# Patient Record
Sex: MALE | Employment: OTHER | URBAN - METROPOLITAN AREA
[De-identification: names, ages, dates, MRNs, and addresses within clinical notes are randomized per-mention and may not be internally consistent; named-entity substitution may affect disease eponyms.]

---

## 2024-11-18 ENCOUNTER — HOSPITAL ENCOUNTER (INPATIENT)
Facility: HOSPITAL | Age: 74
End: 2024-11-18
Attending: INTERNAL MEDICINE | Admitting: INTERNAL MEDICINE
Payer: MEDICARE

## 2024-11-18 ENCOUNTER — APPOINTMENT (OUTPATIENT)
Dept: RADIOLOGY | Facility: HOSPITAL | Age: 74
DRG: 235 | End: 2024-11-18
Payer: MEDICARE

## 2024-11-18 ENCOUNTER — APPOINTMENT (OUTPATIENT)
Dept: CARDIOLOGY | Facility: HOSPITAL | Age: 74
DRG: 235 | End: 2024-11-18
Payer: MEDICARE

## 2024-11-18 ENCOUNTER — APPOINTMENT (OUTPATIENT)
Dept: CARDIOLOGY | Facility: HOSPITAL | Age: 74
End: 2024-11-18
Payer: MEDICARE

## 2024-11-18 DIAGNOSIS — D64.9 ANEMIA, UNSPECIFIED TYPE: ICD-10-CM

## 2024-11-18 DIAGNOSIS — R07.9 CHEST PAIN, UNSPECIFIED: ICD-10-CM

## 2024-11-18 DIAGNOSIS — I21.21 ST ELEVATION MYOCARDIAL INFARCTION INVOLVING LEFT CIRCUMFLEX CORONARY ARTERY (MULTI): ICD-10-CM

## 2024-11-18 DIAGNOSIS — I21.02 ST ELEVATION MYOCARDIAL INFARCTION INVOLVING LEFT ANTERIOR DESCENDING (LAD) CORONARY ARTERY (MULTI): ICD-10-CM

## 2024-11-18 DIAGNOSIS — N17.9 ACUTE KIDNEY INJURY (CMS-HCC): ICD-10-CM

## 2024-11-18 DIAGNOSIS — R09.89 OTHER SPECIFIED SYMPTOMS AND SIGNS INVOLVING THE CIRCULATORY AND RESPIRATORY SYSTEMS: ICD-10-CM

## 2024-11-18 DIAGNOSIS — I73.9 PERIPHERAL VASCULAR DISEASE, UNSPECIFIED (CMS-HCC): ICD-10-CM

## 2024-11-18 DIAGNOSIS — I21.3 STEMI (ST ELEVATION MYOCARDIAL INFARCTION) (MULTI): Primary | ICD-10-CM

## 2024-11-18 DIAGNOSIS — I25.701: ICD-10-CM

## 2024-11-18 DIAGNOSIS — I31.4 CARDIAC TAMPONADE: ICD-10-CM

## 2024-11-18 DIAGNOSIS — D50.9 IRON DEFICIENCY ANEMIA, UNSPECIFIED IRON DEFICIENCY ANEMIA TYPE: ICD-10-CM

## 2024-11-18 DIAGNOSIS — R57.9 SHOCK (MULTI): ICD-10-CM

## 2024-11-18 DIAGNOSIS — R57.0 CARDIOGENIC SHOCK (MULTI): ICD-10-CM

## 2024-11-18 DIAGNOSIS — N17.0 ACUTE KIDNEY INJURY (AKI) WITH ACUTE TUBULAR NECROSIS (ATN) (CMS-HCC): ICD-10-CM

## 2024-11-18 DIAGNOSIS — Z95.1 S/P CABG X 4: ICD-10-CM

## 2024-11-18 DIAGNOSIS — I79.8 OTHER DISORDERS OF ARTERIES, ARTERIOLES AND CAPILLARIES IN DISEASES CLASSIFIED ELSEWHERE: ICD-10-CM

## 2024-11-18 DIAGNOSIS — Z01.818 ENCOUNTER FOR OTHER PREPROCEDURAL EXAMINATION: ICD-10-CM

## 2024-11-18 LAB
ABO GROUP (TYPE) IN BLOOD: NORMAL
ALBUMIN SERPL BCP-MCNC: 3.9 G/DL (ref 3.4–5)
AMPHETAMINES UR QL SCN: ABNORMAL
ANION GAP BLDA CALCULATED.4IONS-SCNC: 12 MMO/L (ref 10–25)
ANION GAP SERPL CALC-SCNC: 16 MMOL/L (ref 10–20)
ANTIBODY SCREEN: NORMAL
AORTIC VALVE PEAK VELOCITY: 1.64 M/S
APTT PPP: 43 SECONDS (ref 27–38)
ATRIAL RATE: 75 BPM
AV PEAK GRADIENT: 11 MMHG
AVA (PEAK VEL): 2.06 CM2
BARBITURATES UR QL SCN: ABNORMAL
BASE EXCESS BLDA CALC-SCNC: 3.1 MMOL/L (ref -2–3)
BASOPHILS # BLD AUTO: 0.04 X10*3/UL (ref 0–0.1)
BASOPHILS NFR BLD AUTO: 0.3 %
BENZODIAZ UR QL SCN: ABNORMAL
BNP SERPL-MCNC: 1470 PG/ML (ref 0–99)
BODY TEMPERATURE: 37 DEGREES CELSIUS
BUN SERPL-MCNC: 21 MG/DL (ref 6–23)
BZE UR QL SCN: ABNORMAL
CA-I BLDA-SCNC: 1.13 MMOL/L (ref 1.1–1.33)
CALCIUM SERPL-MCNC: 9 MG/DL (ref 8.6–10.6)
CANNABINOIDS UR QL SCN: ABNORMAL
CARDIAC TROPONIN I PNL SERPL HS: ABNORMAL NG/L (ref 0–53)
CARDIAC TROPONIN I PNL SERPL HS: ABNORMAL NG/L (ref 0–53)
CHLORIDE BLDA-SCNC: 93 MMOL/L (ref 98–107)
CHLORIDE SERPL-SCNC: 94 MMOL/L (ref 98–107)
CHOLEST SERPL-MCNC: 143 MG/DL (ref 0–199)
CHOLESTEROL/HDL RATIO: 4.6
CO2 SERPL-SCNC: 26 MMOL/L (ref 21–32)
CREAT SERPL-MCNC: 0.9 MG/DL (ref 0.5–1.3)
EGFRCR SERPLBLD CKD-EPI 2021: 90 ML/MIN/1.73M*2
EJECTION FRACTION APICAL 4 CHAMBER: 33.1
EJECTION FRACTION: 23 %
EOSINOPHIL # BLD AUTO: 0 X10*3/UL (ref 0–0.4)
EOSINOPHIL NFR BLD AUTO: 0 %
ERYTHROCYTE [DISTWIDTH] IN BLOOD BY AUTOMATED COUNT: 13.2 % (ref 11.5–14.5)
EST. AVERAGE GLUCOSE BLD GHB EST-MCNC: 134 MG/DL
FENTANYL+NORFENTANYL UR QL SCN: ABNORMAL
GLUCOSE BLD MANUAL STRIP-MCNC: 177 MG/DL (ref 74–99)
GLUCOSE BLD MANUAL STRIP-MCNC: 180 MG/DL (ref 74–99)
GLUCOSE BLD MANUAL STRIP-MCNC: 202 MG/DL (ref 74–99)
GLUCOSE BLDA-MCNC: 187 MG/DL (ref 74–99)
GLUCOSE SERPL-MCNC: 168 MG/DL (ref 74–99)
HBA1C MFR BLD: 6.3 %
HCO3 BLDA-SCNC: 25.9 MMOL/L (ref 22–26)
HCT VFR BLD AUTO: 51.3 % (ref 41–52)
HCT VFR BLD EST: 53 % (ref 41–52)
HDLC SERPL-MCNC: 31.4 MG/DL
HGB BLD-MCNC: 17.3 G/DL (ref 13.5–17.5)
HGB BLDA-MCNC: 17.6 G/DL (ref 13.5–17.5)
IMM GRANULOCYTES # BLD AUTO: 0.08 X10*3/UL (ref 0–0.5)
IMM GRANULOCYTES NFR BLD AUTO: 0.5 % (ref 0–0.9)
INHALED O2 CONCENTRATION: 21 %
INR PPP: 1.3 (ref 0.9–1.1)
LACTATE BLDA-SCNC: 1.3 MMOL/L (ref 0.4–2)
LACTATE SERPL-SCNC: 1.1 MMOL/L (ref 0.4–2)
LDLC SERPL CALC-MCNC: 85 MG/DL
LEFT ATRIUM VOLUME AREA LENGTH INDEX BSA: 39.4 ML/M2
LEFT VENTRICULAR OUTFLOW TRACT DIAMETER: 2 CM
LYMPHOCYTES # BLD AUTO: 1.13 X10*3/UL (ref 0.8–3)
LYMPHOCYTES NFR BLD AUTO: 7.6 %
MAGNESIUM SERPL-MCNC: 2.29 MG/DL (ref 1.6–2.4)
MCH RBC QN AUTO: 27.5 PG (ref 26–34)
MCHC RBC AUTO-ENTMCNC: 33.7 G/DL (ref 32–36)
MCV RBC AUTO: 81 FL (ref 80–100)
METHADONE UR QL SCN: ABNORMAL
MITRAL VALVE E/A RATIO: 4.75
MONOCYTES # BLD AUTO: 1.58 X10*3/UL (ref 0.05–0.8)
MONOCYTES NFR BLD AUTO: 10.6 %
NEUTROPHILS # BLD AUTO: 12.04 X10*3/UL (ref 1.6–5.5)
NEUTROPHILS NFR BLD AUTO: 81 %
NON HDL CHOLESTEROL: 112 MG/DL (ref 0–149)
NRBC BLD-RTO: 0 /100 WBCS (ref 0–0)
OPIATES UR QL SCN: ABNORMAL
OXYCODONE+OXYMORPHONE UR QL SCN: ABNORMAL
OXYHGB MFR BLDA: 92.8 % (ref 94–98)
P AXIS: 68 DEGREES
P OFFSET: 211 MS
P ONSET: 145 MS
PCO2 BLDA: 34 MM HG (ref 38–42)
PCP UR QL SCN: ABNORMAL
PH BLDA: 7.49 PH (ref 7.38–7.42)
PHOSPHATE SERPL-MCNC: 2.9 MG/DL (ref 2.5–4.9)
PLATELET # BLD AUTO: 208 X10*3/UL (ref 150–450)
PO2 BLDA: 72 MM HG (ref 85–95)
POTASSIUM BLDA-SCNC: 3.7 MMOL/L (ref 3.5–5.3)
POTASSIUM SERPL-SCNC: 3.8 MMOL/L (ref 3.5–5.3)
PR INTERVAL: 146 MS
PROCALCITONIN SERPL-MCNC: 0.1 NG/ML
PROTHROMBIN TIME: 15.2 SECONDS (ref 9.8–12.8)
Q ONSET: 218 MS
QRS COUNT: 13 BEATS
QRS DURATION: 88 MS
QT INTERVAL: 442 MS
QTC CALCULATION(BAZETT): 493 MS
QTC FREDERICIA: 475 MS
R AXIS: 57 DEGREES
RBC # BLD AUTO: 6.3 X10*6/UL (ref 4.5–5.9)
RH FACTOR (ANTIGEN D): NORMAL
SAO2 % BLDA: 97 % (ref 94–100)
SODIUM BLDA-SCNC: 127 MMOL/L (ref 136–145)
SODIUM SERPL-SCNC: 132 MMOL/L (ref 136–145)
T AXIS: 101 DEGREES
T OFFSET: 439 MS
TRIGL SERPL-MCNC: 135 MG/DL (ref 0–149)
TSH SERPL-ACNC: 2.05 MIU/L (ref 0.44–3.98)
UFH PPP CHRO-ACNC: 0.1 IU/ML
UFH PPP CHRO-ACNC: 0.2 IU/ML
VENTRICULAR RATE: 75 BPM
VLDL: 27 MG/DL (ref 0–40)
WBC # BLD AUTO: 14.9 X10*3/UL (ref 4.4–11.3)

## 2024-11-18 PROCEDURE — 82810 BLOOD GASES O2 SAT ONLY: CPT

## 2024-11-18 PROCEDURE — 2500000001 HC RX 250 WO HCPCS SELF ADMINISTERED DRUGS (ALT 637 FOR MEDICARE OP)

## 2024-11-18 PROCEDURE — 80307 DRUG TEST PRSMV CHEM ANLYZR: CPT

## 2024-11-18 PROCEDURE — 36415 COLL VENOUS BLD VENIPUNCTURE: CPT

## 2024-11-18 PROCEDURE — 93005 ELECTROCARDIOGRAM TRACING: CPT

## 2024-11-18 PROCEDURE — 99291 CRITICAL CARE FIRST HOUR: CPT

## 2024-11-18 PROCEDURE — 84132 ASSAY OF SERUM POTASSIUM: CPT

## 2024-11-18 PROCEDURE — 37799 UNLISTED PX VASCULAR SURGERY: CPT

## 2024-11-18 PROCEDURE — 84145 PROCALCITONIN (PCT): CPT

## 2024-11-18 PROCEDURE — 82947 ASSAY GLUCOSE BLOOD QUANT: CPT

## 2024-11-18 PROCEDURE — 84443 ASSAY THYROID STIM HORMONE: CPT

## 2024-11-18 PROCEDURE — 93306 TTE W/DOPPLER COMPLETE: CPT | Performed by: INTERNAL MEDICINE

## 2024-11-18 PROCEDURE — 83605 ASSAY OF LACTIC ACID: CPT

## 2024-11-18 PROCEDURE — 71045 X-RAY EXAM CHEST 1 VIEW: CPT | Performed by: RADIOLOGY

## 2024-11-18 PROCEDURE — 84443 ASSAY THYROID STIM HORMONE: CPT | Performed by: PHYSICIAN ASSISTANT

## 2024-11-18 PROCEDURE — 85730 THROMBOPLASTIN TIME PARTIAL: CPT

## 2024-11-18 PROCEDURE — 84484 ASSAY OF TROPONIN QUANT: CPT

## 2024-11-18 PROCEDURE — 83036 HEMOGLOBIN GLYCOSYLATED A1C: CPT

## 2024-11-18 PROCEDURE — 86850 RBC ANTIBODY SCREEN: CPT

## 2024-11-18 PROCEDURE — 80069 RENAL FUNCTION PANEL: CPT

## 2024-11-18 PROCEDURE — 2500000002 HC RX 250 W HCPCS SELF ADMINISTERED DRUGS (ALT 637 FOR MEDICARE OP, ALT 636 FOR OP/ED)

## 2024-11-18 PROCEDURE — C8929 TTE W OR WO FOL WCON,DOPPLER: HCPCS

## 2024-11-18 PROCEDURE — 83735 ASSAY OF MAGNESIUM: CPT

## 2024-11-18 PROCEDURE — 2020000001 HC ICU ROOM DAILY

## 2024-11-18 PROCEDURE — 93010 ELECTROCARDIOGRAM REPORT: CPT | Performed by: INTERNAL MEDICINE

## 2024-11-18 PROCEDURE — 85520 HEPARIN ASSAY: CPT

## 2024-11-18 PROCEDURE — 2500000004 HC RX 250 GENERAL PHARMACY W/ HCPCS (ALT 636 FOR OP/ED)

## 2024-11-18 PROCEDURE — 80320 DRUG SCREEN QUANTALCOHOLS: CPT

## 2024-11-18 PROCEDURE — 71045 X-RAY EXAM CHEST 1 VIEW: CPT

## 2024-11-18 PROCEDURE — 83880 ASSAY OF NATRIURETIC PEPTIDE: CPT

## 2024-11-18 PROCEDURE — 80061 LIPID PANEL: CPT

## 2024-11-18 PROCEDURE — 85025 COMPLETE CBC W/AUTO DIFF WBC: CPT

## 2024-11-18 RX ORDER — HYDRALAZINE HYDROCHLORIDE 10 MG/1
10 TABLET, FILM COATED ORAL 3 TIMES DAILY
Status: DISCONTINUED | OUTPATIENT
Start: 2024-11-18 | End: 2024-11-19

## 2024-11-18 RX ORDER — ACETAMINOPHEN 325 MG/1
975 TABLET ORAL EVERY 6 HOURS PRN
Status: DISCONTINUED | OUTPATIENT
Start: 2024-11-18 | End: 2024-12-04

## 2024-11-18 RX ORDER — ALLOPURINOL 100 MG/1
100 TABLET ORAL DAILY
COMMUNITY

## 2024-11-18 RX ORDER — ASPIRIN 81 MG/1
81 TABLET ORAL DAILY
Status: DISCONTINUED | OUTPATIENT
Start: 2024-11-18 | End: 2024-12-04

## 2024-11-18 RX ORDER — ATORVASTATIN CALCIUM 40 MG/1
40 TABLET, FILM COATED ORAL NIGHTLY
Status: DISCONTINUED | OUTPATIENT
Start: 2024-11-18 | End: 2024-11-19

## 2024-11-18 RX ORDER — DEXTROSE 50 % IN WATER (D50W) INTRAVENOUS SYRINGE
25
Status: DISCONTINUED | OUTPATIENT
Start: 2024-11-18 | End: 2024-12-04

## 2024-11-18 RX ORDER — INSULIN LISPRO 100 [IU]/ML
0-10 INJECTION, SOLUTION INTRAVENOUS; SUBCUTANEOUS EVERY 4 HOURS
Status: DISCONTINUED | OUTPATIENT
Start: 2024-11-18 | End: 2024-11-19

## 2024-11-18 RX ORDER — PANTOPRAZOLE SODIUM 40 MG/10ML
40 INJECTION, POWDER, LYOPHILIZED, FOR SOLUTION INTRAVENOUS DAILY
Status: DISCONTINUED | OUTPATIENT
Start: 2024-11-18 | End: 2024-11-19

## 2024-11-18 RX ORDER — EPTIFIBATIDE 0.75 MG/ML
2 INJECTION, SOLUTION INTRAVENOUS CONTINUOUS
Status: DISCONTINUED | OUTPATIENT
Start: 2024-11-18 | End: 2024-11-18

## 2024-11-18 RX ORDER — HEPARIN SODIUM 10000 [USP'U]/100ML
0-4000 INJECTION, SOLUTION INTRAVENOUS CONTINUOUS
Status: DISCONTINUED | OUTPATIENT
Start: 2024-11-18 | End: 2024-11-20

## 2024-11-18 RX ORDER — DEXTROSE 50 % IN WATER (D50W) INTRAVENOUS SYRINGE
12.5
Status: DISCONTINUED | OUTPATIENT
Start: 2024-11-18 | End: 2024-12-04

## 2024-11-18 SDOH — SOCIAL STABILITY: SOCIAL INSECURITY: WITHIN THE LAST YEAR, HAVE YOU BEEN HUMILIATED OR EMOTIONALLY ABUSED IN OTHER WAYS BY YOUR PARTNER OR EX-PARTNER?: NO

## 2024-11-18 SDOH — SOCIAL STABILITY: SOCIAL INSECURITY
WITHIN THE LAST YEAR, HAVE YOU BEEN RAPED OR FORCED TO HAVE ANY KIND OF SEXUAL ACTIVITY BY YOUR PARTNER OR EX-PARTNER?: NO

## 2024-11-18 SDOH — HEALTH STABILITY: MENTAL HEALTH: HOW MANY DRINKS CONTAINING ALCOHOL DO YOU HAVE ON A TYPICAL DAY WHEN YOU ARE DRINKING?: 1 OR 2

## 2024-11-18 SDOH — SOCIAL STABILITY: SOCIAL INSECURITY: WITHIN THE LAST YEAR, HAVE YOU BEEN AFRAID OF YOUR PARTNER OR EX-PARTNER?: NO

## 2024-11-18 SDOH — ECONOMIC STABILITY: INCOME INSECURITY: IN THE PAST 12 MONTHS HAS THE ELECTRIC, GAS, OIL, OR WATER COMPANY THREATENED TO SHUT OFF SERVICES IN YOUR HOME?: NO

## 2024-11-18 SDOH — SOCIAL STABILITY: SOCIAL INSECURITY: DO YOU FEEL ANYONE HAS EXPLOITED OR TAKEN ADVANTAGE OF YOU FINANCIALLY OR OF YOUR PERSONAL PROPERTY?: NO

## 2024-11-18 SDOH — SOCIAL STABILITY: SOCIAL INSECURITY: ABUSE: ADULT

## 2024-11-18 SDOH — SOCIAL STABILITY: SOCIAL INSECURITY: DOES ANYONE TRY TO KEEP YOU FROM HAVING/CONTACTING OTHER FRIENDS OR DOING THINGS OUTSIDE YOUR HOME?: NO

## 2024-11-18 SDOH — SOCIAL STABILITY: SOCIAL INSECURITY
WITHIN THE LAST YEAR, HAVE YOU BEEN KICKED, HIT, SLAPPED, OR OTHERWISE PHYSICALLY HURT BY YOUR PARTNER OR EX-PARTNER?: NO

## 2024-11-18 SDOH — HEALTH STABILITY: MENTAL HEALTH: HOW OFTEN DO YOU HAVE SIX OR MORE DRINKS ON ONE OCCASION?: NEVER

## 2024-11-18 SDOH — HEALTH STABILITY: MENTAL HEALTH: HOW OFTEN DO YOU HAVE A DRINK CONTAINING ALCOHOL?: NEVER

## 2024-11-18 SDOH — ECONOMIC STABILITY: FOOD INSECURITY
WITHIN THE PAST 12 MONTHS, YOU WORRIED THAT YOUR FOOD WOULD RUN OUT BEFORE YOU GOT THE MONEY TO BUY MORE.: PATIENT DECLINED

## 2024-11-18 SDOH — SOCIAL STABILITY: SOCIAL INSECURITY: HAVE YOU HAD THOUGHTS OF HARMING ANYONE ELSE?: NO

## 2024-11-18 SDOH — ECONOMIC STABILITY: FOOD INSECURITY: WITHIN THE PAST 12 MONTHS, THE FOOD YOU BOUGHT JUST DIDN'T LAST AND YOU DIDN'T HAVE MONEY TO GET MORE.: PATIENT DECLINED

## 2024-11-18 SDOH — HEALTH STABILITY: MENTAL HEALTH: HOW OFTEN DO YOU HAVE A DRINK CONTAINING ALCOHOL?: MONTHLY OR LESS

## 2024-11-18 SDOH — SOCIAL STABILITY: SOCIAL INSECURITY: ARE THERE ANY APPARENT SIGNS OF INJURIES/BEHAVIORS THAT COULD BE RELATED TO ABUSE/NEGLECT?: NO

## 2024-11-18 SDOH — SOCIAL STABILITY: SOCIAL INSECURITY: ARE YOU OR HAVE YOU BEEN THREATENED OR ABUSED PHYSICALLY, EMOTIONALLY, OR SEXUALLY BY ANYONE?: NO

## 2024-11-18 SDOH — SOCIAL STABILITY: SOCIAL INSECURITY: WERE YOU ABLE TO COMPLETE ALL THE BEHAVIORAL HEALTH SCREENINGS?: YES

## 2024-11-18 SDOH — SOCIAL STABILITY: SOCIAL INSECURITY: DO YOU FEEL UNSAFE GOING BACK TO THE PLACE WHERE YOU ARE LIVING?: NO

## 2024-11-18 SDOH — SOCIAL STABILITY: SOCIAL INSECURITY: HAS ANYONE EVER THREATENED TO HURT YOUR FAMILY OR YOUR PETS?: NO

## 2024-11-18 ASSESSMENT — ENCOUNTER SYMPTOMS
NAUSEA: 0
CONSTIPATION: 0
FATIGUE: 0
NERVOUS/ANXIOUS: 0
DIZZINESS: 0
MYALGIAS: 0
CHILLS: 0
BLOOD IN STOOL: 0
NUMBNESS: 0
SORE THROAT: 0
ADENOPATHY: 0
LIGHT-HEADEDNESS: 0
UNEXPECTED WEIGHT CHANGE: 0
FREQUENCY: 0
FEVER: 0
WOUND: 0
COUGH: 0
BRUISES/BLEEDS EASILY: 0
BACK PAIN: 0
JOINT SWELLING: 0
DIFFICULTY URINATING: 0
EYE REDNESS: 0
PALPITATIONS: 0
DIARRHEA: 0
POLYDIPSIA: 0
RHINORRHEA: 0
WEAKNESS: 0
VOMITING: 0
HEADACHES: 0
WHEEZING: 0
APPETITE CHANGE: 0
DYSURIA: 0
EYE DISCHARGE: 0
HEMATURIA: 0
SHORTNESS OF BREATH: 0
ABDOMINAL PAIN: 0

## 2024-11-18 ASSESSMENT — LIFESTYLE VARIABLES
SKIP TO QUESTIONS 9-10: 1
HOW MANY STANDARD DRINKS CONTAINING ALCOHOL DO YOU HAVE ON A TYPICAL DAY: 1 OR 2
AUDIT-C TOTAL SCORE: 1
SKIP TO QUESTIONS 9-10: 1
SKIP TO QUESTIONS 9-10: 1
AUDIT-C TOTAL SCORE: 1
HOW OFTEN DO YOU HAVE 6 OR MORE DRINKS ON ONE OCCASION: NEVER
AUDIT-C TOTAL SCORE: 1
HOW OFTEN DO YOU HAVE A DRINK CONTAINING ALCOHOL: MONTHLY OR LESS
AUDIT-C TOTAL SCORE: 0

## 2024-11-18 ASSESSMENT — PATIENT HEALTH QUESTIONNAIRE - PHQ9
2. FEELING DOWN, DEPRESSED OR HOPELESS: NOT AT ALL
SUM OF ALL RESPONSES TO PHQ9 QUESTIONS 1 & 2: 0
1. LITTLE INTEREST OR PLEASURE IN DOING THINGS: NOT AT ALL

## 2024-11-18 ASSESSMENT — COLUMBIA-SUICIDE SEVERITY RATING SCALE - C-SSRS
6. HAVE YOU EVER DONE ANYTHING, STARTED TO DO ANYTHING, OR PREPARED TO DO ANYTHING TO END YOUR LIFE?: NO
1. IN THE PAST MONTH, HAVE YOU WISHED YOU WERE DEAD OR WISHED YOU COULD GO TO SLEEP AND NOT WAKE UP?: NO
2. HAVE YOU ACTUALLY HAD ANY THOUGHTS OF KILLING YOURSELF?: NO

## 2024-11-18 ASSESSMENT — PAIN SCALES - GENERAL
PAINLEVEL_OUTOF10: 0 - NO PAIN
PAINLEVEL_OUTOF10: 8
PAINLEVEL_OUTOF10: 3
PAINLEVEL_OUTOF10: 0 - NO PAIN
PAINLEVEL_OUTOF10: 9

## 2024-11-18 ASSESSMENT — PAIN - FUNCTIONAL ASSESSMENT
PAIN_FUNCTIONAL_ASSESSMENT: 0-10

## 2024-11-18 ASSESSMENT — COGNITIVE AND FUNCTIONAL STATUS - GENERAL
MOBILITY SCORE: 24
DAILY ACTIVITIY SCORE: 24
PATIENT BASELINE BEDBOUND: NO

## 2024-11-18 ASSESSMENT — ACTIVITIES OF DAILY LIVING (ADL)
DRESSING YOURSELF: INDEPENDENT
JUDGMENT_ADEQUATE_SAFELY_COMPLETE_DAILY_ACTIVITIES: YES
PATIENT'S MEMORY ADEQUATE TO SAFELY COMPLETE DAILY ACTIVITIES?: YES
TOILETING: INDEPENDENT
LACK_OF_TRANSPORTATION: NO
BATHING: INDEPENDENT
WALKS IN HOME: INDEPENDENT
GROOMING: INDEPENDENT
LACK_OF_TRANSPORTATION: NO
ASSISTIVE_DEVICE: EYEGLASSES
HEARING - RIGHT EAR: FUNCTIONAL
ADEQUATE_TO_COMPLETE_ADL: YES
FEEDING YOURSELF: INDEPENDENT
HEARING - LEFT EAR: FUNCTIONAL

## 2024-11-18 ASSESSMENT — PAIN DESCRIPTION - DESCRIPTORS: DESCRIPTORS: ACHING

## 2024-11-18 NOTE — CONSULTS
"CARDIAC SURGERY CONSULT NOTE    Reason For Consult  CABG  evaluation    History Of Present Illness  Edu Echavarria is a 74 y.o. male with a PMH HTN, GERD, and gout who is presenting with chest pain approximately 24 hrs ago. He went to an OSH where he was diagnosed with suspicion for inferior wall MI. A LHC was performed demonstrated mvCAD. An IABP was placed and the patient was transferred to [facility] for escalation of care.     At the time of my evaluation, he states that this was his first episode of pain. He denied having current pain or SOB. He used to smoke but quit in 1984. He is retired but takes care of his ADL's. He states that he would have no difficulty walking a block and can climb a flight of stairs (perhaps 2).      Past Medical History  HTN  GERD  Gout    Surgical History  He has no past surgical history on file.     Social History  He reports that he quit smoking about 52 years ago. His smoking use included cigarettes. He started smoking about 39 years ago. He has never used smokeless tobacco. He reports current drug use. Drug: Marijuana. No history on file for alcohol use.    Family History  No family history on file.     Allergies  Patient has no known allergies.    Review of Systems  Negative except for the aforementioned     Physical Exam  General: NAD   Neuro: No focal deficits. Aox4  HENT: Atraumatic / normocephalic; supple neck; no carotid bruit; Trachea midline  Chest: Symmetric chest rise. No crepitus. No obvious deformity  Lungs: CTAB  CV: S1 and S2 are audible. No MRG. Palpable peripheral pulses  Abdomen: Soft, NT / ND. No masses.   Inguinal: Palpable femoral pulses  Extremities: WWP  Skin: Moist. No rash     Last Recorded Vitals  Blood pressure (!) 160/99, pulse 79, temperature 36.5 °C (97.7 °F), temperature source Temporal, resp. rate 21, height 1.702 m (5' 7\"), weight 82.6 kg (182 lb), SpO2 95%.    Relevant Results  LHC performed on 11/17  Per report:  It showed diffusely diseae LAD (prox 70%, " mid 90%, distal 90%), Lcx mod to severe diffusely disease, OM1 80%, OM3 distally occluded with intra systemic collaterals, ramus absent, RCA mod disease in mid segment non dom. Given multivessel disease and collaterals to distally occluded OM3      Assessment/Plan   Patient is a 74-year old male without major comorbidities who presents with inferior wall STEMI. He is now in the ICU with an IABP. He is stable on Hep and Eptifibatide gtt's. His estimated EF on the [facility] was 25%. Certainly, he will need revascularization and seems to be an appropriate surgical candidate. I do have concerns about the nature of his LAD disease which is diffused and the benefit that a LIMA would provide. Will discuss this with Dr. Villa (Attending on call). In the mean time, we will follow up with the echo for a repeat assessment of his cardiac function. Further recs to follow.    Glen Foreman MD  Cardiac Surgery Fellow  PGY-7  Pager: [phone]

## 2024-11-18 NOTE — CARE PLAN
Problem: Pain - Adult  Goal: Verbalizes/displays adequate comfort level or baseline comfort level  Outcome: Progressing     Problem: Safety - Adult  Goal: Free from fall injury  Outcome: Progressing     Problem: Discharge Planning  Goal: Discharge to home or other facility with appropriate resources  Outcome: Progressing     Problem: Chronic Conditions and Co-morbidities  Goal: Patient's chronic conditions and co-morbidity symptoms are monitored and maintained or improved  Outcome: Progressing     Problem: Skin  Goal: Participates in plan/prevention/treatment measures  Outcome: Progressing  Goal: Prevent/manage excess moisture  Outcome: Progressing  Goal: Prevent/minimize sheer/friction injuries  Outcome: Progressing

## 2024-11-18 NOTE — NURSING NOTE
Multidisciplinary Device Rounds Note    Attendance:    CICU Attending: Yesenia  CICU Fellow: Dr. Lai  HFICU Attending: Unavailable      Device in Place: IABP    Device Concerns: None at this time    Indications for Upgrade: None-patient progressing appropriately, not currently on any vasopressors or inotropes    Hemodynamic/Vent/Device Management: Patient is on RA and hemodynamically stable at this time. IABP is 1:1 with max augmentation    Bleeding/Limb Concerns: Positive pedal pulses in RLE-no concerns at this time    Nursing Concerns: None        eptifibatide, 2 mcg/kg/min, Last Rate: 2 mcg/kg/min (11/18/24 1145)  heparin, 0-4,000 Units/hr, Last Rate: 1,000 Units/hr (11/18/24 1117)        Invasive Hemodynamics:    Most Recent Range Past 24hrs   BP (Art) 168/64 Arterial Line BP 1  Min: 154/59  Max: 186/51   MAP(Art) 114 mmHg Arterial Line MAP 1 (mmHg)   Min: 102 mmHg  Max: 114 mmHg

## 2024-11-18 NOTE — PROGRESS NOTES
Pharmacy Medication History Review    Edu Echavarria is a 74 y.o. male admitted for STEMI (ST elevation myocardial infarction) (Multi). Pharmacy reviewed the patient's qbkhm-yr-bykunikdx medications and allergies for accuracy.    The list below reflects the updated PTA list.   Prior to Admission Medications   Prescriptions Last Dose Informant   allopurinol (Zyloprim) 100 mg tablet  Self   Sig: Take 1 tablet (100 mg) by mouth once daily.    Patient says he takes 1/2 tablet daily , no fill at any local pharmacy . Called to verify at [facility] and said no fill history.    metoprolol succinate XL (Kapspargo Sprinkle) 100 mg 24 hr capsule  Self   Sig: Take 1 capsule (100 mg) by mouth once daily. Capsules must be opened and contents sprinkled on a small amount of soft food or liquid (non-warmed) and administered within 15 minutes of preparation. Do not crush or chew granules.      Patient says he gets from [facility] could not find fill history       Facility-Administered Medications: None        The list below reflects the updated allergy list. Please review each documented allergy for additional clarification and justification.  Allergies  Reviewed by Anna Escobar MD on 11/18/2024   No Known Allergies         Patient accepts M2B at discharge. Pharmacy has been updated to [facility].    Sources used to complete the med history include:    [facility]  Pharmacy dispense history  Patient interview Moderate historian  Chart Review  Care Everywhere     Below are additional concerns with the patient's PTA list.  Patient says he currently only takes 2 medications although could not find any fill history . Called his local pharmacy he said he goes to and there was no fill history at all [facility] [phone]    Medications ADDED:  Allopurinol  Metoprolol succinate    Medications CHANGED:  none  Medications REMOVED:   none    [facility].   Transitions of Care Pharmacist  [facility]  Please  reach out via Secure Chat for questions, or if no response call [employer] or [facility]

## 2024-11-18 NOTE — H&P
History of present illness:  Edu Echavarria is a 74 y.o. male with a PMHx of HTN, GERD, and gout who presented to [facility] ED on 11/17pm with 2d hx of intermittent palpitations and chest pain. These symptoms started on 11/16, self-resolved, and then recurred on 11/17 which is why he presented to the ED.     Initial workup remarkable for Hstrop >27,027, BNP 1100. Initial EKG reportedly Aflutter w/ RVR w/o ST elevations. Pt started on dilt gtt. Repeat EKG showed ST elevations in II/III/avF/V1-V3 (difficult to interpret on photocopied EKG). At that time chest pain had improved 10 > 2. Trops continued to increase and pt felt more chest pressure overnight with repet EKG reportedly showing more pronounced ST elevations in anterior and inferior leads and therefore code STEMI called. Pt was then loaded with aspirin 324mg, plavix 300mg, and heparin gtt. Initially pt declined LHC due to his father previously having a complication with HC, however ultimately agreed to radial access LHC. LHC performed with showed diffusely diseae LAD (prox 70%, mid 90%, distal 90%), Lcx mod to severe diffusely disease, OM1 80%, OM3 distally occluded with intra systemic collaterals, ramus absent, RCA mod disease in mid segment non dom. Given multivessel disease and collaterals to distally occluded OM3, IABP was placed without stenting. Started on integrillin and continued on heparin gtt. Transferred to [facility] for CABG evaluation.     Pt arrived to CICU on room air, heparin and integrillin, and on IABP. Pt reports he hasn't had chest pain since admission 11/17. Pt denies dizziness, headache, chest pain, SOB, nausea, vomiting, abdominal pain, hematochezia, melena, dysuria, hematuria.     While in the ED:  - Vital Signs: °C 36.8/ /90/ HR 95/ RR 18/ 95% on RA     Labs:  - Labs 11/17:   CBC: WBC 15, Hgb 18.7, plt 213   BMP: Na 131, K3.8 , Cl 93, HCO3 28, BUN 18, Cr 1.0, glu 162   LFT: Ca 9.5, tprot -, alb -, alkphos -, AST -, ALT -, tbili -,  dbili -   Mg -, phos -   Heme: PT 15, INR 1.34, aPTT 44.1   hs-TnI >52252, BNP 1100, lactate -   BCx x2 drawn    Imaging:  [facility] 11/18?:  LAD: large vessel which is extensively and diffusely diseased. Prox/ostial LAD evidence of a hazy eccentric stensois ~70%. LAD w/ evidence of extensive disease w/ stenosis in its proximal part of approx 65-70% followed by aneursymatic dilatation jutp roximal to first septal , which si folllowed by his stenosis in range of 70%. Mid LAD has evidence of a stenosis in range of 90%. Distal LAD is severely diseased with evidence of a long lesion in the range of 90%.     D1: small  D2: mod sized, mildly diffusely disease  Lcx: large anatomically dominant vessel which gives off 2 obtuse marginals, mod to severe diffuse disease. Setnosis medicine 1.1.1.1 at the bifucation/takeoff of 3rd OM.   Om1: mod sized vessel with evidence of severe stnesosis in range of 80%  OM2: small  OM3: large vessel with evidence of severe ostial stenosis/bifrucation medicine 1.1.1.1. more distally the vessel is totally occluded. Distal Oms are visualized thought he itnra systemic collaterals. Ramyus intermedius absent.   RCA: non dominant with evcieence of moderte disease in its mid segment.     LVEDP: 46 mmHg  Estimated LVEF 20-25% with evidence of extensive global hypokinesis.     Past Medical History:  See above    Past Surgical History:  Tonsillectomy at 5yo     Social history:  Home: lives alone  Smoking: Smoked for 15yrs, stopped 1985   Alcohol: last drink about 8yrs ago per pt  Drugs: intermittent marijuana, cocaine, and xanax use. Last used cocaine 2-3 wks ago    Allergies:  Patient has no known allergies.    Home medications:  Metop succ 100mg daily  Allopurinol 100mg daily    Meds from OSH:  Dilt gtt (stopped)  Hep gtt  Tylenol  Aspirin 324mg + 81mg daily  Plavix loaded 11/17 2100  Fent 50mcg  Atorva 80mg qhs  Midazolam 1mg  Nicardipine gtt  NG gtt  Pantoprazole 40  Plavix 75  NS 2L  "  Verapamil 2.5 IV PRN  Zofran PRN     Review of systems  Review of Systems   Constitutional:  Negative for appetite change, chills, fatigue, fever and unexpected weight change.   HENT:  Negative for rhinorrhea and sore throat.    Eyes:  Negative for discharge and redness.   Respiratory:  Negative for cough, shortness of breath and wheezing.    Cardiovascular:  Negative for chest pain, palpitations and leg swelling.   Gastrointestinal:  Negative for abdominal pain, blood in stool, constipation, diarrhea, nausea and vomiting.   Endocrine: Negative for polydipsia and polyuria.   Genitourinary:  Negative for difficulty urinating, dysuria, frequency, hematuria and urgency.   Musculoskeletal:  Negative for back pain, joint swelling and myalgias.   Skin:  Negative for rash and wound.   Neurological:  Negative for dizziness, syncope, weakness, light-headedness, numbness and headaches.   Hematological:  Negative for adenopathy. Does not bruise/bleed easily.   Psychiatric/Behavioral:  Negative for behavioral problems. The patient is not nervous/anxious.         Objective     Visit Vitals  BP (!) 160/99   Pulse 79   Temp 36.2 °C (97.2 °F) (Temporal)   Resp 21   Ht 1.702 m (5' 7\")   Wt 82.6 kg (182 lb)   SpO2 95%   BMI 28.51 kg/m²   Smoking Status Former   BSA 1.98 m²      Physical exam:  Constitutional: No acute distress, resting comfortably in bed, cooperative  HEENT: Normocephalic, atraumatic, PERRLA, EOMI, moist mucous membranes  Cardiovascular: RRR, normal S1/S2, no murmurs noted. R art line via sheath. IABP R fem  Pulmonary: Clear to auscultation b/l, no wheezes/crackles/rhonchi, no increased work of breathing, no supplemental oxygen  GI: Soft, non-tender, non-distended, normoactive bowel sounds  : External hawthorne  Lower extremities: Warm and well perfused, no lower extremity edema  Neuro: A&O x3, able to move all 4 extremities spontaneously  Skin: No rashes or lesions noted  Psych: Appropriate mood and affect     "   Assessment/Plan   Edu Echavarria is a 74 y.o. male with a PMHx of HTN, GERD, and gout who presented to [facility] ED on 11/17pm with 2d hx of intermittent palpitations and chest pain. These symptoms started on 11/16, self-resolved, and then recurred on 11/17 which is why he presented to the ED. Initial workup remarkable for Hstrop >27,027, BNP 1100. Initial EKG reportedly Aflutter w/ RVR w/o ST elevations. Pt started on dilt gtt. Repeat EKG showed ST elevations in II/III/avF/V1-V3 (difficult to interpret on photocopied EKG). At that time chest pain had improved 10 > 2. Trops continued to increase and pt felt more chest pressure overnight with repet EKG reportedly showing more pronounced ST elevations in anterior and inferior leads and therefore code STEMI called. Pt was then loaded with aspirin 324mg, plavix 300mg, and heparin gtt. Initially pt declined LHC due to his father previously having a complication with HC, however ultimately agreed to radial access LHC. LHC performed with showed diffusely diseae LAD (prox 70%, mid 90%, distal 90%), Lcx mod to severe diffusely disease, OM1 80%, OM3 distally occluded with intra systemic collaterals, ramus absent, RCA mod disease in mid segment non dom. Given multivessel disease and collaterals to distally occluded OM3, IABP was placed without stenting. Started on integrillin and continued on heparin gtt. Transferred to [facility] for CABG evaluation.     Neuro:  - No active issues    Cardiovascular:  # STEMI  # Triple vessel disease  :: HS trop >67147 at OSH  :: HS trop at : 32877 > 51661  :: LHC 11/18: Triple vessel disease (LAD prox 70%, mid 65-70%, dist 90%), Lcx OM1 80%, OM2 small, OM3 occluded with collaterals, RCA mid moderate disease. LVEDP 46mmHg, LVEF 20-25%, evidence of extensive global hypokinesis  :: Arrived integrillin and heparin gtt  :: Aspirin and plavix loaded 11/17 2100    Plan:  [] ASA 81mg daily  [] Heparin gtt  [] Atorva 40mg at bedtime  [] CT surgery  consult    Respiratory:  - No active issues    GI:  # GERD  [] Pantoprazole 40mg daily IV    Renal/:  - No active issues    Heme/Onc:  - No active issues    Endo:  - No active issues    ID:  - No active issues    MSK/Rheum:  # Gout  [] hold allopurinol    Derm:  - No active issues    Psych:  - No active issues    Access: R radial art line  Tubes/drains: External hawthorne  O2: RA  Diet: Regular diet  GI ppx: PPI  DVT ppx: Heparin gtt  Code status: Full code (confirmed on admission)  Surrogate medical decision maker:   Guillermo Barrios Jr (cousin)   [phone]  Wife's #: [phone]    Anna Escobar MD  Internal Medicine PGY-3

## 2024-11-19 ENCOUNTER — APPOINTMENT (OUTPATIENT)
Dept: RADIOLOGY | Facility: HOSPITAL | Age: 74
End: 2024-11-19
Payer: MEDICARE

## 2024-11-19 LAB
ALBUMIN SERPL BCP-MCNC: 3.8 G/DL (ref 3.4–5)
ALP SERPL-CCNC: 53 U/L (ref 33–136)
ALT SERPL W P-5'-P-CCNC: 30 U/L (ref 10–52)
ANION GAP SERPL CALC-SCNC: 12 MMOL/L (ref 10–20)
APAP SERPL-MCNC: <10 UG/ML
APPEARANCE UR: CLEAR
APTT PPP: 64 SECONDS (ref 27–38)
AST SERPL W P-5'-P-CCNC: 100 U/L (ref 9–39)
BASOPHILS # BLD AUTO: 0.05 X10*3/UL (ref 0–0.1)
BASOPHILS NFR BLD AUTO: 0.4 %
BILIRUB DIRECT SERPL-MCNC: 0.7 MG/DL (ref 0–0.3)
BILIRUB SERPL-MCNC: 2 MG/DL (ref 0–1.2)
BILIRUB UR STRIP.AUTO-MCNC: NEGATIVE MG/DL
BUN SERPL-MCNC: 21 MG/DL (ref 6–23)
CALCIUM SERPL-MCNC: 8.8 MG/DL (ref 8.6–10.6)
CHLORIDE SERPL-SCNC: 94 MMOL/L (ref 98–107)
CO2 SERPL-SCNC: 28 MMOL/L (ref 21–32)
COLOR UR: YELLOW
CREAT SERPL-MCNC: 0.92 MG/DL (ref 0.5–1.3)
EGFRCR SERPLBLD CKD-EPI 2021: 88 ML/MIN/1.73M*2
EOSINOPHIL # BLD AUTO: 0.08 X10*3/UL (ref 0–0.4)
EOSINOPHIL NFR BLD AUTO: 0.6 %
ERYTHROCYTE [DISTWIDTH] IN BLOOD BY AUTOMATED COUNT: 13.1 % (ref 11.5–14.5)
ETHANOL SERPL-MCNC: <10 MG/DL
GLUCOSE BLD MANUAL STRIP-MCNC: 138 MG/DL (ref 74–99)
GLUCOSE BLD MANUAL STRIP-MCNC: 144 MG/DL (ref 74–99)
GLUCOSE BLD MANUAL STRIP-MCNC: 157 MG/DL (ref 74–99)
GLUCOSE BLD MANUAL STRIP-MCNC: 185 MG/DL (ref 74–99)
GLUCOSE BLD MANUAL STRIP-MCNC: 189 MG/DL (ref 74–99)
GLUCOSE BLD MANUAL STRIP-MCNC: 241 MG/DL (ref 74–99)
GLUCOSE SERPL-MCNC: 146 MG/DL (ref 74–99)
GLUCOSE UR STRIP.AUTO-MCNC: ABNORMAL MG/DL
HCT VFR BLD AUTO: 46.4 % (ref 41–52)
HGB BLD-MCNC: 16.4 G/DL (ref 13.5–17.5)
IMM GRANULOCYTES # BLD AUTO: 0.04 X10*3/UL (ref 0–0.5)
IMM GRANULOCYTES NFR BLD AUTO: 0.3 % (ref 0–0.9)
INR PPP: 1.4 (ref 0.9–1.1)
KETONES UR STRIP.AUTO-MCNC: ABNORMAL MG/DL
LEUKOCYTE ESTERASE UR QL STRIP.AUTO: NEGATIVE
LYMPHOCYTES # BLD AUTO: 1.81 X10*3/UL (ref 0.8–3)
LYMPHOCYTES NFR BLD AUTO: 13 %
MAGNESIUM SERPL-MCNC: 2.16 MG/DL (ref 1.6–2.4)
MCH RBC QN AUTO: 28.8 PG (ref 26–34)
MCHC RBC AUTO-ENTMCNC: 35.3 G/DL (ref 32–36)
MCV RBC AUTO: 82 FL (ref 80–100)
MONOCYTES # BLD AUTO: 1.6 X10*3/UL (ref 0.05–0.8)
MONOCYTES NFR BLD AUTO: 11.5 %
MUCOUS THREADS #/AREA URNS AUTO: NORMAL /LPF
NEUTROPHILS # BLD AUTO: 10.36 X10*3/UL (ref 1.6–5.5)
NEUTROPHILS NFR BLD AUTO: 74.2 %
NITRITE UR QL STRIP.AUTO: NEGATIVE
NRBC BLD-RTO: 0 /100 WBCS (ref 0–0)
PH UR STRIP.AUTO: 6.5 [PH]
PHOSPHATE SERPL-MCNC: 3 MG/DL (ref 2.5–4.9)
PLATELET # BLD AUTO: 187 X10*3/UL (ref 150–450)
POTASSIUM SERPL-SCNC: 3.2 MMOL/L (ref 3.5–5.3)
PROT SERPL-MCNC: 6.7 G/DL (ref 6.4–8.2)
PROT UR STRIP.AUTO-MCNC: ABNORMAL MG/DL
PROTHROMBIN TIME: 15.9 SECONDS (ref 9.8–12.8)
RBC # BLD AUTO: 5.69 X10*6/UL (ref 4.5–5.9)
RBC # UR STRIP.AUTO: NEGATIVE /UL
RBC #/AREA URNS AUTO: NORMAL /HPF
SALICYLATES SERPL-MCNC: <3 MG/DL
SODIUM SERPL-SCNC: 131 MMOL/L (ref 136–145)
SP GR UR STRIP.AUTO: 1.03
TSH SERPL-ACNC: 1.9 MIU/L (ref 0.44–3.98)
UFH PPP CHRO-ACNC: 0.2 IU/ML
UFH PPP CHRO-ACNC: 1 IU/ML
UROBILINOGEN UR STRIP.AUTO-MCNC: NORMAL MG/DL
WBC # BLD AUTO: 13.9 X10*3/UL (ref 4.4–11.3)
WBC #/AREA URNS AUTO: NORMAL /HPF

## 2024-11-19 PROCEDURE — 83735 ASSAY OF MAGNESIUM: CPT

## 2024-11-19 PROCEDURE — 2500000004 HC RX 250 GENERAL PHARMACY W/ HCPCS (ALT 636 FOR OP/ED)

## 2024-11-19 PROCEDURE — 36415 COLL VENOUS BLD VENIPUNCTURE: CPT

## 2024-11-19 PROCEDURE — 71045 X-RAY EXAM CHEST 1 VIEW: CPT

## 2024-11-19 PROCEDURE — 82947 ASSAY GLUCOSE BLOOD QUANT: CPT

## 2024-11-19 PROCEDURE — 99232 SBSQ HOSP IP/OBS MODERATE 35: CPT | Performed by: PHYSICIAN ASSISTANT

## 2024-11-19 PROCEDURE — 85730 THROMBOPLASTIN TIME PARTIAL: CPT

## 2024-11-19 PROCEDURE — 84100 ASSAY OF PHOSPHORUS: CPT

## 2024-11-19 PROCEDURE — 85610 PROTHROMBIN TIME: CPT

## 2024-11-19 PROCEDURE — 87081 CULTURE SCREEN ONLY: CPT | Performed by: PHYSICIAN ASSISTANT

## 2024-11-19 PROCEDURE — 80053 COMPREHEN METABOLIC PANEL: CPT

## 2024-11-19 PROCEDURE — 2500000005 HC RX 250 GENERAL PHARMACY W/O HCPCS: Performed by: PHYSICIAN ASSISTANT

## 2024-11-19 PROCEDURE — 2500000001 HC RX 250 WO HCPCS SELF ADMINISTERED DRUGS (ALT 637 FOR MEDICARE OP): Performed by: STUDENT IN AN ORGANIZED HEALTH CARE EDUCATION/TRAINING PROGRAM

## 2024-11-19 PROCEDURE — 71045 X-RAY EXAM CHEST 1 VIEW: CPT | Performed by: STUDENT IN AN ORGANIZED HEALTH CARE EDUCATION/TRAINING PROGRAM

## 2024-11-19 PROCEDURE — 2500000002 HC RX 250 W HCPCS SELF ADMINISTERED DRUGS (ALT 637 FOR MEDICARE OP, ALT 636 FOR OP/ED)

## 2024-11-19 PROCEDURE — 85025 COMPLETE CBC W/AUTO DIFF WBC: CPT

## 2024-11-19 PROCEDURE — 2020000001 HC ICU ROOM DAILY

## 2024-11-19 PROCEDURE — 81001 URINALYSIS AUTO W/SCOPE: CPT | Performed by: PHYSICIAN ASSISTANT

## 2024-11-19 PROCEDURE — 2500000001 HC RX 250 WO HCPCS SELF ADMINISTERED DRUGS (ALT 637 FOR MEDICARE OP)

## 2024-11-19 PROCEDURE — 82248 BILIRUBIN DIRECT: CPT

## 2024-11-19 PROCEDURE — 85520 HEPARIN ASSAY: CPT

## 2024-11-19 PROCEDURE — 99291 CRITICAL CARE FIRST HOUR: CPT

## 2024-11-19 RX ORDER — POTASSIUM CHLORIDE 20 MEQ/1
40 TABLET, EXTENDED RELEASE ORAL ONCE
Status: DISCONTINUED | OUTPATIENT
Start: 2024-11-19 | End: 2024-11-19

## 2024-11-19 RX ORDER — ALUMINUM HYDROXIDE, MAGNESIUM HYDROXIDE, AND SIMETHICONE 1200; 120; 1200 MG/30ML; MG/30ML; MG/30ML
10 SUSPENSION ORAL ONCE
Status: DISCONTINUED | OUTPATIENT
Start: 2024-11-19 | End: 2024-11-19

## 2024-11-19 RX ORDER — MUPIROCIN 20 MG/G
OINTMENT TOPICAL 2 TIMES DAILY
Status: DISPENSED | OUTPATIENT
Start: 2024-11-19 | End: 2024-11-24

## 2024-11-19 RX ORDER — ISOSORBIDE DINITRATE 10 MG/1
10 TABLET ORAL
Status: DISCONTINUED | OUTPATIENT
Start: 2024-11-19 | End: 2024-11-19

## 2024-11-19 RX ORDER — CARVEDILOL 6.25 MG/1
6.25 TABLET ORAL 2 TIMES DAILY
Status: DISCONTINUED | OUTPATIENT
Start: 2024-11-19 | End: 2024-11-19

## 2024-11-19 RX ORDER — CARVEDILOL 12.5 MG/1
12.5 TABLET ORAL ONCE
Status: DISCONTINUED | OUTPATIENT
Start: 2024-11-19 | End: 2024-11-20

## 2024-11-19 RX ORDER — HYDRALAZINE HYDROCHLORIDE 100 MG/1
100 TABLET, FILM COATED ORAL 3 TIMES DAILY
Status: DISCONTINUED | OUTPATIENT
Start: 2024-11-19 | End: 2024-11-21

## 2024-11-19 RX ORDER — PANTOPRAZOLE SODIUM 40 MG/10ML
40 INJECTION, POWDER, LYOPHILIZED, FOR SOLUTION INTRAVENOUS
Status: DISCONTINUED | OUTPATIENT
Start: 2024-11-20 | End: 2024-12-02

## 2024-11-19 RX ORDER — POTASSIUM CHLORIDE 1.5 G/1.58G
40 POWDER, FOR SOLUTION ORAL ONCE
Status: COMPLETED | OUTPATIENT
Start: 2024-11-19 | End: 2024-11-19

## 2024-11-19 RX ORDER — ESOMEPRAZOLE MAGNESIUM 40 MG/1
40 GRANULE, DELAYED RELEASE ORAL
Status: DISCONTINUED | OUTPATIENT
Start: 2024-11-20 | End: 2024-12-02

## 2024-11-19 RX ORDER — ISOSORBIDE DINITRATE 40 MG/1
40 TABLET ORAL
Status: DISCONTINUED | OUTPATIENT
Start: 2024-11-19 | End: 2024-11-25

## 2024-11-19 RX ORDER — POLYETHYLENE GLYCOL 3350 17 G/17G
17 POWDER, FOR SOLUTION ORAL DAILY
Status: DISCONTINUED | OUTPATIENT
Start: 2024-11-19 | End: 2024-11-26

## 2024-11-19 RX ORDER — ISOSORBIDE DINITRATE 20 MG/1
20 TABLET ORAL ONCE
Status: COMPLETED | OUTPATIENT
Start: 2024-11-19 | End: 2024-11-19

## 2024-11-19 RX ORDER — CYCLOBENZAPRINE HCL 10 MG
5 TABLET ORAL ONCE AS NEEDED
Status: COMPLETED | OUTPATIENT
Start: 2024-11-19 | End: 2024-11-19

## 2024-11-19 RX ORDER — ISOSORBIDE DINITRATE 10 MG/1
10 TABLET ORAL ONCE
Status: COMPLETED | OUTPATIENT
Start: 2024-11-19 | End: 2024-11-19

## 2024-11-19 RX ORDER — INSULIN LISPRO 100 [IU]/ML
0-10 INJECTION, SOLUTION INTRAVENOUS; SUBCUTANEOUS
Status: DISCONTINUED | OUTPATIENT
Start: 2024-11-19 | End: 2024-12-04

## 2024-11-19 RX ORDER — CARVEDILOL 25 MG/1
25 TABLET ORAL 2 TIMES DAILY
Status: DISCONTINUED | OUTPATIENT
Start: 2024-11-20 | End: 2024-11-21

## 2024-11-19 RX ORDER — ALUMINUM HYDROXIDE, MAGNESIUM HYDROXIDE, AND SIMETHICONE 1200; 120; 1200 MG/30ML; MG/30ML; MG/30ML
10 SUSPENSION ORAL 4 TIMES DAILY PRN
Status: DISCONTINUED | OUTPATIENT
Start: 2024-11-19 | End: 2024-11-20

## 2024-11-19 RX ORDER — CARVEDILOL 12.5 MG/1
12.5 TABLET ORAL 2 TIMES DAILY
Status: DISCONTINUED | OUTPATIENT
Start: 2024-11-19 | End: 2024-11-19

## 2024-11-19 RX ORDER — ATORVASTATIN CALCIUM 80 MG/1
80 TABLET, FILM COATED ORAL NIGHTLY
Status: DISCONTINUED | OUTPATIENT
Start: 2024-11-19 | End: 2024-12-04

## 2024-11-19 RX ORDER — HYDRALAZINE HYDROCHLORIDE 25 MG/1
25 TABLET, FILM COATED ORAL 3 TIMES DAILY
Status: DISCONTINUED | OUTPATIENT
Start: 2024-11-19 | End: 2024-11-19

## 2024-11-19 RX ORDER — HYDRALAZINE HYDROCHLORIDE 50 MG/1
50 TABLET, FILM COATED ORAL ONCE
Status: COMPLETED | OUTPATIENT
Start: 2024-11-19 | End: 2024-11-19

## 2024-11-19 RX ORDER — ISOSORBIDE DINITRATE 20 MG/1
20 TABLET ORAL
Status: DISCONTINUED | OUTPATIENT
Start: 2024-11-19 | End: 2024-11-19

## 2024-11-19 RX ORDER — HYDRALAZINE HYDROCHLORIDE 10 MG/1
10 TABLET, FILM COATED ORAL ONCE
Status: COMPLETED | OUTPATIENT
Start: 2024-11-19 | End: 2024-11-19

## 2024-11-19 RX ORDER — PANTOPRAZOLE SODIUM 40 MG/1
40 TABLET, DELAYED RELEASE ORAL
Status: DISCONTINUED | OUTPATIENT
Start: 2024-11-20 | End: 2024-12-03

## 2024-11-19 RX ORDER — HYDRALAZINE HYDROCHLORIDE 50 MG/1
50 TABLET, FILM COATED ORAL 3 TIMES DAILY
Status: DISCONTINUED | OUTPATIENT
Start: 2024-11-19 | End: 2024-11-19

## 2024-11-19 RX ORDER — POTASSIUM CHLORIDE 20 MEQ/1
40 TABLET, EXTENDED RELEASE ORAL ONCE
Status: COMPLETED | OUTPATIENT
Start: 2024-11-19 | End: 2024-11-19

## 2024-11-19 RX ORDER — ONDANSETRON HYDROCHLORIDE 2 MG/ML
4 INJECTION, SOLUTION INTRAVENOUS ONCE
Status: COMPLETED | OUTPATIENT
Start: 2024-11-19 | End: 2024-11-19

## 2024-11-19 SDOH — SOCIAL STABILITY: SOCIAL INSECURITY: WITHIN THE LAST YEAR, HAVE YOU BEEN AFRAID OF YOUR PARTNER OR EX-PARTNER?: NO

## 2024-11-19 SDOH — HEALTH STABILITY: PHYSICAL HEALTH
HOW OFTEN DO YOU NEED TO HAVE SOMEONE HELP YOU WHEN YOU READ INSTRUCTIONS, PAMPHLETS, OR OTHER WRITTEN MATERIAL FROM YOUR DOCTOR OR PHARMACY?: NEVER

## 2024-11-19 SDOH — ECONOMIC STABILITY: FOOD INSECURITY: WITHIN THE PAST 12 MONTHS, THE FOOD YOU BOUGHT JUST DIDN'T LAST AND YOU DIDN'T HAVE MONEY TO GET MORE.: NEVER TRUE

## 2024-11-19 SDOH — ECONOMIC STABILITY: HOUSING INSECURITY: IN THE LAST 12 MONTHS, WAS THERE A TIME WHEN YOU WERE NOT ABLE TO PAY THE MORTGAGE OR RENT ON TIME?: NO

## 2024-11-19 SDOH — ECONOMIC STABILITY: FOOD INSECURITY: HOW HARD IS IT FOR YOU TO PAY FOR THE VERY BASICS LIKE FOOD, HOUSING, MEDICAL CARE, AND HEATING?: NOT VERY HARD

## 2024-11-19 SDOH — SOCIAL STABILITY: SOCIAL INSECURITY: WITHIN THE LAST YEAR, HAVE YOU BEEN HUMILIATED OR EMOTIONALLY ABUSED IN OTHER WAYS BY YOUR PARTNER OR EX-PARTNER?: NO

## 2024-11-19 SDOH — ECONOMIC STABILITY: HOUSING INSECURITY: AT ANY TIME IN THE PAST 12 MONTHS, WERE YOU HOMELESS OR LIVING IN A SHELTER (INCLUDING NOW)?: NO

## 2024-11-19 SDOH — ECONOMIC STABILITY: FOOD INSECURITY: WITHIN THE PAST 12 MONTHS, YOU WORRIED THAT YOUR FOOD WOULD RUN OUT BEFORE YOU GOT THE MONEY TO BUY MORE.: NEVER TRUE

## 2024-11-19 SDOH — ECONOMIC STABILITY: INCOME INSECURITY: IN THE PAST 12 MONTHS HAS THE ELECTRIC, GAS, OIL, OR WATER COMPANY THREATENED TO SHUT OFF SERVICES IN YOUR HOME?: NO

## 2024-11-19 SDOH — ECONOMIC STABILITY: HOUSING INSECURITY: IN THE PAST 12 MONTHS, HOW MANY TIMES HAVE YOU MOVED WHERE YOU WERE LIVING?: 0

## 2024-11-19 SDOH — ECONOMIC STABILITY: TRANSPORTATION INSECURITY: IN THE PAST 12 MONTHS, HAS LACK OF TRANSPORTATION KEPT YOU FROM MEDICAL APPOINTMENTS OR FROM GETTING MEDICATIONS?: NO

## 2024-11-19 ASSESSMENT — PAIN SCALES - GENERAL
PAINLEVEL_OUTOF10: 0 - NO PAIN
PAINLEVEL_OUTOF10: 6
PAINLEVEL_OUTOF10: 5 - MODERATE PAIN
PAINLEVEL_OUTOF10: 0 - NO PAIN
PAINLEVEL_OUTOF10: 0 - NO PAIN

## 2024-11-19 ASSESSMENT — ACTIVITIES OF DAILY LIVING (ADL)
LACK_OF_TRANSPORTATION: NO
LACK_OF_TRANSPORTATION: NO

## 2024-11-19 ASSESSMENT — PAIN - FUNCTIONAL ASSESSMENT
PAIN_FUNCTIONAL_ASSESSMENT: 0-10

## 2024-11-19 ASSESSMENT — PAIN DESCRIPTION - DESCRIPTORS: DESCRIPTORS: ACHING

## 2024-11-19 NOTE — PROGRESS NOTES
CARDIAC SURGERY CONSULT PROGRESS NOTE    SUBJECTIVE  Edu Echavarria is a 74 y.o. male with a PMHx of HTN, GERD, gout, former smoker, and current cocaine and marijuana use who presented to [facility] ED on 11/17 with ~ 2 days history of palpitations and chest pain. At [facility] his initial EKG aflutter w/ RVR and ST elevtions in II/III/avF & questionable in V1-V3 w/o reciprocal changes, and labs remarkable for HS trop >20157, BNP 1100, WBC 5, CBC/RFP otherwise unremarkable. Repeat EKG aflutter w/ similar ST elevations as prior. STEMI call activated, loaded with aspirin 324mg, plavix 300mg, and started on heparin gtt 11/17. LHC showed triple vessel disease w/ no stenting and IABP was placed for coronary perfusion. Started on integrillin (which has now been stopped) and continued on heparin gtt. He was then transferred to [facility] for CABG evaluation.    On exam, the patient denies chest pain and otherwise feels well.   Of note, U tox was positive for cannabinoid and cocaine on admission 11/18. The patient states he last used cocaine on Saturday 11/16 and marijuana 11/17.     Cardiac surgery is consulted for surgical evaluation of his CAD.     [facility] 11/18:  LAD: large vessel which is extensively and diffusely diseased. Prox/ostial LAD evidence of a hazy eccentric stensois ~70%. LAD w/ evidence of extensive disease w/ stenosis in its proximal part of approx 65-70% followed by aneursymatic dilatation jutp roximal to first septal , which si folllowed by his stenosis in range of 70%. Mid LAD has evidence of a stenosis in range of 90%. Distal LAD is severely diseased with evidence of a long lesion in the range of 90%.      D1: small  D2: mod sized, mildly diffusely disease  Lcx: large anatomically dominant vessel which gives off 2 obtuse marginals, mod to severe diffuse disease. Setnosis medicine 1.1.1.1 at the bifucation/takeoff of 3rd OM.   Om1: mod sized vessel with evidence of severe stnesosis in range of  "80%  OM2: small  OM3: large vessel with evidence of severe ostial stenosis/bifrucation medicine 1.1.1.1. more distally the vessel is totally occluded. Distal Oms are visualized thought he itnra systemic collaterals. Ramyus intermedius absent.   RCA: non dominant with evcieence of moderte disease in its mid segment.      LVEDP: 46 mmHg  Estimated LVEF 20-25% with evidence of extensive global hypokinesis.     TTE 11/18: CONCLUSIONS:   1. Poorly visualized anatomical structures due to suboptimal image quality.   2. Left ventricular ejection fraction is severely decreased, calculated by Regalado's biplane at 23%.   3. There is global hypokinesis of the left ventricle with minor regional variations.   4. Spectral Doppler shows a Grade III (restrictive pattern) of left ventricular diastolic filling with an elevated left atrial pressure.   5. There is normal right ventricular global systolic function.   6. Mildly enlarged right ventricle.   7. The left atrium is mildly dilated.      Objective   /86   Pulse 93   Temp 36.1 °C (97 °F)   Resp 21   Ht 1.702 m (5' 7\")   Wt 84.4 kg (186 lb)   SpO2 96%   BMI 29.13 kg/m²   0-10 (Numeric) Pain Score: 5 - Moderate pain   Vitals:    11/19/24 0000   Weight: 84.4 kg (186 lb)          Intake/Output Summary (Last 24 hours) at 11/19/2024 1401  Last data filed at 11/19/2024 0800  Gross per 24 hour   Intake 818.26 ml   Output 850 ml   Net -31.74 ml       Physical Exam  Physical Exam  Constitutional:       General: He is not in acute distress.     Appearance: He is obese. He is ill-appearing. He is not toxic-appearing.   HENT:      Head: Normocephalic.      Mouth/Throat:      Mouth: Mucous membranes are moist.   Cardiovascular:      Rate and Rhythm: Normal rate and regular rhythm.      Heart sounds: No murmur heard.     Comments: IABP in place  Pulmonary:      Effort: Pulmonary effort is normal.      Breath sounds: Normal breath sounds.      Comments: On room air  Musculoskeletal: "         General: Normal range of motion.      Cervical back: Neck supple.      Right lower leg: No edema.      Left lower leg: No edema.   Skin:     General: Skin is warm and dry.   Neurological:      General: No focal deficit present.      Mental Status: He is alert and oriented to person, place, and time.   Psychiatric:         Mood and Affect: Mood normal.         Behavior: Behavior normal.         Medications  Scheduled medications  aspirin, 81 mg, oral, Daily  atorvastatin, 80 mg, oral, Nightly  carvedilol, 6.25 mg, oral, BID  [START ON 11/20/2024] pantoprazole, 40 mg, oral, Daily before breakfast   Or  [START ON 11/20/2024] esomeprazole, 40 mg, nasoduodenal tube, Daily before breakfast   Or  [START ON 11/20/2024] pantoprazole, 40 mg, intravenous, Daily before breakfast  hydrALAZINE, 50 mg, oral, TID  insulin lispro, 0-10 Units, subcutaneous, TID AC  isosorbide dinitrate, 20 mg, oral, TID  perflutren protein A microsphere, 0.5 mL, intravenous, Once in imaging  polyethylene glycol, 17 g, oral, Daily  potassium chloride CR, 40 mEq, oral, Once  sulfur hexafluoride microsphr, 2 mL, intravenous, Once in imaging    Continuous medications  heparin, 0-4,000 Units/hr, Last Rate: Stopped (11/19/24 1006)    PRN medications  PRN medications: acetaminophen, dextrose, dextrose, glucagon, glucagon, heparin    Labs  Results for orders placed or performed during the hospital encounter of 11/18/24 (from the past 24 hours)   Troponin I, High Sensitivity   Result Value Ref Range    Troponin I, High Sensitivity (CMC) 69,947 (HH) 0 - 53 ng/L   Heparin Assay, UFH   Result Value Ref Range    Heparin Unfractionated 0.1 See Comment Below for Therapeutic Ranges IU/mL   POCT GLUCOSE   Result Value Ref Range    POCT Glucose 202 (H) 74 - 99 mg/dL   Transthoracic Echo (TTE) Complete   Result Value Ref Range    AV pk carlyn 1.64 m/s    LVOT diam 2.00 cm    MV E/A ratio 4.75     LA vol index A/L 39.4 ml/m2    LV EF 23 %    Aortic Valve Area by  Continuity of Peak Velocity 2.06 cm2    AV pk grad 11 mmHg    LV A4C EF 33.1    POCT GLUCOSE   Result Value Ref Range    POCT Glucose 177 (H) 74 - 99 mg/dL   Drug Screen, Urine   Result Value Ref Range    Amphetamine Screen, Urine Presumptive Negative Presumptive Negative    Barbiturate Screen, Urine Presumptive Negative Presumptive Negative    Benzodiazepines Screen, Urine Presumptive Positive (A) Presumptive Negative    Cannabinoid Screen, Urine Presumptive Positive (A) Presumptive Negative    Cocaine Metabolite Screen, Urine Presumptive Positive (A) Presumptive Negative    Fentanyl Screen, Urine Presumptive Positive (A) Presumptive Negative    Opiate Screen, Urine Presumptive Negative Presumptive Negative    Oxycodone Screen, Urine Presumptive Negative Presumptive Negative    PCP Screen, Urine Presumptive Negative Presumptive Negative    Methadone Screen, Urine Presumptive Negative Presumptive Negative   Heparin Assay, UFH   Result Value Ref Range    Heparin Unfractionated 0.2 See Comment Below for Therapeutic Ranges IU/mL   POCT GLUCOSE   Result Value Ref Range    POCT Glucose 138 (H) 74 - 99 mg/dL   Hepatic Function Panel   Result Value Ref Range    Albumin 3.8 3.4 - 5.0 g/dL    Bilirubin, Total 2.0 (H) 0.0 - 1.2 mg/dL    Bilirubin, Direct 0.7 (H) 0.0 - 0.3 mg/dL    Alkaline Phosphatase 53 33 - 136 U/L    ALT 30 10 - 52 U/L     (H) 9 - 39 U/L    Total Protein 6.7 6.4 - 8.2 g/dL   CBC and Auto Differential   Result Value Ref Range    WBC 13.9 (H) 4.4 - 11.3 x10*3/uL    nRBC 0.0 0.0 - 0.0 /100 WBCs    RBC 5.69 4.50 - 5.90 x10*6/uL    Hemoglobin 16.4 13.5 - 17.5 g/dL    Hematocrit 46.4 41.0 - 52.0 %    MCV 82 80 - 100 fL    MCH 28.8 26.0 - 34.0 pg    MCHC 35.3 32.0 - 36.0 g/dL    RDW 13.1 11.5 - 14.5 %    Platelets 187 150 - 450 x10*3/uL    Neutrophils % 74.2 40.0 - 80.0 %    Immature Granulocytes %, Automated 0.3 0.0 - 0.9 %    Lymphocytes % 13.0 13.0 - 44.0 %    Monocytes % 11.5 2.0 - 10.0 %     Eosinophils % 0.6 0.0 - 6.0 %    Basophils % 0.4 0.0 - 2.0 %    Neutrophils Absolute 10.36 (H) 1.60 - 5.50 x10*3/uL    Immature Granulocytes Absolute, Automated 0.04 0.00 - 0.50 x10*3/uL    Lymphocytes Absolute 1.81 0.80 - 3.00 x10*3/uL    Monocytes Absolute 1.60 (H) 0.05 - 0.80 x10*3/uL    Eosinophils Absolute 0.08 0.00 - 0.40 x10*3/uL    Basophils Absolute 0.05 0.00 - 0.10 x10*3/uL   Coagulation Screen   Result Value Ref Range    Protime 15.9 (H) 9.8 - 12.8 seconds    INR 1.4 (H) 0.9 - 1.1    aPTT 64 (H) 27 - 38 seconds   Magnesium   Result Value Ref Range    Magnesium 2.16 1.60 - 2.40 mg/dL   Phosphorus   Result Value Ref Range    Phosphorus 3.0 2.5 - 4.9 mg/dL   Basic Metabolic Panel   Result Value Ref Range    Glucose 146 (H) 74 - 99 mg/dL    Sodium 131 (L) 136 - 145 mmol/L    Potassium 3.2 (L) 3.5 - 5.3 mmol/L    Chloride 94 (L) 98 - 107 mmol/L    Bicarbonate 28 21 - 32 mmol/L    Anion Gap 12 10 - 20 mmol/L    Urea Nitrogen 21 6 - 23 mg/dL    Creatinine 0.92 0.50 - 1.30 mg/dL    eGFR 88 >60 mL/min/1.73m*2    Calcium 8.8 8.6 - 10.6 mg/dL   Heparin Assay, UFH   Result Value Ref Range    Heparin Unfractionated 0.2 See Comment Below for Therapeutic Ranges IU/mL   POCT GLUCOSE   Result Value Ref Range    POCT Glucose 157 (H) 74 - 99 mg/dL   POCT GLUCOSE   Result Value Ref Range    POCT Glucose 144 (H) 74 - 99 mg/dL   POCT GLUCOSE   Result Value Ref Range    POCT Glucose 241 (H) 74 - 99 mg/dL               ASSESSMENT & PLAN  Edu Echavarria is a 74 y.o. male with a PMHx of HTN, GERD, gout, former smoker, and current cocaine and marijuana use who presented to [facility] ED on 11/17 with ~ 2 days history of palpitations and chest pain. At [facility] his initial EKG aflutter w/ RVR and ST elevtions in II/III/avF & questionable in V1-V3 w/o reciprocal changes, and labs remarkable for HS trop >45810, BNP 1100, WBC 5, CBC/RFP otherwise unremarkable. Repeat EKG aflutter w/ similar ST elevations as prior. STEMI call  activated, loaded with aspirin 324mg, plavix 300mg, and started on heparin gtt 11/17. LHC showed triple vessel disease w/ no stenting and IABP was placed for coronary perfusion. Started on integrillin (which has now been stopped) and continued on heparin gtt. He was then transferred to [facility] for CABG evaluation.    On exam, the patient denies chest pain and otherwise feels well.   Of note, U tox was positive for cannabinoid and cocaine on admission 11/18. The patient states he last used cocaine on Saturday 11/16 and marijuana 11/17.     Cardiac surgery is consulted for surgical evaluation of his CAD.     RECOMMENDATIONS/PLAN  Dr. Villa is aware of patient, currently reviewing case and available imaging.   - Further recs pending surgeon review and the below workup obtained.   - Emergent cardiac surgery not indicated at this time.   - No OR date established, will need further preoperative testing.   - Ideally, will wait at least 5 days s/p last dose of Plavix (11/17 PM) to take patient to OR.  - [facility] images in PACS to review.   - TTE 11/18 EF 23%  - Medical optimization per primary team    Preop risk stratification studies/labs:  --- US Carotids/Vein Mapping/ LE US ELODIA - ordered and pending   --- PFTs (spirometry and room air ABG) - ordered and pending   --- pCXR completed 11/19  --- MRSA, UA/Culture, LFTs, HgbA1c, TSH/T4, Lipid panel  --- CT chest without contrast   --- Dental evaluation not indicated for isolated CABG surgeries     When/if goes to surgery:  - Continue ASA, high-intensity statin, and BB (or document contraindications for use) for preop CABG patients   - No ACEi/ARBs in the pre-op period (at least 48 hours)  - Hold any SGLT2 inhibitors (Farxiga, Jardiance, etc.) for at least 3 days prior to cardiac surgery to prevent euglycemic DKA  - Hold any daily GLP-1 agonist drugs on the day of surgery. Hold any weekly GLP-1 drugs for 7 days prior to surgery. (Dulaglutide, Exenatide, Liraglutide, Lixisenatide,  & Semaglutide)  - No antiplatelets other than ASA, no anticoagulants other than Heparin  - NPO after midnight, blood on hold/T&S, and preop scrubs only to be ordered once OR date is established      Will continue to follow along.  Thank you for the consultation.   Patient educated and all questions answered.  Please page the cardiac surgery consult pager [phone] with any questions or changes in patient condition.    Felecia Ritchie PA-C  Cardiac Surgery Consult [facility]  [facility]  Cardiac Surgery Consult Pager [phone]     11/19/2024  2:01 PM

## 2024-11-19 NOTE — PROGRESS NOTES
Edu Echavarria is a 74 y.o. male on day 1 of admission presenting with STEMI (ST elevation myocardial infarction) (Multi).      Subjective   Overnight pt hypertensive -190s and started on hydral 50mg TID and isordil 10mg TID. This am pt is doing okay. Denies any chest pain since 11/17 evening at OSH. Denies SOB, nausea, vomiting, abdominal pain, difficulty urinating.     Objective     Last Recorded Vitals  /53   Pulse 89   Temp 36.1 °C (97 °F)   Resp 21   Wt 84.4 kg (186 lb)   SpO2 99%   Intake/Output last 3 Shifts:    Intake/Output Summary (Last 24 hours) at 11/19/2024 1052  Last data filed at 11/19/2024 0608  Gross per 24 hour   Intake 786.26 ml   Output 850 ml   Net -63.74 ml       Admission Weight  Weight: 82.6 kg (182 lb) (11/18/24 1124)    Daily Weight  11/19/24 : 84.4 kg (186 lb)    Image Results  XR chest 1 view  Narrative: Interpreted By:  Jovi Alonso,  and Adria Moore   STUDY:  XR CHEST 1 VIEW;  11/19/2024 7:06 am      INDICATION:  Signs/Symptoms:IABP.      COMPARISON:  Chest radiograph 11/18/2024      ACCESSION NUMBER(S):  [id number]      ORDERING CLINICIAN:  MEGHAN ABARCA      FINDINGS:  AP radiograph of the chest was provided. There is better patient  centering on present study. Radiopaque marker of IABP again overlies  upper descending thoracic aorta, just cranial to the level of left  principal bronchus, although slightly more inferior in position as  compared to prior radiograph from yesterday.      CARDIOMEDIASTINAL SILHOUETTE:  Cardiomediastinal silhouette is normal in size and configuration.      LUNGS:  Low lung volumes with bronchovascular crowding and no jamil pulmonary  edema. There is no focal consolidation, pleural effusion, or  pneumothorax.      ABDOMEN:  No remarkable upper abdominal findings.      BONES:  No acute osseous changes.      Impression: 1.  Interval adjustment of the intra-aortic balloon pump catheter,  with the radiopaque marker projecting over  upper descending thoracic  aorta, just cranial to the level of left principal bronchus.  2. No evidence of acute cardiopulmonary process.      I personally reviewed the image(s)/study and resident interpretation.  I agree with the findings as stated by resident Oscar Covington.  Data analyzed and images interpreted at [facility], [place], [place].      MACRO:  None      Signed by: Jovi Alonso 11/19/2024 10:42 AM  Dictation workstation:   [id number]      Physical Exam  Constitutional: No acute distress, resting comfortably in bed, cooperative  HEENT: Normocephalic, atraumatic, PERRLA, EOMI, moist mucous membranes  Cardiovascular: RRR, normal S1/S2, no murmurs noted. R art line via sheath. IABP R fem  Pulmonary: Clear to auscultation b/l, no wheezes/crackles/rhonchi, no increased work of breathing, no supplemental oxygen  GI: Soft, non-tender, non-distended, normoactive bowel sounds  : External hawthorne  Lower extremities: Warm and well perfused, no lower extremity edema  Neuro: A&O x3, able to move all 4 extremities spontaneously  Skin: No rashes or lesions noted  Psych: Appropriate mood and affect     Assessment/Plan   Edu Echavarria is a 74 y.o. male with a PMHx of HTN, GERD, and gout who presented to [facility] ED on 11/17pm with 2d hx of intermittent palpitations and chest pain. At OSH initial EKG flutter w/ RVR and ST elevtions in II/III/avF & questionable in V1-V3 w/o reciprocal changes, and labs remarkable for HS trop >93438, BNP 1100, WBC 5, CBC/RFP otherwise unremarkable. Repeat EKG aflutter w/ similar ST elevations as prior. STEMI call activated, loaded with aspirin 324mg, plavix 300mg, and started on heparin gtt 11/17pm. LHC showed triple vessel disease w/ no stenting and IABP was placed for coronary perf. Started on integrillin (which has now been stopped) and continued on heparin gtt. Transferred to [facility] for CABG evaluation. CT surgery consult, pending recs (note that  plavix loaded at OSH 11/17).     Updates 11/19/24:  [] Coreg 6.25mg BID  [] Increase isordil 10>20 TID  [] Titrate isordil & hydral q4h based on BP trend  [] Remove R radial art line  [] Wean balloon pump today  [] Follow up CT surg recs     Neuro:  - No active issues     Cardiovascular:  # STEMI  # Triple vessel disease  :: HS trop >39044 at OSH  :: HS trop at : 97518 > 25086  :: LHC 11/18: Triple vessel disease (LAD prox 70%, mid 65-70%, dist 90%), Lcx OM1 80%, OM2 small, OM3 occluded with collaterals, RCA mid moderate disease. LVEDP 46mmHg, LVEF 20-25%, evidence of extensive global hypokinesis  :: Arrived integrillin and heparin gtt  :: Aspirin and plavix loaded 11/17 ~2100  :: Utox + cannavinoid, fent (received w/ EMS), benzos, cocaine     Plan:  [] ASA 81mg daily  [] Heparin gtt  [] Atorva 80mg qhs  [] Coreg 6.25 BID  [] Isordil 20 TID  [] Hydral 50 TID  [] CT surgery consult  [] HOLD home metop (hx cocaine use)     Respiratory:  - No active issues, on RA     GI:  # GERD  [] Pantoprazole 40mg daily     Renal/:  - No active issues     Heme/Onc:  - No active issues     Endo:  - No active issues     ID:  - No active issues     MSK/Rheum:  # Gout  [] hold allopurinol     Derm:  - No active issues     Psych:  - No active issues     Access: R radial art line, PIVs  Tubes/drains: External hawthorne  O2: RA  Diet: Regular diet  GI ppx: PPI  DVT ppx: Heparin gtt  Code status: Full code (confirmed on admission)  Surrogate medical decision maker:   Guillermo Barrios Jr (cousin)   [phone]  Guillermo's wife's #: [phone]     Anna Escobar MD  Internal Medicine PGY-3

## 2024-11-19 NOTE — PROGRESS NOTES
"   11/19/24 1040   Discharge Planning   Living Arrangements Alone   Support Systems Children;Family members   Assistance Needed n/a   Type of Residence Private residence   Do you have animals or pets at home? No   Who is requesting discharge planning? Provider   Home or Post Acute Services   (TBD)   Does the patient need discharge transport arranged? No     - ICU TREATMENT PLAN: Patient presented to [facility] ED on 11/17pm with 2d hx of intermittent palpitations and chest pain. Transferred to [facility] for CABG evaluation 11/18.  - Payer: Medicare  -Support System: Cousin Guillermo Barrios. Also son Brandon is listed in the chart.  - Planned Disposition: Pending medical outcome and rehab recommendations.  - Additional Information: SDOH and Social Work Discharge Planning assessments were completed with the patient. There were no SDOH issues identified.  - Barriers to discharge: None at this time. SW will continue to follow.  Update: Patient clarified that Brandon Lehman listed in the chart is his friend, not a son. His NOK is cousin Guillermo Barrios.    SW discussed that patient's labs were positive for Cocaine and Cannabis. Patient says that he used Cocaine recently to \"taste\" it, the use before that was 4 years ago. He is going to stay away from Cocaine. He uses \"one puff\" of Cannabis a day. He is not sure if he wants to stop using it. Patient declined substance abuse treatment resources.   "

## 2024-11-20 ENCOUNTER — APPOINTMENT (OUTPATIENT)
Dept: RADIOLOGY | Facility: HOSPITAL | Age: 74
DRG: 235 | End: 2024-11-20
Payer: MEDICARE

## 2024-11-20 ENCOUNTER — APPOINTMENT (OUTPATIENT)
Dept: VASCULAR MEDICINE | Facility: HOSPITAL | Age: 74
DRG: 235 | End: 2024-11-20
Payer: MEDICARE

## 2024-11-20 ENCOUNTER — APPOINTMENT (OUTPATIENT)
Dept: VASCULAR MEDICINE | Facility: HOSPITAL | Age: 74
End: 2024-11-20
Payer: MEDICARE

## 2024-11-20 ENCOUNTER — APPOINTMENT (OUTPATIENT)
Dept: RESPIRATORY THERAPY | Facility: HOSPITAL | Age: 74
DRG: 235 | End: 2024-11-20
Payer: MEDICARE

## 2024-11-20 LAB
ACT BLD: 131 SEC (ref 83–199)
ALBUMIN SERPL BCP-MCNC: 3.6 G/DL (ref 3.4–5)
ALP SERPL-CCNC: 56 U/L (ref 33–136)
ALT SERPL W P-5'-P-CCNC: 22 U/L (ref 10–52)
ANION GAP SERPL CALC-SCNC: 14 MMOL/L (ref 10–20)
APTT PPP: 91 SECONDS (ref 27–38)
AST SERPL W P-5'-P-CCNC: 36 U/L (ref 9–39)
BASE EXCESS BLDA CALC-SCNC: 0.5 MMOL/L (ref -2–3)
BASOPHILS # BLD AUTO: 0.04 X10*3/UL (ref 0–0.1)
BASOPHILS NFR BLD AUTO: 0.2 %
BILIRUB DIRECT SERPL-MCNC: 0.5 MG/DL (ref 0–0.3)
BILIRUB SERPL-MCNC: 1.5 MG/DL (ref 0–1.2)
BODY TEMPERATURE: 37 DEGREES CELSIUS
BUN SERPL-MCNC: 25 MG/DL (ref 6–23)
CALCIUM SERPL-MCNC: 8.6 MG/DL (ref 8.6–10.6)
CHLORIDE SERPL-SCNC: 95 MMOL/L (ref 98–107)
CO2 SERPL-SCNC: 24 MMOL/L (ref 21–32)
COHGB MFR BLDA: 2.7 %
CREAT SERPL-MCNC: 1.28 MG/DL (ref 0.5–1.3)
CREAT UR-MCNC: 184.6 MG/DL (ref 20–370)
DO-HGB MFR BLDA: 2.9 % (ref 0–5)
EGFRCR SERPLBLD CKD-EPI 2021: 59 ML/MIN/1.73M*2
EOSINOPHIL # BLD AUTO: 0.02 X10*3/UL (ref 0–0.4)
EOSINOPHIL NFR BLD AUTO: 0.1 %
ERYTHROCYTE [DISTWIDTH] IN BLOOD BY AUTOMATED COUNT: 13.2 % (ref 11.5–14.5)
GLUCOSE BLD MANUAL STRIP-MCNC: 152 MG/DL (ref 74–99)
GLUCOSE BLD MANUAL STRIP-MCNC: 162 MG/DL (ref 74–99)
GLUCOSE BLD MANUAL STRIP-MCNC: 172 MG/DL (ref 74–99)
GLUCOSE BLD MANUAL STRIP-MCNC: 187 MG/DL (ref 74–99)
GLUCOSE BLD MANUAL STRIP-MCNC: 190 MG/DL (ref 74–99)
GLUCOSE BLD MANUAL STRIP-MCNC: 212 MG/DL (ref 74–99)
GLUCOSE SERPL-MCNC: 175 MG/DL (ref 74–99)
HCO3 BLDA-SCNC: 24.6 MMOL/L (ref 22–26)
HCT VFR BLD AUTO: 43 % (ref 41–52)
HGB BLD-MCNC: 15.3 G/DL (ref 13.5–17.5)
HGB BLDA-MCNC: 14.9 G/DL (ref 13.5–17.5)
HOLD SPECIMEN: NORMAL
IMM GRANULOCYTES # BLD AUTO: 0.08 X10*3/UL (ref 0–0.5)
IMM GRANULOCYTES NFR BLD AUTO: 0.5 % (ref 0–0.9)
INR PPP: 1.3 (ref 0.9–1.1)
LYMPHOCYTES # BLD AUTO: 1.09 X10*3/UL (ref 0.8–3)
LYMPHOCYTES NFR BLD AUTO: 6.1 %
MAGNESIUM SERPL-MCNC: 2.1 MG/DL (ref 1.6–2.4)
MCH RBC QN AUTO: 28 PG (ref 26–34)
MCHC RBC AUTO-ENTMCNC: 35.6 G/DL (ref 32–36)
MCV RBC AUTO: 79 FL (ref 80–100)
METHGB MFR BLDA: 0.4 % (ref 0–1.5)
MGC ASCENT PFT - FEV1 - PRE: 1.58
MGC ASCENT PFT - FEV1 - PREDICTED: 2.66
MGC ASCENT PFT - FVC - PRE: 2.9
MGC ASCENT PFT - FVC - PREDICTED: 3.52
MONOCYTES # BLD AUTO: 1.36 X10*3/UL (ref 0.05–0.8)
MONOCYTES NFR BLD AUTO: 7.7 %
NEUTROPHILS # BLD AUTO: 15.17 X10*3/UL (ref 1.6–5.5)
NEUTROPHILS NFR BLD AUTO: 85.4 %
NRBC BLD-RTO: 0 /100 WBCS (ref 0–0)
OXYHGB MFR BLDA: 94 % (ref 94–98)
PCO2 BLDA: 37 MM HG (ref 38–42)
PH BLDA: 7.43 PH (ref 7.38–7.42)
PHOSPHATE SERPL-MCNC: 3.4 MG/DL (ref 2.5–4.9)
PLATELET # BLD AUTO: 171 X10*3/UL (ref 150–450)
PO2 BLDA: 78 MM HG (ref 85–95)
POTASSIUM SERPL-SCNC: 4.2 MMOL/L (ref 3.5–5.3)
PROT SERPL-MCNC: 5.6 G/DL (ref 6.4–8.2)
PROTHROMBIN TIME: 14.8 SECONDS (ref 9.8–12.8)
RBC # BLD AUTO: 5.46 X10*6/UL (ref 4.5–5.9)
SAO2 % BLDA: 97 % (ref 94–100)
SODIUM SERPL-SCNC: 129 MMOL/L (ref 136–145)
SODIUM UR-SCNC: <10 MMOL/L
SODIUM/CREAT UR-RTO: NORMAL
UFH PPP CHRO-ACNC: 0.3 IU/ML
UFH PPP CHRO-ACNC: 0.3 IU/ML
UFH PPP CHRO-ACNC: 1.3 IU/ML
WBC # BLD AUTO: 17.8 X10*3/UL (ref 4.4–11.3)

## 2024-11-20 PROCEDURE — 2500000001 HC RX 250 WO HCPCS SELF ADMINISTERED DRUGS (ALT 637 FOR MEDICARE OP)

## 2024-11-20 PROCEDURE — 36600 WITHDRAWAL OF ARTERIAL BLOOD: CPT

## 2024-11-20 PROCEDURE — 93922 UPR/L XTREMITY ART 2 LEVELS: CPT | Performed by: INTERNAL MEDICINE

## 2024-11-20 PROCEDURE — 94010 BREATHING CAPACITY TEST: CPT | Performed by: INTERNAL MEDICINE

## 2024-11-20 PROCEDURE — 84100 ASSAY OF PHOSPHORUS: CPT

## 2024-11-20 PROCEDURE — 94010 BREATHING CAPACITY TEST: CPT

## 2024-11-20 PROCEDURE — 85610 PROTHROMBIN TIME: CPT

## 2024-11-20 PROCEDURE — 37799 UNLISTED PX VASCULAR SURGERY: CPT

## 2024-11-20 PROCEDURE — 71045 X-RAY EXAM CHEST 1 VIEW: CPT

## 2024-11-20 PROCEDURE — 83050 HGB METHEMOGLOBIN QUAN: CPT

## 2024-11-20 PROCEDURE — 93922 UPR/L XTREMITY ART 2 LEVELS: CPT

## 2024-11-20 PROCEDURE — 80048 BASIC METABOLIC PNL TOTAL CA: CPT

## 2024-11-20 PROCEDURE — 85025 COMPLETE CBC W/AUTO DIFF WBC: CPT

## 2024-11-20 PROCEDURE — 2500000004 HC RX 250 GENERAL PHARMACY W/ HCPCS (ALT 636 FOR OP/ED)

## 2024-11-20 PROCEDURE — 83735 ASSAY OF MAGNESIUM: CPT

## 2024-11-20 PROCEDURE — 2020000001 HC ICU ROOM DAILY

## 2024-11-20 PROCEDURE — 82947 ASSAY GLUCOSE BLOOD QUANT: CPT

## 2024-11-20 PROCEDURE — 82248 BILIRUBIN DIRECT: CPT

## 2024-11-20 PROCEDURE — 85347 COAGULATION TIME ACTIVATED: CPT

## 2024-11-20 PROCEDURE — 85520 HEPARIN ASSAY: CPT

## 2024-11-20 PROCEDURE — 99291 CRITICAL CARE FIRST HOUR: CPT

## 2024-11-20 PROCEDURE — 83935 ASSAY OF URINE OSMOLALITY: CPT

## 2024-11-20 PROCEDURE — 2500000001 HC RX 250 WO HCPCS SELF ADMINISTERED DRUGS (ALT 637 FOR MEDICARE OP): Performed by: STUDENT IN AN ORGANIZED HEALTH CARE EDUCATION/TRAINING PROGRAM

## 2024-11-20 PROCEDURE — 99232 SBSQ HOSP IP/OBS MODERATE 35: CPT | Performed by: PHYSICIAN ASSISTANT

## 2024-11-20 PROCEDURE — 36415 COLL VENOUS BLD VENIPUNCTURE: CPT

## 2024-11-20 PROCEDURE — 84300 ASSAY OF URINE SODIUM: CPT

## 2024-11-20 RX ORDER — FENTANYL CITRATE 50 UG/ML
25 INJECTION, SOLUTION INTRAMUSCULAR; INTRAVENOUS ONCE
Status: COMPLETED | OUTPATIENT
Start: 2024-11-20 | End: 2024-11-20

## 2024-11-20 RX ORDER — ENOXAPARIN SODIUM 100 MG/ML
40 INJECTION SUBCUTANEOUS DAILY
Status: DISCONTINUED | OUTPATIENT
Start: 2024-11-20 | End: 2024-11-24

## 2024-11-20 RX ORDER — FENTANYL CITRATE 50 UG/ML
INJECTION, SOLUTION INTRAMUSCULAR; INTRAVENOUS
Status: COMPLETED
Start: 2024-11-20 | End: 2024-11-20

## 2024-11-20 RX ORDER — CALCIUM CARBONATE 200(500)MG
500 TABLET,CHEWABLE ORAL DAILY
Status: DISCONTINUED | OUTPATIENT
Start: 2024-11-20 | End: 2024-11-20

## 2024-11-20 RX ORDER — AMLODIPINE BESYLATE 5 MG/1
5 TABLET ORAL DAILY
Status: DISCONTINUED | OUTPATIENT
Start: 2024-11-20 | End: 2024-11-20

## 2024-11-20 RX ORDER — ATROPINE SULFATE 0.1 MG/ML
INJECTION INTRAVENOUS
Status: DISPENSED
Start: 2024-11-20 | End: 2024-11-21

## 2024-11-20 RX ORDER — ALUMINUM HYDROXIDE, MAGNESIUM HYDROXIDE, AND SIMETHICONE 1200; 120; 1200 MG/30ML; MG/30ML; MG/30ML
20 SUSPENSION ORAL ONCE
Status: COMPLETED | OUTPATIENT
Start: 2024-11-20 | End: 2024-11-20

## 2024-11-20 RX ORDER — CALCIUM CARBONATE 200(500)MG
500 TABLET,CHEWABLE ORAL 4 TIMES DAILY PRN
Status: DISCONTINUED | OUTPATIENT
Start: 2024-11-20 | End: 2024-11-23

## 2024-11-20 ASSESSMENT — PAIN SCALES - GENERAL
PAINLEVEL_OUTOF10: 0 - NO PAIN
PAINLEVEL_OUTOF10: 0 - NO PAIN
PAINLEVEL_OUTOF10: 5 - MODERATE PAIN
PAINLEVEL_OUTOF10: 0 - NO PAIN
PAINLEVEL_OUTOF10: 7

## 2024-11-20 ASSESSMENT — PAIN - FUNCTIONAL ASSESSMENT
PAIN_FUNCTIONAL_ASSESSMENT: 0-10

## 2024-11-20 NOTE — PROGRESS NOTES
Social Work Progress Note  SW met with the patient at bedside. Patient reports that he is not . He does not have children nor siblings. His closest relative is cousin Guillermo Barrios. Patient wants Guillermo to be his medical decision maker in the situations when patient is not able to make decisions for himself. Patient did not want to complete [facility] paperwork.  JIHAN GARZA

## 2024-11-20 NOTE — PROGRESS NOTES
"Edu Echavarria is a 74 y.o. male on day 2 of admission presenting with STEMI (ST elevation myocardial infarction) (Multi).    Subjective   Mr. Echavarria was seen and examined.  He is mainly bothered by gastric pain, limiting his PO intake.  He states being dehydrated.  His Cr increased to 1.28 today with dec UO over 24hr yesterday.  Still has IABP, will wean off hopefully today.  Coreg was increased to 20mg BID yesterday night, amlodipine added this morning but patient's BP dropped afterwards so it was discontinued.        Objective     Physical Exam  Constitutional: No acute distress, resting comfortably in bed, cooperative  HEENT: Normocephalic, atraumatic, PERRLA, EOMI, moist mucous membranes  Cardiovascular: RRR, normal S1/S2, no murmurs noted. R art line via sheath. IABP R fem  Pulmonary: Clear to auscultation b/l, no wheezes/crackles/rhonchi, no increased work of breathing, no supplemental oxygen  GI: Soft, non-tender, non-distended, normoactive bowel sounds  : External hawthorne  Lower extremities: Warm and well perfused, no lower extremity edema  Neuro: A&O x3, able to move all 4 extremities spontaneously  Skin: No rashes or lesions noted  Psych: Appropriate mood and affect    Last Recorded Vitals  Blood pressure 155/58, pulse 82, temperature 35.4 °C (95.7 °F), temperature source Temporal, resp. rate 21, height 1.702 m (5' 7\"), weight 84.4 kg (186 lb), SpO2 94%.  Intake/Output last 3 Shifts:  I/O last 3 completed shifts:  In: 894.5 (10.6 mL/kg) [P.O.:240; I.V.:654.5 (7.8 mL/kg)]  Out: 1125 (13.3 mL/kg) [Urine:1125 (0.4 mL/kg/hr)]  Weight: 84.4 kg     Relevant Results    Results for orders placed or performed during the hospital encounter of 11/18/24 (from the past 24 hours)   POCT GLUCOSE   Result Value Ref Range    POCT Glucose 189 (H) 74 - 99 mg/dL   Urinalysis with Reflex Culture and Microscopic   Result Value Ref Range    Color, Urine Yellow Light-Yellow, Yellow, Dark-Yellow    Appearance, Urine Clear Clear    " Specific Gravity, Urine 1.030 1.005 - 1.035    pH, Urine 6.5 5.0, 5.5, 6.0, 6.5, 7.0, 7.5, 8.0    Protein, Urine 70 (1+) (A) NEGATIVE, 10 (TRACE), 20 (TRACE) mg/dL    Glucose, Urine 30 (TRACE) (A) Normal mg/dL    Blood, Urine NEGATIVE NEGATIVE    Ketones, Urine 20 (1+) (A) NEGATIVE mg/dL    Bilirubin, Urine NEGATIVE NEGATIVE    Urobilinogen, Urine Normal Normal mg/dL    Nitrite, Urine NEGATIVE NEGATIVE    Leukocyte Esterase, Urine NEGATIVE NEGATIVE   Extra Urine Gray Tube   Result Value Ref Range    Extra Tube Hold for add-ons.    Urinalysis Microscopic   Result Value Ref Range    WBC, Urine 1-5 1-5, NONE /HPF    RBC, Urine 3-5 NONE, 1-2, 3-5 /HPF    Mucus, Urine FEW Reference range not established. /LPF   Heparin Assay, UFH   Result Value Ref Range    Heparin Unfractionated 1.0 See Comment Below for Therapeutic Ranges IU/mL   POCT GLUCOSE   Result Value Ref Range    POCT Glucose 185 (H) 74 - 99 mg/dL   Hepatic Function Panel   Result Value Ref Range    Albumin 3.6 3.4 - 5.0 g/dL    Bilirubin, Total 1.5 (H) 0.0 - 1.2 mg/dL    Bilirubin, Direct 0.5 (H) 0.0 - 0.3 mg/dL    Alkaline Phosphatase 56 33 - 136 U/L    ALT 22 10 - 52 U/L    AST 36 9 - 39 U/L    Total Protein 5.6 (L) 6.4 - 8.2 g/dL   CBC and Auto Differential   Result Value Ref Range    WBC 17.8 (H) 4.4 - 11.3 x10*3/uL    nRBC 0.0 0.0 - 0.0 /100 WBCs    RBC 5.46 4.50 - 5.90 x10*6/uL    Hemoglobin 15.3 13.5 - 17.5 g/dL    Hematocrit 43.0 41.0 - 52.0 %    MCV 79 (L) 80 - 100 fL    MCH 28.0 26.0 - 34.0 pg    MCHC 35.6 32.0 - 36.0 g/dL    RDW 13.2 11.5 - 14.5 %    Platelets 171 150 - 450 x10*3/uL    Neutrophils % 85.4 40.0 - 80.0 %    Immature Granulocytes %, Automated 0.5 0.0 - 0.9 %    Lymphocytes % 6.1 13.0 - 44.0 %    Monocytes % 7.7 2.0 - 10.0 %    Eosinophils % 0.1 0.0 - 6.0 %    Basophils % 0.2 0.0 - 2.0 %    Neutrophils Absolute 15.17 (H) 1.60 - 5.50 x10*3/uL    Immature Granulocytes Absolute, Automated 0.08 0.00 - 0.50 x10*3/uL    Lymphocytes Absolute  1.09 0.80 - 3.00 x10*3/uL    Monocytes Absolute 1.36 (H) 0.05 - 0.80 x10*3/uL    Eosinophils Absolute 0.02 0.00 - 0.40 x10*3/uL    Basophils Absolute 0.04 0.00 - 0.10 x10*3/uL   Coagulation Screen   Result Value Ref Range    Protime 14.8 (H) 9.8 - 12.8 seconds    INR 1.3 (H) 0.9 - 1.1    aPTT 91 (H) 27 - 38 seconds   Magnesium   Result Value Ref Range    Magnesium 2.10 1.60 - 2.40 mg/dL   Phosphorus   Result Value Ref Range    Phosphorus 3.4 2.5 - 4.9 mg/dL   Basic Metabolic Panel   Result Value Ref Range    Glucose 175 (H) 74 - 99 mg/dL    Sodium 129 (L) 136 - 145 mmol/L    Potassium 4.2 3.5 - 5.3 mmol/L    Chloride 95 (L) 98 - 107 mmol/L    Bicarbonate 24 21 - 32 mmol/L    Anion Gap 14 10 - 20 mmol/L    Urea Nitrogen 25 (H) 6 - 23 mg/dL    Creatinine 1.28 0.50 - 1.30 mg/dL    eGFR 59 (L) >60 mL/min/1.73m*2    Calcium 8.6 8.6 - 10.6 mg/dL   Heparin Assay, UFH   Result Value Ref Range    Heparin Unfractionated 0.3 See Comment Below for Therapeutic Ranges IU/mL   POCT GLUCOSE   Result Value Ref Range    POCT Glucose 187 (H) 74 - 99 mg/dL   Heparin Assay, UFH   Result Value Ref Range    Heparin Unfractionated 1.3 (HH) See Comment Below for Therapeutic Ranges IU/mL   Pulmonary function testing   Result Value Ref Range    FVC - Predicted 3.52     FEV1 - Predicted 2.66     FVC - PRE 2.90     FEV1 - Pre 1.58    Heparin Assay, UFH   Result Value Ref Range    Heparin Unfractionated 0.3 See Comment Below for Therapeutic Ranges IU/mL   POCT GLUCOSE   Result Value Ref Range    POCT Glucose 152 (H) 74 - 99 mg/dL   Blood Gas Arterial, COOX Unsolicited   Result Value Ref Range    POCT pH, Arterial 7.43 (H) 7.38 - 7.42 pH    POCT pCO2, Arterial 37 (L) 38 - 42 mm Hg    POCT pO2, Arterial 78 (L) 85 - 95 mm Hg    POCT SO2, Arterial 97 94 - 100 %    POCT Oxy Hemoglobin, Arterial 94.0 94.0 - 98.0 %    POCT Base Excess, Arterial 0.5 -2.0 - 3.0 mmol/L    POCT HCO3 Calculated, Arterial 24.6 22.0 - 26.0 mmol/L    POCT Hemoglobin, Arterial  14.9 13.5 - 17.5 g/dL    POCT Carboxyhemoglobin, Arterial 2.7 %    POCT Methemoglobin, Arterial 0.4 0.0 - 1.5 %    POCT Deoxy Hemoglobin, Arterial 2.9 0.0 - 5.0 %    Patient Temperature 37.0 degrees Celsius   Sodium, Urine Random   Result Value Ref Range    Sodium, Urine Random <10 mmol/L    Creatinine, Urine Random 184.6 20.0 - 370.0 mg/dL    Sodium/Creatinine Ratio     POCT GLUCOSE   Result Value Ref Range    POCT Glucose 190 (H) 74 - 99 mg/dL        Assessment/Plan   Assessment & Plan  STEMI (ST elevation myocardial infarction) (Multi)      Edu Echavarria is a 74 y.o. male with a PMHx of HTN, GERD, and gout who presented to [facility] ED on 11/17pm with 2d hx of intermittent palpitations and chest pain. At OSH initial EKG flutter w/ RVR and ST elevtions in II/III/avF & questionable in V1-V3 w/o reciprocal changes, and labs remarkable for HS trop >14259, BNP 1100, WBC 5, CBC/RFP otherwise unremarkable. Repeat EKG aflutter w/ similar ST elevations as prior. STEMI call activated, loaded with aspirin 324mg, plavix 300mg, and started on heparin gtt 11/17pm. LHC showed triple vessel disease w/ no stenting and IABP was placed for coronary perf. Started on integrillin (which has now been stopped) and continued on heparin gtt. Transferred to [facility] for CABG evaluation. CT surgery on board for CABG evaluation.    Updates 11/20/2024:  - Emergent cardiac surgery not indicated at this time.   - No OR date established, will need further preoperative testing.   - Ideally, will wait at least 5 days s/p last dose of Plavix (11/17 PM) to take patient to OR.  -Coreg increased to 25mg BID overnight  -Amlodipine added then discontinued as patient's blood pressure dropped  -LR 500ml given as MARYBETH sounds pre-renal     Preop risk stratification studies/labs:  --- US Carotids/Vein Mapping/ LE US ELODIA - ordered and pending   --- PFTs (spirometry and room air ABG) - done today, pending result  --- pCXR completed 11/19  --- CT chest without  contrast  after the balloon pump is out   --- Dental evaluation not indicated for isolated CABG surgeries     Neuro:  - No active issues     Cardiovascular:  # STEMI  # Triple vessel disease  :: HS trop >94023 at OSH  :: HS trop at [facility]: 54693 > 74301  :: LHC 11/18: Triple vessel disease (LAD prox 70%, mid 65-70%, dist 90%), Lcx OM1 80%, OM2 small, OM3 occluded with collaterals, RCA mid moderate disease. LVEDP 46mmHg, LVEF 20-25%, evidence of extensive global hypokinesis  :: Arrived integrillin and heparin gtt  :: Aspirin and plavix loaded 11/17 ~2100  :: Utox + cannavinoid, fent (received w/ EMS), benzos, cocaine     Plan:  [] ASA 81mg daily  [] Heparin gtt  [] Atorva 80mg qhs  [] Coreg 25 BID  [] Isordil 40 TID  [] Hydral 100 TID  [] HOLD home metop (hx cocaine use)     Respiratory:  - No active issues, on RA     GI:  # GERD  [] Pantoprazole 40mg daily     Renal/:  - Cr elevated today (1.3 today) along with low UO over the past 24hrs (275ml)     Heme/Onc:  -Leukocytosis but no infectious sxs  -Will monitor     Endo:  - No active issues     ID:  - No active issues     MSK/Rheum:  # Gout  [] hold allopurinol     Derm:  - No active issues     Psych:  - No active issues     Access: R radial art line, PIVs  Tubes/drains: External hawthorne  O2: RA  Diet: Regular diet  GI ppx: PPI  DVT ppx: Heparin gtt  Code status: Full code (confirmed on admission)  Surrogate medical decision maker:   Guillermo Barrios Jr (cousin)   [phone]  Guillermo's wife's #: [phone]    Discussed with Dr. Britt Scott MD  PGY-II  Department of Internal Medicine

## 2024-11-20 NOTE — HOSPITAL COURSE
Edu Echavarria is a 74 y.o. male with PMH of HTN, HFrEF, CAD, GERD, and gout, initially presented to OSH with STEMI. Transferred to [facility] for CABG evaluation, hospital course c/b MARYBETH, worsening hyponatremia causing delay of surgery case, then further complicated by respiratory failure following fluid resuscitation for correction of hyponatremia, and cardiogenic shock necessitating IABP. Now presents to CTICU s/p CABGx4, LAAL with Dr. Villa    12/4/2024 OPERATION: by Homer Villa  1. Coronary artery bypass grafting x 4:               In situ left internal mammary artery to left anterior descending coronary artery bypass.               Aorto to right posterior descending saphenous vein coronary artery bypass.                  Aorto to first marginal circumflex saphenous vein coronary artery bypass.               Abarca of vein graft to first diagonal saphenous vein coronary artery bypass.  2. Left atrial appendage ligation with AtriCure ACHV 35 mm clip ([id number]).   3. Ligation, Ligament of Mau.  4. Left posterior pericardial window creation.  5. Cannulation and initiation of cardiopulmonary oxygenator.   6. Medistim flow probe analysis of bypass grafts.   7. Endoscopic harvest of right saphenous vein.    Echo Pre/Post: Pre EF 30%, Post EF 30%,   Chest Tubes/Drains: L pleural, R pleural, mediastinal   Temporary wires location/setting: A/V wires set DDD@ 80 on arrival    CTICU course:    Transfer to T3 on 12/13    ========================================    Floor Course:  - Patient was diuresed for fluid volume overload post cardiac surgery; Preop weight: 89kg, discharge wt: 85kg  - On ASA, statin, BB by discharge  - Epicardial wires CUT on 12/18  - Telemetry at discharge SR  - 2v CXR done 12/15  - Postop echo done 12/9  - Cardiac rehab referral was placed  - PT recs SNF  - Anticipate discharge to skilled nursing facility    Discharged on 12/18    On day of discharge, vital signs were stable and no acute distress  was noted. All questions were answered. After VS and labs were reviewed it was determined the patient was stable for discharge.   Hospital day of discharge management- spent >30 minutes coordinating the discharge and counseling/educating patient and family regarding discharge instructions.     ========================================    History:  Past Surgical History:  Procedure Laterality Date  · CARDIAC CATHETERIZATION N/A 2024    Procedure: IABP Insertion;  Surgeon: Mari Baneulos MD;  Location: [place] [address] Cardiac Cath Lab;  Service: Cardiovascular;  Laterality: N/A;     Prescriptions Prior to Admission  Medications Prior to Admission  Medication Sig Dispense Refill Last Dose/Taking  · allopurinol (Zyloprim) 100 mg tablet Take 1 tablet (100 mg) by mouth once daily.        · metoprolol succinate XL (Kapspargo Sprinkle) 100 mg 24 hr capsule Take 1 capsule (100 mg) by mouth once daily. Capsules must be opened and contents sprinkled on a small amount of soft food or liquid (non-warmed) and administered within 15 minutes of preparation. Do not crush or chew granules.           Allergies: Patient has no known allergies.     Social History  Tobacco Use  · Smoking status: Former      Types: Cigarettes      Start date:       Quit date: 1972      Years since quittin.9  · Smokeless tobacco: Never  Vaping Use  · Vaping status: Never Used  Substance Use Topics  · Drug use: Yes      Types: Marijuana      Comment: occasional  ======================================

## 2024-11-20 NOTE — PROGRESS NOTES
CARDIAC SURGERY CONSULT PROGRESS NOTE    SUBJECTIVE  Edu Echavarria is a 74 y.o. male with a PMHx of HTN, GERD, gout, former smoker, and current cocaine and marijuana use who presented to [facility] ED on 11/17 with ~ 2 days history of palpitations and chest pain. At [facility] his initial EKG aflutter w/ RVR and ST elevtions in II/III/avF & questionable in V1-V3 w/o reciprocal changes, and labs remarkable for HS trop >84273, BNP 1100, WBC 5, CBC/RFP otherwise unremarkable. Repeat EKG aflutter w/ similar ST elevations as prior. STEMI call activated, loaded with aspirin 324mg, plavix 300mg, and started on heparin gtt 11/17. LHC showed triple vessel disease w/ no stenting and IABP was placed for coronary perfusion. Started on integrillin (which has now been stopped) and continued on heparin gtt. He was then transferred to [facility] for CABG evaluation.    On exam, the patient denies chest pain and otherwise feels well.   Of note, U tox was positive for cannabinoid and cocaine on admission 11/18. The patient states he last used cocaine on Saturday 11/16 and marijuana 11/17.     Cardiac surgery is consulted for surgical evaluation of his CAD.     [facility] 11/18:  LAD: large vessel which is extensively and diffusely diseased. Prox/ostial LAD evidence of a hazy eccentric stensois ~70%. LAD w/ evidence of extensive disease w/ stenosis in its proximal part of approx 65-70% followed by aneursymatic dilatation jutp roximal to first septal , which si folllowed by his stenosis in range of 70%. Mid LAD has evidence of a stenosis in range of 90%. Distal LAD is severely diseased with evidence of a long lesion in the range of 90%.      D1: small  D2: mod sized, mildly diffusely disease  Lcx: large anatomically dominant vessel which gives off 2 obtuse marginals, mod to severe diffuse disease. Setnosis medicine 1.1.1.1 at the bifucation/takeoff of 3rd OM.   Om1: mod sized vessel with evidence of severe stnesosis in range of  "80%  OM2: small  OM3: large vessel with evidence of severe ostial stenosis/bifrucation medicine 1.1.1.1. more distally the vessel is totally occluded. Distal Oms are visualized thought he itnra systemic collaterals. Ramyus intermedius absent.   RCA: non dominant with evcieence of moderte disease in its mid segment.      LVEDP: 46 mmHg  Estimated LVEF 20-25% with evidence of extensive global hypokinesis.     TTE 11/18: CONCLUSIONS:   1. Poorly visualized anatomical structures due to suboptimal image quality.   2. Left ventricular ejection fraction is severely decreased, calculated by Regalado's biplane at 23%.   3. There is global hypokinesis of the left ventricle with minor regional variations.   4. Spectral Doppler shows a Grade III (restrictive pattern) of left ventricular diastolic filling with an elevated left atrial pressure.   5. There is normal right ventricular global systolic function.   6. Mildly enlarged right ventricle.   7. The left atrium is mildly dilated.      Objective   BP (!) 99/37   Pulse 84   Temp 36.3 °C (97.3 °F) (Temporal)   Resp 22   Ht 1.702 m (5' 7\")   Wt 84.4 kg (186 lb 1.1 oz)   SpO2 95%   BMI 29.14 kg/m²   0-10 (Numeric) Pain Score: 0 - No pain   Vitals:    11/20/24 0811   Weight: 84.4 kg (186 lb 1.1 oz)          Intake/Output Summary (Last 24 hours) at 11/20/2024 1417  Last data filed at 11/20/2024 1200  Gross per 24 hour   Intake 818.61 ml   Output 575 ml   Net 243.61 ml       Physical Exam  Physical Exam  Constitutional:       General: He is not in acute distress.     Appearance: He is obese. He is not toxic-appearing.   HENT:      Head: Normocephalic.      Mouth/Throat:      Mouth: Mucous membranes are moist.   Cardiovascular:      Rate and Rhythm: Normal rate and regular rhythm.      Heart sounds: No murmur heard.     Comments: IABP in place  Pulmonary:      Effort: Pulmonary effort is normal.      Breath sounds: Normal breath sounds.      Comments: On room " air  Musculoskeletal:         General: Normal range of motion.      Cervical back: Neck supple.      Right lower leg: No edema.      Left lower leg: No edema.   Skin:     General: Skin is warm and dry.   Neurological:      General: No focal deficit present.      Mental Status: He is alert and oriented to person, place, and time.   Psychiatric:         Mood and Affect: Mood normal.         Behavior: Behavior normal.         Medications  Scheduled medications  aspirin, 81 mg, oral, Daily  atorvastatin, 80 mg, oral, Nightly  carvedilol, 25 mg, oral, BID  pantoprazole, 40 mg, oral, Daily before breakfast   Or  esomeprazole, 40 mg, nasoduodenal tube, Daily before breakfast   Or  pantoprazole, 40 mg, intravenous, Daily before breakfast  hydrALAZINE, 100 mg, oral, TID  insulin lispro, 0-10 Units, subcutaneous, TID AC  isosorbide dinitrate, 40 mg, oral, TID  mupirocin, , Topical, BID  perflutren protein A microsphere, 0.5 mL, intravenous, Once in imaging  polyethylene glycol, 17 g, oral, Daily  sulfur hexafluoride microsphr, 2 mL, intravenous, Once in imaging    Continuous medications  heparin, 0-4,000 Units/hr, Last Rate: Stopped (11/20/24 1130)    PRN medications  PRN medications: acetaminophen, calcium carbonate, dextrose, dextrose, glucagon, glucagon, heparin    Labs  Results for orders placed or performed during the hospital encounter of 11/18/24 (from the past 24 hours)   POCT GLUCOSE   Result Value Ref Range    POCT Glucose 189 (H) 74 - 99 mg/dL   Urinalysis with Reflex Culture and Microscopic   Result Value Ref Range    Color, Urine Yellow Light-Yellow, Yellow, Dark-Yellow    Appearance, Urine Clear Clear    Specific Gravity, Urine 1.030 1.005 - 1.035    pH, Urine 6.5 5.0, 5.5, 6.0, 6.5, 7.0, 7.5, 8.0    Protein, Urine 70 (1+) (A) NEGATIVE, 10 (TRACE), 20 (TRACE) mg/dL    Glucose, Urine 30 (TRACE) (A) Normal mg/dL    Blood, Urine NEGATIVE NEGATIVE    Ketones, Urine 20 (1+) (A) NEGATIVE mg/dL    Bilirubin, Urine  NEGATIVE NEGATIVE    Urobilinogen, Urine Normal Normal mg/dL    Nitrite, Urine NEGATIVE NEGATIVE    Leukocyte Esterase, Urine NEGATIVE NEGATIVE   Extra Urine Gray Tube   Result Value Ref Range    Extra Tube Hold for add-ons.    Urinalysis Microscopic   Result Value Ref Range    WBC, Urine 1-5 1-5, NONE /HPF    RBC, Urine 3-5 NONE, 1-2, 3-5 /HPF    Mucus, Urine FEW Reference range not established. /LPF   Heparin Assay, UFH   Result Value Ref Range    Heparin Unfractionated 1.0 See Comment Below for Therapeutic Ranges IU/mL   POCT GLUCOSE   Result Value Ref Range    POCT Glucose 185 (H) 74 - 99 mg/dL   Hepatic Function Panel   Result Value Ref Range    Albumin 3.6 3.4 - 5.0 g/dL    Bilirubin, Total 1.5 (H) 0.0 - 1.2 mg/dL    Bilirubin, Direct 0.5 (H) 0.0 - 0.3 mg/dL    Alkaline Phosphatase 56 33 - 136 U/L    ALT 22 10 - 52 U/L    AST 36 9 - 39 U/L    Total Protein 5.6 (L) 6.4 - 8.2 g/dL   CBC and Auto Differential   Result Value Ref Range    WBC 17.8 (H) 4.4 - 11.3 x10*3/uL    nRBC 0.0 0.0 - 0.0 /100 WBCs    RBC 5.46 4.50 - 5.90 x10*6/uL    Hemoglobin 15.3 13.5 - 17.5 g/dL    Hematocrit 43.0 41.0 - 52.0 %    MCV 79 (L) 80 - 100 fL    MCH 28.0 26.0 - 34.0 pg    MCHC 35.6 32.0 - 36.0 g/dL    RDW 13.2 11.5 - 14.5 %    Platelets 171 150 - 450 x10*3/uL    Neutrophils % 85.4 40.0 - 80.0 %    Immature Granulocytes %, Automated 0.5 0.0 - 0.9 %    Lymphocytes % 6.1 13.0 - 44.0 %    Monocytes % 7.7 2.0 - 10.0 %    Eosinophils % 0.1 0.0 - 6.0 %    Basophils % 0.2 0.0 - 2.0 %    Neutrophils Absolute 15.17 (H) 1.60 - 5.50 x10*3/uL    Immature Granulocytes Absolute, Automated 0.08 0.00 - 0.50 x10*3/uL    Lymphocytes Absolute 1.09 0.80 - 3.00 x10*3/uL    Monocytes Absolute 1.36 (H) 0.05 - 0.80 x10*3/uL    Eosinophils Absolute 0.02 0.00 - 0.40 x10*3/uL    Basophils Absolute 0.04 0.00 - 0.10 x10*3/uL   Coagulation Screen   Result Value Ref Range    Protime 14.8 (H) 9.8 - 12.8 seconds    INR 1.3 (H) 0.9 - 1.1    aPTT 91 (H) 27 - 38  seconds   Magnesium   Result Value Ref Range    Magnesium 2.10 1.60 - 2.40 mg/dL   Phosphorus   Result Value Ref Range    Phosphorus 3.4 2.5 - 4.9 mg/dL   Basic Metabolic Panel   Result Value Ref Range    Glucose 175 (H) 74 - 99 mg/dL    Sodium 129 (L) 136 - 145 mmol/L    Potassium 4.2 3.5 - 5.3 mmol/L    Chloride 95 (L) 98 - 107 mmol/L    Bicarbonate 24 21 - 32 mmol/L    Anion Gap 14 10 - 20 mmol/L    Urea Nitrogen 25 (H) 6 - 23 mg/dL    Creatinine 1.28 0.50 - 1.30 mg/dL    eGFR 59 (L) >60 mL/min/1.73m*2    Calcium 8.6 8.6 - 10.6 mg/dL   Heparin Assay, UFH   Result Value Ref Range    Heparin Unfractionated 0.3 See Comment Below for Therapeutic Ranges IU/mL   POCT GLUCOSE   Result Value Ref Range    POCT Glucose 187 (H) 74 - 99 mg/dL   Heparin Assay, UFH   Result Value Ref Range    Heparin Unfractionated 1.3 (HH) See Comment Below for Therapeutic Ranges IU/mL   Pulmonary function testing   Result Value Ref Range    FVC - Predicted 3.52     FEV1 - Predicted 2.66     FVC - PRE 2.90     FEV1 - Pre 1.58    Heparin Assay, UFH   Result Value Ref Range    Heparin Unfractionated 0.3 See Comment Below for Therapeutic Ranges IU/mL   POCT GLUCOSE   Result Value Ref Range    POCT Glucose 152 (H) 74 - 99 mg/dL   Blood Gas Arterial, COOX Unsolicited   Result Value Ref Range    POCT pH, Arterial 7.43 (H) 7.38 - 7.42 pH    POCT pCO2, Arterial 37 (L) 38 - 42 mm Hg    POCT pO2, Arterial 78 (L) 85 - 95 mm Hg    POCT SO2, Arterial 97 94 - 100 %    POCT Oxy Hemoglobin, Arterial 94.0 94.0 - 98.0 %    POCT Base Excess, Arterial 0.5 -2.0 - 3.0 mmol/L    POCT HCO3 Calculated, Arterial 24.6 22.0 - 26.0 mmol/L    POCT Hemoglobin, Arterial 14.9 13.5 - 17.5 g/dL    POCT Carboxyhemoglobin, Arterial 2.7 %    POCT Methemoglobin, Arterial 0.4 0.0 - 1.5 %    POCT Deoxy Hemoglobin, Arterial 2.9 0.0 - 5.0 %    Patient Temperature 37.0 degrees Celsius   Sodium, Urine Random   Result Value Ref Range    Sodium, Urine Random <10 mmol/L    Creatinine,  Urine Random 184.6 20.0 - 370.0 mg/dL    Sodium/Creatinine Ratio     POCT GLUCOSE   Result Value Ref Range    POCT Glucose 190 (H) 74 - 99 mg/dL   ACTIVATED CLOTTING TIME LOW   Result Value Ref Range    POCT Activated Clotting Time Low Range 131 83 - 199 sec               ASSESSMENT & PLAN  Edu Echavarria is a 74 y.o. male with a PMHx of HTN, GERD, gout, former smoker, and current cocaine and marijuana use who presented to [facility] ED on 11/17 with ~ 2 days history of palpitations and chest pain. At [facility] his initial EKG aflutter w/ RVR and ST elevtions in II/III/avF & questionable in V1-V3 w/o reciprocal changes, and labs remarkable for HS trop >06938, BNP 1100, WBC 5, CBC/RFP otherwise unremarkable. Repeat EKG aflutter w/ similar ST elevations as prior. STEMI call activated, loaded with aspirin 324mg, plavix 300mg, and started on heparin gtt 11/17. LHC showed triple vessel disease w/ no stenting and IABP was placed for coronary perfusion. Started on integrillin (which has now been stopped) and continued on heparin gtt. [facility]    On exam, the patient denies chest pain and otherwise feels well.   Of note, U tox was positive for cannabinoid and cocaine on admission 11/18. The patient states he last used cocaine on Saturday 11/16 and marijuana 11/17.     Cardiac surgery is consulted for surgical evaluation of his CAD.     RECOMMENDATIONS/PLAN  Dr. Villa met with patient reviewed case and available imaging.   - Surgical date TBD  - Ideally, will wait at least 5 days s/p last dose of Plavix (11/17 PM) to take patient to OR.  - [facility] images in PACS to review.   - TTE 11/18 EF 23%  - Medical optimization per primary team    Preop risk stratification studies/labs:  --- US Carotids/Vein Mapping/ LE US ELODIA - ordered and pending   --- PFTs (spirometry and room air ABG) - ordered and pending   --- pCXR completed 11/19  --- MRSA, UA/Culture, LFTs, HgbA1c, TSH/T4, Lipid  panel  --- CT chest without contrast - ordered and pending   --- Dental evaluation not indicated for isolated CABG surgeries     When/if goes to surgery:  - Continue ASA, high-intensity statin, and BB (or document contraindications for use) for preop CABG patients   - No ACEi/ARBs in the pre-op period (at least 48 hours)  - Hold any SGLT2 inhibitors (Farxiga, Jardiance, etc.) for at least 3 days prior to cardiac surgery to prevent euglycemic DKA  - Hold any daily GLP-1 agonist drugs on the day of surgery. Hold any weekly GLP-1 drugs for 7 days prior to surgery. (Dulaglutide, Exenatide, Liraglutide, Lixisenatide, & Semaglutide)  - No antiplatelets other than ASA, no anticoagulants other than Heparin  - NPO after midnight, blood on hold/T&S, and preop scrubs only to be ordered once OR date is established      Will continue to follow along.  Thank you for the consultation.   Patient educated and all questions answered.  Please page the cardiac surgery consult pager [phone] with any questions or changes in patient condition.    Felecia Ritchie PA-C  Cardiac Surgery Consult [facility]  [facility]  Cardiac Surgery Consult Pager [phone]     11/20/2024  2:17 PM

## 2024-11-20 NOTE — CARE PLAN
Problem: Pain - Adult  Goal: Verbalizes/displays adequate comfort level or baseline comfort level  Outcome: Progressing     Problem: Safety - Adult  Goal: Free from fall injury  Outcome: Progressing     Problem: Discharge Planning  Goal: Discharge to home or other facility with appropriate resources  Outcome: Progressing     Problem: Chronic Conditions and Co-morbidities  Goal: Patient's chronic conditions and co-morbidity symptoms are monitored and maintained or improved  Outcome: Progressing     Problem: Skin  Goal: Participates in plan/prevention/treatment measures  Outcome: Progressing  Flowsheets (Taken 11/20/2024 0810)  Participates in plan/prevention/treatment measures: Elevate heels  Goal: Prevent/manage excess moisture  Outcome: Progressing  Flowsheets (Taken 11/20/2024 0810)  Prevent/manage excess moisture: Cleanse incontinence/protect with barrier cream  Goal: Prevent/minimize sheer/friction injuries  Outcome: Progressing  Flowsheets (Taken 11/20/2024 0810)  Prevent/minimize sheer/friction injuries: Use pull sheet

## 2024-11-21 ENCOUNTER — APPOINTMENT (OUTPATIENT)
Dept: CARDIOLOGY | Facility: HOSPITAL | Age: 74
DRG: 235 | End: 2024-11-21
Payer: MEDICARE

## 2024-11-21 ENCOUNTER — APPOINTMENT (OUTPATIENT)
Dept: VASCULAR MEDICINE | Facility: HOSPITAL | Age: 74
DRG: 235 | End: 2024-11-21
Payer: MEDICARE

## 2024-11-21 ENCOUNTER — APPOINTMENT (OUTPATIENT)
Dept: VASCULAR MEDICINE | Facility: HOSPITAL | Age: 74
End: 2024-11-21
Payer: MEDICARE

## 2024-11-21 ENCOUNTER — APPOINTMENT (OUTPATIENT)
Dept: RADIOLOGY | Facility: HOSPITAL | Age: 74
DRG: 235 | End: 2024-11-21
Payer: MEDICARE

## 2024-11-21 LAB
ALBUMIN SERPL BCP-MCNC: 3.4 G/DL (ref 3.4–5)
ALBUMIN SERPL BCP-MCNC: 3.7 G/DL (ref 3.4–5)
ALP SERPL-CCNC: 61 U/L (ref 33–136)
ALT SERPL W P-5'-P-CCNC: 18 U/L (ref 10–52)
ANION GAP SERPL CALC-SCNC: 13 MMOL/L (ref 10–20)
ANION GAP SERPL CALC-SCNC: 15 MMOL/L (ref 10–20)
APTT PPP: 34 SECONDS (ref 27–38)
AST SERPL W P-5'-P-CCNC: 27 U/L (ref 9–39)
BASOPHILS # BLD AUTO: 0.05 X10*3/UL (ref 0–0.1)
BASOPHILS NFR BLD AUTO: 0.4 %
BILIRUB DIRECT SERPL-MCNC: 0.2 MG/DL (ref 0–0.3)
BILIRUB SERPL-MCNC: 1.1 MG/DL (ref 0–1.2)
BILIRUB SERPL-MCNC: 1.1 MG/DL (ref 0–1.2)
BUN SERPL-MCNC: 38 MG/DL (ref 6–23)
BUN SERPL-MCNC: 40 MG/DL (ref 6–23)
CALCIUM SERPL-MCNC: 8.4 MG/DL (ref 8.6–10.6)
CALCIUM SERPL-MCNC: 8.5 MG/DL (ref 8.6–10.6)
CHLORIDE SERPL-SCNC: 95 MMOL/L (ref 98–107)
CHLORIDE SERPL-SCNC: 95 MMOL/L (ref 98–107)
CO2 SERPL-SCNC: 24 MMOL/L (ref 21–32)
CO2 SERPL-SCNC: 25 MMOL/L (ref 21–32)
CREAT SERPL-MCNC: 1.41 MG/DL (ref 0.5–1.3)
CREAT SERPL-MCNC: 1.65 MG/DL (ref 0.5–1.3)
EGFRCR SERPLBLD CKD-EPI 2021: 44 ML/MIN/1.73M*2
EGFRCR SERPLBLD CKD-EPI 2021: 53 ML/MIN/1.73M*2
EOSINOPHIL # BLD AUTO: 0.21 X10*3/UL (ref 0–0.4)
EOSINOPHIL NFR BLD AUTO: 1.7 %
ERYTHROCYTE [DISTWIDTH] IN BLOOD BY AUTOMATED COUNT: 13.5 % (ref 11.5–14.5)
GLUCOSE BLD MANUAL STRIP-MCNC: 140 MG/DL (ref 74–99)
GLUCOSE BLD MANUAL STRIP-MCNC: 149 MG/DL (ref 74–99)
GLUCOSE BLD MANUAL STRIP-MCNC: 163 MG/DL (ref 74–99)
GLUCOSE BLD MANUAL STRIP-MCNC: 181 MG/DL (ref 74–99)
GLUCOSE BLD MANUAL STRIP-MCNC: 192 MG/DL (ref 74–99)
GLUCOSE SERPL-MCNC: 145 MG/DL (ref 74–99)
GLUCOSE SERPL-MCNC: 171 MG/DL (ref 74–99)
HCT VFR BLD AUTO: 42.8 % (ref 41–52)
HGB BLD-MCNC: 14.6 G/DL (ref 13.5–17.5)
HGB RETIC QN: 32 PG (ref 28–38)
IMM GRANULOCYTES # BLD AUTO: 0.07 X10*3/UL (ref 0–0.5)
IMM GRANULOCYTES NFR BLD AUTO: 0.6 % (ref 0–0.9)
IMMATURE RETIC FRACTION: 9.9 %
INR PPP: 1.1 (ref 0.9–1.1)
LDH SERPL L TO P-CCNC: 390 U/L (ref 84–246)
LYMPHOCYTES # BLD AUTO: 1.86 X10*3/UL (ref 0.8–3)
LYMPHOCYTES NFR BLD AUTO: 15.4 %
MAGNESIUM SERPL-MCNC: 2.23 MG/DL (ref 1.6–2.4)
MCH RBC QN AUTO: 28.3 PG (ref 26–34)
MCHC RBC AUTO-ENTMCNC: 34.1 G/DL (ref 32–36)
MCV RBC AUTO: 83 FL (ref 80–100)
MONOCYTES # BLD AUTO: 1.25 X10*3/UL (ref 0.05–0.8)
MONOCYTES NFR BLD AUTO: 10.3 %
NEUTROPHILS # BLD AUTO: 8.66 X10*3/UL (ref 1.6–5.5)
NEUTROPHILS NFR BLD AUTO: 71.6 %
NRBC BLD-RTO: 0 /100 WBCS (ref 0–0)
OSMOLALITY SERPL: 281 MOSM/KG (ref 280–300)
PHOSPHATE SERPL-MCNC: 3.1 MG/DL (ref 2.5–4.9)
PHOSPHATE SERPL-MCNC: 4.2 MG/DL (ref 2.5–4.9)
PLATELET # BLD AUTO: 144 X10*3/UL (ref 150–450)
POTASSIUM SERPL-SCNC: 4 MMOL/L (ref 3.5–5.3)
POTASSIUM SERPL-SCNC: 4.1 MMOL/L (ref 3.5–5.3)
PROT SERPL-MCNC: 5.8 G/DL (ref 6.4–8.2)
PROTHROMBIN TIME: 12.7 SECONDS (ref 9.8–12.8)
RBC # BLD AUTO: 5.15 X10*6/UL (ref 4.5–5.9)
RETICS #: 0.13 X10*6/UL (ref 0.02–0.11)
RETICS/RBC NFR AUTO: 2.5 % (ref 0.5–2)
SODIUM SERPL-SCNC: 128 MMOL/L (ref 136–145)
SODIUM SERPL-SCNC: 131 MMOL/L (ref 136–145)
STAPHYLOCOCCUS SPEC CULT: NORMAL
WBC # BLD AUTO: 12.1 X10*3/UL (ref 4.4–11.3)

## 2024-11-21 PROCEDURE — 2500000001 HC RX 250 WO HCPCS SELF ADMINISTERED DRUGS (ALT 637 FOR MEDICARE OP)

## 2024-11-21 PROCEDURE — 82248 BILIRUBIN DIRECT: CPT

## 2024-11-21 PROCEDURE — 80053 COMPREHEN METABOLIC PANEL: CPT

## 2024-11-21 PROCEDURE — 85025 COMPLETE CBC W/AUTO DIFF WBC: CPT

## 2024-11-21 PROCEDURE — 83615 LACTATE (LD) (LDH) ENZYME: CPT

## 2024-11-21 PROCEDURE — 71250 CT THORAX DX C-: CPT

## 2024-11-21 PROCEDURE — 83735 ASSAY OF MAGNESIUM: CPT

## 2024-11-21 PROCEDURE — 2500000004 HC RX 250 GENERAL PHARMACY W/ HCPCS (ALT 636 FOR OP/ED)

## 2024-11-21 PROCEDURE — 84100 ASSAY OF PHOSPHORUS: CPT

## 2024-11-21 PROCEDURE — 36415 COLL VENOUS BLD VENIPUNCTURE: CPT

## 2024-11-21 PROCEDURE — 1200000002 HC GENERAL ROOM WITH TELEMETRY DAILY

## 2024-11-21 PROCEDURE — 85730 THROMBOPLASTIN TIME PARTIAL: CPT

## 2024-11-21 PROCEDURE — 80069 RENAL FUNCTION PANEL: CPT | Mod: CCI

## 2024-11-21 PROCEDURE — 82947 ASSAY GLUCOSE BLOOD QUANT: CPT

## 2024-11-21 PROCEDURE — 71250 CT THORAX DX C-: CPT | Performed by: RADIOLOGY

## 2024-11-21 PROCEDURE — 93005 ELECTROCARDIOGRAM TRACING: CPT

## 2024-11-21 PROCEDURE — 93880 EXTRACRANIAL BILAT STUDY: CPT | Performed by: SURGERY

## 2024-11-21 PROCEDURE — 2550000001 HC RX 255 CONTRASTS: Performed by: INTERNAL MEDICINE

## 2024-11-21 PROCEDURE — 85045 AUTOMATED RETICULOCYTE COUNT: CPT

## 2024-11-21 PROCEDURE — 82247 BILIRUBIN TOTAL: CPT

## 2024-11-21 PROCEDURE — 99291 CRITICAL CARE FIRST HOUR: CPT

## 2024-11-21 PROCEDURE — 2500000001 HC RX 250 WO HCPCS SELF ADMINISTERED DRUGS (ALT 637 FOR MEDICARE OP): Performed by: STUDENT IN AN ORGANIZED HEALTH CARE EDUCATION/TRAINING PROGRAM

## 2024-11-21 PROCEDURE — 83930 ASSAY OF BLOOD OSMOLALITY: CPT

## 2024-11-21 PROCEDURE — 99232 SBSQ HOSP IP/OBS MODERATE 35: CPT | Performed by: PHYSICIAN ASSISTANT

## 2024-11-21 PROCEDURE — 93970 EXTREMITY STUDY: CPT

## 2024-11-21 PROCEDURE — 97165 OT EVAL LOW COMPLEX 30 MIN: CPT | Mod: GO

## 2024-11-21 PROCEDURE — 93970 EXTREMITY STUDY: CPT | Performed by: SURGERY

## 2024-11-21 PROCEDURE — 97162 PT EVAL MOD COMPLEX 30 MIN: CPT | Mod: GP | Performed by: STUDENT IN AN ORGANIZED HEALTH CARE EDUCATION/TRAINING PROGRAM

## 2024-11-21 PROCEDURE — 93880 EXTRACRANIAL BILAT STUDY: CPT

## 2024-11-21 PROCEDURE — 85610 PROTHROMBIN TIME: CPT

## 2024-11-21 RX ORDER — HYDRALAZINE HYDROCHLORIDE 50 MG/1
50 TABLET, FILM COATED ORAL 3 TIMES DAILY
Status: DISCONTINUED | OUTPATIENT
Start: 2024-11-21 | End: 2024-11-25

## 2024-11-21 RX ORDER — CHLORHEXIDINE GLUCONATE ORAL RINSE 1.2 MG/ML
15 SOLUTION DENTAL 2 TIMES DAILY
Status: COMPLETED | OUTPATIENT
Start: 2024-11-24 | End: 2024-11-24

## 2024-11-21 RX ORDER — DOCUSATE SODIUM 100 MG/1
100 CAPSULE, LIQUID FILLED ORAL 2 TIMES DAILY
Status: DISCONTINUED | OUTPATIENT
Start: 2024-11-21 | End: 2024-11-24

## 2024-11-21 RX ORDER — CARVEDILOL 12.5 MG/1
12.5 TABLET ORAL 2 TIMES DAILY
Status: DISCONTINUED | OUTPATIENT
Start: 2024-11-21 | End: 2024-11-24

## 2024-11-21 ASSESSMENT — COGNITIVE AND FUNCTIONAL STATUS - GENERAL
HELP NEEDED FOR BATHING: A LITTLE
STANDING UP FROM CHAIR USING ARMS: A LITTLE
MOVING FROM LYING ON BACK TO SITTING ON SIDE OF FLAT BED WITH BEDRAILS: A LITTLE
CLIMB 3 TO 5 STEPS WITH RAILING: TOTAL
DAILY ACTIVITIY SCORE: 21
TURNING FROM BACK TO SIDE WHILE IN FLAT BAD: A LITTLE
MOVING TO AND FROM BED TO CHAIR: A LITTLE
TOILETING: A LITTLE
DRESSING REGULAR LOWER BODY CLOTHING: A LITTLE
MOBILITY SCORE: 16
WALKING IN HOSPITAL ROOM: A LITTLE
DAILY ACTIVITIY SCORE: 24
MOBILITY SCORE: 24

## 2024-11-21 ASSESSMENT — PAIN - FUNCTIONAL ASSESSMENT
PAIN_FUNCTIONAL_ASSESSMENT: 0-10

## 2024-11-21 ASSESSMENT — PAIN SCALES - GENERAL
PAINLEVEL_OUTOF10: 0 - NO PAIN

## 2024-11-21 ASSESSMENT — ACTIVITIES OF DAILY LIVING (ADL): BATHING_ASSISTANCE: MINIMAL

## 2024-11-21 NOTE — PROGRESS NOTES
CARDIAC SURGERY CONSULT PROGRESS NOTE    SUBJECTIVE  Edu Echavarria is a 74 y.o. male with a PMHx of HTN, GERD, gout, former smoker, and current cocaine and marijuana use who presented to [facility] ED on 11/17 with ~ 2 days history of palpitations and chest pain. At [facility] his initial EKG aflutter w/ RVR and ST elevtions in II/III/avF & questionable in V1-V3 w/o reciprocal changes, and labs remarkable for HS trop >79519, BNP 1100, WBC 5, CBC/RFP otherwise unremarkable. Repeat EKG aflutter w/ similar ST elevations as prior. STEMI call activated, loaded with aspirin 324mg, plavix 300mg, and started on heparin gtt 11/17. LHC showed triple vessel disease w/ no stenting and IABP was placed for coronary perfusion. Started on integrillin (which has now been stopped) and continued on heparin gtt. He was then transferred to [facility] for CABG evaluation.    On exam, the patient denies chest pain and otherwise feels well.   Of note, U tox was positive for cannabinoid and cocaine on admission 11/18. The patient states he last used cocaine on Saturday 11/16 and marijuana 11/17.     Cardiac surgery is consulted for surgical evaluation of his CAD.     [facility] 11/18:  LAD: large vessel which is extensively and diffusely diseased. Prox/ostial LAD evidence of a hazy eccentric stensois ~70%. LAD w/ evidence of extensive disease w/ stenosis in its proximal part of approx 65-70% followed by aneursymatic dilatation jutp roximal to first septal , which si folllowed by his stenosis in range of 70%. Mid LAD has evidence of a stenosis in range of 90%. Distal LAD is severely diseased with evidence of a long lesion in the range of 90%.      D1: small  D2: mod sized, mildly diffusely disease  Lcx: large anatomically dominant vessel which gives off 2 obtuse marginals, mod to severe diffuse disease. Setnosis medicine 1.1.1.1 at the bifucation/takeoff of 3rd OM.   Om1: mod sized vessel with evidence of severe stnesosis in range of  "80%  OM2: small  OM3: large vessel with evidence of severe ostial stenosis/bifrucation medicine 1.1.1.1. more distally the vessel is totally occluded. Distal Oms are visualized thought he itnra systemic collaterals. Ramyus intermedius absent.   RCA: non dominant with evcieence of moderte disease in its mid segment.      LVEDP: 46 mmHg  Estimated LVEF 20-25% with evidence of extensive global hypokinesis.     TTE 11/18: CONCLUSIONS:   1. Poorly visualized anatomical structures due to suboptimal image quality.   2. Left ventricular ejection fraction is severely decreased, calculated by Regalado's biplane at 23%.   3. There is global hypokinesis of the left ventricle with minor regional variations.   4. Spectral Doppler shows a Grade III (restrictive pattern) of left ventricular diastolic filling with an elevated left atrial pressure.   5. There is normal right ventricular global systolic function.   6. Mildly enlarged right ventricle.   7. The left atrium is mildly dilated.      Objective   /57   Pulse 83   Temp 36.8 °C (98.2 °F)   Resp 21   Ht 1.702 m (5' 7\")   Wt 85.4 kg (188 lb 4.4 oz)   SpO2 95%   BMI 29.49 kg/m²   0-10 (Numeric) Pain Score: 0 - No pain   Vitals:    11/21/24 0600   Weight: 85.4 kg (188 lb 4.4 oz)          Intake/Output Summary (Last 24 hours) at 11/21/2024 1523  Last data filed at 11/21/2024 1122  Gross per 24 hour   Intake 563.3 ml   Output 250 ml   Net 313.3 ml       Physical Exam  Physical Exam  Constitutional:       General: He is not in acute distress.     Appearance: He is obese. He is not toxic-appearing.   HENT:      Head: Normocephalic.      Mouth/Throat:      Mouth: Mucous membranes are moist.   Cardiovascular:      Rate and Rhythm: Normal rate and regular rhythm.      Heart sounds: No murmur heard.  Pulmonary:      Effort: Pulmonary effort is normal.      Breath sounds: Normal breath sounds.      Comments: On room air  Musculoskeletal:         General: Normal range of motion. "      Cervical back: Neck supple.      Right lower leg: No edema.      Left lower leg: No edema.   Skin:     General: Skin is warm and dry.   Neurological:      General: No focal deficit present.      Mental Status: He is alert and oriented to person, place, and time.   Psychiatric:         Mood and Affect: Mood normal.         Behavior: Behavior normal.         Medications  Scheduled medications  aspirin, 81 mg, oral, Daily  atorvastatin, 80 mg, oral, Nightly  carvedilol, 12.5 mg, oral, BID  [START ON 11/24/2024] chlorhexidine, 15 mL, Mouth/Throat, BID  enoxaparin, 40 mg, subcutaneous, Daily  pantoprazole, 40 mg, oral, Daily before breakfast   Or  esomeprazole, 40 mg, nasoduodenal tube, Daily before breakfast   Or  pantoprazole, 40 mg, intravenous, Daily before breakfast  hydrALAZINE, 50 mg, oral, TID  insulin lispro, 0-10 Units, subcutaneous, TID AC  iohexol, 90 mL, intravenous, Once in imaging  isosorbide dinitrate, 40 mg, oral, TID  mupirocin, , Topical, BID  perflutren protein A microsphere, 0.5 mL, intravenous, Once in imaging  polyethylene glycol, 17 g, oral, Daily  sulfur hexafluoride microsphr, 2 mL, intravenous, Once in imaging    Continuous medications     PRN medications  PRN medications: acetaminophen, calcium carbonate, dextrose, dextrose, glucagon, glucagon    Labs  Results for orders placed or performed during the hospital encounter of 11/18/24 (from the past 24 hours)   POCT GLUCOSE   Result Value Ref Range    POCT Glucose 172 (H) 74 - 99 mg/dL   POCT GLUCOSE   Result Value Ref Range    POCT Glucose 212 (H) 74 - 99 mg/dL   POCT GLUCOSE   Result Value Ref Range    POCT Glucose 162 (H) 74 - 99 mg/dL   Hepatic Function Panel   Result Value Ref Range    Albumin 3.7 3.4 - 5.0 g/dL    Bilirubin, Total 1.1 0.0 - 1.2 mg/dL    Bilirubin, Direct 0.2 0.0 - 0.3 mg/dL    Alkaline Phosphatase 61 33 - 136 U/L    ALT 18 10 - 52 U/L    AST 27 9 - 39 U/L    Total Protein 5.8 (L) 6.4 - 8.2 g/dL   CBC and Auto  Differential   Result Value Ref Range    WBC 12.1 (H) 4.4 - 11.3 x10*3/uL    nRBC 0.0 0.0 - 0.0 /100 WBCs    RBC 5.15 4.50 - 5.90 x10*6/uL    Hemoglobin 14.6 13.5 - 17.5 g/dL    Hematocrit 42.8 41.0 - 52.0 %    MCV 83 80 - 100 fL    MCH 28.3 26.0 - 34.0 pg    MCHC 34.1 32.0 - 36.0 g/dL    RDW 13.5 11.5 - 14.5 %    Platelets 144 (L) 150 - 450 x10*3/uL    Neutrophils % 71.6 40.0 - 80.0 %    Immature Granulocytes %, Automated 0.6 0.0 - 0.9 %    Lymphocytes % 15.4 13.0 - 44.0 %    Monocytes % 10.3 2.0 - 10.0 %    Eosinophils % 1.7 0.0 - 6.0 %    Basophils % 0.4 0.0 - 2.0 %    Neutrophils Absolute 8.66 (H) 1.60 - 5.50 x10*3/uL    Immature Granulocytes Absolute, Automated 0.07 0.00 - 0.50 x10*3/uL    Lymphocytes Absolute 1.86 0.80 - 3.00 x10*3/uL    Monocytes Absolute 1.25 (H) 0.05 - 0.80 x10*3/uL    Eosinophils Absolute 0.21 0.00 - 0.40 x10*3/uL    Basophils Absolute 0.05 0.00 - 0.10 x10*3/uL   Coagulation Screen   Result Value Ref Range    Protime 12.7 9.8 - 12.8 seconds    INR 1.1 0.9 - 1.1    aPTT 34 27 - 38 seconds   Magnesium   Result Value Ref Range    Magnesium 2.23 1.60 - 2.40 mg/dL   Phosphorus   Result Value Ref Range    Phosphorus 4.2 2.5 - 4.9 mg/dL   Basic Metabolic Panel   Result Value Ref Range    Glucose 145 (H) 74 - 99 mg/dL    Sodium 131 (L) 136 - 145 mmol/L    Potassium 4.0 3.5 - 5.3 mmol/L    Chloride 95 (L) 98 - 107 mmol/L    Bicarbonate 25 21 - 32 mmol/L    Anion Gap 15 10 - 20 mmol/L    Urea Nitrogen 38 (H) 6 - 23 mg/dL    Creatinine 1.65 (H) 0.50 - 1.30 mg/dL    eGFR 44 (L) >60 mL/min/1.73m*2    Calcium 8.5 (L) 8.6 - 10.6 mg/dL   POCT GLUCOSE   Result Value Ref Range    POCT Glucose 140 (H) 74 - 99 mg/dL   Bilirubin, total   Result Value Ref Range    Bilirubin, Total 1.1 0.0 - 1.2 mg/dL   Lactate dehydrogenase   Result Value Ref Range     (H) 84 - 246 U/L   Reticulocytes   Result Value Ref Range    Retic % 2.5 (H) 0.5 - 2.0 %    Retic Absolute 0.127 (H) 0.017 - 0.110 x10*6/uL     Reticulocyte Hemoglobin 32 28 - 38 pg    Immature Retic fraction 9.9 <=16.0 %   POCT GLUCOSE   Result Value Ref Range    POCT Glucose 149 (H) 74 - 99 mg/dL   POCT GLUCOSE   Result Value Ref Range    POCT Glucose 192 (H) 74 - 99 mg/dL   Renal function panel   Result Value Ref Range    Glucose 171 (H) 74 - 99 mg/dL    Sodium 128 (L) 136 - 145 mmol/L    Potassium 4.1 3.5 - 5.3 mmol/L    Chloride 95 (L) 98 - 107 mmol/L    Bicarbonate 24 21 - 32 mmol/L    Anion Gap 13 10 - 20 mmol/L    Urea Nitrogen 40 (H) 6 - 23 mg/dL    Creatinine 1.41 (H) 0.50 - 1.30 mg/dL    eGFR 53 (L) >60 mL/min/1.73m*2    Calcium 8.4 (L) 8.6 - 10.6 mg/dL    Phosphorus 3.1 2.5 - 4.9 mg/dL    Albumin 3.4 3.4 - 5.0 g/dL               ASSESSMENT & PLAN  Edu Echavarria is a 74 y.o. male with a PMHx of HTN, GERD, gout, former smoker, and current cocaine and marijuana use who presented to [facility] ED on 11/17 with ~ 2 days history of palpitations and chest pain. At [facility] his initial EKG aflutter w/ RVR and ST elevtions in II/III/avF & questionable in V1-V3 w/o reciprocal changes, and labs remarkable for HS trop >48889, BNP 1100, WBC 5, CBC/RFP otherwise unremarkable. Repeat EKG aflutter w/ similar ST elevations as prior. STEMI call activated, loaded with aspirin 324mg, plavix 300mg, and started on heparin gtt 11/17. LHC showed triple vessel disease w/ no stenting and IABP was placed for coronary perfusion. Started on integrillin (which has now been stopped) and continued on heparin gtt. He was then transferred to [facility] for CABG evaluation.    On exam, the patient denies chest pain and otherwise feels well.   Of note, U tox was positive for cannabinoid and cocaine on admission 11/18. The patient states he last used cocaine on Saturday 11/16 and marijuana 11/17.     Cardiac surgery is consulted for surgical evaluation of his CAD.     RECOMMENDATIONS/PLAN  Dr. Villa met with patient reviewed case and available imaging.   - Plan for OR Monday  11/25   - Ideally, will wait at least 5 days s/p last dose of Plavix (11/17 PM) to take patient to OR.  - [facility] images in PACS to review.   - TTE 11/18 EF 23%  - Medical optimization per primary team    Preop risk stratification studies/labs:  --- US Carotids/Vein Mapping/ LE US ELODIA - completed 11/21  --- PFTs (spirometry and room air ABG) -  completed 11/21  --- pCXR completed 11/19  --- MRSA, UA/Culture, LFTs, HgbA1c, TSH/T4, Lipid panel  --- CT chest without contrast - completed 11/21  --- Dental evaluation not indicated for isolated CABG surgeries     When/if goes to surgery:  - Continue ASA, high-intensity statin, and BB (or document contraindications for use) for preop CABG patients   - No ACEi/ARBs in the pre-op period (at least 48 hours)  - Hold any SGLT2 inhibitors (Farxiga, Jardiance, etc.) for at least 3 days prior to cardiac surgery to prevent euglycemic DKA  - Hold any daily GLP-1 agonist drugs on the day of surgery. Hold any weekly GLP-1 drugs for 7 days prior to surgery. (Dulaglutide, Exenatide, Liraglutide, Lixisenatide, & Semaglutide)  - No antiplatelets other than ASA, no anticoagulants other than Heparin  - NPO after midnight, blood on hold/T&S, and preop scrubs only to be ordered for OR 11/25       Will continue to follow along.  Thank you for the consultation.   Patient educated and all questions answered.  Please page the cardiac surgery consult pager [phone] with any questions or changes in patient condition.    Felecia Ritchie PA-C  Cardiac Surgery Consult [facility]  [facility]  Cardiac Surgery Consult Pager [phone]     11/21/2024  3:23 PM

## 2024-11-21 NOTE — PROGRESS NOTES
Physical Therapy    Physical Therapy Evaluation    Patient Name: Edu Echavarria  MRN: [id number]  Room: 03/03-A  Today's Date: 11/21/2024   Time Calculation  Start Time: 0957  Stop Time: 1015  Time Calculation (min): 18 min    Assessment/Plan   PT Assessment  PT Assessment Results: Decreased endurance, Impaired balance  Rehab Prognosis: Good  Barriers to Discharge: medical acuity/CABG workup  End of Session Communication: Bedside nurse  End of Session Patient Position: Up in chair, Alarm off, not on at start of session  IP OR SWING BED PT PLAN  Inpatient or Swing Bed: Inpatient  PT Plan  Treatment/Interventions: Bed mobility, Gait training, Transfer training, Balance training, Strengthening, Endurance training, Therapeutic exercise, Range of motion, Therapeutic activity  PT Plan: Ongoing PT  PT Frequency: 3 times per week  PT Discharge Recommendations: Low intensity level of continued care (may require moderate intensity post acute discharge pending possible surgical intervention)  PT Recommended Transfer Status: Assist x1  PT - OK to Discharge: Yes    Subjective      General Visit Information:  Subjective: Patient is alert, agreeable to PT.  In good spirits and happy to be OOB mobilizing, watching resident evil on computer up in chair.  Mentioning how if he were to die, it would be fine as he would be reincarnated up the chain.  Reason for Referral: CAD CABG evaluation c triple vessle disease s/p IABP  Past Medical History Relevant to Rehab: PMHx of HTN, GERD, gout, former smoker, and current cocaine and marijuana  Prior to Session Communication: Bedside nurse  Patient Position Received: Up in chair  Family/Caregiver Present: No   Home Living:  Home Living  Type of Home: House  Lives With: Alone  Home Adaptive Equipment: Cane  Home Layout: One level  Home Access: No concerns  Prior Level of Function:  Prior Function Per Pt/Caregiver Report  Level of [place]: Independent with ADLs and functional  transfers  Precautions:  Precautions  Medical Precautions: Fall precautions, Cardiac precautions  Vital Signs:  Pre Vitals  Vital Signs  Heart Rate: 80  SpO2: 97 %  BP: 105/60  MAP (mmHg): 63   Post Vitals  Vital Signs  Heart Rate: 80  SpO2: 97 %  BP: 90/56  MAP (mmHg): 65   Lines/Tubes/Drains:  External Urinary Catheter Male (Active)   Number of days: 1     Medical Gas Therapy: None (Room air)  Continuous Medications/Drips:       Objective   Pain:  Pain Assessment  0-10 (Numeric) Pain Score: 0 - No pain (post 0)    Cognition:  Cognition  Overall Cognitive Status: Within Functional Limits  Orientation Level: Oriented X4    General Assessments:  Extremity/Trunk Assessments:  Tone: Mild tremor/cervical tic noted  Sensation  Light Touch: No apparent deficits  Coordination  Coordination Comment: intact to opposition, noted mild tremor, cervial/facial tic  Upper Extremity  ROM: WFL  Strength:BUE 4+/5  Lower Extremity  ROM: WFL  Strength: BUE 4+/5    Sitting Static Balance Normal  Sitting Dynamic Balance Not NormalUE Support No UE support and Assist Level Supervision  Standing Static Balance Not NormalUE Support No UE support and Assist Level Supervision  Standing Dynamic Balance Not NormalUE Support No UE support and Assist Level Contact Guard    Functional Assessments:       Transfers  Transfer: Yes  Transfer 1  Transfer From 1: Sit to  Transfer to 1: Stand  Transfer Device 1:  (no de)  Transfer Level of Assistance 1: Contact guard  Transfers 2  Transfer From 2: Stand to  Transfer to 2: Sit  Transfer Device 2:  (no device)  Transfer Level of Assistance 2: Contact guard  Transfers 3  Transfer From 3: Chair with arms to  Transfer to 3: Chair with arms  Transfer Device 3:  (no device)  Transfer Level of Assistance 3: Contact guard    Ambulation/Gait Training  Ambulation/Gait Training Performed: Yes  Ambulation/Gait Training 1  Surface 1: Level tile  Device 1: No device  Assistance 1:  (cont)  Quality of Gait 1:  (wide JOSÉ MIGUEL,  decreased foot clearance, dereased step length)  Comments/Distance (ft) 1: 15'              Outcome Measures:  [facility] Basic Mobility  Turning from your back to your side while in a flat bed without using bedrails: A little  Moving from lying on your back to sitting on the side of a flat bed without using bedrails: A little  Moving to and from bed to chair (including a wheelchair): A little  Standing up from a chair using your arms (e.g. wheelchair or bedside chair): A little  To walk in hospital room: A little  Climbing 3-5 steps with railing: Total  Basic Mobility - Total Score: 16           FSS-ICU  Ambulation: Walks <50 feet with any assistance x1 or walks any distance with assistance x2 people  Rolling: Supervision or set-up only  Sitting: Supervision or set-up only  Transfer Sit-to-Stand: Supervision or set-up only  Transfer Supine-to-Sit: Supervision or set-up only  Total Score: 21  ICU Mobility Screen  ICU Mobility Scale: Walking with assistance of 1 person  [place]  Sitting Balance: Steady, safe  Arises: Able, uses arms to help  Attempts to Arise: Able to arise, one attempt  Immediate Standing Balance (First 5 Seconds): Unsteady (Swaggers, moves feet, trunk sway)  Standing Balance: Steady but wide stance, uses cane or other support  Nudged: Staggers, grabs, catches self  Eyes Closed: Steady  Turned 360 Degrees: Steadiness: Steady  Turned 360 Degrees: Continuity of Steps: Discontinuous steps  Sitting Down: Uses arms or not a smooth motion  Balance Score: 9  Initiation of Gait: No hesitancy  Step Height: R Swing Foot: Right foot complete clears floor  Step Length: R Swing Foot: Passes left stance foot  Step Height: L Swing Foot: Left foot complete clears floor  Step Length: L Swing Foot: Passes right stance foot  Step Symmetry: Right and left step length not equal (Estimate)  Step Continuity: Steps appear continuous  Path: Mild/moderate deviation or uses walking aid  Trunk: No sway but flexion of knees or back  or spreads arms out while walking  Walking Time: Heels apart  Gait Score: 8  Total Score: 17          Encounter Problems       Encounter Problems (Active)       PT Problem       Patient will complete supine to sit and sit to supine Independent  (Progressing)       Start:  11/21/24    Expected End:  12/05/24            Patient will perform sit<>stand transfer with LRAD, and Modified Independent  (Progressing)       Start:  11/21/24    Expected End:  12/05/24            Patient will ambulate >150' with LRAD and Modified Independent  (Progressing)       Start:  11/21/24    Expected End:  12/05/24            Patient will complete Tinetti Balance Assesment with a score equal to or better than 24 to reduce falls risk.    (Progressing)       Start:  11/21/24    Expected End:  12/05/24               Pain - Adult            Assessment: Patient presents for CABG evaluation.  Currently CGA for mobility with endurance and orthostatic hypotension main limiting factors.  Patient would benefit from further therapy to increase functional independence and safety.  Will continue to follow during acute stay.      Education Documentation  Precautions, taught by Akil Lara PT at 11/21/2024 11:09 AM.  Learner: Patient  Readiness: Acceptance  Method: Explanation  Response: Needs Reinforcement  Comment: falls risk, BP concerns    Body Mechanics, taught by Akil Lara PT at 11/21/2024 11:09 AM.  Learner: Patient  Readiness: Acceptance  Method: Explanation  Response: Needs Reinforcement  Comment: falls risk, BP concerns    Mobility Training, taught by Akil Lara PT at 11/21/2024 11:09 AM.  Learner: Patient  Readiness: Acceptance  Method: Explanation  Response: Needs Reinforcement  Comment: falls risk, BP concerns    Education Comments  No comments found.          11/21/24 at 11:09 AM   Akil Lara PT   Rehab Office: [phone]          '

## 2024-11-21 NOTE — PROGRESS NOTES
Occupational Therapy    Evaluation    Patient Name: Eud Echavarria  MRN: [id number]  Today's Date: 11/21/2024  Room: 03/03  Time Calculation  Start Time: 1443  Stop Time: 1509  Time Calculation (min): 26 min    Assessment  IP OT Assessment  OT Assessment: Patient demo hair care with set up assist. Patient reporting some fatigue with OOB activity after bedrest with IABP. Motivated to engage in therapy and to progress to PLOF. Recommend LOW intensity OT services when medically appropriate.  Prognosis: Good  Barriers to Discharge: None  Evaluation/Treatment Tolerance: Patient tolerated treatment well  End of Session Communication: Bedside nurse  End of Session Patient Position: Bed, 3 rail up  Plan:  Inpatient Plan  Treatment Interventions: ADL retraining, Visual perceptual retraining, Functional transfer training, UE strengthening/ROM, Endurance training, Cognitive reorientation, Patient/family training, Equipment evaluation/education, Neuromuscular reeducation, Fine motor coordination activities, Compensatory technique education, UE splinting  OT Frequency: 2 times per week  OT Discharge Recommendations: Low intensity level of continued care  OT Recommended Transfer Status: Assist of 1  OT - OK to Discharge: Yes (when medically appropriate)  OT Assessment  OT Assessment Results: Decreased ADL status, Decreased endurance, Decreased functional mobility  Prognosis: Good  Barriers to Discharge: None  Evaluation/Treatment Tolerance: Patient tolerated treatment well  Strengths: Ability to acquire knowledge, Attitude of self, Premorbid level of function  Barriers to Participation: Comorbidities    Subjective   Current Problem:  1. STEMI (ST elevation myocardial infarction) (Multi)  Transthoracic Echo (TTE) Complete    Transthoracic Echo (TTE) Complete    Vascular US Lower Extremity Vein Mapping Bilateral    Vascular US Ankle Brachial Index (ELODIA) Without Exercise    Vascular US Carotid Artery Duplex Bilateral    Vascular US  Lower Extremity Vein Mapping Bilateral    Vascular US Ankle Brachial Index (ELODIA) Without Exercise    Vascular US Carotid Artery Duplex Bilateral    Case Request Operating Room: CABG x 3 LIMA and veins    Case Request Operating Room: CABG x 3 LIMA and veins      2. Chest pain, unspecified  Vascular US Lower Extremity Vein Mapping Bilateral    Vascular US Lower Extremity Vein Mapping Bilateral      3. Other disorders of arteries, arterioles and capillaries in diseases classified elsewhere  Vascular US Ankle Brachial Index (ELODIA) Without Exercise    Vascular US Carotid Artery Duplex Bilateral    Vascular US Ankle Brachial Index (ELODIA) Without Exercise    Vascular US Carotid Artery Duplex Bilateral      4. Encounter for other preprocedural examination  Vascular US Lower Extremity Vein Mapping Bilateral    Vascular US Ankle Brachial Index (ELODIA) Without Exercise      5. Peripheral vascular disease, unspecified (CMS-HCC)  Vascular US Ankle Brachial Index (ELODIA) Without Exercise      6. Other specified symptoms and signs involving the circulatory and respiratory systems  Vascular US Carotid Artery Duplex Bilateral        General:  Reason for Referral: 73 y/o M presenting to OSH with 2 days chest pain and found to have STEMI; IABP placed for coronar perfusion and transfer to [facility] for CABG eval work up.  Past Medical History Relevant to Rehab: HTN, GERD, gout, former smoker, and current cocaine and marijuana  Prior to Session Communication: Bedside nurse  Patient Position Received: Bed, 3 rail up  Family/Caregiver Present: No  General Comment: Agreeable to OT eval. Patient reporting he wishes he could shower and wash hair; patient agreeable to shampoo cap.   Precautions:  Hearing/Visual Limitations: appears WFL with glasses on.  Medical Precautions: Fall precautions, Cardiac precautions  Vital Signs: Patient denied lightheadedness/dizziness with in bed activity.     Pre Vitals  Vital Signs  Vitals Session: Pre OT  Heart Rate:  81  SpO2: 96 %  BP: 106/57  MAP (mmHg): 68       Pain:  Pain Assessment  Pain Assessment: 0-10  0-10 (Numeric) Pain Score: 0 - No pain  Lines/Tubes/Drains:  External Urinary Catheter Male (Active)   Number of days: 1         Objective   Cognition:  Overall Cognitive Status: Within Functional Limits  Orientation Level: Oriented X4  Insight: Within function limits  Impulsive: Within functional limits        Reyes Agitation Sedation Scale  Reyes Agitation Sedation Scale (RASS): Alert and calm  Home Living:  Type of Home: House  Lives With: Alone  Home Adaptive Equipment: None  Home Layout: One level  Home Access: No concerns   Prior Function:  Level of [place]: Independent with ADLs and functional transfers  Vocational: Retired (automechanic)  Leisure: 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  Hand Dominance: Right  Prior Function Comments: Patient reporting IND with ADLs, IADLs and driving. Patient reporting his friendManny usually stops by daily to his house or his autoshop (nearby not at his house).    ADL:  Eating Assistance: Independent  Grooming Assistance: Independent  Grooming Deficit: Setup  Bathing Assistance: Minimal  UE Dressing Assistance: Modified independent (Device)  UE Dressing Deficit: Setup  LE Dressing Assistance: Minimal  Toileting Assistance with Device: Minimal    Bed Mobility/Transfers: Bed Mobility  Bed Mobility: No (Patient declined OOB at this time. Patient was up earlier this date with nursing and PT.)  Functional Mobility  Functional Mobility Performed: No   and Transfers  Transfer: No    Vision: Vision - Basic Assessment  Current Vision: Wears glasses all the time    Strength:  Strength Comments: BUEs WFL    Coordination:  Movements are Fluid and Coordinated: Yes   Hand Function:  Hand Function  Gross Grasp: Functional  Coordination: Functional  Extremities: RUE   RUE : Within Functional Limits, LUE   LUE: Within Functional Limits,     Outcome Measures: [facility]  Daily Activity  Putting on and taking off regular lower body clothing: A little  Bathing (including washing, rinsing, drying): A little  Putting on and taking off regular upper body clothing: None  Toileting, which includes using toilet, bedpan or urinal: A little  Taking care of personal grooming such as brushing teeth: None  Eating Meals: None  Daily Activity - Total Score: 21    Confusion Assessment Method-ICU (CAM-ICU)  Feature 3: Altered Level of Consciousness: Negative    ,   Reyes Agitation Sedation Scale  Reyes Agitation Sedation Scale (RASS): Alert and calm      Education Documentation  Body Mechanics, taught by Colleen Salazar OT at 11/21/2024  4:10 PM.  Learner: Patient  Readiness: Acceptance  Method: Explanation  Response: Verbalizes Understanding  Comment: activity pacing; self advocacy for needs while in hospital    Precautions, taught by Colleen Salazar OT at 11/21/2024  4:10 PM.  Learner: Patient  Readiness: Acceptance  Method: Explanation  Response: Verbalizes Understanding  Comment: activity pacing; self advocacy for needs while in hospital    ADL Training, taught by Colleen Salazar OT at 11/21/2024  4:10 PM.  Learner: Patient  Readiness: Acceptance  Method: Explanation  Response: Verbalizes Understanding  Comment: activity pacing; self advocacy for needs while in hospital    Education Comments  No comments found.        Goals:     Encounter Problems       Encounter Problems (Active)       ADLs       Patient will complete LB dressing with MOD I.   (Progressing)       Start:  11/21/24    Expected End:  12/05/24            Patient will complete toileting with MOD I.   (Progressing)       Start:  11/21/24    Expected End:  12/05/24               BALANCE       Patient will demo standing balance with MOD I for at least 5 min in prep for standing ADLs.  (Progressing)       Start:  11/21/24    Expected End:  12/05/24               MOBILITY       Patient will complete bed mobility with MOD  I.    (Progressing)       Start:  11/21/24    Expected End:  12/05/24            Pt. Will demo household distance functional mobility with MOD I using LRAD.   (Progressing)       Start:  11/21/24    Expected End:  12/05/24               TRANSFERS       Pt. Will complete stand pivot transfer with MOD I using LRAD.   (Progressing)       Start:  11/21/24    Expected End:  12/05/24 11/21/24 at 4:11 PM   Colleen Salazar, OT   Rehab Office: [phone]

## 2024-11-21 NOTE — CARE PLAN
The patient's goals for the shift include  to get some rest    The clinical goals for the shift include pt. will remain HDS overnight and begin to sit up around 2200    Over the shift, the patient did not make progress toward the following goals. Barriers to progression include frequent checks. Recommendations to address these barriers include grouping nursing care.

## 2024-11-22 ENCOUNTER — APPOINTMENT (OUTPATIENT)
Dept: CARDIOLOGY | Facility: HOSPITAL | Age: 74
End: 2024-11-22
Payer: MEDICARE

## 2024-11-22 ENCOUNTER — ANESTHESIA EVENT (OUTPATIENT)
Dept: OPERATING ROOM | Facility: HOSPITAL | Age: 74
End: 2024-11-22
Payer: MEDICARE

## 2024-11-22 PROBLEM — I10 PRIMARY HYPERTENSION: Status: ACTIVE | Noted: 2024-11-22

## 2024-11-22 PROBLEM — E78.5 HYPERLIPIDEMIA: Status: ACTIVE | Noted: 2024-11-22

## 2024-11-22 PROBLEM — J45.909 MILD ASTHMA (HHS-HCC): Status: ACTIVE | Noted: 2024-11-22

## 2024-11-22 PROBLEM — K21.9 GASTROESOPHAGEAL REFLUX DISEASE: Status: ACTIVE | Noted: 2024-11-22

## 2024-11-22 PROBLEM — I25.10 CAD (CORONARY ARTERY DISEASE): Status: ACTIVE | Noted: 2024-11-22

## 2024-11-22 PROBLEM — R94.31 ABNORMAL EKG: Status: ACTIVE | Noted: 2024-11-22

## 2024-11-22 PROBLEM — R07.9 CHEST PAIN: Status: ACTIVE | Noted: 2024-11-22

## 2024-11-22 LAB
ALBUMIN SERPL BCP-MCNC: 3.7 G/DL (ref 3.4–5)
ALP SERPL-CCNC: 60 U/L (ref 33–136)
ALT SERPL W P-5'-P-CCNC: 16 U/L (ref 10–52)
ANION GAP SERPL CALC-SCNC: 13 MMOL/L (ref 10–20)
APTT PPP: 32 SECONDS (ref 27–38)
AST SERPL W P-5'-P-CCNC: 27 U/L (ref 9–39)
BASOPHILS # BLD AUTO: 0.04 X10*3/UL (ref 0–0.1)
BASOPHILS NFR BLD AUTO: 0.4 %
BILIRUB DIRECT SERPL-MCNC: 0.3 MG/DL (ref 0–0.3)
BILIRUB SERPL-MCNC: 1 MG/DL (ref 0–1.2)
BUN SERPL-MCNC: 31 MG/DL (ref 6–23)
CALCIUM SERPL-MCNC: 8.5 MG/DL (ref 8.6–10.6)
CHLORIDE SERPL-SCNC: 94 MMOL/L (ref 98–107)
CO2 SERPL-SCNC: 24 MMOL/L (ref 21–32)
CREAT SERPL-MCNC: 1.2 MG/DL (ref 0.5–1.3)
EGFRCR SERPLBLD CKD-EPI 2021: 64 ML/MIN/1.73M*2
EOSINOPHIL # BLD AUTO: 0.26 X10*3/UL (ref 0–0.4)
EOSINOPHIL NFR BLD AUTO: 2.5 %
ERYTHROCYTE [DISTWIDTH] IN BLOOD BY AUTOMATED COUNT: 13.2 % (ref 11.5–14.5)
GLUCOSE BLD MANUAL STRIP-MCNC: 116 MG/DL (ref 74–99)
GLUCOSE BLD MANUAL STRIP-MCNC: 141 MG/DL (ref 74–99)
GLUCOSE BLD MANUAL STRIP-MCNC: 155 MG/DL (ref 74–99)
GLUCOSE BLD MANUAL STRIP-MCNC: 157 MG/DL (ref 74–99)
GLUCOSE BLD MANUAL STRIP-MCNC: 179 MG/DL (ref 74–99)
GLUCOSE SERPL-MCNC: 140 MG/DL (ref 74–99)
HCT VFR BLD AUTO: 39 % (ref 41–52)
HGB BLD-MCNC: 13.4 G/DL (ref 13.5–17.5)
IMM GRANULOCYTES # BLD AUTO: 0.05 X10*3/UL (ref 0–0.5)
IMM GRANULOCYTES NFR BLD AUTO: 0.5 % (ref 0–0.9)
INR PPP: 1.1 (ref 0.9–1.1)
LDH SERPL L TO P-CCNC: 249 U/L (ref 84–246)
LYMPHOCYTES # BLD AUTO: 1.35 X10*3/UL (ref 0.8–3)
LYMPHOCYTES NFR BLD AUTO: 12.7 %
MAGNESIUM SERPL-MCNC: 2.12 MG/DL (ref 1.6–2.4)
MCH RBC QN AUTO: 28.6 PG (ref 26–34)
MCHC RBC AUTO-ENTMCNC: 34.4 G/DL (ref 32–36)
MCV RBC AUTO: 83 FL (ref 80–100)
MONOCYTES # BLD AUTO: 1.39 X10*3/UL (ref 0.05–0.8)
MONOCYTES NFR BLD AUTO: 13.1 %
NEUTROPHILS # BLD AUTO: 7.52 X10*3/UL (ref 1.6–5.5)
NEUTROPHILS NFR BLD AUTO: 70.8 %
NRBC BLD-RTO: 0 /100 WBCS (ref 0–0)
OSMOLALITY UR: 784 MOSM/KG (ref 200–1200)
PHOSPHATE SERPL-MCNC: 3.1 MG/DL (ref 2.5–4.9)
PLATELET # BLD AUTO: 144 X10*3/UL (ref 150–450)
POTASSIUM SERPL-SCNC: 3.8 MMOL/L (ref 3.5–5.3)
PROT SERPL-MCNC: 5.9 G/DL (ref 6.4–8.2)
PROTHROMBIN TIME: 12.8 SECONDS (ref 9.8–12.8)
RBC # BLD AUTO: 4.68 X10*6/UL (ref 4.5–5.9)
SODIUM SERPL-SCNC: 127 MMOL/L (ref 136–145)
WBC # BLD AUTO: 10.6 X10*3/UL (ref 4.4–11.3)

## 2024-11-22 PROCEDURE — 2500000001 HC RX 250 WO HCPCS SELF ADMINISTERED DRUGS (ALT 637 FOR MEDICARE OP)

## 2024-11-22 PROCEDURE — 36415 COLL VENOUS BLD VENIPUNCTURE: CPT

## 2024-11-22 PROCEDURE — 84075 ASSAY ALKALINE PHOSPHATASE: CPT

## 2024-11-22 PROCEDURE — 93010 ELECTROCARDIOGRAM REPORT: CPT | Performed by: INTERNAL MEDICINE

## 2024-11-22 PROCEDURE — 99233 SBSQ HOSP IP/OBS HIGH 50: CPT | Performed by: INTERNAL MEDICINE

## 2024-11-22 PROCEDURE — 2500000002 HC RX 250 W HCPCS SELF ADMINISTERED DRUGS (ALT 637 FOR MEDICARE OP, ALT 636 FOR OP/ED)

## 2024-11-22 PROCEDURE — 82947 ASSAY GLUCOSE BLOOD QUANT: CPT

## 2024-11-22 PROCEDURE — 85025 COMPLETE CBC W/AUTO DIFF WBC: CPT

## 2024-11-22 PROCEDURE — 83735 ASSAY OF MAGNESIUM: CPT

## 2024-11-22 PROCEDURE — 93005 ELECTROCARDIOGRAM TRACING: CPT

## 2024-11-22 PROCEDURE — 83615 LACTATE (LD) (LDH) ENZYME: CPT

## 2024-11-22 PROCEDURE — 84100 ASSAY OF PHOSPHORUS: CPT

## 2024-11-22 PROCEDURE — 85610 PROTHROMBIN TIME: CPT

## 2024-11-22 PROCEDURE — 2500000004 HC RX 250 GENERAL PHARMACY W/ HCPCS (ALT 636 FOR OP/ED)

## 2024-11-22 PROCEDURE — 99232 SBSQ HOSP IP/OBS MODERATE 35: CPT | Performed by: PHYSICIAN ASSISTANT

## 2024-11-22 PROCEDURE — 1200000002 HC GENERAL ROOM WITH TELEMETRY DAILY

## 2024-11-22 RX ORDER — SIMETHICONE 80 MG
40 TABLET,CHEWABLE ORAL 4 TIMES DAILY PRN
Status: DISCONTINUED | OUTPATIENT
Start: 2024-11-22 | End: 2024-12-04

## 2024-11-22 RX ORDER — POTASSIUM CHLORIDE 750 MG/1
20 TABLET, FILM COATED, EXTENDED RELEASE ORAL ONCE
Status: COMPLETED | OUTPATIENT
Start: 2024-11-22 | End: 2024-11-22

## 2024-11-22 SDOH — HEALTH STABILITY: MENTAL HEALTH: CURRENT SMOKER: 0

## 2024-11-22 ASSESSMENT — COGNITIVE AND FUNCTIONAL STATUS - GENERAL
DRESSING REGULAR LOWER BODY CLOTHING: A LITTLE
MOBILITY SCORE: 24
MOBILITY SCORE: 24
DAILY ACTIVITIY SCORE: 24
DAILY ACTIVITIY SCORE: 23

## 2024-11-22 ASSESSMENT — PAIN SCALES - GENERAL
PAINLEVEL_OUTOF10: 8
PAINLEVEL_OUTOF10: 0 - NO PAIN

## 2024-11-22 ASSESSMENT — PAIN - FUNCTIONAL ASSESSMENT: PAIN_FUNCTIONAL_ASSESSMENT: 0-10

## 2024-11-22 ASSESSMENT — ACTIVITIES OF DAILY LIVING (ADL): LACK_OF_TRANSPORTATION: NO

## 2024-11-22 NOTE — PROGRESS NOTES
Date of Admission: 2024  Hospital Day: 5    Subjective   NAEON. No CP, SOB, resting comfortably on RA.         Objective     Vitals:    24 Hour Vitals  Temp:  [36.2 °C (97.2 °F)-36.7 °C (98.1 °F)] 36.6 °C (97.9 °F)  Heart Rate:  [74-87] 80  Resp:  [13-26] 18  BP: ()/(57-82) 118/64    Temp (24hrs), Av.4 °C (97.6 °F), Min:36.2 °C (97.2 °F), Max:36.7 °C (98.1 °F)     24 hour Intake/Output    Intake/Output Summary (Last 24 hours) at 2024 1526  Last data filed at 2024 1151  Gross per 24 hour   Intake 240 ml   Output 975 ml   Net -735 ml        Physical exam:  Constitutional: Well-developed male in no acute distress.  HEENT: NCAT. EOMI. No JVD  Respiratory: CTAB. No wheezes, crackles, or rhonchi. Normal respiratory effort on RA.  Cardiovascular: RRR, normal S1/S2, No murmurs, rubs, or gallops.   Abdominal: Soft, nondistended, nontender to palpation. No rebound or guarding  Neuro: CN II-XII grossly intact. Moving all extremities with no focal deficits  MSK: WWP, no peripheral edema  Skin: No lesions, wounds, or bruising  Psych: Appropriate mood and affect.      Medications   Scheduled Medications  aspirin, 81 mg, oral, Daily  atorvastatin, 80 mg, oral, Nightly  carvedilol, 12.5 mg, oral, BID  [START ON 2024] chlorhexidine, 15 mL, Mouth/Throat, BID  docusate sodium, 100 mg, oral, BID  enoxaparin, 40 mg, subcutaneous, Daily  pantoprazole, 40 mg, oral, Daily before breakfast   Or  esomeprazole, 40 mg, nasoduodenal tube, Daily before breakfast   Or  pantoprazole, 40 mg, intravenous, Daily before breakfast  hydrALAZINE, 50 mg, oral, TID  insulin lispro, 0-10 Units, subcutaneous, TID AC  isosorbide dinitrate, 40 mg, oral, TID  mupirocin, , Topical, BID  polyethylene glycol, 17 g, oral, Daily     Continuous Medications    PRN Medications  PRN medications: acetaminophen, calcium carbonate, dextrose, dextrose, glucagon, glucagon     Labs  CBC  Results from last 72 hours   Lab Units 24  8872  11/21/24  0337 11/20/24  0302   WBC AUTO x10*3/uL 10.6 12.1* 17.8*   HEMOGLOBIN g/dL 13.4* 14.6 15.3   HEMATOCRIT % 39.0* 42.8 43.0   PLATELETS AUTO x10*3/uL 144* 144* 171       BMP  Results from last 72 hours   Lab Units 11/22/24  0636 11/21/24  1405 11/21/24  0337 11/20/24  0302   SODIUM mmol/L 127* 128* 131* 129*   POTASSIUM mmol/L 3.8 4.1 4.0 4.2   CHLORIDE mmol/L 94* 95* 95* 95*   BUN mg/dL 31* 40* 38* 25*   CREATININE mg/dL 1.20 1.41* 1.65* 1.28   MAGNESIUM mg/dL 2.12  --  2.23 2.10   PHOSPHORUS mg/dL 3.1 3.1 4.2 3.4       Imaging:     === 11/18/24 ===    CT CHEST WO IV CONTRAST    - Impression -  1.  Thoracic aorta is normal in course and caliber. There is mild  calcified plaque involving the arch and branch vessels. There is  bovine arch configuration with common origin of the innominate and  left common carotid artery.  2. Severe coronary artery calcifications.  3. Bibasilar atelectasis left-greater-than-right.  4. Several small, pulmonary nodules within both lungs, measuring less  than 6 mm, which are likely benign. Instructions:  Consider follow-up  noncontrast CT scan chest in 12 months.  5. Multiple enlarged retrocrural lymph nodes measuring up to 1.3 cm  which are nonspecific and may be reactive. Recommend follow-up CT of  the abdomen in 3 months to evaluate for interval change.    TTE (11/18)  CONCLUSIONS:   1. Poorly visualized anatomical structures due to suboptimal image quality.   2. Left ventricular ejection fraction is severely decreased, calculated by Regalado's biplane at 23%.   3. There is global hypokinesis of the left ventricle with minor regional variations.   4. Spectral Doppler shows a Grade III (restrictive pattern) of left ventricular diastolic filling with an elevated left atrial pressure.   5. There is normal right ventricular global systolic function.   6. Mildly enlarged right ventricle.   7. The left atrium is mildly dilated.           Assessment and plan:  Edu Echavarria is a 74  y.o. male with a PMHx of HTN, GERD, and gout who presented to [facility] ED on 11/17pm with 2d hx of intermittent palpitations and chest pain. At OSH initial EKG flutter w/ RVR and ST elevtions in II/III/avF & questionable in V1-V3 w/o reciprocal changes, and labs remarkable for HS trop >92395, BNP 1100, WBC 5, CBC/RFP otherwise unremarkable. Repeat EKG aflutter w/ similar ST elevations as prior. STEMI call activated, loaded with aspirin 324mg, plavix 300mg, and started on heparin gtt 11/17pm. LHC showed triple vessel disease w/ no stenting and IABP was placed for coronary perf. Started on integrillin (which has now been stopped) and continued on heparin gtt. Transferred to [facility] for CABG evaluation. CT surgery on board for CABG evaluation, tentative plan for OR 11/25. Hospital course c/b MARYBETH and slowly dropping plts/Hb, continuing further workup. Pt transferred to floor 11/21 for further care pre-op.      Updates 11/22  -transfer to floor   -tentative plan for OR 11/25 with CT surgery  -abdominal pain improved after having BM  -colace added BID per pt request  -retic index borderline at 2.6   -LDH mildly elevated at 390, likely related to IABP; repeat for AM ordered  -4Ts: 3pts (low prob of HIT)   -c/w lovenox ppx for now, considering holding if plts continue to drop   -repeat T&S ordered with AM labs  -s/p 500cc fluids given MARYBETH with improvement in Cr     # STEMI  # Triple vessel disease  :: HS trop >39780 at OSH  :: HS trop at : 99278 > 24804  :: LHC 11/18: Triple vessel disease (LAD prox 70%, mid 65-70%, dist 90%), Lcx OM1 80%, OM2 small, OM3 occluded with collaterals, RCA mid moderate disease. LVEDP 46mmHg, LVEF 20-25%, evidence of extensive global hypokinesis  :: Arrived integrillin and heparin gtt  :: Aspirin and plavix loaded 11/17 ~2100  :: Utox + cannavinoid, fent (received w/ EMS), benzos, cocaine  -ASA 81mg daily  -Atorva 80mg qhs  -Coreg 12.5mg BID  -Isordil 40 TID  -Hydral 50 TID  -HOLD home metop  (hx cocaine use), on coreg as above  -CT surgery following, planning for tentative OR date 11/25 (waiting at least 5 days since plavix load 11/17)  -holding hep gtt     #MARYBETH, resolving  -baseline cr ~0.9  -MARYBETH worsening 11/21 in AM to 1.65>1.2  -FENa 0.1%, pre-renal  -likely multifactorial related to contrast admin, hemodynamic changes, and decreased PO intake  -low urine sodium 11/20  -given 500cc fluid with improvement of Cr to 1.41  -encourage PO intake  -monitor urine output, renally dose medications to GFR     #hyponatremia, chronic  -Na on admit 132, now 128- likely some chronic component of hyponatremia  -serum and urine osm added onto labwork  -will continue to monitor for time being     #mild thrombocytopenia   -plts and Hb slowly downtrending since admission, appears possible dilutional as all cell lines have decreased (initial labs likely with element of hemoconcentration)  -4Ts: 3pts (low prob of HIT)   -coags, bili wnl  -retic index borderline at 2.6   -LDH mildly elevated at 390 - may be related to IABP (removed 11/20)  -repeat LDH with AM labs  -repeat T&S ordered with AM labs  -c/w lovenox ppx for now, considering holding if plts continue to drop   -no signs of active bleeding     #leukocytosis - improving  -downtrending with fluids, no current signs/sx of infection   -continue to monitor off abx     #heartburn   #abdominal pain  -not on medications at home  -c/w Pantoprazole 40mg daily- continue discontinue if symptoms improve prior to dc  -tums prn  -c/w miralax; adding colace per patient request     #Gout  -hold allopurinol     Disposition  -PT/OT rec home care currently, pending post-operation eval      Diet: low sodium  Electrolytes: K >4; Mg >2  DVT ppx: lovenox  GI ppx: ppi  Lines/tubes: PIV, external cath   O2: RA  Baseline Cr: 0.9  T+S: 11/18, repeat   Code status: full code - reconfirmed on transfer  Surrogate decision maker: Guillermo Barrios Jr (cousin) [phone] - reconfirmed on  transfer  Guillermo's wife's #: [phone]    Patient seen and discussed with attending physician.      Red Loredo MD  PGY-1 Internal Medicine  [facility]

## 2024-11-22 NOTE — PROGRESS NOTES
CARDIAC SURGERY CONSULT PROGRESS NOTE    SUBJECTIVE  Edu Echavarria is a 74 y.o. male with a PMHx of HTN, GERD, gout, former smoker, and current cocaine and marijuana use who presented to [facility] ED on 11/17 with ~ 2 days history of palpitations and chest pain. At [facility] his initial EKG aflutter w/ RVR and ST elevtions in II/III/avF & questionable in V1-V3 w/o reciprocal changes, and labs remarkable for HS trop >17900, BNP 1100, WBC 5, CBC/RFP otherwise unremarkable. Repeat EKG aflutter w/ similar ST elevations as prior. STEMI call activated, loaded with aspirin 324mg, plavix 300mg, and started on heparin gtt 11/17. LHC showed triple vessel disease w/ no stenting and IABP was placed for coronary perfusion. Started on integrillin (which has now been stopped) and continued on heparin gtt. He was then transferred to [facility] for CABG evaluation.    On exam, the patient denies chest pain and otherwise feels well.   Of note, U tox was positive for cannabinoid and cocaine on admission 11/18. The patient states he last used cocaine on Saturday 11/16 and marijuana 11/17.     Cardiac surgery is consulted for surgical evaluation of his CAD.     [facility] 11/18:  LAD: large vessel which is extensively and diffusely diseased. Prox/ostial LAD evidence of a hazy eccentric stensois ~70%. LAD w/ evidence of extensive disease w/ stenosis in its proximal part of approx 65-70% followed by aneursymatic dilatation jutp roximal to first septal , which si folllowed by his stenosis in range of 70%. Mid LAD has evidence of a stenosis in range of 90%. Distal LAD is severely diseased with evidence of a long lesion in the range of 90%.      D1: small  D2: mod sized, mildly diffusely disease  Lcx: large anatomically dominant vessel which gives off 2 obtuse marginals, mod to severe diffuse disease. Setnosis medicine 1.1.1.1 at the bifucation/takeoff of 3rd OM.   Om1: mod sized vessel with evidence of severe stnesosis in range of  "80%  OM2: small  OM3: large vessel with evidence of severe ostial stenosis/bifrucation medicine 1.1.1.1. more distally the vessel is totally occluded. Distal Oms are visualized thought he itnra systemic collaterals. Ramyus intermedius absent.   RCA: non dominant with evcieence of moderte disease in its mid segment.      LVEDP: 46 mmHg  Estimated LVEF 20-25% with evidence of extensive global hypokinesis.     TTE 11/18: CONCLUSIONS:   1. Poorly visualized anatomical structures due to suboptimal image quality.   2. Left ventricular ejection fraction is severely decreased, calculated by Regalado's biplane at 23%.   3. There is global hypokinesis of the left ventricle with minor regional variations.   4. Spectral Doppler shows a Grade III (restrictive pattern) of left ventricular diastolic filling with an elevated left atrial pressure.   5. There is normal right ventricular global systolic function.   6. Mildly enlarged right ventricle.   7. The left atrium is mildly dilated.      Objective   /64   Pulse 80   Temp 36.6 °C (97.9 °F)   Resp 18   Ht 1.702 m (5' 7\")   Wt 85.4 kg (188 lb 4.4 oz)   SpO2 95%   BMI 29.49 kg/m²   0-10 (Numeric) Pain Score: 0 - No pain   Vitals:    11/21/24 0600   Weight: 85.4 kg (188 lb 4.4 oz)          Intake/Output Summary (Last 24 hours) at 11/22/2024 1305  Last data filed at 11/22/2024 1151  Gross per 24 hour   Intake 240 ml   Output 975 ml   Net -735 ml       Physical Exam  Physical Exam  Constitutional:       General: He is not in acute distress.     Appearance: He is obese. He is not toxic-appearing.   HENT:      Head: Normocephalic.      Mouth/Throat:      Mouth: Mucous membranes are moist.   Cardiovascular:      Rate and Rhythm: Normal rate and regular rhythm.      Heart sounds: No murmur heard.  Pulmonary:      Effort: Pulmonary effort is normal.      Breath sounds: Normal breath sounds.      Comments: On room air  Musculoskeletal:         General: Normal range of motion.    "   Cervical back: Neck supple.      Right lower leg: No edema.      Left lower leg: No edema.   Skin:     General: Skin is warm and dry.   Neurological:      General: No focal deficit present.      Mental Status: He is alert and oriented to person, place, and time.   Psychiatric:         Mood and Affect: Mood normal.         Behavior: Behavior normal.         Medications  Scheduled medications  aspirin, 81 mg, oral, Daily  atorvastatin, 80 mg, oral, Nightly  carvedilol, 12.5 mg, oral, BID  [START ON 11/24/2024] chlorhexidine, 15 mL, Mouth/Throat, BID  docusate sodium, 100 mg, oral, BID  enoxaparin, 40 mg, subcutaneous, Daily  pantoprazole, 40 mg, oral, Daily before breakfast   Or  esomeprazole, 40 mg, nasoduodenal tube, Daily before breakfast   Or  pantoprazole, 40 mg, intravenous, Daily before breakfast  hydrALAZINE, 50 mg, oral, TID  insulin lispro, 0-10 Units, subcutaneous, TID AC  isosorbide dinitrate, 40 mg, oral, TID  mupirocin, , Topical, BID  polyethylene glycol, 17 g, oral, Daily    Continuous medications     PRN medications  PRN medications: acetaminophen, calcium carbonate, dextrose, dextrose, glucagon, glucagon    Labs  Results for orders placed or performed during the hospital encounter of 11/18/24 (from the past 24 hours)   Renal function panel   Result Value Ref Range    Glucose 171 (H) 74 - 99 mg/dL    Sodium 128 (L) 136 - 145 mmol/L    Potassium 4.1 3.5 - 5.3 mmol/L    Chloride 95 (L) 98 - 107 mmol/L    Bicarbonate 24 21 - 32 mmol/L    Anion Gap 13 10 - 20 mmol/L    Urea Nitrogen 40 (H) 6 - 23 mg/dL    Creatinine 1.41 (H) 0.50 - 1.30 mg/dL    eGFR 53 (L) >60 mL/min/1.73m*2    Calcium 8.4 (L) 8.6 - 10.6 mg/dL    Phosphorus 3.1 2.5 - 4.9 mg/dL    Albumin 3.4 3.4 - 5.0 g/dL   Osmolality   Result Value Ref Range    Osmolality, Serum 281 280 - 300 mOsm/kg   POCT GLUCOSE   Result Value Ref Range    POCT Glucose 181 (H) 74 - 99 mg/dL   POCT GLUCOSE   Result Value Ref Range    POCT Glucose 163 (H) 74 - 99  mg/dL   Hepatic Function Panel   Result Value Ref Range    Albumin 3.7 3.4 - 5.0 g/dL    Bilirubin, Total 1.0 0.0 - 1.2 mg/dL    Bilirubin, Direct 0.3 0.0 - 0.3 mg/dL    Alkaline Phosphatase 60 33 - 136 U/L    ALT 16 10 - 52 U/L    AST 27 9 - 39 U/L    Total Protein 5.9 (L) 6.4 - 8.2 g/dL   CBC and Auto Differential   Result Value Ref Range    WBC 10.6 4.4 - 11.3 x10*3/uL    nRBC 0.0 0.0 - 0.0 /100 WBCs    RBC 4.68 4.50 - 5.90 x10*6/uL    Hemoglobin 13.4 (L) 13.5 - 17.5 g/dL    Hematocrit 39.0 (L) 41.0 - 52.0 %    MCV 83 80 - 100 fL    MCH 28.6 26.0 - 34.0 pg    MCHC 34.4 32.0 - 36.0 g/dL    RDW 13.2 11.5 - 14.5 %    Platelets 144 (L) 150 - 450 x10*3/uL    Neutrophils % 70.8 40.0 - 80.0 %    Immature Granulocytes %, Automated 0.5 0.0 - 0.9 %    Lymphocytes % 12.7 13.0 - 44.0 %    Monocytes % 13.1 2.0 - 10.0 %    Eosinophils % 2.5 0.0 - 6.0 %    Basophils % 0.4 0.0 - 2.0 %    Neutrophils Absolute 7.52 (H) 1.60 - 5.50 x10*3/uL    Immature Granulocytes Absolute, Automated 0.05 0.00 - 0.50 x10*3/uL    Lymphocytes Absolute 1.35 0.80 - 3.00 x10*3/uL    Monocytes Absolute 1.39 (H) 0.05 - 0.80 x10*3/uL    Eosinophils Absolute 0.26 0.00 - 0.40 x10*3/uL    Basophils Absolute 0.04 0.00 - 0.10 x10*3/uL   Magnesium   Result Value Ref Range    Magnesium 2.12 1.60 - 2.40 mg/dL   Phosphorus   Result Value Ref Range    Phosphorus 3.1 2.5 - 4.9 mg/dL   Basic Metabolic Panel   Result Value Ref Range    Glucose 140 (H) 74 - 99 mg/dL    Sodium 127 (L) 136 - 145 mmol/L    Potassium 3.8 3.5 - 5.3 mmol/L    Chloride 94 (L) 98 - 107 mmol/L    Bicarbonate 24 21 - 32 mmol/L    Anion Gap 13 10 - 20 mmol/L    Urea Nitrogen 31 (H) 6 - 23 mg/dL    Creatinine 1.20 0.50 - 1.30 mg/dL    eGFR 64 >60 mL/min/1.73m*2    Calcium 8.5 (L) 8.6 - 10.6 mg/dL   Lactate dehydrogenase   Result Value Ref Range     (H) 84 - 246 U/L   Coagulation Screen   Result Value Ref Range    Protime 12.8 9.8 - 12.8 seconds    INR 1.1 0.9 - 1.1    aPTT 32 27 - 38  seconds   POCT GLUCOSE   Result Value Ref Range    POCT Glucose 179 (H) 74 - 99 mg/dL   POCT GLUCOSE   Result Value Ref Range    POCT Glucose 116 (H) 74 - 99 mg/dL   POCT GLUCOSE   Result Value Ref Range    POCT Glucose 155 (H) 74 - 99 mg/dL               ASSESSMENT & PLAN  Edu Echavarria is a 74 y.o. male with a PMHx of HTN, GERD, gout, former smoker, and current cocaine and marijuana use who presented to [facility] ED on 11/17 with ~ 2 days history of palpitations and chest pain. At [facility] his initial EKG aflutter w/ RVR and ST elevtions in II/III/avF & questionable in V1-V3 w/o reciprocal changes, and labs remarkable for HS trop >41241, BNP 1100, WBC 5, CBC/RFP otherwise unremarkable. Repeat EKG aflutter w/ similar ST elevations as prior. STEMI call activated, loaded with aspirin 324mg, plavix 300mg, and started on heparin gtt 11/17. LHC showed triple vessel disease w/ no stenting and IABP was placed for coronary perfusion. Started on integrillin (which has now been stopped) and continued on heparin gtt. He was then transferred to [facility] for CABG evaluation.    On exam, the patient denies chest pain and otherwise feels well.   Of note, U tox was positive for cannabinoid and cocaine on admission 11/18. The patient states he last used cocaine on Saturday 11/16 and marijuana 11/17.     Cardiac surgery is consulted for surgical evaluation of his CAD.     RECOMMENDATIONS/PLAN  Dr. Villa met with patient reviewed case and available imaging. Ideally, will wait at least 5 days s/p last dose of Plavix (11/17 PM) to take patient to OR.  - Plan for OR Monday 11/25   - [facility] images in PACS to review.   - TTE 11/18 EF 23%  - Medical optimization per primary team    Preop risk stratification studies/labs:  --- US Carotids/Vein Mapping/ LE US ELODIA - completed 11/21  --- PFTs (spirometry and room air ABG) -  completed 11/21  --- pCXR completed 11/19  --- MRSA, UA/Culture, LFTs, HgbA1c, TSH/T4, Lipid panel  --- CT chest  without contrast - completed 11/21  --- Dental evaluation not indicated for isolated CABG surgeries     Preop orders:  - Continue ASA, high-intensity statin, and BB (or document contraindications for use) for preop CABG patients   - No ACEi/ARBs in the pre-op period (at least 48 hours)  - Hold any SGLT2 inhibitors (Farxiga, Jardiance, etc.) for at least 3 days prior to cardiac surgery to prevent euglycemic DKA  - Hold any daily GLP-1 agonist drugs on the day of surgery. Hold any weekly GLP-1 drugs for 7 days prior to surgery. (Dulaglutide, Exenatide, Liraglutide, Lixisenatide, & Semaglutide)  - No antiplatelets other than ASA, no anticoagulants other than Heparin  - NPO after midnight, blood on hold/T&S, and preop scrubs ordered for OR 11/25       Will continue to follow along.  Thank you for the consultation.   Patient educated and all questions answered.  Please page the cardiac surgery consult pager [phone] with any questions or changes in patient condition.    Felecia Ritchie PA-C  Cardiac Surgery Consult [facility]  [facility]  Cardiac Surgery Consult Pager [phone]     11/22/2024  1:05 PM

## 2024-11-22 NOTE — ANESTHESIA PREPROCEDURE EVALUATION
74 y.o. male with a PMHx of HTN, GERD, gout, former smoker, and current cocaine and marijuana use who presented to [facility] ED on 11/17 with ~ 2 days history of palpitations and chest pain. EKG showed aflutter w/ ST elevations. STEMI call activated, loaded with aspirin 324mg, plavix 300mg, and started on heparin gtt. LHC showed triple vessel disease w/ no stenting and IABP was placed for coronary perfusion, removed since.He was then transferred to [facility] for CABG evaluation.       ALLERGIES:  No Known Allergies     MEDICAL HISTORY:  No past medical history on file.     Relevant Problems   Anesthesia  No history of Anesthesia. Denies family history of MH.       Cardiac   (+) Abnormal EKG   (+) CAD (coronary artery disease)   (+) Chest pain   (+) Hyperlipidemia   (+) Primary hypertension   (+) STEMI (ST elevation myocardial infarction) (Multi)      Pulmonary   (+) Mild asthma (HHS-HCC)      Neuro  Cocaine use       GI   (+) Gastroesophageal reflux disease      /Renal (within normal limits)      Liver (within normal limits)      Endocrine  Denies T2DM, has been hyperglycemic since admission and required insulin.       Hematology (within normal limits)      Musculoskeletal (within normal limits)      HEENT (within normal limits)      ID (within normal limits)      Skin (within normal limits)        SURGICAL HISTORY:  No past surgical history on file.     MEDS:  Current Outpatient Medications   Medication Instructions    allopurinol (ZYLOPRIM) 100 mg, oral, Daily    metoprolol succinate XL (KAPSPARGO SPRINKLE) 100 mg, oral, Daily, Capsules must be opened and contents sprinkled on a small amount of soft food or liquid (non-warmed) and administered within 15 minutes of preparation. Do not crush or chew granules.        VITALS:      11/22/2024     3:45 PM 11/22/2024    11:51 AM 11/22/2024     8:16 AM   Vitals   Systolic 120 118 129   Diastolic 72 64 82   Heart Rate 81 80 87   Temp 36.6 °C (97.9 °F) 36.6 °C (97.9 °F)  36.2 °C (97.2 °F)   Resp  18 16       LABS:   BMP Extended[unfilled], LFT   Lab Results   Component Value Date    ALT 16 11/22/2024    AST 27 11/22/2024    ALKPHOS 60 11/22/2024    BILITOT 1.0 11/22/2024   , CBC Extended [unfilled], Coags Extendied[unfilled], A1C   Lab Results   Component Value Date    HGBA1C 6.3 (H) 11/18/2024   , ABG Extended [unfilled]    IMAGING:  [facility] 11/18:  LAD: large vessel which is extensively and diffusely diseased. Prox/ostial LAD evidence of a hazy eccentric stensois ~70%. LAD w/ evidence of extensive disease w/ stenosis in its proximal part of approx 65-70% followed by aneursymatic dilatation jutp roximal to first septal , which si folllowed by his stenosis in range of 70%. Mid LAD has evidence of a stenosis in range of 90%. Distal LAD is severely diseased with evidence of a long lesion in the range of 90%.      D1: small  D2: mod sized, mildly diffusely disease  Lcx: large anatomically dominant vessel which gives off 2 obtuse marginals, mod to severe diffuse disease. Setnosis medicine 1.1.1.1 at the bifucation/takeoff of 3rd OM.   Om1: mod sized vessel with evidence of severe stnesosis in range of 80%  OM2: small  OM3: large vessel with evidence of severe ostial stenosis/bifrucation medicine 1.1.1.1. more distally the vessel is totally occluded. Distal Oms are visualized thought he itnra systemic collaterals. Ramyus intermedius absent.   RCA: non dominant with evcieence of moderte disease in its mid segment.      LVEDP: 46 mmHg  Estimated LVEF 20-25% with evidence of extensive global hypokinesis.      TTE 11/18: CONCLUSIONS:   1. Poorly visualized anatomical structures due to suboptimal image quality.   2. Left ventricular ejection fraction is severely decreased, calculated by Regalado's biplane at 23%.   3. There is global hypokinesis of the left ventricle with minor regional variations.   4. Spectral Doppler shows a Grade III (restrictive pattern) of left  ventricular diastolic filling with an elevated left atrial pressure.   5. There is normal right ventricular global systolic function.   6. Mildly enlarged right ventricle.   7. The left atrium is mildly dilated.       SOCIAL:  Social History     Tobacco Use   Smoking Status Former    Types: Cigarettes    Start date:     Quit date:     Years since quittin.9   Smokeless Tobacco Never      Social History     Substance and Sexual Activity   Alcohol Use Not on file      Social History     Substance and Sexual Activity   Drug Use Yes    Types: Marijuana    Comment: occasional        NPO STATUS:  No data recorded    Clinical Areas Reviewed:    Allergies  Meds             Physical Exam    Airway  Mallampati: II  TM distance: >3 FB  Neck ROM: full     Cardiovascular - normal exam     Dental - normal exam     Pulmonary - normal exam     Abdominal            Anesthesia Plan    History of general anesthesia?: no  History of complications of general anesthesia?: unknown/emergency    ASA 4     The patient is not a current smoker.    Use of blood products discussed with patient who consented to blood products.  Blood Products Consent Comment: Patient is hesitant to receive blood products due to COVID vaccine. Explained to patient complications with CPB and possibility of needing to use blood products. Patient agrees to receive blood only for emergency.   Plan discussed with resident.

## 2024-11-22 NOTE — CARE PLAN
The patient's goals for the shift include      The clinical goals for the shift include Pt will sleep a minimum of 4 hours by the end of this shift      Problem: Pain - Adult  Goal: Verbalizes/displays adequate comfort level or baseline comfort level  Outcome: Progressing     Problem: Safety - Adult  Goal: Free from fall injury  Outcome: Progressing

## 2024-11-22 NOTE — PROGRESS NOTES
SW met with pt to offer support, pt is alert and fully oriented. SW offered information of Medicare Part D and how to apply per pt's request. Pt has questions for financial POA and SW informed pt financial POA is not to be assisted in the hospital. Pt understands and denied any further issues or questions on social work at the moment. SW will continue to follow and assist.     REGINA MillsA, LSW

## 2024-11-22 NOTE — PROGRESS NOTES
"S  Hospital course by ICU team   \"Edu Echavarria is a 74 y.o. male with a PMHx of HTN, GERD, and gout who presented to [facility] ED on 11/17pm with 2d hx of intermittent palpitations and chest pain. At OSH initial EKG flutter w/ RVR and ST elevtions in II/III/avF & questionable in V1-V3 w/o reciprocal changes, and labs remarkable for HS trop >89151, BNP 1100, WBC 5, CBC/RFP otherwise unremarkable. Repeat EKG aflutter w/ similar ST elevations as prior. STEMI call activated, loaded with aspirin 324mg, plavix 300mg, and started on heparin gtt 11/17pm.   LHC showed triple vessel disease w/ no stenting and IABP was placed for coronary perf. Started on integrillin (which has now been stopped) and continued on heparin gtt.      Transferred to [facility] for CABG evaluation.   CT surgery consulted, tentative plan for OR on Monday (note that plavix loaded at OSH 11/17). Preop eval was completed pending CT chest result.     IABP was pulled out on 11/20.  BP has been managed with:  -Coreg 25 BID  -Isordil 40 TID  -Hydral 100 TID  Tried to add amlodipine yesterday in am but patient's BP dropped so it was discontinued.   Coreg was also reduced to 12.5BID this morning.     Hospital course was complicated with worsening MARYBETH, most likely in the setting of dec PO intake, recent contrast administration during cath, and BP drop.  Will give 500ml fluids and repeat RFPi in pm.     Hb and plt slowly dropping, will check blood film, LDH, bili and retic. No blood loss reported.\"    When seen, pt states he is overall feeling well. He feels quite run down given everything that has happened. States he just walked around his room and felt that really tired him out. Denies any SOB or chest pain. He does report mild abdominal pain worst in LUQ which is improved after having a BM. Pt is requesting stool softener in addition to miralax as the miralax causes mild cramping for him. Denies any other complaints.     MEDs  Scheduled medications  aspirin, " 81 mg, oral, Daily  atorvastatin, 80 mg, oral, Nightly  carvedilol, 12.5 mg, oral, BID  [START ON 11/24/2024] chlorhexidine, 15 mL, Mouth/Throat, BID  enoxaparin, 40 mg, subcutaneous, Daily  pantoprazole, 40 mg, oral, Daily before breakfast   Or  esomeprazole, 40 mg, nasoduodenal tube, Daily before breakfast   Or  pantoprazole, 40 mg, intravenous, Daily before breakfast  hydrALAZINE, 50 mg, oral, TID  insulin lispro, 0-10 Units, subcutaneous, TID AC  iohexol, 90 mL, intravenous, Once in imaging  isosorbide dinitrate, 40 mg, oral, TID  mupirocin, , Topical, BID  perflutren protein A microsphere, 0.5 mL, intravenous, Once in imaging  polyethylene glycol, 17 g, oral, Daily  sulfur hexafluoride microsphr, 2 mL, intravenous, Once in imaging      Continuous medications     PRN medications  PRN medications: acetaminophen, calcium carbonate, dextrose, dextrose, glucagon, glucagon     O  VT  Temp:  [36.1 °C (97 °F)-36.8 °C (98.2 °F)] 36.4 °C (97.5 °F)  Heart Rate:  [] 80  Resp:  [15-27] 25  BP: ()/(47-81) 107/65     I/O    Intake/Output Summary (Last 24 hours) at 11/21/2024 1952  Last data filed at 11/21/2024 1700  Gross per 24 hour   Intake 527.88 ml   Output 650 ml   Net -122.12 ml        PE  General: no acute distress  Cardio: normal rate, regular rhythm, no rubs/murmurs, no S3/S4  Pulm: non-labored, no accessory muscle usage, BS symmetric, no crackles/wheezing/stridor, on RA  GI: non-distended, appropriately soft, mildly tender in LUQ otherwise nontender to palpation  MSK: no joint swelling, grossly full active ROM, bruising over R radial access site- no swelling noted; full ROM in right hand  HEENT: grossly normocephalic  Neuro: grossly oriented, CN grossly intact, extremities moving symmetrically  Psych: appropriate affect  Volume: mucous membranes moist, no LE pitting edema  Perfusion: no cyanosis, distal extremities warm    LAB  -see EMR    IMG  -see EMR      A/P  Edu Echavarria is a 74 y.o. male with a  PMHx of HTN, GERD, and gout who presented to [facility] ED on 11/17pm with 2d hx of intermittent palpitations and chest pain. At OSH initial EKG flutter w/ RVR and ST elevtions in II/III/avF & questionable in V1-V3 w/o reciprocal changes, and labs remarkable for HS trop >82170, BNP 1100, WBC 5, CBC/RFP otherwise unremarkable. Repeat EKG aflutter w/ similar ST elevations as prior. STEMI call activated, loaded with aspirin 324mg, plavix 300mg, and started on heparin gtt 11/17pm. LHC showed triple vessel disease w/ no stenting and IABP was placed for coronary perf. Started on integrillin (which has now been stopped) and continued on heparin gtt. Transferred to [facility] for CABG evaluation. CT surgery on board for CABG evaluation, tentative plan for OR 11/25. Hospital course c/b MARYBETH and slowly dropping plts/Hb, continuing further workup. Pt transferred to floor 11/21 for further care pre-op.      Updates 11/21  -transfer to floor   -tentative plan for OR 11/25 with CT surgery  -abdominal pain improved after having BM  -colace added BID per pt request  -retic index borderline at 2.6   -LDH mildly elevated at 390, likely related to IABP; repeat for AM ordered  -4Ts: 3pts (low prob of HIT)   -c/w lovenox ppx for now, considering holding if plts continue to drop   -repeat T&S ordered with AM labs  -s/p 500cc fluids today given MARYBETH with improvement in Cr  -serum osm, urine osm added on to eval hyponatremia (came in at 132 so may be some chronic element)     # STEMI  # Triple vessel disease  :: HS trop >73177 at OSH  :: HS trop at : 04045 > 63304  :: LHC 11/18: Triple vessel disease (LAD prox 70%, mid 65-70%, dist 90%), Lcx OM1 80%, OM2 small, OM3 occluded with collaterals, RCA mid moderate disease. LVEDP 46mmHg, LVEF 20-25%, evidence of extensive global hypokinesis  :: Arrived integrillin and heparin gtt  :: Aspirin and plavix loaded 11/17 ~2100  :: Utox + cannavinoid, fent (received w/ EMS), benzos, cocaine  -ASA 81mg  daily  -Atorva 80mg qhs  -Coreg 12.5mg BID  -Isordil 40 TID  -Hydral 50 TID  -HOLD home metop (hx cocaine use), on coreg as above  -CT surgery following, planning for tentative OR date 11/25 (waiting at least 5 days since plavix load 11/17)    #MARYBETH  -baseline cr ~0.9  -MARYBETH worsening 11/21 in AM to 1.65  -likely multifactorial related to contrast admin, hemodynamic changes, and decreased PO intake  -low urine sodium 11/20  -given 500cc fluid with improvement of Cr to 1.41  -encourage PO intake  -monitor urine output, renally dose medications to GFR    #hyponatremia  -Na on admit 132, now 128- likely some chronic component of hyponatremia  -serum and urine osm added onto labwork  -will continue to monitor for time being    #mild thrombocytopenia   -plts and Hb slowly downtrending since admission, appears possible dilutional as all cell lines have decreased (initial labs likely with element of hemoconcentration)  -4Ts: 3pts (low prob of HIT)   -coags, bili wnl  -retic index borderline at 2.6   -LDH mildly elevated at 390 - may be related to IABP (removed 11/20)  -repeat LDH with AM labs  -repeat T&S ordered with AM labs  -c/w lovenox ppx for now, considering holding if plts continue to drop   -no signs of active bleeding     #leukocytosis - improving  -downtrending with fluids, no current signs/sx of infection   -continue to monitor off abx     #heartburn   #abdominal pain  -not on medications at home  -c/w Pantoprazole 40mg daily- continue discontinue if symptoms improve prior to dc  -tums prn  -c/w miralax; adding colace per patient request     #Gout  -hold allopurinol    Disposition  -PT/OT rec home care currently, pending post-operation eval     Diet: low sodium  Electrolytes: K >4; Mg >2  DVT ppx: lovenox  GI ppx: ppi  Lines/tubes: PIV, external cath   O2: RA  Baseline Cr: 0.9  T+S: 11/18, repeat   Code status: full code - reconfirmed on transfer  Surrogate decision maker: Guillermo Barrios Jr (cousin) [phone] -  reconfirmed on transfer  Guillermo's wife's #: [phone]    Vanessa Castillo  Internal Medicine, PGY3    Pt to be formally staffed in AM with attending.

## 2024-11-22 NOTE — PROGRESS NOTES
"Physical Therapy                 Therapy Communication Note    Patient Name: Edu Echavarria  MRN: [id number]  Department: [place] [address]  Room: [facility]  Today's Date: 11/22/2024     Discipline: Physical Therapy    Missed Visit Reason: Missed Visit Reason:  (pt declined stating, \"I just was up to the bathroom. I dont want to walk out there until after surgery.\" provided encouragement however pt continued to declined)    Missed Time: Attempt    Comment:  "

## 2024-11-22 NOTE — PROGRESS NOTES
11/22/24 0900   Discharge Planning   Living Arrangements Alone   Support Systems Children;Family members   Assistance Needed no   Type of Residence Private residence   Number of Stairs to Enter Residence 2   Number of Stairs Within Residence 0   Do you have animals or pets at home? No   Who is requesting discharge planning? Provider   Home or Post Acute Services None   Expected Discharge Disposition Home   Does the patient need discharge transport arranged? No   Financial Resource Strain   How hard is it for you to pay for the very basics like food, housing, medical care, and heating? Not very   Housing Stability   In the last 12 months, was there a time when you were not able to pay the mortgage or rent on time? N   In the past 12 months, how many times have you moved where you were living? 0   At any time in the past 12 months, were you homeless or living in a shelter (including now)? N   Transportation Needs   In the past 12 months, has lack of transportation kept you from medical appointments or from getting medications? no   In the past 12 months, has lack of transportation kept you from meetings, work, or from getting things needed for daily living? No   Patient Choice   Patient / Family choosing to utilize agency / facility established prior to hospitalization No   Stroke Family Assessment   Stroke Family Assessment Needed No   Intensity of Service   Intensity of Service 0-30 min     Assessment Note:  Met with patient and Introduced myself as care coordinator and member of the Care Transitions team for discharge planning. Patient lives at home alone.  Transportation: Patient said his cousin or friend will pick him up at time of discharge.  Pharmacy: [facility] on [address] in [place].  DME: No  Previous home care: No  Falls: No  PCP: Has PCP in [place], but does not remember the doctors name.  Dialysis: No  Oxygen: No  Confirmed patient address, phone number and emergency contact. Answered all questions and  concerns. Will continue to follow for discharge needs.    Transitional Care Coordination Progress Note:  Patient discussed during interdisciplinary rounds.   Team members present: MD and TCC  Plan per Medical/Surgical team: Patient getting surgery on Monday 11/25, then home with home care for PT/OT.  Payor: Medicare  Discharge disposition: Patient lives outside [facility] area will reach out to home cares in patients area.  Potential Barriers: None  ADOD: 11/26/24

## 2024-11-23 LAB
ABO GROUP (TYPE) IN BLOOD: NORMAL
ALBUMIN SERPL BCP-MCNC: 3.8 G/DL (ref 3.4–5)
ALP SERPL-CCNC: 72 U/L (ref 33–136)
ALT SERPL W P-5'-P-CCNC: 20 U/L (ref 10–52)
ANION GAP SERPL CALC-SCNC: 17 MMOL/L (ref 10–20)
ANTIBODY SCREEN: NORMAL
APTT PPP: 33 SECONDS (ref 27–38)
AST SERPL W P-5'-P-CCNC: 24 U/L (ref 9–39)
BASOPHILS # BLD AUTO: 0.04 X10*3/UL (ref 0–0.1)
BASOPHILS NFR BLD AUTO: 0.4 %
BILIRUB DIRECT SERPL-MCNC: 0.5 MG/DL (ref 0–0.3)
BILIRUB SERPL-MCNC: 1.4 MG/DL (ref 0–1.2)
BLOOD EXPIRATION DATE: NORMAL
BUN SERPL-MCNC: 25 MG/DL (ref 6–23)
CALCIUM SERPL-MCNC: 9.1 MG/DL (ref 8.6–10.6)
CHLORIDE SERPL-SCNC: 92 MMOL/L (ref 98–107)
CO2 SERPL-SCNC: 21 MMOL/L (ref 21–32)
CREAT SERPL-MCNC: 1.02 MG/DL (ref 0.5–1.3)
DISPENSE STATUS: NORMAL
EGFRCR SERPLBLD CKD-EPI 2021: 78 ML/MIN/1.73M*2
EOSINOPHIL # BLD AUTO: 0.22 X10*3/UL (ref 0–0.4)
EOSINOPHIL NFR BLD AUTO: 2.5 %
ERYTHROCYTE [DISTWIDTH] IN BLOOD BY AUTOMATED COUNT: 13.1 % (ref 11.5–14.5)
GLUCOSE BLD MANUAL STRIP-MCNC: 126 MG/DL (ref 74–99)
GLUCOSE BLD MANUAL STRIP-MCNC: 131 MG/DL (ref 74–99)
GLUCOSE BLD MANUAL STRIP-MCNC: 134 MG/DL (ref 74–99)
GLUCOSE BLD MANUAL STRIP-MCNC: 190 MG/DL (ref 74–99)
GLUCOSE SERPL-MCNC: 129 MG/DL (ref 74–99)
HCT VFR BLD AUTO: 40.2 % (ref 41–52)
HGB BLD-MCNC: 13.8 G/DL (ref 13.5–17.5)
IMM GRANULOCYTES # BLD AUTO: 0.03 X10*3/UL (ref 0–0.5)
IMM GRANULOCYTES NFR BLD AUTO: 0.3 % (ref 0–0.9)
INR PPP: 1.2 (ref 0.9–1.1)
LYMPHOCYTES # BLD AUTO: 1.31 X10*3/UL (ref 0.8–3)
LYMPHOCYTES NFR BLD AUTO: 14.7 %
MAGNESIUM SERPL-MCNC: 2.07 MG/DL (ref 1.6–2.4)
MCH RBC QN AUTO: 28.3 PG (ref 26–34)
MCHC RBC AUTO-ENTMCNC: 34.3 G/DL (ref 32–36)
MCV RBC AUTO: 82 FL (ref 80–100)
MONOCYTES # BLD AUTO: 1.06 X10*3/UL (ref 0.05–0.8)
MONOCYTES NFR BLD AUTO: 11.9 %
NEUTROPHILS # BLD AUTO: 6.24 X10*3/UL (ref 1.6–5.5)
NEUTROPHILS NFR BLD AUTO: 70.2 %
NRBC BLD-RTO: 0 /100 WBCS (ref 0–0)
PHOSPHATE SERPL-MCNC: 3.4 MG/DL (ref 2.5–4.9)
PLATELET # BLD AUTO: 174 X10*3/UL (ref 150–450)
POTASSIUM SERPL-SCNC: 4 MMOL/L (ref 3.5–5.3)
PRODUCT BLOOD TYPE: 6200
PRODUCT CODE: NORMAL
PROT SERPL-MCNC: 6.1 G/DL (ref 6.4–8.2)
PROTHROMBIN TIME: 13 SECONDS (ref 9.8–12.8)
RBC # BLD AUTO: 4.88 X10*6/UL (ref 4.5–5.9)
RH FACTOR (ANTIGEN D): NORMAL
SODIUM SERPL-SCNC: 126 MMOL/L (ref 136–145)
UNIT ABO: NORMAL
UNIT NUMBER: NORMAL
UNIT RH: NORMAL
UNIT VOLUME: 350
WBC # BLD AUTO: 8.9 X10*3/UL (ref 4.4–11.3)
XM INTEP: NORMAL

## 2024-11-23 PROCEDURE — 85610 PROTHROMBIN TIME: CPT

## 2024-11-23 PROCEDURE — 80053 COMPREHEN METABOLIC PANEL: CPT

## 2024-11-23 PROCEDURE — 2500000001 HC RX 250 WO HCPCS SELF ADMINISTERED DRUGS (ALT 637 FOR MEDICARE OP)

## 2024-11-23 PROCEDURE — 84075 ASSAY ALKALINE PHOSPHATASE: CPT

## 2024-11-23 PROCEDURE — 84100 ASSAY OF PHOSPHORUS: CPT

## 2024-11-23 PROCEDURE — 2500000002 HC RX 250 W HCPCS SELF ADMINISTERED DRUGS (ALT 637 FOR MEDICARE OP, ALT 636 FOR OP/ED)

## 2024-11-23 PROCEDURE — 86850 RBC ANTIBODY SCREEN: CPT

## 2024-11-23 PROCEDURE — 36415 COLL VENOUS BLD VENIPUNCTURE: CPT

## 2024-11-23 PROCEDURE — 85025 COMPLETE CBC W/AUTO DIFF WBC: CPT

## 2024-11-23 PROCEDURE — 85730 THROMBOPLASTIN TIME PARTIAL: CPT

## 2024-11-23 PROCEDURE — 82947 ASSAY GLUCOSE BLOOD QUANT: CPT

## 2024-11-23 PROCEDURE — 83735 ASSAY OF MAGNESIUM: CPT

## 2024-11-23 PROCEDURE — 86923 COMPATIBILITY TEST ELECTRIC: CPT

## 2024-11-23 PROCEDURE — 2500000004 HC RX 250 GENERAL PHARMACY W/ HCPCS (ALT 636 FOR OP/ED)

## 2024-11-23 PROCEDURE — 99232 SBSQ HOSP IP/OBS MODERATE 35: CPT | Performed by: INTERNAL MEDICINE

## 2024-11-23 PROCEDURE — 1200000002 HC GENERAL ROOM WITH TELEMETRY DAILY

## 2024-11-23 RX ORDER — ALUMINUM HYDROXIDE, MAGNESIUM HYDROXIDE, AND SIMETHICONE 1200; 120; 1200 MG/30ML; MG/30ML; MG/30ML
20 SUSPENSION ORAL 4 TIMES DAILY PRN
Status: DISCONTINUED | OUTPATIENT
Start: 2024-11-23 | End: 2024-12-01

## 2024-11-23 ASSESSMENT — COGNITIVE AND FUNCTIONAL STATUS - GENERAL
MOBILITY SCORE: 24
DAILY ACTIVITIY SCORE: 23
DRESSING REGULAR LOWER BODY CLOTHING: A LITTLE

## 2024-11-23 ASSESSMENT — PAIN SCALES - GENERAL: PAINLEVEL_OUTOF10: 0 - NO PAIN

## 2024-11-23 NOTE — PROGRESS NOTES
Date of Admission: 2024  Hospital Day: 6    Subjective   NAEON. No CP, SOB, resting comfortably on RA.  Pain has been complaining of abdominal pain with taking his pills.  He states that he has had; since he was 17 years old.  Taking the amount of pills that he is at the hospital is making some medicine.  Denies symptoms of reflux.  Endorses upset stomach, indigestion, bloating.  Denies nausea, vomiting, constipation, diarrhea.      Discussed with patient the plan for patient CABG on Monday.  Cardiac      Objective     Vitals:    24 Hour Vitals  Temp:  [36.4 °C (97.5 °F)-36.7 °C (98.1 °F)] 36.7 °C (98.1 °F)  Heart Rate:  [74-84] 81  Resp:  [17-19] 18  BP: (118-132)/(64-78) 132/78    Temp (24hrs), Av.6 °C (97.8 °F), Min:36.4 °C (97.5 °F), Max:36.7 °C (98.1 °F)     24 hour Intake/Output    Intake/Output Summary (Last 24 hours) at 2024 0858  Last data filed at 2024 0430  Gross per 24 hour   Intake 240 ml   Output 1650 ml   Net -1410 ml        Physical exam:  Constitutional: Well-developed male in no acute distress.  HEENT: NCAT. EOMI. No JVD  Respiratory: CTAB. No wheezes, crackles, or rhonchi. Normal respiratory effort on RA.  Cardiovascular: RRR, normal S1/S2, No murmurs, rubs, or gallops.   Abdominal: Soft, nondistended, nontender to palpation. No rebound or guarding  Neuro: CN II-XII grossly intact. Moving all extremities with no focal deficits  MSK: WWP, no peripheral edema  Skin: No lesions, wounds, or bruising  Psych: Appropriate mood and affect.      Medications   Scheduled Medications  aspirin, 81 mg, oral, Daily  atorvastatin, 80 mg, oral, Nightly  carvedilol, 12.5 mg, oral, BID  [START ON 2024] chlorhexidine, 15 mL, Mouth/Throat, BID  docusate sodium, 100 mg, oral, BID  enoxaparin, 40 mg, subcutaneous, Daily  pantoprazole, 40 mg, oral, Daily before breakfast   Or  esomeprazole, 40 mg, nasoduodenal tube, Daily before breakfast   Or  pantoprazole, 40 mg, intravenous, Daily before  breakfast  hydrALAZINE, 50 mg, oral, TID  insulin lispro, 0-10 Units, subcutaneous, TID AC  isosorbide dinitrate, 40 mg, oral, TID  mupirocin, , Topical, BID  polyethylene glycol, 17 g, oral, Daily     Continuous Medications    PRN Medications  PRN medications: acetaminophen, calcium carbonate, dextrose, dextrose, glucagon, glucagon, simethicone     Labs  CBC  Results from last 72 hours   Lab Units 11/23/24  0722 11/22/24  0636 11/21/24  0337   WBC AUTO x10*3/uL 8.9 10.6 12.1*   HEMOGLOBIN g/dL 13.8 13.4* 14.6   HEMATOCRIT % 40.2* 39.0* 42.8   PLATELETS AUTO x10*3/uL 174 144* 144*       BMP  Results from last 72 hours   Lab Units 11/23/24  0722 11/22/24  0636 11/21/24  1405 11/21/24  0337   SODIUM mmol/L 126* 127* 128* 131*   POTASSIUM mmol/L 4.0 3.8 4.1 4.0   CHLORIDE mmol/L 92* 94* 95* 95*   BUN mg/dL 25* 31* 40* 38*   CREATININE mg/dL 1.02 1.20 1.41* 1.65*   MAGNESIUM mg/dL 2.07 2.12  --  2.23   PHOSPHORUS mg/dL 3.4 3.1 3.1 4.2       Imaging:     === 11/18/24 ===    CT CHEST WO IV CONTRAST    - Impression -  1.  Thoracic aorta is normal in course and caliber. There is mild  calcified plaque involving the arch and branch vessels. There is  bovine arch configuration with common origin of the innominate and  left common carotid artery.  2. Severe coronary artery calcifications.  3. Bibasilar atelectasis left-greater-than-right.  4. Several small, pulmonary nodules within both lungs, measuring less  than 6 mm, which are likely benign. Instructions:  Consider follow-up  noncontrast CT scan chest in 12 months.  5. Multiple enlarged retrocrural lymph nodes measuring up to 1.3 cm  which are nonspecific and may be reactive. Recommend follow-up CT of  the abdomen in 3 months to evaluate for interval change.    TTE (11/18)  CONCLUSIONS:   1. Poorly visualized anatomical structures due to suboptimal image quality.   2. Left ventricular ejection fraction is severely decreased, calculated by Regalado's biplane at 23%.   3.  There is global hypokinesis of the left ventricle with minor regional variations.   4. Spectral Doppler shows a Grade III (restrictive pattern) of left ventricular diastolic filling with an elevated left atrial pressure.   5. There is normal right ventricular global systolic function.   6. Mildly enlarged right ventricle.   7. The left atrium is mildly dilated.           Assessment and plan:  Edu Echavarria is a 74 y.o. male with a PMHx of HTN, GERD, and gout who presented to [facility] ED on 11/17pm with 2d hx of intermittent palpitations and chest pain. At OSH initial EKG flutter w/ RVR and ST elevtions in II/III/avF & questionable in V1-V3 w/o reciprocal changes, and labs remarkable for HS trop >78541, BNP 1100, WBC 5, CBC/RFP otherwise unremarkable. Repeat EKG aflutter w/ similar ST elevations as prior. STEMI call activated, loaded with aspirin 324mg, plavix 300mg, and started on heparin gtt 11/17pm. LHC showed triple vessel disease w/ no stenting and IABP was placed for coronary perf. Started on integrillin (which has now been stopped) and continued on heparin gtt. Transferred to [facility] for CABG evaluation. CT surgery on board for CABG evaluation, tentative plan for OR 11/25. Hospital course c/b MARYBETH and slowly dropping plts/Hb, continuing further workup. Pt transferred to floor 11/21 for further care pre-op.      Updates 11/23  -tentative plan for OR 11/25 with CT surgery  -LDH mildly elevated at 390, likely related to IABP; repeat for AM ordered.-->249   -c/w lovenox ppx for now, considering holding if plts drop again  -added Maalox for stomach upset      # STEMI  # Triple vessel disease  :: HS trop >23497 at OSH  :: HS trop at : 77676 > 33943  :: LHC 11/18: Triple vessel disease (LAD prox 70%, mid 65-70%, dist 90%), Lcx OM1 80%, OM2 small, OM3 occluded with collaterals, RCA mid moderate disease. LVEDP 46mmHg, LVEF 20-25%, evidence of extensive global hypokinesis  :: Arrived integrillin and heparin gtt  ::  Aspirin and plavix loaded 11/17 ~2100  :: Utox + cannavinoid, fent (received w/ EMS), benzos, cocaine  -ASA 81mg daily  -Atorva 80mg qhs  -Coreg 12.5mg BID  -Isordil 40 TID  -Hydral 50 TID  -HOLD home metop (hx cocaine use), on coreg as above  -CT surgery following, planning for tentative OR date 11/25 (waiting at least 5 days since plavix load 11/17)  -holding hep gtt     #MARYBETH, resolved  -baseline cr ~0.9  -MARYBETH worsening 11/21 in AM to 1.65>1.2-->1  -FENa 0.1%, pre-renal, likely multifactorial related to contrast admin, hemodynamic changes, and decreased PO intake  -low urine sodium 11/20  Plan:  -encourage PO intake  -monitor urine output, renally dose medications to GFR  -gentle fluid bolus prn     #hyponatremia, chronic  -Na on admit 132, now 128- likely some chronic component of hyponatremia  -serum and urine osm added onto labwork  -will continue to monitor for time being     #mild thrombocytopenia, resolved  -plts and Hb slowly downtrending since admission, appears possible dilutional as all cell lines have decreased (initial labs likely with element of hemoconcentration)  -4Ts: 3pts (low prob of HIT)   -coags, bili wnl  -retic index borderline at 2.6   -LDH mildly elevated at 390 - may be related to IABP (removed 11/20)  -repeat LDH with AM labs-->249  -repeat T&S ordered with AM labs  -c/w lovenox ppx for now, considering holding if plts continue to drop   -no signs of active bleeding   -Plt 174     #leukocytosis - improving  -downtrending with fluids, no current signs/sx of infection   -continue to monitor off abx     #heartburn   #abdominal pain  -not on medications at home  -c/w Pantoprazole 40mg daily- continue discontinue if symptoms improve prior to dc  -tums prn  -c/w miralax; adding colace per patient request     #Gout  -hold allopurinol     Disposition  -PT/OT rec home care currently, pending post-operation eval      Diet: low sodium  Electrolytes: K >4; Mg >2  DVT ppx: lovenox  GI ppx:  ppi  Lines/tubes: PIV, external cath   O2: RA  Baseline Cr: 0.9  T+S: 11/18, repeat   Code status: full code - reconfirmed on transfer  Surrogate decision maker: Guillermo Karlosbrant Cloud (cousin) [phone] - reconfirmed on transfer  Guillermo's wife's #: [phone]    Patient seen and discussed with attending physician.      Aspen Carnes,   PGY-1 Anesthesiology  [facility]

## 2024-11-24 VITALS
WEIGHT: 188.27 LBS | BODY MASS INDEX: 29.55 KG/M2 | SYSTOLIC BLOOD PRESSURE: 130 MMHG | HEART RATE: 75 BPM | TEMPERATURE: 97.5 F | HEIGHT: 67 IN | DIASTOLIC BLOOD PRESSURE: 83 MMHG | OXYGEN SATURATION: 95 % | RESPIRATION RATE: 16 BRPM

## 2024-11-24 LAB
ALBUMIN SERPL BCP-MCNC: 3.6 G/DL (ref 3.4–5)
ALBUMIN SERPL BCP-MCNC: 4.2 G/DL (ref 3.4–5)
ALP SERPL-CCNC: 70 U/L (ref 33–136)
ALT SERPL W P-5'-P-CCNC: 22 U/L (ref 10–52)
ANION GAP SERPL CALC-SCNC: 14 MMOL/L (ref 10–20)
ANION GAP SERPL CALC-SCNC: 15 MMOL/L (ref 10–20)
APTT PPP: 32 SECONDS (ref 27–38)
AST SERPL W P-5'-P-CCNC: 21 U/L (ref 9–39)
BASOPHILS # BLD AUTO: 0.04 X10*3/UL (ref 0–0.1)
BASOPHILS NFR BLD AUTO: 0.4 %
BILIRUB DIRECT SERPL-MCNC: 0.3 MG/DL (ref 0–0.3)
BILIRUB SERPL-MCNC: 1.2 MG/DL (ref 0–1.2)
BUN SERPL-MCNC: 19 MG/DL (ref 6–23)
BUN SERPL-MCNC: 20 MG/DL (ref 6–23)
CALCIUM SERPL-MCNC: 8.6 MG/DL (ref 8.6–10.6)
CALCIUM SERPL-MCNC: 8.8 MG/DL (ref 8.6–10.6)
CHLORIDE SERPL-SCNC: 89 MMOL/L (ref 98–107)
CHLORIDE SERPL-SCNC: 90 MMOL/L (ref 98–107)
CO2 SERPL-SCNC: 21 MMOL/L (ref 21–32)
CO2 SERPL-SCNC: 24 MMOL/L (ref 21–32)
CREAT SERPL-MCNC: 0.99 MG/DL (ref 0.5–1.3)
CREAT SERPL-MCNC: 0.99 MG/DL (ref 0.5–1.3)
EGFRCR SERPLBLD CKD-EPI 2021: 80 ML/MIN/1.73M*2
EGFRCR SERPLBLD CKD-EPI 2021: 80 ML/MIN/1.73M*2
EOSINOPHIL # BLD AUTO: 0.22 X10*3/UL (ref 0–0.4)
EOSINOPHIL NFR BLD AUTO: 2.1 %
ERYTHROCYTE [DISTWIDTH] IN BLOOD BY AUTOMATED COUNT: 12.9 % (ref 11.5–14.5)
GLUCOSE BLD MANUAL STRIP-MCNC: 140 MG/DL (ref 74–99)
GLUCOSE BLD MANUAL STRIP-MCNC: 152 MG/DL (ref 74–99)
GLUCOSE BLD MANUAL STRIP-MCNC: 216 MG/DL (ref 74–99)
GLUCOSE BLD MANUAL STRIP-MCNC: 224 MG/DL (ref 74–99)
GLUCOSE SERPL-MCNC: 157 MG/DL (ref 74–99)
GLUCOSE SERPL-MCNC: 213 MG/DL (ref 74–99)
HCT VFR BLD AUTO: 38.8 % (ref 41–52)
HGB BLD-MCNC: 13.7 G/DL (ref 13.5–17.5)
IMM GRANULOCYTES # BLD AUTO: 0.1 X10*3/UL (ref 0–0.5)
IMM GRANULOCYTES NFR BLD AUTO: 1 % (ref 0–0.9)
INR PPP: 1.2 (ref 0.9–1.1)
LYMPHOCYTES # BLD AUTO: 1.36 X10*3/UL (ref 0.8–3)
LYMPHOCYTES NFR BLD AUTO: 13 %
MAGNESIUM SERPL-MCNC: 2.09 MG/DL (ref 1.6–2.4)
MCH RBC QN AUTO: 28.9 PG (ref 26–34)
MCHC RBC AUTO-ENTMCNC: 35.3 G/DL (ref 32–36)
MCV RBC AUTO: 82 FL (ref 80–100)
MONOCYTES # BLD AUTO: 1.26 X10*3/UL (ref 0.05–0.8)
MONOCYTES NFR BLD AUTO: 12 %
NEUTROPHILS # BLD AUTO: 7.5 X10*3/UL (ref 1.6–5.5)
NEUTROPHILS NFR BLD AUTO: 71.5 %
NRBC BLD-RTO: 0 /100 WBCS (ref 0–0)
OSMOLALITY SERPL: 262 MOSM/KG (ref 280–300)
PHOSPHATE SERPL-MCNC: 2.6 MG/DL (ref 2.5–4.9)
PHOSPHATE SERPL-MCNC: 2.9 MG/DL (ref 2.5–4.9)
PLATELET # BLD AUTO: 182 X10*3/UL (ref 150–450)
POTASSIUM SERPL-SCNC: 3.8 MMOL/L (ref 3.5–5.3)
POTASSIUM SERPL-SCNC: 4.3 MMOL/L (ref 3.5–5.3)
PROT SERPL-MCNC: 6.1 G/DL (ref 6.4–8.2)
PROTHROMBIN TIME: 14 SECONDS (ref 9.8–12.8)
RBC # BLD AUTO: 4.74 X10*6/UL (ref 4.5–5.9)
SODIUM SERPL-SCNC: 121 MMOL/L (ref 136–145)
SODIUM SERPL-SCNC: 124 MMOL/L (ref 136–145)
WBC # BLD AUTO: 10.5 X10*3/UL (ref 4.4–11.3)

## 2024-11-24 PROCEDURE — 84133 ASSAY OF URINE POTASSIUM: CPT

## 2024-11-24 PROCEDURE — 2500000001 HC RX 250 WO HCPCS SELF ADMINISTERED DRUGS (ALT 637 FOR MEDICARE OP)

## 2024-11-24 PROCEDURE — 36415 COLL VENOUS BLD VENIPUNCTURE: CPT

## 2024-11-24 PROCEDURE — 85025 COMPLETE CBC W/AUTO DIFF WBC: CPT

## 2024-11-24 PROCEDURE — 99232 SBSQ HOSP IP/OBS MODERATE 35: CPT | Performed by: INTERNAL MEDICINE

## 2024-11-24 PROCEDURE — 80069 RENAL FUNCTION PANEL: CPT | Mod: CCI

## 2024-11-24 PROCEDURE — 82947 ASSAY GLUCOSE BLOOD QUANT: CPT

## 2024-11-24 PROCEDURE — 85610 PROTHROMBIN TIME: CPT

## 2024-11-24 PROCEDURE — 84100 ASSAY OF PHOSPHORUS: CPT

## 2024-11-24 PROCEDURE — 84540 ASSAY OF URINE/UREA-N: CPT

## 2024-11-24 PROCEDURE — 83735 ASSAY OF MAGNESIUM: CPT

## 2024-11-24 PROCEDURE — 1200000002 HC GENERAL ROOM WITH TELEMETRY DAILY

## 2024-11-24 PROCEDURE — 81001 URINALYSIS AUTO W/SCOPE: CPT

## 2024-11-24 PROCEDURE — 82248 BILIRUBIN DIRECT: CPT

## 2024-11-24 PROCEDURE — 83935 ASSAY OF URINE OSMOLALITY: CPT

## 2024-11-24 PROCEDURE — 83930 ASSAY OF BLOOD OSMOLALITY: CPT

## 2024-11-24 PROCEDURE — 84300 ASSAY OF URINE SODIUM: CPT

## 2024-11-24 PROCEDURE — 80053 COMPREHEN METABOLIC PANEL: CPT

## 2024-11-24 PROCEDURE — 2500000002 HC RX 250 W HCPCS SELF ADMINISTERED DRUGS (ALT 637 FOR MEDICARE OP, ALT 636 FOR OP/ED)

## 2024-11-24 RX ORDER — CARVEDILOL 25 MG/1
25 TABLET ORAL 2 TIMES DAILY
Status: DISCONTINUED | OUTPATIENT
Start: 2024-11-24 | End: 2024-11-25

## 2024-11-24 RX ORDER — DOCUSATE SODIUM 50 MG/5ML
100 LIQUID ORAL 2 TIMES DAILY PRN
Status: DISCONTINUED | OUTPATIENT
Start: 2024-11-24 | End: 2024-12-04

## 2024-11-24 RX ORDER — ENOXAPARIN SODIUM 100 MG/ML
40 INJECTION SUBCUTANEOUS DAILY
Status: DISCONTINUED | OUTPATIENT
Start: 2024-11-25 | End: 2024-11-29

## 2024-11-24 ASSESSMENT — COGNITIVE AND FUNCTIONAL STATUS - GENERAL
WALKING IN HOSPITAL ROOM: A LITTLE
CLIMB 3 TO 5 STEPS WITH RAILING: A LITTLE
TOILETING: A LITTLE
TURNING FROM BACK TO SIDE WHILE IN FLAT BAD: A LITTLE
DRESSING REGULAR LOWER BODY CLOTHING: A LITTLE
MOBILITY SCORE: 18
MOVING FROM LYING ON BACK TO SITTING ON SIDE OF FLAT BED WITH BEDRAILS: A LITTLE
MOVING TO AND FROM BED TO CHAIR: A LITTLE
HELP NEEDED FOR BATHING: A LITTLE
PERSONAL GROOMING: A LITTLE
MOVING TO AND FROM BED TO CHAIR: A LITTLE
MOBILITY SCORE: 18
MOVING FROM LYING ON BACK TO SITTING ON SIDE OF FLAT BED WITH BEDRAILS: A LITTLE
PERSONAL GROOMING: A LITTLE
DRESSING REGULAR UPPER BODY CLOTHING: A LITTLE
TURNING FROM BACK TO SIDE WHILE IN FLAT BAD: A LITTLE
WALKING IN HOSPITAL ROOM: A LITTLE
STANDING UP FROM CHAIR USING ARMS: A LITTLE
DRESSING REGULAR UPPER BODY CLOTHING: A LITTLE
CLIMB 3 TO 5 STEPS WITH RAILING: A LITTLE
DRESSING REGULAR LOWER BODY CLOTHING: A LITTLE
STANDING UP FROM CHAIR USING ARMS: A LITTLE
DAILY ACTIVITIY SCORE: 19
DAILY ACTIVITIY SCORE: 19
TOILETING: A LITTLE
HELP NEEDED FOR BATHING: A LITTLE

## 2024-11-24 ASSESSMENT — PAIN SCALES - GENERAL
PAINLEVEL_OUTOF10: 0 - NO PAIN
PAINLEVEL_OUTOF10: 0 - NO PAIN

## 2024-11-24 ASSESSMENT — PAIN - FUNCTIONAL ASSESSMENT: PAIN_FUNCTIONAL_ASSESSMENT: 0-10

## 2024-11-24 NOTE — PROGRESS NOTES
Date of Admission: 2024  Hospital Day: 7    Subjective   NAEON. No CP, SOB, resting comfortably on RA.      From previous:  Pain has been complaining of abdominal pain with taking his pills.  He states that he has had; since he was 17 years old.  Taking the amount of pills that he is at the hospital is making some medicine.  Denies symptoms of reflux.  Endorses upset stomach, indigestion, bloating.  Denies nausea, vomiting, constipation, diarrhea.      Discussed with patient the plan for patient CABG on Monday.  Cardiac surgery following.      Objective     Vitals:    24 Hour Vitals  Temp:  [36.4 °C (97.5 °F)-36.7 °C (98.1 °F)] 36.4 °C (97.5 °F)  Heart Rate:  [77-84] 78  Resp:  [16-18] 17  BP: (120-160)/() 120/73    Temp (24hrs), Av.6 °C (97.8 °F), Min:36.4 °C (97.5 °F), Max:36.7 °C (98.1 °F)     24 hour Intake/Output    Intake/Output Summary (Last 24 hours) at 2024 0817  Last data filed at 2024 0431  Gross per 24 hour   Intake --   Output 700 ml   Net -700 ml        Physical exam:  Constitutional: Well-developed male in no acute distress.  HEENT: NCAT. EOMI. No JVD  Respiratory: CTAB. No wheezes, crackles, or rhonchi. Normal respiratory effort on RA.  Cardiovascular: RRR, normal S1/S2, No murmurs, rubs, or gallops.   Abdominal: Soft, nondistended, nontender to palpation. No rebound or guarding  Neuro: CN II-XII grossly intact. Moving all extremities with no focal deficits  MSK: WWP, no peripheral edema  Skin: No lesions, wounds, or bruising  Psych: Appropriate mood and affect.      Medications   Scheduled Medications  aspirin, 81 mg, oral, Daily  atorvastatin, 80 mg, oral, Nightly  carvedilol, 25 mg, oral, BID  chlorhexidine, 15 mL, Mouth/Throat, BID  docusate sodium, 100 mg, oral, BID  enoxaparin, 40 mg, subcutaneous, Daily  pantoprazole, 40 mg, oral, Daily before breakfast   Or  esomeprazole, 40 mg, nasoduodenal tube, Daily before breakfast   Or  pantoprazole, 40 mg, intravenous, Daily  before breakfast  hydrALAZINE, 50 mg, oral, TID  insulin lispro, 0-10 Units, subcutaneous, TID AC  isosorbide dinitrate, 40 mg, oral, TID  mupirocin, , Topical, BID  polyethylene glycol, 17 g, oral, Daily     Continuous Medications    PRN Medications  PRN medications: acetaminophen, alum-mag hydroxide-simeth, dextrose, dextrose, glucagon, glucagon, simethicone     Labs  CBC  Results from last 72 hours   Lab Units 11/23/24  0722 11/22/24  0636   WBC AUTO x10*3/uL 8.9 10.6   HEMOGLOBIN g/dL 13.8 13.4*   HEMATOCRIT % 40.2* 39.0*   PLATELETS AUTO x10*3/uL 174 144*       BMP  Results from last 72 hours   Lab Units 11/23/24  0722 11/22/24  0636 11/21/24  1405   SODIUM mmol/L 126* 127* 128*   POTASSIUM mmol/L 4.0 3.8 4.1   CHLORIDE mmol/L 92* 94* 95*   BUN mg/dL 25* 31* 40*   CREATININE mg/dL 1.02 1.20 1.41*   MAGNESIUM mg/dL 2.07 2.12  --    PHOSPHORUS mg/dL 3.4 3.1 3.1       Imaging:     === 11/18/24 ===    CT CHEST WO IV CONTRAST    - Impression -  1.  Thoracic aorta is normal in course and caliber. There is mild  calcified plaque involving the arch and branch vessels. There is  bovine arch configuration with common origin of the innominate and  left common carotid artery.  2. Severe coronary artery calcifications.  3. Bibasilar atelectasis left-greater-than-right.  4. Several small, pulmonary nodules within both lungs, measuring less  than 6 mm, which are likely benign. Instructions:  Consider follow-up  noncontrast CT scan chest in 12 months.  5. Multiple enlarged retrocrural lymph nodes measuring up to 1.3 cm  which are nonspecific and may be reactive. Recommend follow-up CT of  the abdomen in 3 months to evaluate for interval change.    TTE (11/18)  CONCLUSIONS:   1. Poorly visualized anatomical structures due to suboptimal image quality.   2. Left ventricular ejection fraction is severely decreased, calculated by Regalado's biplane at 23%.   3. There is global hypokinesis of the left ventricle with minor regional  variations.   4. Spectral Doppler shows a Grade III (restrictive pattern) of left ventricular diastolic filling with an elevated left atrial pressure.   5. There is normal right ventricular global systolic function.   6. Mildly enlarged right ventricle.   7. The left atrium is mildly dilated.           Assessment and plan:  Edu Echavarria is a 74 y.o. male with a PMHx of HTN, GERD, and gout who presented to [facility] ED on 11/17pm with 2d hx of intermittent palpitations and chest pain. At OSH initial EKG flutter w/ RVR and ST elevtions in II/III/avF & questionable in V1-V3 w/o reciprocal changes, and labs remarkable for HS trop >67806, BNP 1100, WBC 5, CBC/RFP otherwise unremarkable. Repeat EKG aflutter w/ similar ST elevations as prior. STEMI call activated, loaded with aspirin 324mg, plavix 300mg, and started on heparin gtt 11/17pm. LHC showed triple vessel disease w/ no stenting and IABP was placed for coronary perf. Started on integrillin (which has now been stopped) and continued on heparin gtt. Transferred to [facility] for CABG evaluation. CT surgery on board for CABG evaluation, tentative plan for OR 11/25. Hospital course c/b MARYBETH and slowly dropping plts/Hb, continuing further workup. Pt transferred to floor 11/21 for further care pre-op.      Updates 11/24  -tentative plan for OR 11/25 with CT surgery  -c/w lovenox ppx for now, considering holding if plts drop again     # STEMI  # Triple vessel disease  :: HS trop >79674 at OSH  :: HS trop at : 85039 > 19155  :: LHC 11/18: Triple vessel disease (LAD prox 70%, mid 65-70%, dist 90%), Lcx OM1 80%, OM2 small, OM3 occluded with collaterals, RCA mid moderate disease. LVEDP 46mmHg, LVEF 20-25%, evidence of extensive global hypokinesis  :: Arrived integrillin and heparin gtt  :: Aspirin and plavix loaded 11/17 ~2100  :: Utox + cannavinoid, fent (received w/ EMS), benzos, cocaine  -ASA 81mg daily  -Atorva 80mg qhs  -Coreg 12.5mg BID  -Isordil 40 TID  -Hydral 50  TID  -HOLD home metop (hx cocaine use), on coreg as above  -CT surgery following, planning for tentative OR date 11/25 (waiting at least 5 days since plavix load 11/17)  -NPO MN  -holding hep, stop dvt ppx this AM     #MARYBETH, resolved  -baseline cr ~0.9  -MARYBETH worsening 11/21 in AM to 1.65>1.2-->1  -FENa 0.1%, pre-renal, likely multifactorial related to contrast admin, hemodynamic changes, and decreased PO intake  -low urine sodium 11/20  Plan:  -encourage PO intake  -monitor urine output, renally dose medications to GFR  -gentle fluid bolus prn     #hyponatremia, chronic  -Na on admit 132, now 124- likely some chronic component of hyponatremia  -serum and urine osm added onto labwork  -will continue to monitor for time being     #mild thrombocytopenia, resolved  -plts and Hb slowly downtrending since admission, appears possible dilutional as all cell lines have decreased (initial labs likely with element of hemoconcentration)  -4Ts: 3pts (low prob of HIT)   -coags, bili wnl  -retic index borderline at 2.6   -LDH mildly elevated at 390 - may be related to IABP (removed 11/20)  -repeat LDH with AM labs-->249  -repeat T&S ordered with AM labs  -c/w lovenox ppx for now, considering holding if plts continue to drop   -no signs of active bleeding     #leukocytosis - improving  -downtrending with fluids, no current signs/sx of infection   -continue to monitor off abx     #heartburn   #abdominal pain  -not on medications at home  -c/w Pantoprazole 40mg daily- continue discontinue if symptoms improve prior to dc  -tums prn  -c/w miralax; adding colace per patient request     #Gout  -hold allopurinol     Disposition  -PT/OT rec home care currently, pending post-operation eval      Diet: low sodium  Electrolytes: K >4; Mg >2  DVT ppx: lovenox therapeutic  GI ppx: ppi  Lines/tubes: PIV, external cath   O2: RA  Baseline Cr: 0.9  T+S: 11/18, repeat   Code status: full code - reconfirmed on transfer  Surrogate decision maker: Guillermo  Denis Cloud (cousin) [phone] - reconfirmed on transfer  Guillermo's wife's #: [phone]    Patient seen and discussed with attending physician.      Aspen Carnes DO  PGY-1 Anesthesiology  [facility]

## 2024-11-24 NOTE — CARE PLAN
The patient's goals for the shift include      The clinical goals for the shift include safety      Problem: Pain - Adult  Goal: Verbalizes/displays adequate comfort level or baseline comfort level  Outcome: Progressing     Problem: Safety - Adult  Goal: Free from fall injury  Outcome: Progressing     Problem: Discharge Planning  Goal: Discharge to home or other facility with appropriate resources  Outcome: Progressing     Problem: Chronic Conditions and Co-morbidities  Goal: Patient's chronic conditions and co-morbidity symptoms are monitored and maintained or improved  Outcome: Progressing     Problem: Skin  Goal: Participates in plan/prevention/treatment measures  Outcome: Progressing  Goal: Prevent/manage excess moisture  Outcome: Progressing  Goal: Prevent/minimize sheer/friction injuries  Outcome: Progressing

## 2024-11-24 NOTE — CARE PLAN
The patient's goals for the shift include      The clinical goals for the shift include safety    Over the shift, the patient had no c/o sob or cp.  Tolerating diet without issues. Pt NPO after MN for surgery. Family at bedside

## 2024-11-25 ENCOUNTER — ANESTHESIA (OUTPATIENT)
Dept: OPERATING ROOM | Facility: HOSPITAL | Age: 74
End: 2024-11-25
Payer: MEDICARE

## 2024-11-25 ENCOUNTER — APPOINTMENT (OUTPATIENT)
Dept: RADIOLOGY | Facility: HOSPITAL | Age: 74
DRG: 235 | End: 2024-11-25
Payer: MEDICARE

## 2024-11-25 LAB
ALBUMIN SERPL BCP-MCNC: 3.7 G/DL (ref 3.4–5)
ALBUMIN SERPL BCP-MCNC: 3.8 G/DL (ref 3.4–5)
ALP SERPL-CCNC: 100 U/L (ref 33–136)
ALT SERPL W P-5'-P-CCNC: 22 U/L (ref 10–52)
ANION GAP SERPL CALC-SCNC: 13 MMOL/L (ref 10–20)
ANION GAP SERPL CALC-SCNC: 15 MMOL/L (ref 10–20)
APPEARANCE UR: CLEAR
APTT PPP: 34 SECONDS (ref 27–38)
AST SERPL W P-5'-P-CCNC: 18 U/L (ref 9–39)
ATRIAL RATE: 75 BPM
ATRIAL RATE: 87 BPM
BASOPHILS # BLD AUTO: 0.04 X10*3/UL (ref 0–0.1)
BASOPHILS NFR BLD AUTO: 0.4 %
BILIRUB DIRECT SERPL-MCNC: 0.4 MG/DL (ref 0–0.3)
BILIRUB SERPL-MCNC: 1.2 MG/DL (ref 0–1.2)
BILIRUB UR STRIP.AUTO-MCNC: NEGATIVE MG/DL
BUN SERPL-MCNC: 17 MG/DL (ref 6–23)
BUN SERPL-MCNC: 18 MG/DL (ref 6–23)
CALCIUM SERPL-MCNC: 8.7 MG/DL (ref 8.6–10.6)
CALCIUM SERPL-MCNC: 9.1 MG/DL (ref 8.6–10.6)
CHLORIDE SERPL-SCNC: 87 MMOL/L (ref 98–107)
CHLORIDE SERPL-SCNC: 88 MMOL/L (ref 98–107)
CHLORIDE UR-SCNC: <15 MMOL/L
CHLORIDE/CREATININE (MMOL/G) IN URINE: NORMAL
CO2 SERPL-SCNC: 23 MMOL/L (ref 21–32)
CO2 SERPL-SCNC: 24 MMOL/L (ref 21–32)
COLOR UR: YELLOW
CREAT SERPL-MCNC: 0.91 MG/DL (ref 0.5–1.3)
CREAT SERPL-MCNC: 0.97 MG/DL (ref 0.5–1.3)
CREAT UR-MCNC: 144.4 MG/DL (ref 20–370)
EGFRCR SERPLBLD CKD-EPI 2021: 82 ML/MIN/1.73M*2
EGFRCR SERPLBLD CKD-EPI 2021: 89 ML/MIN/1.73M*2
EOSINOPHIL # BLD AUTO: 0.25 X10*3/UL (ref 0–0.4)
EOSINOPHIL NFR BLD AUTO: 2.3 %
ERYTHROCYTE [DISTWIDTH] IN BLOOD BY AUTOMATED COUNT: 13 % (ref 11.5–14.5)
GLUCOSE BLD MANUAL STRIP-MCNC: 124 MG/DL (ref 74–99)
GLUCOSE BLD MANUAL STRIP-MCNC: 152 MG/DL (ref 74–99)
GLUCOSE BLD MANUAL STRIP-MCNC: 174 MG/DL (ref 74–99)
GLUCOSE BLD MANUAL STRIP-MCNC: 189 MG/DL (ref 74–99)
GLUCOSE SERPL-MCNC: 122 MG/DL (ref 74–99)
GLUCOSE SERPL-MCNC: 137 MG/DL (ref 74–99)
GLUCOSE UR STRIP.AUTO-MCNC: NORMAL MG/DL
HCT VFR BLD AUTO: 40 % (ref 41–52)
HGB BLD-MCNC: 14.2 G/DL (ref 13.5–17.5)
HOLD SPECIMEN: NORMAL
IMM GRANULOCYTES # BLD AUTO: 0.11 X10*3/UL (ref 0–0.5)
IMM GRANULOCYTES NFR BLD AUTO: 1 % (ref 0–0.9)
INR PPP: 1.3 (ref 0.9–1.1)
KETONES UR STRIP.AUTO-MCNC: NEGATIVE MG/DL
LEUKOCYTE ESTERASE UR QL STRIP.AUTO: NEGATIVE
LYMPHOCYTES # BLD AUTO: 1.39 X10*3/UL (ref 0.8–3)
LYMPHOCYTES NFR BLD AUTO: 13 %
MAGNESIUM SERPL-MCNC: 2.03 MG/DL (ref 1.6–2.4)
MAGNESIUM SERPL-MCNC: 2.06 MG/DL (ref 1.6–2.4)
MCH RBC QN AUTO: 28.3 PG (ref 26–34)
MCHC RBC AUTO-ENTMCNC: 35.5 G/DL (ref 32–36)
MCV RBC AUTO: 80 FL (ref 80–100)
MONOCYTES # BLD AUTO: 1.38 X10*3/UL (ref 0.05–0.8)
MONOCYTES NFR BLD AUTO: 12.9 %
MUCOUS THREADS #/AREA URNS AUTO: NORMAL /LPF
NEUTROPHILS # BLD AUTO: 7.52 X10*3/UL (ref 1.6–5.5)
NEUTROPHILS NFR BLD AUTO: 70.4 %
NITRITE UR QL STRIP.AUTO: NEGATIVE
NRBC BLD-RTO: 0 /100 WBCS (ref 0–0)
OSMOLALITY UR: 581 MOSM/KG (ref 200–1200)
P AXIS: 68 DEGREES
P AXIS: 69 DEGREES
P OFFSET: 207 MS
P OFFSET: 211 MS
P ONSET: 142 MS
P ONSET: 145 MS
PH UR STRIP.AUTO: 6.5 [PH]
PHOSPHATE SERPL-MCNC: 3.2 MG/DL (ref 2.5–4.9)
PHOSPHATE SERPL-MCNC: 3.4 MG/DL (ref 2.5–4.9)
PLATELET # BLD AUTO: 217 X10*3/UL (ref 150–450)
POTASSIUM SERPL-SCNC: 3.9 MMOL/L (ref 3.5–5.3)
POTASSIUM SERPL-SCNC: 4 MMOL/L (ref 3.5–5.3)
POTASSIUM UR-SCNC: 22 MMOL/L
POTASSIUM/CREAT UR-RTO: 15 MMOL/G CREAT
PR INTERVAL: 146 MS
PR INTERVAL: 146 MS
PROT SERPL-MCNC: 6.3 G/DL (ref 6.4–8.2)
PROT UR STRIP.AUTO-MCNC: ABNORMAL MG/DL
PROTHROMBIN TIME: 14.3 SECONDS (ref 9.8–12.8)
Q ONSET: 215 MS
Q ONSET: 218 MS
QRS COUNT: 13 BEATS
QRS COUNT: 14 BEATS
QRS DURATION: 106 MS
QRS DURATION: 88 MS
QT INTERVAL: 396 MS
QT INTERVAL: 442 MS
QTC CALCULATION(BAZETT): 476 MS
QTC CALCULATION(BAZETT): 493 MS
QTC FREDERICIA: 448 MS
QTC FREDERICIA: 475 MS
R AXIS: 57 DEGREES
R AXIS: 89 DEGREES
RBC # BLD AUTO: 5.01 X10*6/UL (ref 4.5–5.9)
RBC # UR STRIP.AUTO: NEGATIVE /UL
RBC #/AREA URNS AUTO: NORMAL /HPF
SODIUM SERPL-SCNC: 120 MMOL/L (ref 136–145)
SODIUM SERPL-SCNC: 122 MMOL/L (ref 136–145)
SODIUM UR-SCNC: <10 MMOL/L
SODIUM UR-SCNC: <10 MMOL/L
SODIUM/CREAT UR-RTO: NORMAL
SODIUM/CREAT UR-RTO: NORMAL
SP GR UR STRIP.AUTO: 1.02
T AXIS: 101 DEGREES
T AXIS: 111 DEGREES
T OFFSET: 413 MS
T OFFSET: 439 MS
UREA/CREAT UR-SRTO: 8.2 G/G CREAT
UROBILINOGEN UR STRIP.AUTO-MCNC: NORMAL MG/DL
UUN UR-MCNC: 1187 MG/DL
VENTRICULAR RATE: 75 BPM
VENTRICULAR RATE: 87 BPM
WBC # BLD AUTO: 10.7 X10*3/UL (ref 4.4–11.3)
WBC #/AREA URNS AUTO: NORMAL /HPF

## 2024-11-25 PROCEDURE — 80053 COMPREHEN METABOLIC PANEL: CPT

## 2024-11-25 PROCEDURE — 2500000004 HC RX 250 GENERAL PHARMACY W/ HCPCS (ALT 636 FOR OP/ED)

## 2024-11-25 PROCEDURE — 36415 COLL VENOUS BLD VENIPUNCTURE: CPT

## 2024-11-25 PROCEDURE — 2500000001 HC RX 250 WO HCPCS SELF ADMINISTERED DRUGS (ALT 637 FOR MEDICARE OP)

## 2024-11-25 PROCEDURE — 82248 BILIRUBIN DIRECT: CPT

## 2024-11-25 PROCEDURE — 99232 SBSQ HOSP IP/OBS MODERATE 35: CPT | Performed by: PHYSICIAN ASSISTANT

## 2024-11-25 PROCEDURE — 80069 RENAL FUNCTION PANEL: CPT | Mod: CCI

## 2024-11-25 PROCEDURE — 99232 SBSQ HOSP IP/OBS MODERATE 35: CPT | Performed by: STUDENT IN AN ORGANIZED HEALTH CARE EDUCATION/TRAINING PROGRAM

## 2024-11-25 PROCEDURE — 84100 ASSAY OF PHOSPHORUS: CPT

## 2024-11-25 PROCEDURE — 1200000002 HC GENERAL ROOM WITH TELEMETRY DAILY

## 2024-11-25 PROCEDURE — 74018 RADEX ABDOMEN 1 VIEW: CPT | Performed by: RADIOLOGY

## 2024-11-25 PROCEDURE — 85610 PROTHROMBIN TIME: CPT

## 2024-11-25 PROCEDURE — 2500000002 HC RX 250 W HCPCS SELF ADMINISTERED DRUGS (ALT 637 FOR MEDICARE OP, ALT 636 FOR OP/ED)

## 2024-11-25 PROCEDURE — 85025 COMPLETE CBC W/AUTO DIFF WBC: CPT

## 2024-11-25 PROCEDURE — 99222 1ST HOSP IP/OBS MODERATE 55: CPT

## 2024-11-25 PROCEDURE — 82947 ASSAY GLUCOSE BLOOD QUANT: CPT

## 2024-11-25 PROCEDURE — 74018 RADEX ABDOMEN 1 VIEW: CPT

## 2024-11-25 PROCEDURE — 83735 ASSAY OF MAGNESIUM: CPT

## 2024-11-25 RX ORDER — ISOSORBIDE MONONITRATE 30 MG/1
30 TABLET, EXTENDED RELEASE ORAL DAILY
Status: DISCONTINUED | OUTPATIENT
Start: 2024-11-25 | End: 2024-11-30

## 2024-11-25 RX ORDER — METOPROLOL SUCCINATE 50 MG/1
50 TABLET, EXTENDED RELEASE ORAL DAILY
Status: DISCONTINUED | OUTPATIENT
Start: 2024-11-25 | End: 2024-11-30

## 2024-11-25 ASSESSMENT — COGNITIVE AND FUNCTIONAL STATUS - GENERAL
TURNING FROM BACK TO SIDE WHILE IN FLAT BAD: A LITTLE
DAILY ACTIVITIY SCORE: 19
MOVING TO AND FROM BED TO CHAIR: A LITTLE
WALKING IN HOSPITAL ROOM: A LITTLE
WALKING IN HOSPITAL ROOM: A LITTLE
CLIMB 3 TO 5 STEPS WITH RAILING: A LITTLE
MOVING TO AND FROM BED TO CHAIR: A LITTLE
HELP NEEDED FOR BATHING: A LITTLE
CLIMB 3 TO 5 STEPS WITH RAILING: A LITTLE
DRESSING REGULAR UPPER BODY CLOTHING: A LITTLE
DRESSING REGULAR LOWER BODY CLOTHING: A LITTLE
MOBILITY SCORE: 18
TOILETING: A LITTLE
PERSONAL GROOMING: A LITTLE
STANDING UP FROM CHAIR USING ARMS: A LITTLE
PERSONAL GROOMING: A LITTLE
DRESSING REGULAR LOWER BODY CLOTHING: A LITTLE
MOBILITY SCORE: 18
MOVING TO AND FROM BED TO CHAIR: A LITTLE
CLIMB 3 TO 5 STEPS WITH RAILING: A LITTLE
DRESSING REGULAR UPPER BODY CLOTHING: A LITTLE
TURNING FROM BACK TO SIDE WHILE IN FLAT BAD: A LITTLE
STANDING UP FROM CHAIR USING ARMS: A LITTLE
DAILY ACTIVITIY SCORE: 19
DRESSING REGULAR LOWER BODY CLOTHING: A LITTLE
HELP NEEDED FOR BATHING: A LITTLE
PERSONAL GROOMING: A LITTLE
TOILETING: A LITTLE
TOILETING: A LITTLE
STANDING UP FROM CHAIR USING ARMS: A LITTLE
MOVING FROM LYING ON BACK TO SITTING ON SIDE OF FLAT BED WITH BEDRAILS: A LITTLE
MOBILITY SCORE: 18
HELP NEEDED FOR BATHING: A LITTLE
MOVING FROM LYING ON BACK TO SITTING ON SIDE OF FLAT BED WITH BEDRAILS: A LITTLE
MOVING FROM LYING ON BACK TO SITTING ON SIDE OF FLAT BED WITH BEDRAILS: A LITTLE
TURNING FROM BACK TO SIDE WHILE IN FLAT BAD: A LITTLE
WALKING IN HOSPITAL ROOM: A LITTLE
DRESSING REGULAR UPPER BODY CLOTHING: A LITTLE
DAILY ACTIVITIY SCORE: 19

## 2024-11-25 ASSESSMENT — PAIN SCALES - GENERAL
PAINLEVEL_OUTOF10: 0 - NO PAIN
PAINLEVEL_OUTOF10: 0 - NO PAIN

## 2024-11-25 NOTE — CARE PLAN
Problem: Pain - Adult  Goal: Verbalizes/displays adequate comfort level or baseline comfort level  Outcome: Progressing     Problem: Safety - Adult  Goal: Free from fall injury  Outcome: Progressing     Problem: Discharge Planning  Goal: Discharge to home or other facility with appropriate resources  Outcome: Progressing     Problem: Chronic Conditions and Co-morbidities  Goal: Patient's chronic conditions and co-morbidity symptoms are monitored and maintained or improved  Outcome: Progressing     Problem: Skin  Goal: Participates in plan/prevention/treatment measures  Outcome: Progressing  Goal: Prevent/manage excess moisture  Outcome: Progressing  Goal: Prevent/minimize sheer/friction injuries  Outcome: Progressing   The patient's goals for the shift include      The clinical goals for the shift include patient will remain HMDS throughout shift

## 2024-11-25 NOTE — CONSULTS
Reason For Consult  Hyponatremia    History Of Present Illness  Edu Echavarria is a 74 y.o. male with a PMHx of HTN, GERD, and gout who presented to OSH on 11/17 with chest pain, found to be in Aflutter with RVR and STEMI. LHC showed triple vessel disease and IABP was placed, transferred to [facility] for CABG. Course c/b MARYBETH, likely 2/2 dehydration and contrast, which resolved. TTE on 11/18 with EF of 23%. Nephrology consulted for acute on chronic hyponatremia.     CABG delayed d/t hyponatremia on 11/25. Na on admit 132, now 121.  likely some chronic component of hyponatremia. UA unremarkable.  Urine sodium <10, Urine osmolality 581, serum osmolality 262 (low). Very low po intake (<400mL last 2 days) with good UOP (1.2L yesterday). Also having continued diarrhea. States he is feeling well this morning and frustrated his surgery was postponed. Has been getting low sodium diet. No other acute complaints.      Past Medical History  He has no past medical history on file.    Surgical History  He has no past surgical history on file.     Social History  He reports that he quit smoking about 52 years ago. His smoking use included cigarettes. He started smoking about 39 years ago. He has never used smokeless tobacco. He reports current drug use. Drug: Marijuana. No history on file for alcohol use.    Family History  No family history on file.     Allergies  Patient has no known allergies.    Review of Systems  As stated in HPI     Objective Data:  Vitals: I/O:   Vitals:    11/25/24 0816   BP: 140/88   Pulse: 75   Resp: 17   Temp: 36.4 °C (97.5 °F)   SpO2: 97%        24hr Min/Max:  Temp  Min: 36.3 °C (97.3 °F)  Max: 36.9 °C (98.4 °F)  Pulse  Min: 69  Max: 79  BP  Min: 110/66  Max: 144/92  Resp  Min: 16  Max: 20  SpO2  Min: 95 %  Max: 97 %   Intake/Output Summary (Last 24 hours) at 11/25/2024 1029  Last data filed at 11/25/2024 0658  Gross per 24 hour   Intake 120 ml   Output 650 ml   Net -530 ml        Net IO Since Admission:  -3,199.83 mL [11/25/24 1029]      Physical Exam:  General: Awake, alert, conversant  HEENT: Pupils equal and round, no scleral icterus or conjunctivitis  Skin: No suspect lesions or rashes noted on visible skin  Chest: Ctab, normal respiratory effort, not on supplemental oxygen  Cardiac: Regular rate and rhythm, no murmurs appreciated  Abdomen: moderately distended, non tender to palpation, no guarding  : No flank pain or indwelling urinary catheter  EXT: No peripheral edema, no asymmetry noted  Neuro: AOx4, moving all limbs spontaneously, follows commands    Lab:  CBC RFP   Lab Results   Component Value Date    WBC 10.7 11/25/2024    HGB 14.2 11/25/2024    HCT 40.0 (L) 11/25/2024    MCV 80 11/25/2024     11/25/2024    NEUTROABS 7.52 (H) 11/25/2024    Lab Results   Component Value Date     (L) 11/25/2024    K 4.0 11/25/2024    CL 88 (L) 11/25/2024    CO2 23 11/25/2024    BUN 17 11/25/2024    CREATININE 0.91 11/25/2024    CREATININE 0.99 11/24/2024     Lab Results   Component Value Date    MG 2.06 11/25/2024    PHOS 3.4 11/25/2024    CALCIUM 9.1 11/25/2024         Hepatic Function ABG/VBG   Lab Results   Component Value Date    ALT 22 11/25/2024    AST 18 11/25/2024    ALKPHOS 100 11/25/2024     Lab Results   Component Value Date    BILIDIR 0.4 (H) 11/25/2024      Lab Results   Component Value Date    PROTIME 14.3 (H) 11/25/2024    APTT 34 11/25/2024    INR 1.3 (H) 11/25/2024    Lab Results   Component Value Date    LACTATE 1.1 11/18/2024        Results from last 7 days   Lab Units 11/25/24  0832 11/24/24  0805 11/23/24  0722   WBC AUTO x10*3/uL 10.7 10.5 8.9   HEMOGLOBIN g/dL 14.2 13.7 13.8   HEMATOCRIT % 40.0* 38.8* 40.2*   PLATELETS AUTO x10*3/uL 217 182 174   NEUTROS PCT AUTO % 70.4 71.5 70.2   LYMPHS PCT AUTO % 13.0 13.0 14.7   MONOS PCT AUTO % 12.9 12.0 11.9   EOS PCT AUTO % 2.3 2.1 2.5     Results from last 7 days   Lab Units 11/25/24  0832 11/24/24  1842 11/24/24  0805 11/23/24  0722   SODIUM  mmol/L 122* 121* 124* 126*   POTASSIUM mmol/L 4.0 4.3 3.8 4.0   CHLORIDE mmol/L 88* 89* 90* 92*   CO2 mmol/L 23 21 24 21   BUN mg/dL 17 19 20 25*   CREATININE mg/dL 0.91 0.99 0.99 1.02   CALCIUM mg/dL 9.1 8.6 8.8 9.1   PROTEIN TOTAL g/dL 6.3*  --  6.1* 6.1*   BILIRUBIN TOTAL mg/dL 1.2  --  1.2 1.4*   ALK PHOS U/L 100  --  70 72   ALT U/L 22  --  22 20   AST U/L 18  --  21 24   GLUCOSE mg/dL 137* 157* 213* 129*     Results from last 7 days   Lab Units 11/25/24  0832 11/24/24  0805 11/23/24  0722   MAGNESIUM mg/dL 2.06 2.09 2.07       Results from last 7 days   Lab Units 11/25/24  0832 11/25/24  0813 11/24/24  2054 11/24/24  1842 11/24/24  1645 11/24/24  1133 11/24/24  0805 11/24/24  0732 11/23/24  1150 11/23/24  0722 11/22/24  0927 11/22/24  0636   POCT GLUCOSE mg/dL  --  152* 140*  --  216* 152*  --  224*   < >  --    < >  --    GLUCOSE mg/dL 137*  --   --  157*  --   --  213*  --   --  129*  --  140*    < > = values in this interval not displayed.       Imaging:  XR abdomen 1 view    Result Date: 11/25/2024  Interpreted By:  Familia Segura, STUDY: XR ABDOMEN 1 VIEW;  11/25/2024 9:07 am   INDICATION: Signs/Symptoms:Bloating and diarrhea, hx: STEMI pending CABG.     COMPARISON: None.   ACCESSION NUMBER(S): [id number]   ORDERING CLINICIAN: MEGHAN ABARCA   FINDINGS: Nonobstructive bowel gas pattern. Limited evaluation of pneumoperitoneum on supine imaging, however no gross evidence of free air is noted.   Visualized lungs are clear.   Osseous structures demonstrate no acute bony changes.       1.  No evidence of bowel obstruction or perforation.   MACRO: None   Signed by: Familia Segura 11/25/2024 10:09 AM Dictation workstation:   [id number]      Medications:  Scheduled Medications:  PRN Medications:    aspirin, 81 mg, oral, Daily  atorvastatin, 80 mg, oral, Nightly  carvedilol, 25 mg, oral, BID  enoxaparin, 40 mg, subcutaneous, Daily  pantoprazole, 40 mg, oral, Daily before breakfast   Or  esomeprazole, 40  mg, nasoduodenal tube, Daily before breakfast   Or  pantoprazole, 40 mg, intravenous, Daily before breakfast  hydrALAZINE, 50 mg, oral, TID  insulin lispro, 0-10 Units, subcutaneous, TID AC  isosorbide dinitrate, 40 mg, oral, TID  polyethylene glycol, 17 g, oral, Daily  sodium chloride, 500 mL, intravenous, Once     PRN medications: acetaminophen, alum-mag hydroxide-simeth, dextrose, dextrose, docusate sodium, glucagon, glucagon, simethicone     Assessment:  Edu Echavarria is a 74 y.o. male w/ a PMH of HTN, GERD, and gout who presented to OSH on 11/17 with chest pain, found to be in Aflutter with RVR and STEMI, LHC showed triple vessel disease, now awaiting CABG. TTE 11/18 with EF of 23%. Nephrology consulted for acute on chronic hyponatremia in the setting of hypovolemia.    #Hyponatremia  - Likely In setting of dehydration and diarrhea, low po intake  - Clinical volume status: Hypovolemic  - No likely causative meds  - UA unremarkable.  Urine sodium <10, Urine osmolality 581, serum osmolality 262 (low).    Recommendations:  - IV fluid resuscitation with isotonic fluids (NS 0.9%) as tolerated given HFrEF. Would recommend at least 1L.  - Would encourage PO intake    Discussed with attending nephrologist, Dr. Singh who agrees with the plan. Please page with any questions.    Andres Dominguez MD  PGY-1, Internal Medicine  Nephrology Pager # [phone]

## 2024-11-25 NOTE — PROGRESS NOTES
"Date of Admission: 2024  Hospital Day: 8    Subjective   No CP, SOB, on RA.  Denotes having a distended abdomen and bloating. Has had increased watery stools, 2-3x per day over the last few days (normal 1 well-formed BM per day). Also endorses decreased appetite and PO intake 2/2 bloating and diarrhea.    His CABG surgery today was canceled due to worsening acute on chronic hypoNa, down to 121. Pt expressed frustration and discontent at this news and is critical of the healthcare system, often having thoughts of going home AMA and expressing he would pursue \"medically-assisted suicide\" if it was an option. He believes his prognosis is very poor and he has little hope of making meaningful recovery. Expressed to patient regarding surgery and improved functionality/QoL after surgery, but pt in a low mood and repeats wishes of not being alive, although no active suicidal intent.      Objective     Vitals:    24 Hour Vitals  Temp:  [36.3 °C (97.3 °F)-36.9 °C (98.4 °F)] 36.4 °C (97.5 °F)  Heart Rate:  [69-79] 75  Resp:  [16-20] 17  BP: (110-144)/(66-92) 140/88    Temp (24hrs), Av.6 °C (97.8 °F), Min:36.3 °C (97.3 °F), Max:36.9 °C (98.4 °F)     24 hour Intake/Output    Intake/Output Summary (Last 24 hours) at 2024 0918  Last data filed at 2024 0658  Gross per 24 hour   Intake 240 ml   Output 950 ml   Net -710 ml        Physical exam:  Constitutional: Well-developed male in no acute distress.  HEENT: NCAT. EOMI. No JVD  Respiratory: CTAB. No wheezes, crackles, or rhonchi. Normal respiratory effort on RA.  Cardiovascular: RRR, normal S1/S2, No murmurs, rubs, or gallops.   Abdominal: Distended, soft, tympanitic to percussion, mild tenderness in RUQ, no rebound or guarding  Neuro: CN II-XII grossly intact. Moving all extremities with no focal deficits  MSK: WWP, no peripheral edema  Skin: No lesions, wounds, or bruising  Psych: Appropriate mood and affect.      Medications   Scheduled " Medications  aspirin, 81 mg, oral, Daily  atorvastatin, 80 mg, oral, Nightly  carvedilol, 25 mg, oral, BID  enoxaparin, 40 mg, subcutaneous, Daily  pantoprazole, 40 mg, oral, Daily before breakfast   Or  esomeprazole, 40 mg, nasoduodenal tube, Daily before breakfast   Or  pantoprazole, 40 mg, intravenous, Daily before breakfast  hydrALAZINE, 50 mg, oral, TID  insulin lispro, 0-10 Units, subcutaneous, TID AC  isosorbide dinitrate, 40 mg, oral, TID  polyethylene glycol, 17 g, oral, Daily  sodium chloride, 500 mL, intravenous, Once     Continuous Medications    PRN Medications  PRN medications: acetaminophen, alum-mag hydroxide-simeth, dextrose, dextrose, docusate sodium, glucagon, glucagon, simethicone     Labs  CBC  Results from last 72 hours   Lab Units 11/24/24  0805 11/23/24  0722   WBC AUTO x10*3/uL 10.5 8.9   HEMOGLOBIN g/dL 13.7 13.8   HEMATOCRIT % 38.8* 40.2*   PLATELETS AUTO x10*3/uL 182 174       BMP  Results from last 72 hours   Lab Units 11/24/24  1842 11/24/24  0805 11/23/24  0722   SODIUM mmol/L 121* 124* 126*   POTASSIUM mmol/L 4.3 3.8 4.0   CHLORIDE mmol/L 89* 90* 92*   BUN mg/dL 19 20 25*   CREATININE mg/dL 0.99 0.99 1.02   MAGNESIUM mg/dL  --  2.09 2.07   PHOSPHORUS mg/dL 2.9 2.6 3.4     Serum osm 262, Uosm 581, Vanita <10    Imaging:     === 11/18/24 ===    CT CHEST WO IV CONTRAST    - Impression -  1.  Thoracic aorta is normal in course and caliber. There is mild  calcified plaque involving the arch and branch vessels. There is  bovine arch configuration with common origin of the innominate and  left common carotid artery.  2. Severe coronary artery calcifications.  3. Bibasilar atelectasis left-greater-than-right.  4. Several small, pulmonary nodules within both lungs, measuring less  than 6 mm, which are likely benign. Instructions:  Consider follow-up  noncontrast CT scan chest in 12 months.  5. Multiple enlarged retrocrural lymph nodes measuring up to 1.3 cm  which are nonspecific and may be  reactive. Recommend follow-up CT of  the abdomen in 3 months to evaluate for interval change.    TTE (11/18)  CONCLUSIONS:   1. Poorly visualized anatomical structures due to suboptimal image quality.   2. Left ventricular ejection fraction is severely decreased, calculated by Regalado's biplane at 23%.   3. There is global hypokinesis of the left ventricle with minor regional variations.   4. Spectral Doppler shows a Grade III (restrictive pattern) of left ventricular diastolic filling with an elevated left atrial pressure.   5. There is normal right ventricular global systolic function.   6. Mildly enlarged right ventricle.   7. The left atrium is mildly dilated.    KUB (11/25):  IMPRESSION:  1.  No evidence of bowel obstruction or perforation.       Assessment and plan:  Edu Echavarria is a 74 y.o. male with a PMHx of HTN, GERD, and gout who presented to [facility] ED on 11/17pm with 2d hx of intermittent palpitations and chest pain. At OSH initial EKG flutter w/ RVR and ST elevtions in II/III/avF & questionable in V1-V3 w/o reciprocal changes, and labs remarkable for HS trop >96748, BNP 1100, WBC 5, CBC/RFP otherwise unremarkable. Repeat EKG aflutter w/ similar ST elevations as prior. STEMI call activated, loaded with aspirin 324mg, plavix 300mg, and started on heparin gtt 11/17pm. LHC showed triple vessel disease w/ no stenting and IABP was placed for coronary perf. Started on integrillin (which has now been stopped) and continued on heparin gtt. Transferred to [facility] for CABG evaluation. CT surgery on board for CABG evaluation, tentative plan for OR 11/25. Hospital course c/b MARYBETH and slowly dropping plts/Hb, continuing further workup. Pt transferred to floor 11/21 for further care pre-op.      Updates 11/25  -OR cancelled today 2/2 hyponatremia  -Acute on chronic hyponatremia, down to 121 (baseline ~130)  -Consulted nephrology, appreciate recs  -Fluid resuscitation w/ 1L NS  -Denotes diarrhea and poor PO  intake  -STOP coreg, isordil, and hydral, START imdur and metop XL to reduce pill burden per pt request     #STEMI  #Triple vessel disease  #HTN  :: HS trop >30261 at OSH  :: HS trop at : 86738 > 64835  :: [facility] 11/18: Triple vessel disease (LAD prox 70%, mid 65-70%, dist 90%), Lcx OM1 80%, OM2 small, OM3 occluded with collaterals, RCA mid moderate disease. LVEDP 46mmHg, LVEF 20-25%, evidence of extensive global hypokinesis  :: Arrived integrillin and heparin gtt  :: Aspirin and plavix loaded 11/17 ~2100  :: Utox + cannavinoid, fent (received w/ EMS), benzos, cocaine  -ASA 81mg daily  -Atorva 80mg qhs  -Stop Coreg 25mg BID, Isordil 40 TID, Hydral 50 TID as patient wants to minimize pill burden  -Start Imdur 30, Metop XL 50  -Consider starting nifedipine if worsening HTN  -HOLD home metop (hx cocaine use), on coreg as above  -CT surgery following, planning for tentative OR date 11/25 (waiting at least 5 days since plavix load 11/17)  -Surgery canceled on 11/25 due to acute on chronic HypoNa (down to 121), see below for further plans     #MARYBETH, resolved  -baseline cr ~0.9  -MARYBETH worsening 11/21 in AM to 1.65>1.2-->1  -FENa 0.1%, pre-renal, likely multifactorial related to contrast admin, hemodynamic changes, and decreased PO intake  -low urine sodium 11/20  Plan:  -encourage PO intake  -monitor urine output, renally dose medications to GFR     #Hyponatremia, acute on chronic  ::Na on admit 132, now 122  ::Serum osm 262, Uosm 581, Vanita <10  ::Hypotonic hypovolemic hyponatremia  -Nephrology consulted, appreciate recs  -Likely 2/2 dehydration, poor PO intake, and diarrhea  -Likely hypovolemic given high Uosm and low Vanita  -1L NS fluid resuscitation over longer time period given EF 23%    #Diarrhea  ::2-3x watery, loose stools over last few days, b/l for him is 1x well-formed stool  ::KUB showed nonobstructive pattern  -Stool PCR  -Consider anti-diarrheals if persisting     #mild thrombocytopenia, resolved  -plts and Hb  slowly downtrending since admission, appears possible dilutional as all cell lines have decreased (initial labs likely with element of hemoconcentration)  -4Ts: 3pts (low prob of HIT)   -coags, bili wnl  -retic index borderline at 2.6   -LDH mildly elevated at 390 - may be related to IABP (removed 11/20)  -repeat LDH with AM labs-->249  -repeat T&S ordered with AM labs  -c/w lovenox ppx for now, considering holding if plts continue to drop   -no signs of active bleeding     #leukocytosis - improving  -downtrending with fluids, no current signs/sx of infection   -continue to monitor off abx     #heartburn   #abdominal pain  -not on medications at home  -c/w Pantoprazole 40mg daily- continue discontinue if symptoms improve prior to dc  -tums prn  -c/w miralax; adding colace per patient request     #Gout  -hold allopurinol     Disposition  -PT/OT rec home care currently, pending post-operation eval      Diet: reg  Electrolytes: K >4; Mg >2  DVT ppx: lovenox ppx  GI ppx: ppi  Lines/tubes: PIV, external cath   O2: RA  Baseline Cr: 0.9  T+S: 11/18, repeat   Code status: full code - reconfirmed on transfer  Surrogate decision maker: Guillermo Barrios Jr (cousin) [phone] - reconfirmed on transfer  Guillermo's wife's #: [phone]    Patient seen and discussed with attending physician.      Red Loredo MD  PGY-1 [facility]      Cardiology Staff Addendum:    I saw and evaluated the patient on 11/25/2024.  I personally obtained the key and critical portions of the history and physical exam or was physically present for key and critical portions performed by the resident. I reviewed the resident’s documentation and discussed the patient with the resident.  I agree with the resident’s medical decision making as documented/amended in the note.    Jemal Botello MD

## 2024-11-25 NOTE — PROGRESS NOTES
SW received a message from pt's bed nurse that pt prefers to have assist for HCPOA and Financial POA. SW informed her that pt was offered information last week regarding of FPOA which is not allowed for SW to assist with in hospital and pt told SW that he already has a complete HCPOA. SW called pt to offer information and pt prefers to have a blank form of Living will. Pt understands SW is not allowed to assist with FPOA.   SW visited pt with a copy of Living Will and offered it pt pt with explanation. Pt will look into the form and let SW know once he completes the form. SW will continue to follow and assist.     Alex Dennis, REGINAA, LSW

## 2024-11-25 NOTE — PROGRESS NOTES
CARDIAC SURGERY CONSULT PROGRESS NOTE    SUBJECTIVE  Edu Echavarria is a 74 y.o. male with a PMHx of HTN, GERD, gout, former smoker, and current cocaine and marijuana use who presented to [facility] ED on 11/17 with ~ 2 days history of palpitations and chest pain. At [facility] his initial EKG aflutter w/ RVR and ST elevtions in II/III/avF & questionable in V1-V3 w/o reciprocal changes, and labs remarkable for HS trop >14246, BNP 1100, WBC 5, CBC/RFP otherwise unremarkable. Repeat EKG aflutter w/ similar ST elevations as prior. STEMI call activated, loaded with aspirin 324mg, plavix 300mg, and started on heparin gtt 11/17. LHC showed triple vessel disease w/ no stenting and IABP was placed for coronary perfusion. Started on integrillin (which has now been stopped) and continued on heparin gtt. He was then transferred to [facility] for CABG evaluation.    On exam, the patient denies chest pain and otherwise feels well.   Of note, U tox was positive for cannabinoid and cocaine on admission 11/18. The patient states he last used cocaine on Saturday 11/16 and marijuana 11/17.     Cardiac surgery is consulted for surgical evaluation of his CAD.     [facility] 11/18:  LAD: large vessel which is extensively and diffusely diseased. Prox/ostial LAD evidence of a hazy eccentric stensois ~70%. LAD w/ evidence of extensive disease w/ stenosis in its proximal part of approx 65-70% followed by aneursymatic dilatation jutp roximal to first septal , which si folllowed by his stenosis in range of 70%. Mid LAD has evidence of a stenosis in range of 90%. Distal LAD is severely diseased with evidence of a long lesion in the range of 90%.      D1: small  D2: mod sized, mildly diffusely disease  Lcx: large anatomically dominant vessel which gives off 2 obtuse marginals, mod to severe diffuse disease. Setnosis medicine 1.1.1.1 at the bifucation/takeoff of 3rd OM.   Om1: mod sized vessel with evidence of severe stnesosis in range of  "80%  OM2: small  OM3: large vessel with evidence of severe ostial stenosis/bifrucation medicine 1.1.1.1. more distally the vessel is totally occluded. Distal Oms are visualized thought he itnra systemic collaterals. Ramyus intermedius absent.   RCA: non dominant with evcieence of moderte disease in its mid segment.      LVEDP: 46 mmHg  Estimated LVEF 20-25% with evidence of extensive global hypokinesis.     TTE 11/18: CONCLUSIONS:   1. Poorly visualized anatomical structures due to suboptimal image quality.   2. Left ventricular ejection fraction is severely decreased, calculated by Regalado's biplane at 23%.   3. There is global hypokinesis of the left ventricle with minor regional variations.   4. Spectral Doppler shows a Grade III (restrictive pattern) of left ventricular diastolic filling with an elevated left atrial pressure.   5. There is normal right ventricular global systolic function.   6. Mildly enlarged right ventricle.   7. The left atrium is mildly dilated.      Objective   /72   Pulse 78   Temp 36.3 °C (97.3 °F)   Resp 16   Ht 1.702 m (5' 7\")   Wt 85.4 kg (188 lb 4.4 oz)   SpO2 93%   BMI 29.49 kg/m²   0-10 (Numeric) Pain Score: 0 - No pain   Vitals:    11/21/24 0600   Weight: 85.4 kg (188 lb 4.4 oz)          Intake/Output Summary (Last 24 hours) at 11/25/2024 1422  Last data filed at 11/25/2024 1247  Gross per 24 hour   Intake 703.75 ml   Output 650 ml   Net 53.75 ml       Physical Exam  Physical Exam  Constitutional:       General: He is not in acute distress.     Appearance: He is obese. He is not toxic-appearing.   HENT:      Head: Normocephalic.      Mouth/Throat:      Mouth: Mucous membranes are moist.   Cardiovascular:      Rate and Rhythm: Normal rate and regular rhythm.      Heart sounds: No murmur heard.  Pulmonary:      Effort: Pulmonary effort is normal.      Breath sounds: Normal breath sounds.      Comments: On room air  Musculoskeletal:         General: Normal range of " motion.      Cervical back: Neck supple.      Right lower leg: No edema.      Left lower leg: No edema.   Skin:     General: Skin is warm and dry.   Neurological:      General: No focal deficit present.      Mental Status: He is alert and oriented to person, place, and time.   Psychiatric:         Mood and Affect: Mood normal.         Behavior: Behavior normal.         Medications  Scheduled medications  aspirin, 81 mg, oral, Daily  atorvastatin, 80 mg, oral, Nightly  enoxaparin, 40 mg, subcutaneous, Daily  pantoprazole, 40 mg, oral, Daily before breakfast   Or  esomeprazole, 40 mg, nasoduodenal tube, Daily before breakfast   Or  pantoprazole, 40 mg, intravenous, Daily before breakfast  insulin lispro, 0-10 Units, subcutaneous, TID AC  isosorbide mononitrate ER, 30 mg, oral, Daily  metoprolol succinate XL, 50 mg, oral, Daily  polyethylene glycol, 17 g, oral, Daily    Continuous medications     PRN medications  PRN medications: acetaminophen, alum-mag hydroxide-simeth, dextrose, dextrose, docusate sodium, glucagon, glucagon, simethicone    Labs  Results for orders placed or performed during the hospital encounter of 11/18/24 (from the past 24 hours)   POCT GLUCOSE   Result Value Ref Range    POCT Glucose 216 (H) 74 - 99 mg/dL   Renal function panel   Result Value Ref Range    Glucose 157 (H) 74 - 99 mg/dL    Sodium 121 (L) 136 - 145 mmol/L    Potassium 4.3 3.5 - 5.3 mmol/L    Chloride 89 (L) 98 - 107 mmol/L    Bicarbonate 21 21 - 32 mmol/L    Anion Gap 15 10 - 20 mmol/L    Urea Nitrogen 19 6 - 23 mg/dL    Creatinine 0.99 0.50 - 1.30 mg/dL    eGFR 80 >60 mL/min/1.73m*2    Calcium 8.6 8.6 - 10.6 mg/dL    Phosphorus 2.9 2.5 - 4.9 mg/dL    Albumin 4.2 3.4 - 5.0 g/dL   Osmolality   Result Value Ref Range    Osmolality, Serum 262 (L) 280 - 300 mOsm/kg   POCT GLUCOSE   Result Value Ref Range    POCT Glucose 140 (H) 74 - 99 mg/dL   Urinalysis with Reflex Culture and Microscopic   Result Value Ref Range    Color, Urine Yellow  Light-Yellow, Yellow, Dark-Yellow    Appearance, Urine Clear Clear    Specific Gravity, Urine 1.022 1.005 - 1.035    pH, Urine 6.5 5.0, 5.5, 6.0, 6.5, 7.0, 7.5, 8.0    Protein, Urine 50 (1+) (A) NEGATIVE, 10 (TRACE), 20 (TRACE) mg/dL    Glucose, Urine Normal Normal mg/dL    Blood, Urine NEGATIVE NEGATIVE    Ketones, Urine NEGATIVE NEGATIVE mg/dL    Bilirubin, Urine NEGATIVE NEGATIVE    Urobilinogen, Urine Normal Normal mg/dL    Nitrite, Urine NEGATIVE NEGATIVE    Leukocyte Esterase, Urine NEGATIVE NEGATIVE   Extra Urine Gray Tube   Result Value Ref Range    Extra Tube Hold for add-ons.    Urinalysis Microscopic   Result Value Ref Range    WBC, Urine 1-5 1-5, NONE /HPF    RBC, Urine 1-2 NONE, 1-2, 3-5 /HPF    Mucus, Urine FEW Reference range not established. /LPF   Sodium, Urine Random   Result Value Ref Range    Sodium, Urine Random <10 mmol/L    Creatinine, Urine Random 144.4 20.0 - 370.0 mg/dL    Sodium/Creatinine Ratio     Urine electrolytes   Result Value Ref Range    Sodium, Urine Random <10 mmol/L    Sodium/Creatinine Ratio      Potassium, Urine Random 22 mmol/L    Potassium/Creatinine Ratio 15 Not established mmol/g Creat    Chloride, Urine Random <15 mmol/L    Chloride/Creatinine Ratio      Creatinine, Urine Random 144.4 20.0 - 370.0 mg/dL   Urea Nitrogen, Urine Random   Result Value Ref Range    Urea Nitrogen, Urine Random 1,187 mg/dL    Creatinine, Urine Random 144.4 20.0 - 370.0 mg/dL    Urea Nitrogen/Creatinine Ratio 8.2 Not established. g/g creat   Osmolality, urine   Result Value Ref Range    Osmolality, Urine Random 581 200 - 1,200 mOsm/kg   POCT GLUCOSE   Result Value Ref Range    POCT Glucose 152 (H) 74 - 99 mg/dL   Hepatic Function Panel   Result Value Ref Range    Albumin 3.7 3.4 - 5.0 g/dL    Bilirubin, Total 1.2 0.0 - 1.2 mg/dL    Bilirubin, Direct 0.4 (H) 0.0 - 0.3 mg/dL    Alkaline Phosphatase 100 33 - 136 U/L    ALT 22 10 - 52 U/L    AST 18 9 - 39 U/L    Total Protein 6.3 (L) 6.4 - 8.2 g/dL    CBC and Auto Differential   Result Value Ref Range    WBC 10.7 4.4 - 11.3 x10*3/uL    nRBC 0.0 0.0 - 0.0 /100 WBCs    RBC 5.01 4.50 - 5.90 x10*6/uL    Hemoglobin 14.2 13.5 - 17.5 g/dL    Hematocrit 40.0 (L) 41.0 - 52.0 %    MCV 80 80 - 100 fL    MCH 28.3 26.0 - 34.0 pg    MCHC 35.5 32.0 - 36.0 g/dL    RDW 13.0 11.5 - 14.5 %    Platelets 217 150 - 450 x10*3/uL    Neutrophils % 70.4 40.0 - 80.0 %    Immature Granulocytes %, Automated 1.0 (H) 0.0 - 0.9 %    Lymphocytes % 13.0 13.0 - 44.0 %    Monocytes % 12.9 2.0 - 10.0 %    Eosinophils % 2.3 0.0 - 6.0 %    Basophils % 0.4 0.0 - 2.0 %    Neutrophils Absolute 7.52 (H) 1.60 - 5.50 x10*3/uL    Immature Granulocytes Absolute, Automated 0.11 0.00 - 0.50 x10*3/uL    Lymphocytes Absolute 1.39 0.80 - 3.00 x10*3/uL    Monocytes Absolute 1.38 (H) 0.05 - 0.80 x10*3/uL    Eosinophils Absolute 0.25 0.00 - 0.40 x10*3/uL    Basophils Absolute 0.04 0.00 - 0.10 x10*3/uL   Coagulation Screen   Result Value Ref Range    Protime 14.3 (H) 9.8 - 12.8 seconds    INR 1.3 (H) 0.9 - 1.1    aPTT 34 27 - 38 seconds   Magnesium   Result Value Ref Range    Magnesium 2.06 1.60 - 2.40 mg/dL   Phosphorus   Result Value Ref Range    Phosphorus 3.4 2.5 - 4.9 mg/dL   Basic Metabolic Panel   Result Value Ref Range    Glucose 137 (H) 74 - 99 mg/dL    Sodium 122 (L) 136 - 145 mmol/L    Potassium 4.0 3.5 - 5.3 mmol/L    Chloride 88 (L) 98 - 107 mmol/L    Bicarbonate 23 21 - 32 mmol/L    Anion Gap 15 10 - 20 mmol/L    Urea Nitrogen 17 6 - 23 mg/dL    Creatinine 0.91 0.50 - 1.30 mg/dL    eGFR 89 >60 mL/min/1.73m*2    Calcium 9.1 8.6 - 10.6 mg/dL   POCT GLUCOSE   Result Value Ref Range    POCT Glucose 189 (H) 74 - 99 mg/dL               ASSESSMENT & PLAN  Edu Echavarria is a 74 y.o. male with a PMHx of HTN, GERD, gout, former smoker, and current cocaine and marijuana use who presented to [facility] ED on 11/17 with ~ 2 days history of palpitations and chest pain. At [facility] his initial EKG aflutter w/  RVR and ST elevtions in II/III/avF & questionable in V1-V3 w/o reciprocal changes, and labs remarkable for HS trop >33640, BNP 1100, WBC 5, CBC/RFP otherwise unremarkable. Repeat EKG aflutter w/ similar ST elevations as prior. STEMI call activated, loaded with aspirin 324mg, plavix 300mg, and started on heparin gtt 11/17. LHC showed triple vessel disease w/ no stenting and IABP was placed for coronary perfusion. Started on integrillin (which has now been stopped) and continued on heparin gtt. He was then transferred to [facility] for CABG evaluation.    On exam, the patient denies chest pain and otherwise feels well.   Of note, U tox was positive for cannabinoid and cocaine on admission 11/18. The patient states he last used cocaine on Saturday 11/16 and marijuana 11/17.     Cardiac surgery is consulted for surgical evaluation of his CAD.     RECOMMENDATIONS/PLAN  Dr. Villa met with patient reviewed case and available imaging. Ideally, will wait at least 5 days s/p last dose of Plavix (11/17 PM) to take patient to OR.  Initial plan was for OR Monday 11/25, however given patient's worsening hypochloremia and hyponatremia, the patient is cancelled and reschedule date TBD. Nephrology is consulted. Further recs pending electrolyte correction.     - [facility] images in PACS to review.   - TTE 11/18 EF 23%  - Medical optimization per primary team    Preop risk stratification studies/labs:  --- US Carotids/Vein Mapping/ LE US ELODIA - completed 11/21  --- PFTs (spirometry and room air ABG) -  completed 11/21  --- pCXR completed 11/19  --- MRSA, UA/Culture, LFTs, HgbA1c, TSH/T4, Lipid panel  --- CT chest without contrast - completed 11/21  --- Dental evaluation not indicated for isolated CABG surgeries     Preop orders:  - Continue ASA, high-intensity statin, and BB (or document contraindications for use) for preop CABG patients   - No ACEi/ARBs in the pre-op period (at least 48 hours)  - Hold any SGLT2 inhibitors (Farxiga, Jardiance,  etc.) for at least 3 days prior to cardiac surgery to prevent euglycemic DKA  - Hold any daily GLP-1 agonist drugs on the day of surgery. Hold any weekly GLP-1 drugs for 7 days prior to surgery. (Dulaglutide, Exenatide, Liraglutide, Lixisenatide, & Semaglutide)  - No antiplatelets other than ASA, no anticoagulants other than Heparin  - NPO after midnight, blood on hold/T&S, and preop scrubs ordered for OR 11/25       Will continue to follow along.  Thank you for the consultation.   Patient educated and all questions answered.  Please page the cardiac surgery consult pager [phone] with any questions or changes in patient condition.    Felecia Ritchie PA-C  Cardiac Surgery Consult [facility]  [facility]  Cardiac Surgery Consult Pager [phone]     11/25/2024  2:22 PM

## 2024-11-26 LAB
ALBUMIN SERPL BCP-MCNC: 3.8 G/DL (ref 3.4–5)
ALP SERPL-CCNC: 75 U/L (ref 33–136)
ALT SERPL W P-5'-P-CCNC: 18 U/L (ref 10–52)
ANION GAP SERPL CALC-SCNC: 16 MMOL/L (ref 10–20)
APTT PPP: 36 SECONDS (ref 27–38)
AST SERPL W P-5'-P-CCNC: 13 U/L (ref 9–39)
BASOPHILS # BLD AUTO: 0.07 X10*3/UL (ref 0–0.1)
BASOPHILS NFR BLD AUTO: 0.6 %
BILIRUB DIRECT SERPL-MCNC: 0.4 MG/DL (ref 0–0.3)
BILIRUB SERPL-MCNC: 1.1 MG/DL (ref 0–1.2)
BLOOD EXPIRATION DATE: NORMAL
BUN SERPL-MCNC: 17 MG/DL (ref 6–23)
CALCIUM SERPL-MCNC: 8.8 MG/DL (ref 8.6–10.6)
CHLORIDE SERPL-SCNC: 88 MMOL/L (ref 98–107)
CO2 SERPL-SCNC: 21 MMOL/L (ref 21–32)
CREAT SERPL-MCNC: 0.96 MG/DL (ref 0.5–1.3)
DISPENSE STATUS: NORMAL
EGFRCR SERPLBLD CKD-EPI 2021: 83 ML/MIN/1.73M*2
EOSINOPHIL # BLD AUTO: 0.21 X10*3/UL (ref 0–0.4)
EOSINOPHIL NFR BLD AUTO: 1.7 %
ERYTHROCYTE [DISTWIDTH] IN BLOOD BY AUTOMATED COUNT: 13 % (ref 11.5–14.5)
GLUCOSE BLD MANUAL STRIP-MCNC: 128 MG/DL (ref 74–99)
GLUCOSE BLD MANUAL STRIP-MCNC: 153 MG/DL (ref 74–99)
GLUCOSE BLD MANUAL STRIP-MCNC: 179 MG/DL (ref 74–99)
GLUCOSE SERPL-MCNC: 140 MG/DL (ref 74–99)
HCT VFR BLD AUTO: 41.5 % (ref 41–52)
HGB BLD-MCNC: 14.8 G/DL (ref 13.5–17.5)
IMM GRANULOCYTES # BLD AUTO: 0.13 X10*3/UL (ref 0–0.5)
IMM GRANULOCYTES NFR BLD AUTO: 1 % (ref 0–0.9)
INR PPP: 1.3 (ref 0.9–1.1)
LYMPHOCYTES # BLD AUTO: 1.77 X10*3/UL (ref 0.8–3)
LYMPHOCYTES NFR BLD AUTO: 14.1 %
MAGNESIUM SERPL-MCNC: 2.22 MG/DL (ref 1.6–2.4)
MCH RBC QN AUTO: 28.5 PG (ref 26–34)
MCHC RBC AUTO-ENTMCNC: 35.7 G/DL (ref 32–36)
MCV RBC AUTO: 80 FL (ref 80–100)
MONOCYTES # BLD AUTO: 1.56 X10*3/UL (ref 0.05–0.8)
MONOCYTES NFR BLD AUTO: 12.4 %
NEUTROPHILS # BLD AUTO: 8.8 X10*3/UL (ref 1.6–5.5)
NEUTROPHILS NFR BLD AUTO: 70.2 %
NRBC BLD-RTO: 0 /100 WBCS (ref 0–0)
PHOSPHATE SERPL-MCNC: 3.3 MG/DL (ref 2.5–4.9)
PLATELET # BLD AUTO: 234 X10*3/UL (ref 150–450)
POTASSIUM SERPL-SCNC: 4.2 MMOL/L (ref 3.5–5.3)
PRODUCT BLOOD TYPE: 6200
PRODUCT CODE: NORMAL
PROT SERPL-MCNC: 6.3 G/DL (ref 6.4–8.2)
PROTHROMBIN TIME: 14.5 SECONDS (ref 9.8–12.8)
RBC # BLD AUTO: 5.2 X10*6/UL (ref 4.5–5.9)
SODIUM SERPL-SCNC: 121 MMOL/L (ref 136–145)
UNIT ABO: NORMAL
UNIT NUMBER: NORMAL
UNIT RH: NORMAL
UNIT VOLUME: 350
WBC # BLD AUTO: 12.5 X10*3/UL (ref 4.4–11.3)
XM INTEP: NORMAL

## 2024-11-26 PROCEDURE — 99232 SBSQ HOSP IP/OBS MODERATE 35: CPT | Performed by: STUDENT IN AN ORGANIZED HEALTH CARE EDUCATION/TRAINING PROGRAM

## 2024-11-26 PROCEDURE — 1200000002 HC GENERAL ROOM WITH TELEMETRY DAILY

## 2024-11-26 PROCEDURE — 84100 ASSAY OF PHOSPHORUS: CPT

## 2024-11-26 PROCEDURE — 2500000002 HC RX 250 W HCPCS SELF ADMINISTERED DRUGS (ALT 637 FOR MEDICARE OP, ALT 636 FOR OP/ED)

## 2024-11-26 PROCEDURE — 2500000001 HC RX 250 WO HCPCS SELF ADMINISTERED DRUGS (ALT 637 FOR MEDICARE OP)

## 2024-11-26 PROCEDURE — 99232 SBSQ HOSP IP/OBS MODERATE 35: CPT | Performed by: PHYSICIAN ASSISTANT

## 2024-11-26 PROCEDURE — 82947 ASSAY GLUCOSE BLOOD QUANT: CPT

## 2024-11-26 PROCEDURE — 2500000004 HC RX 250 GENERAL PHARMACY W/ HCPCS (ALT 636 FOR OP/ED)

## 2024-11-26 PROCEDURE — 84075 ASSAY ALKALINE PHOSPHATASE: CPT

## 2024-11-26 PROCEDURE — 36415 COLL VENOUS BLD VENIPUNCTURE: CPT

## 2024-11-26 PROCEDURE — 87506 IADNA-DNA/RNA PROBE TQ 6-11: CPT

## 2024-11-26 PROCEDURE — 99232 SBSQ HOSP IP/OBS MODERATE 35: CPT

## 2024-11-26 PROCEDURE — 85610 PROTHROMBIN TIME: CPT

## 2024-11-26 PROCEDURE — 85025 COMPLETE CBC W/AUTO DIFF WBC: CPT

## 2024-11-26 PROCEDURE — 83735 ASSAY OF MAGNESIUM: CPT

## 2024-11-26 RX ORDER — SODIUM CHLORIDE 9 MG/ML
80 INJECTION, SOLUTION INTRAVENOUS CONTINUOUS
Status: ACTIVE | OUTPATIENT
Start: 2024-11-26 | End: 2024-11-27

## 2024-11-26 RX ORDER — HYDROXYZINE HYDROCHLORIDE 25 MG/1
25 TABLET, FILM COATED ORAL ONCE
Status: DISCONTINUED | OUTPATIENT
Start: 2024-11-26 | End: 2024-11-26

## 2024-11-26 RX ORDER — METOPROLOL TARTRATE 1 MG/ML
10 INJECTION, SOLUTION INTRAVENOUS ONCE
Status: COMPLETED | OUTPATIENT
Start: 2024-11-26 | End: 2024-11-26

## 2024-11-26 RX ORDER — NIFEDIPINE 30 MG/1
30 TABLET, FILM COATED, EXTENDED RELEASE ORAL
Status: DISCONTINUED | OUTPATIENT
Start: 2024-11-26 | End: 2024-11-30

## 2024-11-26 RX ORDER — METOPROLOL SUCCINATE 50 MG/1
50 TABLET, EXTENDED RELEASE ORAL ONCE
Status: DISCONTINUED | OUTPATIENT
Start: 2024-11-26 | End: 2024-11-26

## 2024-11-26 ASSESSMENT — COGNITIVE AND FUNCTIONAL STATUS - GENERAL
DAILY ACTIVITIY SCORE: 19
WALKING IN HOSPITAL ROOM: A LITTLE
TOILETING: A LITTLE
MOVING FROM LYING ON BACK TO SITTING ON SIDE OF FLAT BED WITH BEDRAILS: A LITTLE
PERSONAL GROOMING: A LITTLE
TOILETING: A LITTLE
MOVING TO AND FROM BED TO CHAIR: A LITTLE
PERSONAL GROOMING: A LITTLE
STANDING UP FROM CHAIR USING ARMS: A LITTLE
DRESSING REGULAR UPPER BODY CLOTHING: A LITTLE
TURNING FROM BACK TO SIDE WHILE IN FLAT BAD: A LITTLE
MOVING FROM LYING ON BACK TO SITTING ON SIDE OF FLAT BED WITH BEDRAILS: A LITTLE
TURNING FROM BACK TO SIDE WHILE IN FLAT BAD: A LITTLE
DAILY ACTIVITIY SCORE: 19
STANDING UP FROM CHAIR USING ARMS: A LITTLE
DRESSING REGULAR UPPER BODY CLOTHING: A LITTLE
DRESSING REGULAR LOWER BODY CLOTHING: A LITTLE
WALKING IN HOSPITAL ROOM: A LITTLE
MOVING TO AND FROM BED TO CHAIR: A LITTLE
HELP NEEDED FOR BATHING: A LITTLE
CLIMB 3 TO 5 STEPS WITH RAILING: A LITTLE
MOBILITY SCORE: 18
HELP NEEDED FOR BATHING: A LITTLE
CLIMB 3 TO 5 STEPS WITH RAILING: A LITTLE
MOBILITY SCORE: 18
DRESSING REGULAR LOWER BODY CLOTHING: A LITTLE

## 2024-11-26 ASSESSMENT — PAIN SCALES - GENERAL
PAINLEVEL_OUTOF10: 0 - NO PAIN
PAINLEVEL_OUTOF10: 0 - NO PAIN

## 2024-11-26 NOTE — PROGRESS NOTES
"Physical Therapy                 Therapy Communication Note    Patient Name: Edu Echavarria  MRN: [id number]  Department: [place] [address]  Room: 70/7017A  Today's Date: 11/26/2024     Discipline: Physical Therapy    Missed Visit Reason: Missed Visit Reason:  (pt refused stating \"Im not doing PT until after surgery.\" provided encouragment and education about prehab. pt continued to decline. will re-attempt as schedule permits)    Missed Time: Attempt    Comment:  "

## 2024-11-26 NOTE — PROGRESS NOTES
CARDIAC SURGERY CONSULT PROGRESS NOTE    SUBJECTIVE  Edu Echavarria is a 74 y.o. male with a PMHx of HTN, GERD, gout, former smoker, and current cocaine and marijuana use who presented to [facility] ED on 11/17 with ~ 2 days history of palpitations and chest pain. At [facility] his initial EKG aflutter w/ RVR and ST elevtions in II/III/avF & questionable in V1-V3 w/o reciprocal changes, and labs remarkable for HS trop >58727, BNP 1100, WBC 5, CBC/RFP otherwise unremarkable. Repeat EKG aflutter w/ similar ST elevations as prior. STEMI call activated, loaded with aspirin 324mg, plavix 300mg, and started on heparin gtt 11/17. LHC showed triple vessel disease w/ no stenting and IABP was placed for coronary perfusion. Started on integrillin (which has now been stopped) and continued on heparin gtt. He was then transferred to [facility] for CABG evaluation.    On exam, the patient denies chest pain and otherwise feels well.   Of note, U tox was positive for cannabinoid and cocaine on admission 11/18. The patient states he last used cocaine on Saturday 11/16 and marijuana 11/17.     Cardiac surgery is consulted for surgical evaluation of his CAD.     [facility] 11/18:  LAD: large vessel which is extensively and diffusely diseased. Prox/ostial LAD evidence of a hazy eccentric stensois ~70%. LAD w/ evidence of extensive disease w/ stenosis in its proximal part of approx 65-70% followed by aneursymatic dilatation jutp roximal to first septal , which si folllowed by his stenosis in range of 70%. Mid LAD has evidence of a stenosis in range of 90%. Distal LAD is severely diseased with evidence of a long lesion in the range of 90%.      D1: small  D2: mod sized, mildly diffusely disease  Lcx: large anatomically dominant vessel which gives off 2 obtuse marginals, mod to severe diffuse disease. Setnosis medicine 1.1.1.1 at the bifucation/takeoff of 3rd OM.   Om1: mod sized vessel with evidence of severe stnesosis in range of  "80%  OM2: small  OM3: large vessel with evidence of severe ostial stenosis/bifrucation medicine 1.1.1.1. more distally the vessel is totally occluded. Distal Oms are visualized thought he itnra systemic collaterals. Ramyus intermedius absent.   RCA: non dominant with evcieence of moderte disease in its mid segment.      LVEDP: 46 mmHg  Estimated LVEF 20-25% with evidence of extensive global hypokinesis.     TTE 11/18: CONCLUSIONS:   1. Poorly visualized anatomical structures due to suboptimal image quality.   2. Left ventricular ejection fraction is severely decreased, calculated by Regalado's biplane at 23%.   3. There is global hypokinesis of the left ventricle with minor regional variations.   4. Spectral Doppler shows a Grade III (restrictive pattern) of left ventricular diastolic filling with an elevated left atrial pressure.   5. There is normal right ventricular global systolic function.   6. Mildly enlarged right ventricle.   7. The left atrium is mildly dilated.      Objective   /89   Pulse 77   Temp 35.8 °C (96.4 °F)   Resp 18   Ht 1.702 m (5' 7\")   Wt 85.4 kg (188 lb 4.4 oz)   SpO2 96%   BMI 29.49 kg/m²   0-10 (Numeric) Pain Score: 0 - No pain   Vitals:    11/21/24 0600   Weight: 85.4 kg (188 lb 4.4 oz)          Intake/Output Summary (Last 24 hours) at 11/26/2024 1129  Last data filed at 11/26/2024 0809  Gross per 24 hour   Intake 783.75 ml   Output 650 ml   Net 133.75 ml         Medications  Scheduled medications  aspirin, 81 mg, oral, Daily  atorvastatin, 80 mg, oral, Nightly  enoxaparin, 40 mg, subcutaneous, Daily  pantoprazole, 40 mg, oral, Daily before breakfast   Or  esomeprazole, 40 mg, nasoduodenal tube, Daily before breakfast   Or  pantoprazole, 40 mg, intravenous, Daily before breakfast  insulin lispro, 0-10 Units, subcutaneous, TID AC  isosorbide mononitrate ER, 30 mg, oral, Daily  metoprolol succinate XL, 50 mg, oral, Daily  NIFEdipine ER, 30 mg, oral, Daily before " breakfast    Continuous medications  sodium chloride 0.9%, 80 mL/hr      PRN medications  PRN medications: acetaminophen, alum-mag hydroxide-simeth, dextrose, dextrose, docusate sodium, glucagon, glucagon, simethicone    Labs  Results for orders placed or performed during the hospital encounter of 11/18/24 (from the past 24 hours)   POCT GLUCOSE   Result Value Ref Range    POCT Glucose 189 (H) 74 - 99 mg/dL   POCT GLUCOSE   Result Value Ref Range    POCT Glucose 174 (H) 74 - 99 mg/dL   Renal function panel   Result Value Ref Range    Glucose 122 (H) 74 - 99 mg/dL    Sodium 120 (LL) 136 - 145 mmol/L    Potassium 3.9 3.5 - 5.3 mmol/L    Chloride 87 (L) 98 - 107 mmol/L    Bicarbonate 24 21 - 32 mmol/L    Anion Gap 13 10 - 20 mmol/L    Urea Nitrogen 18 6 - 23 mg/dL    Creatinine 0.97 0.50 - 1.30 mg/dL    eGFR 82 >60 mL/min/1.73m*2    Calcium 8.7 8.6 - 10.6 mg/dL    Phosphorus 3.2 2.5 - 4.9 mg/dL    Albumin 3.8 3.4 - 5.0 g/dL   Magnesium   Result Value Ref Range    Magnesium 2.03 1.60 - 2.40 mg/dL   POCT GLUCOSE   Result Value Ref Range    POCT Glucose 124 (H) 74 - 99 mg/dL   POCT GLUCOSE   Result Value Ref Range    POCT Glucose 153 (H) 74 - 99 mg/dL   Hepatic Function Panel   Result Value Ref Range    Albumin 3.8 3.4 - 5.0 g/dL    Bilirubin, Total 1.1 0.0 - 1.2 mg/dL    Bilirubin, Direct 0.4 (H) 0.0 - 0.3 mg/dL    Alkaline Phosphatase 75 33 - 136 U/L    ALT 18 10 - 52 U/L    AST 13 9 - 39 U/L    Total Protein 6.3 (L) 6.4 - 8.2 g/dL   CBC and Auto Differential   Result Value Ref Range    WBC 12.5 (H) 4.4 - 11.3 x10*3/uL    nRBC 0.0 0.0 - 0.0 /100 WBCs    RBC 5.20 4.50 - 5.90 x10*6/uL    Hemoglobin 14.8 13.5 - 17.5 g/dL    Hematocrit 41.5 41.0 - 52.0 %    MCV 80 80 - 100 fL    MCH 28.5 26.0 - 34.0 pg    MCHC 35.7 32.0 - 36.0 g/dL    RDW 13.0 11.5 - 14.5 %    Platelets 234 150 - 450 x10*3/uL    Neutrophils % 70.2 40.0 - 80.0 %    Immature Granulocytes %, Automated 1.0 (H) 0.0 - 0.9 %    Lymphocytes % 14.1 13.0 - 44.0 %     Monocytes % 12.4 2.0 - 10.0 %    Eosinophils % 1.7 0.0 - 6.0 %    Basophils % 0.6 0.0 - 2.0 %    Neutrophils Absolute 8.80 (H) 1.60 - 5.50 x10*3/uL    Immature Granulocytes Absolute, Automated 0.13 0.00 - 0.50 x10*3/uL    Lymphocytes Absolute 1.77 0.80 - 3.00 x10*3/uL    Monocytes Absolute 1.56 (H) 0.05 - 0.80 x10*3/uL    Eosinophils Absolute 0.21 0.00 - 0.40 x10*3/uL    Basophils Absolute 0.07 0.00 - 0.10 x10*3/uL   Coagulation Screen   Result Value Ref Range    Protime 14.5 (H) 9.8 - 12.8 seconds    INR 1.3 (H) 0.9 - 1.1    aPTT 36 27 - 38 seconds   Magnesium   Result Value Ref Range    Magnesium 2.22 1.60 - 2.40 mg/dL   Phosphorus   Result Value Ref Range    Phosphorus 3.3 2.5 - 4.9 mg/dL   Basic Metabolic Panel   Result Value Ref Range    Glucose 140 (H) 74 - 99 mg/dL    Sodium 121 (L) 136 - 145 mmol/L    Potassium 4.2 3.5 - 5.3 mmol/L    Chloride 88 (L) 98 - 107 mmol/L    Bicarbonate 21 21 - 32 mmol/L    Anion Gap 16 10 - 20 mmol/L    Urea Nitrogen 17 6 - 23 mg/dL    Creatinine 0.96 0.50 - 1.30 mg/dL    eGFR 83 >60 mL/min/1.73m*2    Calcium 8.8 8.6 - 10.6 mg/dL               ASSESSMENT & PLAN  Edu Echavarria is a 74 y.o. male with a PMHx of HTN, GERD, gout, former smoker, and current cocaine and marijuana use who presented to [facility] ED on 11/17 with ~ 2 days history of palpitations and chest pain. At [facility] his initial EKG aflutter w/ RVR and ST elevtions in II/III/avF & questionable in V1-V3 w/o reciprocal changes, and labs remarkable for HS trop >86068, BNP 1100, WBC 5, CBC/RFP otherwise unremarkable. Repeat EKG aflutter w/ similar ST elevations as prior. STEMI call activated, loaded with aspirin 324mg, plavix 300mg, and started on heparin gtt 11/17. LHC showed triple vessel disease w/ no stenting and IABP was placed for coronary perfusion. Started on integrillin (which has now been stopped) and continued on heparin gtt. He was then transferred to [facility] for CABG evaluation.    On exam, the patient  denies chest pain and otherwise feels well.   Of note, U tox was positive for cannabinoid and cocaine on admission 11/18. The patient states he last used cocaine on Saturday 11/16 and marijuana 11/17.     Cardiac surgery is consulted for surgical evaluation of his CAD.     RECOMMENDATIONS/PLAN  Dr. Villa met with patient reviewed case and available imaging. Ideally, will wait at least 5 days s/p last dose of Plavix (11/17 PM) to take patient to OR.  - Update 11/25: Initially plan was for surgery Monday 11/25, however given patient's worsening hypochloremia and hyponatremia and concern for anesthesia, surgery was cancelled and reschedule date TBD. Nephrology is consulted. Further recs pending electrolyte correction.   - Update 11/26: Na 121, Cl 88; nephrology recommending hydration with NS 0.9%; rescheduled OR date TBD    - [facility] images in PACS to review.   - TTE 11/18 EF 23%  - Medical optimization per primary team    Preop risk stratification studies/labs:  --- US Carotids/Vein Mapping/ LE US ELODIA - completed 11/21  --- PFTs (spirometry and room air ABG) -  completed 11/21  --- pCXR completed 11/19  --- MRSA, UA/Culture, LFTs, HgbA1c, TSH/T4, Lipid panel  --- CT chest without contrast - completed 11/21  --- Dental evaluation not indicated for isolated CABG surgeries     Preop orders:  - Continue ASA, high-intensity statin, and BB (or document contraindications for use) for preop CABG patients   - No ACEi/ARBs in the pre-op period (at least 48 hours)  - Hold any SGLT2 inhibitors (Farxiga, Jardiance, etc.) for at least 3 days prior to cardiac surgery to prevent euglycemic DKA  - Hold any daily GLP-1 agonist drugs on the day of surgery. Hold any weekly GLP-1 drugs for 7 days prior to surgery. (Dulaglutide, Exenatide, Liraglutide, Lixisenatide, & Semaglutide)  - No antiplatelets other than ASA, no anticoagulants other than Heparin  - NPO after midnight, blood on hold/T&S, and preop scrubs ordered for OR 11/25       Will  continue to follow along.  Thank you for the consultation.   Patient educated and all questions answered.  Please page the cardiac surgery consult pager [phone] with any questions or changes in patient condition.    Felecia Ritchie PA-C  Cardiac Surgery Consult [facility]  [facility]  Cardiac Surgery Consult Pager [phone]     11/26/2024  11:29 AM

## 2024-11-26 NOTE — PROGRESS NOTES
Date of Admission: 2024  Hospital Day: 9    Subjective   No CP, SOB, on RA.  Improved diarrhea (only 1 semi-formed stool), improving appetite, feeling better overall. Na 121 (stable but low). Wants to reduce pill burden.      Objective     Vitals:    24 Hour Vitals  Temp:  [35.8 °C (96.4 °F)-36.6 °C (97.9 °F)] 35.8 °C (96.4 °F)  Heart Rate:  [75-79] 75  Resp:  [18-20] 18  BP: (143-175)/() 143/85    Temp (24hrs), Av.2 °C (97.2 °F), Min:35.8 °C (96.4 °F), Max:36.6 °C (97.9 °F)     24 hour Intake/Output    Intake/Output Summary (Last 24 hours) at 2024 1447  Last data filed at 2024 1423  Gross per 24 hour   Intake 420 ml   Output 650 ml   Net -230 ml        Physical exam:  Constitutional: Well-developed male in no acute distress.  HEENT: NCAT. EOMI. No JVD  Respiratory: CTAB. No wheezes, crackles, or rhonchi. Normal respiratory effort on RA.  Cardiovascular: RRR, normal S1/S2, No murmurs, rubs, or gallops.   Abdominal: Distended, soft, tympanitic to percussion, mild tenderness in RUQ, no rebound or guarding  Neuro: CN II-XII grossly intact. Moving all extremities with no focal deficits  MSK: WWP, no peripheral edema  Skin: No lesions, wounds, or bruising  Psych: Appropriate mood and affect.      Medications   Scheduled Medications  aspirin, 81 mg, oral, Daily  atorvastatin, 80 mg, oral, Nightly  enoxaparin, 40 mg, subcutaneous, Daily  pantoprazole, 40 mg, oral, Daily before breakfast   Or  esomeprazole, 40 mg, nasoduodenal tube, Daily before breakfast   Or  pantoprazole, 40 mg, intravenous, Daily before breakfast  insulin lispro, 0-10 Units, subcutaneous, TID AC  isosorbide mononitrate ER, 30 mg, oral, Daily  metoprolol succinate XL, 50 mg, oral, Daily  NIFEdipine ER, 30 mg, oral, Daily before breakfast     Continuous Medications  sodium chloride 0.9%, 80 mL/hr, Last Rate: 80 mL/hr (24 1423)     PRN Medications  PRN medications: acetaminophen, alum-mag hydroxide-simeth, dextrose,  dextrose, docusate sodium, glucagon, glucagon, simethicone     Labs  CBC  Results from last 72 hours   Lab Units 11/26/24  0832 11/25/24  0832 11/24/24  0805   WBC AUTO x10*3/uL 12.5* 10.7 10.5   HEMOGLOBIN g/dL 14.8 14.2 13.7   HEMATOCRIT % 41.5 40.0* 38.8*   PLATELETS AUTO x10*3/uL 234 217 182       BMP  Results from last 72 hours   Lab Units 11/26/24  0832 11/25/24  1928 11/25/24  0832   SODIUM mmol/L 121* 120* 122*   POTASSIUM mmol/L 4.2 3.9 4.0   CHLORIDE mmol/L 88* 87* 88*   BUN mg/dL 17 18 17   CREATININE mg/dL 0.96 0.97 0.91   MAGNESIUM mg/dL 2.22 2.03 2.06   PHOSPHORUS mg/dL 3.3 3.2 3.4     Serum osm 262, Uosm 581, Vanita <10    Imaging:     === 11/18/24 ===    CT CHEST WO IV CONTRAST    - Impression -  1.  Thoracic aorta is normal in course and caliber. There is mild  calcified plaque involving the arch and branch vessels. There is  bovine arch configuration with common origin of the innominate and  left common carotid artery.  2. Severe coronary artery calcifications.  3. Bibasilar atelectasis left-greater-than-right.  4. Several small, pulmonary nodules within both lungs, measuring less  than 6 mm, which are likely benign. Instructions:  Consider follow-up  noncontrast CT scan chest in 12 months.  5. Multiple enlarged retrocrural lymph nodes measuring up to 1.3 cm  which are nonspecific and may be reactive. Recommend follow-up CT of  the abdomen in 3 months to evaluate for interval change.    TTE (11/18)  CONCLUSIONS:   1. Poorly visualized anatomical structures due to suboptimal image quality.   2. Left ventricular ejection fraction is severely decreased, calculated by Regalado's biplane at 23%.   3. There is global hypokinesis of the left ventricle with minor regional variations.   4. Spectral Doppler shows a Grade III (restrictive pattern) of left ventricular diastolic filling with an elevated left atrial pressure.   5. There is normal right ventricular global systolic function.   6. Mildly enlarged right  ventricle.   7. The left atrium is mildly dilated.    KUB (11/25):  IMPRESSION:  1.  No evidence of bowel obstruction or perforation.       Assessment and plan:  Edu Echavarria is a 74 y.o. male with a PMHx of HTN, GERD, and gout who presented to [facility] ED on 11/17pm with 2d hx of intermittent palpitations and chest pain. At OSH initial EKG flutter w/ RVR and ST elevtions in II/III/avF & questionable in V1-V3 w/o reciprocal changes, and labs remarkable for HS trop >33239, BNP 1100, WBC 5, CBC/RFP otherwise unremarkable. Repeat EKG aflutter w/ similar ST elevations as prior. STEMI call activated, loaded with aspirin 324mg, plavix 300mg, and started on heparin gtt 11/17pm. LHC showed triple vessel disease w/ no stenting and IABP was placed for coronary perf. Started on integrillin (which has now been stopped) and continued on heparin gtt. Transferred to [facility] for CABG evaluation. CT surgery on board for CABG evaluation, tentative plan for OR 11/25. Hospital course c/b MARYBETH and slowly dropping plts/Hb, continuing further workup. Pt transferred to floor 11/21 for further care pre-op.     Surgery cancelled on 11/25 due to worsening hyponatremia, likely iso hypovolemia 2/2 decreased PO intake and diarrhea. Resuscitated w/ 1L fluids, nephrology on board. KUB -ve, stool PCR pending. HTN reg changed from coreg isordil and hydral to Imdur 30 and Metop XL 50 to reduce pill burden. HypoNa persistent, started on continuous IV NS 80cc/hr over 24 hr.     Updates 11/26  -Na 121, started on continuous IV NS 80cc/hr over 24 hr  -Nephrology onboard, appreciate recs  -Diarrhea and appetite improving, feeling better  -Hypertensive, often refuses to take pills to reduce pill burden, encouraged pt to take BP meds, CTM  -OR TBD, per Na and Cl improvement     #STEMI  #Triple vessel disease  #HTN  :: HS trop >68345 at OSH  :: HS trop at : 55489 > 02740  :: LHC 11/18: Triple vessel disease (LAD prox 70%, mid 65-70%, dist 90%), Lcx OM1  80%, OM2 small, OM3 occluded with collaterals, RCA mid moderate disease. LVEDP 46mmHg, LVEF 20-25%, evidence of extensive global hypokinesis  :: Arrived integrillin and heparin gtt  :: Aspirin and plavix loaded 11/17 ~2100  :: Utox + cannavinoid, fent (received w/ EMS), benzos, cocaine  -ASA 81mg daily  -Atorva 80mg qhs  -Stop Coreg 25mg BID, Isordil 40 TID, Hydral 50 TID as patient wants to minimize pill burden  -Start Imdur 30, Metop XL 50  -Consider starting nifedipine if worsening HTN  -HOLD home metop (hx cocaine use), on coreg as above  -CT surgery following, planning for tentative OR date 11/25 (waiting at least 5 days since plavix load 11/17)  -Surgery canceled on 11/25 due to acute on chronic HypoNa (down to 121), see below for further plans     #MARYBETH, resolved  -baseline cr ~0.9  -MARYBETH worsening 11/21 in AM to 1.65>1.2-->1  -FENa 0.1%, pre-renal, likely multifactorial related to contrast admin, hemodynamic changes, and decreased PO intake  -low urine sodium 11/20  Plan:  -encourage PO intake  -monitor urine output, renally dose medications to GFR     #Hyponatremia, acute on chronic  ::Na on admit 132, now 122  ::Serum osm 262, Uosm 581, Vanita <10  ::Hypotonic hypovolemic hyponatremia  -Nephrology consulted, appreciate recs  -Likely 2/2 dehydration, poor PO intake, and diarrhea  -Likely hypovolemic given high Uosm and low Vanita  -1L NS fluid resuscitation on 11/25  -Na 121 on 11/26, cont. IV NS 80cc/hr over 24hr    #Diarrhea  ::2-3x watery, loose stools over last few days, b/l for him is 1x well-formed stool  ::KUB showed nonobstructive pattern  -Stool PCR pending  -Consider anti-diarrheals if persisting     #mild thrombocytopenia, resolved  -plts and Hb slowly downtrending since admission, appears possible dilutional as all cell lines have decreased (initial labs likely with element of hemoconcentration)  -4Ts: 3pts (low prob of HIT)   -coags, bili wnl  -retic index borderline at 2.6   -LDH mildly elevated at  390 - may be related to IABP (removed 11/20)  -repeat LDH with AM labs-->249  -repeat T&S ordered with AM labs  -c/w lovenox ppx for now, considering holding if plts continue to drop   -no signs of active bleeding     #leukocytosis - improving  -downtrending with fluids, no current signs/sx of infection   -continue to monitor off abx     #heartburn   #abdominal pain  -not on medications at home  -c/w Pantoprazole 40mg daily- continue discontinue if symptoms improve prior to dc  -tums prn  -c/w miralax; adding colace per patient request     #Gout  -hold allopurinol     Disposition  -PT/OT rec home care currently, pending post-operation eval      Diet: reg  Electrolytes: K >4; Mg >2  DVT ppx: lovenox ppx  GI ppx: ppi  Lines/tubes: PIV, external cath   O2: RA  Baseline Cr: 0.9  T+S: 11/18, repeat   Code status: full code - reconfirmed on transfer  Surrogate decision maker: Guillermo Barrios Jr (cousin) [phone] - reconfirmed on transfer  Guillermo's wife's #: [phone]    Patient seen and discussed with attending physician.      Red Loredo MD  PGY-1 IM  [facility]      Cardiology Staff Addendum:    I saw and evaluated the patient on 11/26/2024.  I personally obtained the key and critical portions of the history and physical exam or was physically present for key and critical portions performed by the resident. I reviewed the resident’s documentation and discussed the patient with the resident.  I agree with the resident’s medical decision making as documented/amended in the note.    Jemal Botello MD

## 2024-11-26 NOTE — NURSING NOTE
[facility]672 that he had locked up on [address]. Patient sent home with family member $500 and he has $172 remaining in his possession.     Witnessed by charge nurse Viridiana Stanley RN at bedside.

## 2024-11-26 NOTE — PROGRESS NOTES
Edu Echavarria   74 jamie    @@  [facility]/Room: 09233114/7017/7017-A    Subjective: FABIÁN. Na 121 this am after 1L yesterday. Pt. States he is feeling well with no acute complaints.    Objective:     Meds:   aspirin, 81 mg, Daily  atorvastatin, 80 mg, Nightly  enoxaparin, 40 mg, Daily  pantoprazole, 40 mg, Daily before breakfast   Or  esomeprazole, 40 mg, Daily before breakfast   Or  pantoprazole, 40 mg, Daily before breakfast  insulin lispro, 0-10 Units, TID AC  isosorbide mononitrate ER, 30 mg, Daily  metoprolol succinate XL, 50 mg, Daily  NIFEdipine ER, 30 mg, Daily before breakfast  sodium chloride, 500 mL, Once         acetaminophen, 975 mg, q6h PRN  alum-mag hydroxide-simeth, 20 mL, 4x daily PRN  dextrose, 12.5 g, q15 min PRN  dextrose, 25 g, q15 min PRN  docusate sodium, 100 mg, BID PRN  glucagon, 1 mg, q15 min PRN  glucagon, 1 mg, q15 min PRN  simethicone, 40 mg, 4x daily PRN        Vitals:    11/26/24 0937   BP: 167/89   Pulse: 77   Resp:    Temp: 35.8 °C (96.4 °F)   SpO2: 96%          Intake/Output Summary (Last 24 hours) at 11/26/2024 1124  Last data filed at 11/26/2024 0809  Gross per 24 hour   Intake 783.75 ml   Output 650 ml   Net 133.75 ml     Physical Exam:  General: Awake, alert, conversant  HEENT: Pupils equal and round, no scleral icterus or conjunctivitis  Skin: No suspect lesions or rashes noted on visible skin  Chest: Ctab, normal respiratory effort  Cardiac: Regular rate and rhythm, no murmurs appreciated  Abdomen: Mildly distended, non tender to palpation, no guarding  : No flank pain  EXT: No peripheral edema, no asymmetry noted  Neuro: AOx4, moving all limbs spontaneously, follows commands    Blood Labs:      Results from last 72 hours   Lab Units 11/26/24  0832 11/25/24  1928 11/25/24  0832 11/24/24  1842 11/24/24  0805   WBC AUTO x10*3/uL 12.5*  --  10.7  --  10.5   HEMOGLOBIN g/dL 14.8  --  14.2  --  13.7   HEMATOCRIT % 41.5  --  40.0*  --  38.8*   PLATELETS AUTO x10*3/uL 234  --  217  --   182   INR  1.3*  --  1.3*  --  1.2*   SODIUM mmol/L 121* 120* 122*   < > 124*   POTASSIUM mmol/L 4.2 3.9 4.0   < > 3.8   CHLORIDE mmol/L 88* 87* 88*   < > 90*   CO2 mmol/L 21 24 23   < > 24   BUN mg/dL 17 18 17   < > 20   CREATININE mg/dL 0.96 0.97 0.91   < > 0.99   GLUCOSE mg/dL 140* 122* 137*   < > 213*   CALCIUM mg/dL 8.8 8.7 9.1   < > 8.8   MAGNESIUM mg/dL 2.22 2.03 2.06  --  2.09   PHOSPHORUS mg/dL 3.3 3.2 3.4   < > 2.6   ALBUMIN g/dL 3.8 3.8 3.7   < > 3.6   ALK PHOS U/L 75  --  100  --  70   ALT U/L 18  --  22  --  22   AST U/L 13  --  18  --  21   BILIRUBIN TOTAL mg/dL 1.1  --  1.2  --  1.2    < > = values in this interval not displayed.      ASSESSMENT:  Edu Echavarria is a 74 y.o. male w/ a PMH of HTN, GERD, and gout who presented to OSH on 11/17 for STEMI, LHC showed triple vessel disease, now awaiting CABG. TTE 11/18 with EF of 23%. Nephrology consulted for acute on chronic hyponatremia in the setting of hypovolemia.    #Acute on Chronic Hyponatremia  - Likely In setting of dehydration and diarrhea, low po intake  - Clinical volume status: Hypovolemic  - No likely causative meds  - UA unremarkable.  Urine sodium <10, Urine osmolality 581, serum osmolality 262 (low).     Recommendations:  - Continue aggressive IV fluid resuscitation with isotonic fluids (NS 0.9%) as tolerated given HFrEF. Would recommend at least 1L today.  - Continue encouraging PO intake     Discussed with attending nephrologist, Dr. Singh who agrees with the plan. Please page with any questions.    Andres Dominguez MD ([place])  IM PGY-1

## 2024-11-27 LAB
ALBUMIN SERPL BCP-MCNC: 3.7 G/DL (ref 3.4–5)
ALBUMIN SERPL BCP-MCNC: 3.8 G/DL (ref 3.4–5)
ALP SERPL-CCNC: 75 U/L (ref 33–136)
ALT SERPL W P-5'-P-CCNC: 16 U/L (ref 10–52)
ANION GAP SERPL CALC-SCNC: 13 MMOL/L (ref 10–20)
ANION GAP SERPL CALC-SCNC: 14 MMOL/L (ref 10–20)
APTT PPP: 36 SECONDS (ref 27–38)
AST SERPL W P-5'-P-CCNC: 12 U/L (ref 9–39)
BASOPHILS # BLD AUTO: 0.04 X10*3/UL (ref 0–0.1)
BASOPHILS NFR BLD AUTO: 0.3 %
BILIRUB DIRECT SERPL-MCNC: 0.3 MG/DL (ref 0–0.3)
BILIRUB SERPL-MCNC: 1 MG/DL (ref 0–1.2)
BUN SERPL-MCNC: 13 MG/DL (ref 6–23)
BUN SERPL-MCNC: 15 MG/DL (ref 6–23)
C COLI+JEJ+UPSA DNA STL QL NAA+PROBE: NOT DETECTED
CALCIUM SERPL-MCNC: 8.4 MG/DL (ref 8.6–10.6)
CALCIUM SERPL-MCNC: 8.6 MG/DL (ref 8.6–10.6)
CHLORIDE SERPL-SCNC: 91 MMOL/L (ref 98–107)
CHLORIDE SERPL-SCNC: 92 MMOL/L (ref 98–107)
CO2 SERPL-SCNC: 20 MMOL/L (ref 21–32)
CO2 SERPL-SCNC: 22 MMOL/L (ref 21–32)
CREAT SERPL-MCNC: 0.85 MG/DL (ref 0.5–1.3)
CREAT SERPL-MCNC: 0.91 MG/DL (ref 0.5–1.3)
EC STX1 GENE STL QL NAA+PROBE: NOT DETECTED
EC STX2 GENE STL QL NAA+PROBE: NOT DETECTED
EGFRCR SERPLBLD CKD-EPI 2021: 89 ML/MIN/1.73M*2
EGFRCR SERPLBLD CKD-EPI 2021: >90 ML/MIN/1.73M*2
EOSINOPHIL # BLD AUTO: 0.13 X10*3/UL (ref 0–0.4)
EOSINOPHIL NFR BLD AUTO: 1 %
ERYTHROCYTE [DISTWIDTH] IN BLOOD BY AUTOMATED COUNT: 13 % (ref 11.5–14.5)
GLUCOSE BLD MANUAL STRIP-MCNC: 155 MG/DL (ref 74–99)
GLUCOSE BLD MANUAL STRIP-MCNC: 155 MG/DL (ref 74–99)
GLUCOSE BLD MANUAL STRIP-MCNC: 175 MG/DL (ref 74–99)
GLUCOSE BLD MANUAL STRIP-MCNC: 177 MG/DL (ref 74–99)
GLUCOSE SERPL-MCNC: 152 MG/DL (ref 74–99)
GLUCOSE SERPL-MCNC: 179 MG/DL (ref 74–99)
HCT VFR BLD AUTO: 42.8 % (ref 41–52)
HGB BLD-MCNC: 15.2 G/DL (ref 13.5–17.5)
IMM GRANULOCYTES # BLD AUTO: 0.13 X10*3/UL (ref 0–0.5)
IMM GRANULOCYTES NFR BLD AUTO: 1 % (ref 0–0.9)
INR PPP: 1.5 (ref 0.9–1.1)
LYMPHOCYTES # BLD AUTO: 1.68 X10*3/UL (ref 0.8–3)
LYMPHOCYTES NFR BLD AUTO: 12.4 %
MAGNESIUM SERPL-MCNC: 2.15 MG/DL (ref 1.6–2.4)
MAGNESIUM SERPL-MCNC: 2.16 MG/DL (ref 1.6–2.4)
MCH RBC QN AUTO: 28.4 PG (ref 26–34)
MCHC RBC AUTO-ENTMCNC: 35.5 G/DL (ref 32–36)
MCV RBC AUTO: 80 FL (ref 80–100)
MONOCYTES # BLD AUTO: 1.26 X10*3/UL (ref 0.05–0.8)
MONOCYTES NFR BLD AUTO: 9.3 %
NEUTROPHILS # BLD AUTO: 10.36 X10*3/UL (ref 1.6–5.5)
NEUTROPHILS NFR BLD AUTO: 76 %
NOROVIRUS GI + GII RNA STL NAA+PROBE: NOT DETECTED
NRBC BLD-RTO: 0 /100 WBCS (ref 0–0)
PHOSPHATE SERPL-MCNC: 2.6 MG/DL (ref 2.5–4.9)
PHOSPHATE SERPL-MCNC: 3 MG/DL (ref 2.5–4.9)
PLATELET # BLD AUTO: 293 X10*3/UL (ref 150–450)
POTASSIUM SERPL-SCNC: 4.3 MMOL/L (ref 3.5–5.3)
POTASSIUM SERPL-SCNC: 4.4 MMOL/L (ref 3.5–5.3)
PROT SERPL-MCNC: 6.5 G/DL (ref 6.4–8.2)
PROTHROMBIN TIME: 16.5 SECONDS (ref 9.8–12.8)
RBC # BLD AUTO: 5.35 X10*6/UL (ref 4.5–5.9)
RV RNA STL NAA+PROBE: NOT DETECTED
SALMONELLA DNA STL QL NAA+PROBE: NOT DETECTED
SHIGELLA DNA SPEC QL NAA+PROBE: NOT DETECTED
SODIUM SERPL-SCNC: 121 MMOL/L (ref 136–145)
SODIUM SERPL-SCNC: 123 MMOL/L (ref 136–145)
V CHOLERAE DNA STL QL NAA+PROBE: NOT DETECTED
WBC # BLD AUTO: 13.6 X10*3/UL (ref 4.4–11.3)
Y ENTEROCOL DNA STL QL NAA+PROBE: NOT DETECTED

## 2024-11-27 PROCEDURE — 85025 COMPLETE CBC W/AUTO DIFF WBC: CPT

## 2024-11-27 PROCEDURE — 2500000001 HC RX 250 WO HCPCS SELF ADMINISTERED DRUGS (ALT 637 FOR MEDICARE OP)

## 2024-11-27 PROCEDURE — 2500000002 HC RX 250 W HCPCS SELF ADMINISTERED DRUGS (ALT 637 FOR MEDICARE OP, ALT 636 FOR OP/ED)

## 2024-11-27 PROCEDURE — 85730 THROMBOPLASTIN TIME PARTIAL: CPT

## 2024-11-27 PROCEDURE — 99232 SBSQ HOSP IP/OBS MODERATE 35: CPT | Performed by: PHYSICIAN ASSISTANT

## 2024-11-27 PROCEDURE — 84075 ASSAY ALKALINE PHOSPHATASE: CPT

## 2024-11-27 PROCEDURE — 82947 ASSAY GLUCOSE BLOOD QUANT: CPT

## 2024-11-27 PROCEDURE — 80069 RENAL FUNCTION PANEL: CPT | Mod: CCI

## 2024-11-27 PROCEDURE — 36415 COLL VENOUS BLD VENIPUNCTURE: CPT

## 2024-11-27 PROCEDURE — 99232 SBSQ HOSP IP/OBS MODERATE 35: CPT | Performed by: STUDENT IN AN ORGANIZED HEALTH CARE EDUCATION/TRAINING PROGRAM

## 2024-11-27 PROCEDURE — 2500000004 HC RX 250 GENERAL PHARMACY W/ HCPCS (ALT 636 FOR OP/ED)

## 2024-11-27 PROCEDURE — 83735 ASSAY OF MAGNESIUM: CPT

## 2024-11-27 PROCEDURE — 99232 SBSQ HOSP IP/OBS MODERATE 35: CPT

## 2024-11-27 PROCEDURE — 1200000002 HC GENERAL ROOM WITH TELEMETRY DAILY

## 2024-11-27 PROCEDURE — 84295 ASSAY OF SERUM SODIUM: CPT

## 2024-11-27 PROCEDURE — 84100 ASSAY OF PHOSPHORUS: CPT

## 2024-11-27 RX ORDER — POLYETHYLENE GLYCOL 3350 17 G/17G
17 POWDER, FOR SOLUTION ORAL DAILY
Status: DISCONTINUED | OUTPATIENT
Start: 2024-11-27 | End: 2024-11-29

## 2024-11-27 RX ORDER — CHLORHEXIDINE GLUCONATE ORAL RINSE 1.2 MG/ML
15 SOLUTION DENTAL 2 TIMES DAILY
Status: DISCONTINUED | OUTPATIENT
Start: 2024-12-03 | End: 2024-11-29

## 2024-11-27 ASSESSMENT — COGNITIVE AND FUNCTIONAL STATUS - GENERAL
PERSONAL GROOMING: A LITTLE
MOVING FROM LYING ON BACK TO SITTING ON SIDE OF FLAT BED WITH BEDRAILS: A LITTLE
CLIMB 3 TO 5 STEPS WITH RAILING: A LITTLE
TOILETING: A LITTLE
MOBILITY SCORE: 19
WALKING IN HOSPITAL ROOM: A LITTLE
CLIMB 3 TO 5 STEPS WITH RAILING: A LITTLE
HELP NEEDED FOR BATHING: A LITTLE
STANDING UP FROM CHAIR USING ARMS: A LITTLE
DAILY ACTIVITIY SCORE: 23
HELP NEEDED FOR BATHING: A LITTLE
WALKING IN HOSPITAL ROOM: A LITTLE
MOVING TO AND FROM BED TO CHAIR: A LITTLE
TURNING FROM BACK TO SIDE WHILE IN FLAT BAD: A LITTLE
MOBILITY SCORE: 18
MOVING TO AND FROM BED TO CHAIR: A LITTLE
DAILY ACTIVITIY SCORE: 19
STANDING UP FROM CHAIR USING ARMS: A LITTLE
DRESSING REGULAR LOWER BODY CLOTHING: A LITTLE
DRESSING REGULAR UPPER BODY CLOTHING: A LITTLE
TURNING FROM BACK TO SIDE WHILE IN FLAT BAD: A LITTLE

## 2024-11-27 ASSESSMENT — PAIN - FUNCTIONAL ASSESSMENT: PAIN_FUNCTIONAL_ASSESSMENT: 0-10

## 2024-11-27 ASSESSMENT — PAIN SCALES - GENERAL
PAINLEVEL_OUTOF10: 0 - NO PAIN
PAINLEVEL_OUTOF10: 0 - NO PAIN

## 2024-11-27 NOTE — PROGRESS NOTES
Edu Echavarria   74 jamie    @@  [facility]/Room: 50798553/7017/7017-A    Subjective: ZENIA Has been on 80cc NaCl. Sodium 123. States he is feeling well with no acute complaints.    Objective:     Meds:   aspirin, 81 mg, Daily  atorvastatin, 80 mg, Nightly  enoxaparin, 40 mg, Daily  pantoprazole, 40 mg, Daily before breakfast   Or  esomeprazole, 40 mg, Daily before breakfast   Or  pantoprazole, 40 mg, Daily before breakfast  insulin lispro, 0-10 Units, TID AC  isosorbide mononitrate ER, 30 mg, Daily  metoprolol succinate XL, 50 mg, Daily  NIFEdipine ER, 30 mg, Daily before breakfast         acetaminophen, 975 mg, q6h PRN  alum-mag hydroxide-simeth, 20 mL, 4x daily PRN  dextrose, 12.5 g, q15 min PRN  dextrose, 25 g, q15 min PRN  docusate sodium, 100 mg, BID PRN  glucagon, 1 mg, q15 min PRN  glucagon, 1 mg, q15 min PRN  simethicone, 40 mg, 4x daily PRN        Vitals:    11/27/24 1056   BP: (!) 162/95   Pulse: 82   Resp: 20   Temp: 36.6 °C (97.9 °F)   SpO2: 95%          Intake/Output Summary (Last 24 hours) at 11/27/2024 1251  Last data filed at 11/27/2024 0446  Gross per 24 hour   Intake 460 ml   Output 550 ml   Net -90 ml     Physical Exam:  General: Awake, alert, conversant  HEENT: Pupils equal and round, no scleral icterus or conjunctivitis  Skin: No suspect lesions or rashes noted on visible skin  Chest: Ctab, normal respiratory effort  Cardiac: Regular rate and rhythm, no murmurs appreciated  Abdomen: Mildly distended, non tender to palpation, no guarding  : No flank pain  EXT: No peripheral edema, no asymmetry noted  Neuro: AOx4, moving all limbs spontaneously, follows commands    Blood Labs:      Results from last 72 hours   Lab Units 11/27/24  0830 11/27/24  0829 11/26/24  0832 11/25/24  1928 11/25/24  0832   WBC AUTO x10*3/uL  --  13.6* 12.5*  --  10.7   HEMOGLOBIN g/dL  --  15.2 14.8  --  14.2   HEMATOCRIT %  --  42.8 41.5  --  40.0*   PLATELETS AUTO x10*3/uL  --  293 234  --  217   INR  1.5*  --  1.3*  --  1.3*    SODIUM mmol/L  --  123* 121* 120* 122*   POTASSIUM mmol/L  --  4.3 4.2 3.9 4.0   CHLORIDE mmol/L  --  91* 88* 87* 88*   CO2 mmol/L  --  22 21 24 23   BUN mg/dL  --  15 17 18 17   CREATININE mg/dL  --  0.91 0.96 0.97 0.91   GLUCOSE mg/dL  --  179* 140* 122* 137*   CALCIUM mg/dL  --  8.6 8.8 8.7 9.1   MAGNESIUM mg/dL  --  2.15 2.22 2.03 2.06   PHOSPHORUS mg/dL  --  3.0 3.3 3.2 3.4   ALBUMIN g/dL  --  3.8 3.8 3.8 3.7   ALK PHOS U/L  --  75 75  --  100   ALT U/L  --  16 18  --  22   AST U/L  --  12 13  --  18   BILIRUBIN TOTAL mg/dL  --  1.0 1.1  --  1.2      ASSESSMENT:  Edu Echavarria is a 74 y.o. male w/ a PMH of HTN, GERD, and gout who presented to OSH on 11/17 for STEMI, LHC showed triple vessel disease, now awaiting CABG. TTE 11/18 with EF of 23%. Nephrology consulted for acute on chronic hyponatremia in the setting of hypovolemia.    #Acute on Chronic Hyponatremia  - Likely In setting of dehydration and diarrhea, low po intake  - Clinical volume status: Hypovolemic  - No likely causative meds  - UA unremarkable.  Urine sodium <10, Urine osmolality 581, serum osmolality 262 (low).     Recommendations:  - Would recommend conservative measures and continuing to monitor as Na trend is up after IVF. Encourage good po intake  - Will continue to follow     Discussed with attending nephrologist, Dr. Singh who agrees with the plan. Please page with any questions.    Andres (Charlie Dominguez MD  IM PGY-1

## 2024-11-27 NOTE — CARE PLAN
Transitional Care Coordinator Note: Patient discussed with medical team, per medical team patient is not medically ready. Waiting on CABG Eval, being treated for Hyponatremia. Discharge dispo: [facility] 11/29. [facility] RN  Transitional Care Coordinator      Update: Plan for new scheduled OR date of 12/4 pending patient is medically optimized.

## 2024-11-27 NOTE — PROGRESS NOTES
Date of Admission: 2024  Hospital Day: 10    Subjective   No CP, SOB, on RA.  Improved diarrhea (only 1 semi-formed stool), improving appetite, feeling better overall. Na 121 (stable but low). Wants to reduce pill burden.      Objective     Vitals:    24 Hour Vitals  Temp:  [36.4 °C (97.5 °F)-36.6 °C (97.9 °F)] 36.6 °C (97.9 °F)  Heart Rate:  [62-83] 82  Resp:  [17-20] 20  BP: (158-173)/() 162/95    Temp (24hrs), Av.6 °C (97.8 °F), Min:36.4 °C (97.5 °F), Max:36.6 °C (97.9 °F)     24 hour Intake/Output    Intake/Output Summary (Last 24 hours) at 2024 1323  Last data filed at 2024 0446  Gross per 24 hour   Intake 460 ml   Output 550 ml   Net -90 ml        Physical exam:  Constitutional: Well-developed male in no acute distress.  HEENT: NCAT. EOMI. No JVD  Respiratory: CTAB. No wheezes, crackles, or rhonchi. Normal respiratory effort on RA.  Cardiovascular: RRR, normal S1/S2, No murmurs, rubs, or gallops.   Abdominal: Distended, soft, tympanitic to percussion, mild tenderness in RUQ, no rebound or guarding  Neuro: CN II-XII grossly intact. Moving all extremities with no focal deficits  MSK: WWP, no peripheral edema  Skin: No lesions, wounds, or bruising  Psych: Appropriate mood and affect.      Medications   Scheduled Medications  aspirin, 81 mg, oral, Daily  atorvastatin, 80 mg, oral, Nightly  enoxaparin, 40 mg, subcutaneous, Daily  pantoprazole, 40 mg, oral, Daily before breakfast   Or  esomeprazole, 40 mg, nasoduodenal tube, Daily before breakfast   Or  pantoprazole, 40 mg, intravenous, Daily before breakfast  insulin lispro, 0-10 Units, subcutaneous, TID AC  isosorbide mononitrate ER, 30 mg, oral, Daily  metoprolol succinate XL, 50 mg, oral, Daily  NIFEdipine ER, 30 mg, oral, Daily before breakfast     Continuous Medications      PRN Medications  PRN medications: acetaminophen, alum-mag hydroxide-simeth, dextrose, dextrose, docusate sodium, glucagon, glucagon, simethicone      Labs  CBC  Results from last 72 hours   Lab Units 11/27/24  0829 11/26/24  0832 11/25/24  0832   WBC AUTO x10*3/uL 13.6* 12.5* 10.7   HEMOGLOBIN g/dL 15.2 14.8 14.2   HEMATOCRIT % 42.8 41.5 40.0*   PLATELETS AUTO x10*3/uL 293 234 217       BMP  Results from last 72 hours   Lab Units 11/27/24  0829 11/26/24  0832 11/25/24  1928   SODIUM mmol/L 123* 121* 120*   POTASSIUM mmol/L 4.3 4.2 3.9   CHLORIDE mmol/L 91* 88* 87*   BUN mg/dL 15 17 18   CREATININE mg/dL 0.91 0.96 0.97   MAGNESIUM mg/dL 2.15 2.22 2.03   PHOSPHORUS mg/dL 3.0 3.3 3.2     Serum osm 262, Uosm 581, Vnaita <10    Imaging:     === 11/18/24 ===    CT CHEST WO IV CONTRAST    - Impression -  1.  Thoracic aorta is normal in course and caliber. There is mild  calcified plaque involving the arch and branch vessels. There is  bovine arch configuration with common origin of the innominate and  left common carotid artery.  2. Severe coronary artery calcifications.  3. Bibasilar atelectasis left-greater-than-right.  4. Several small, pulmonary nodules within both lungs, measuring less  than 6 mm, which are likely benign. Instructions:  Consider follow-up  noncontrast CT scan chest in 12 months.  5. Multiple enlarged retrocrural lymph nodes measuring up to 1.3 cm  which are nonspecific and may be reactive. Recommend follow-up CT of  the abdomen in 3 months to evaluate for interval change.    TTE (11/18)  CONCLUSIONS:   1. Poorly visualized anatomical structures due to suboptimal image quality.   2. Left ventricular ejection fraction is severely decreased, calculated by Regalado's biplane at 23%.   3. There is global hypokinesis of the left ventricle with minor regional variations.   4. Spectral Doppler shows a Grade III (restrictive pattern) of left ventricular diastolic filling with an elevated left atrial pressure.   5. There is normal right ventricular global systolic function.   6. Mildly enlarged right ventricle.   7. The left atrium is mildly dilated.    KUB  (11/25):  IMPRESSION:  1.  No evidence of bowel obstruction or perforation.       Assessment and plan:  Edu Echavarria is a 74 y.o. male with a PMHx of HTN, GERD, and gout who presented to [facility] ED on 11/17pm with 2d hx of intermittent palpitations and chest pain. At OSH initial EKG flutter w/ RVR and ST elevtions in II/III/avF & questionable in V1-V3 w/o reciprocal changes, and labs remarkable for HS trop >57457, BNP 1100, WBC 5, CBC/RFP otherwise unremarkable. Repeat EKG aflutter w/ similar ST elevations as prior. STEMI call activated, loaded with aspirin 324mg, plavix 300mg, and started on heparin gtt 11/17pm. LHC showed triple vessel disease w/ no stenting and IABP was placed for coronary perf. Started on integrillin (which has now been stopped) and continued on heparin gtt. Transferred to [facility] for CABG evaluation. CT surgery on board for CABG evaluation, tentative plan for OR 11/25. Hospital course c/b MARYBETH and slowly dropping plts/Hb, continuing further workup. Pt transferred to floor 11/21 for further care pre-op.     Surgery cancelled on 11/25 due to worsening hyponatremia, likely iso hypovolemia 2/2 decreased PO intake and diarrhea. Resuscitated w/ 1L fluids, nephrology on board. KUB -ve, stool PCR pending. HTN reg changed from coreg isordil and hydral to Imdur 30 and Metop XL 50 to reduce pill burden. HypoNa persistent, s/p continuous IV NS 80cc/hr over 24 hr.     Updates 11/27:  -s/p continuous IV NS 80cc/hr over 24 hr, Na 121>123, CTM  -Nephrology onboard, appreciate recs  -Diarrhea and appetite improving, feeling better  -Hypertensive, encouraged pt to take BP meds, CTM  -OR tentatively Wed 12/4     #STEMI  #Triple vessel disease  #HTN  :: HS trop >21859 at OSH  :: HS trop at : 52095 > 48782  :: LHC 11/18: Triple vessel disease (LAD prox 70%, mid 65-70%, dist 90%), Lcx OM1 80%, OM2 small, OM3 occluded with collaterals, RCA mid moderate disease. LVEDP 46mmHg, LVEF 20-25%, evidence of extensive  global hypokinesis  :: Arrived integrillin and heparin gtt  :: Aspirin and plavix loaded 11/17 ~2100  :: Utox + cannavinoid, fent (received w/ EMS), benzos, cocaine  -ASA 81mg daily  -Atorva 80mg qhs  -Stop Coreg 25mg BID, Isordil 40 TID, Hydral 50 TID as patient wants to minimize pill burden  -Now on Imdur 30, Metop XL 50, Nifedipine 30  -HOLD home metop (hx cocaine use), on coreg as above  -CT surgery following, planning for tentative OR date 11/25 (waiting at least 5 days since plavix load 11/17)  -Surgery canceled on 11/25 due to acute on chronic HypoNa (down to 121), see below for further plans  -Pending OR tentatively Wed 12/4     #MARYBETH, resolved  -baseline cr ~0.9  -MARYBETH worsening 11/21 in AM to 1.65>1.2-->1  -FENa 0.1%, pre-renal, likely multifactorial related to contrast admin, hemodynamic changes, and decreased PO intake  -low urine sodium 11/20  Plan:  -encourage PO intake  -monitor urine output, renally dose medications to GFR     #Hyponatremia, acute on chronic  ::Na on admit 132, now 122  ::Serum osm 262, Uosm 581, Vanita <10  ::Hypotonic hypovolemic hyponatremia  -Nephrology consulted, appreciate recs  -Likely 2/2 dehydration, poor PO intake, and diarrhea  -Likely hypovolemic given high Uosm and low Vanita  -1L NS fluid resuscitation on 11/25  -s/p continuous IV NS 80cc/hr over 24 hr (11/27), Na 121>123, CTM    #Diarrhea  ::2-3x watery, loose stools over last few days, b/l for him is 1x well-formed stool  ::KUB showed nonobstructive pattern  -Stool PCR pending  -Consider anti-diarrheals if persisting     #mild thrombocytopenia, resolved  -plts and Hb slowly downtrending since admission, appears possible dilutional as all cell lines have decreased (initial labs likely with element of hemoconcentration)  -4Ts: 3pts (low prob of HIT)   -coags, bili wnl  -retic index borderline at 2.6   -LDH mildly elevated at 390 - may be related to IABP (removed 11/20)  -repeat LDH with AM labs-->249  -repeat T&S ordered with  AM labs  -c/w lovenox ppx for now, considering holding if plts continue to drop   -no signs of active bleeding     #leukocytosis - improving  -downtrending with fluids, no current signs/sx of infection   -continue to monitor off abx     #heartburn   #abdominal pain  -not on medications at home  -c/w Pantoprazole 40mg daily- continue discontinue if symptoms improve prior to dc  -tums prn  -c/w miralax, colace      #Gout  -hold allopurinol     Disposition  -PT/OT rec home care currently, pending post-operation eval      Diet: reg  Electrolytes: K >4; Mg >2  DVT ppx: lovenox ppx  GI ppx: ppi  Lines/tubes: PIV, external cath   O2: RA  Baseline Cr: 0.9  T+S: 11/18, repeat   Code status: full code - reconfirmed on transfer  Surrogate decision maker: Guillermo Barrios Jr (cousin) [phone] - reconfirmed on transfer  Guillermo's wife's #: [phone]    Patient seen and discussed with attending physician.      Red Loredo MD  PGY-1 [facility]        Cardiology Staff Addendum:    I saw and evaluated the patient on 11/27/2024.  I personally obtained the key and critical portions of the history and physical exam or was physically present for key and critical portions performed by the resident. I reviewed the resident’s documentation and discussed the patient with the resident.  I agree with the resident’s medical decision making as documented/amended in the note.    Jemal Botello MD

## 2024-11-27 NOTE — PROGRESS NOTES
"             Therapy Communication Note    Patient Name: Edu Echavarria  MRN: [id number]  Department: [place] [address]  Room: [facility]  Today's Date: 11/27/2024     Discipline: Physical Therapy    Missed Visit Reason: Missed Visit Reason: Patient refused (pt declined stating, \"I just was up to the bathroom. I dont want to walk out there until after surgery.\" Pt states he hasn't slept. provided encouragement however pt continued to declined)    Missed Time: Attempt    Comment:    "

## 2024-11-27 NOTE — PROGRESS NOTES
CARDIAC SURGERY CONSULT PROGRESS NOTE    SUBJECTIVE  Edu Echavarria is a 74 y.o. male with a PMHx of HTN, GERD, gout, former smoker, and current cocaine and marijuana use who presented to [facility] ED on 11/17 with ~ 2 days history of palpitations and chest pain. At [facility] his initial EKG aflutter w/ RVR and ST elevtions in II/III/avF & questionable in V1-V3 w/o reciprocal changes, and labs remarkable for HS trop >23602, BNP 1100, WBC 5, CBC/RFP otherwise unremarkable. Repeat EKG aflutter w/ similar ST elevations as prior. STEMI call activated, loaded with aspirin 324mg, plavix 300mg, and started on heparin gtt 11/17. LHC showed triple vessel disease w/ no stenting and IABP was placed for coronary perfusion. Started on integrillin (which has now been stopped) and continued on heparin gtt. He was then transferred to [facility] for CABG evaluation.    On exam, the patient denies chest pain and otherwise feels well.   Of note, U tox was positive for cannabinoid and cocaine on admission 11/18. The patient states he last used cocaine on Saturday 11/16 and marijuana 11/17.     Cardiac surgery is consulted for surgical evaluation of his CAD.     [facility] 11/18:  LAD: large vessel which is extensively and diffusely diseased. Prox/ostial LAD evidence of a hazy eccentric stensois ~70%. LAD w/ evidence of extensive disease w/ stenosis in its proximal part of approx 65-70% followed by aneursymatic dilatation jutp roximal to first septal , which si folllowed by his stenosis in range of 70%. Mid LAD has evidence of a stenosis in range of 90%. Distal LAD is severely diseased with evidence of a long lesion in the range of 90%.      D1: small  D2: mod sized, mildly diffusely disease  Lcx: large anatomically dominant vessel which gives off 2 obtuse marginals, mod to severe diffuse disease. Setnosis medicine 1.1.1.1 at the bifucation/takeoff of 3rd OM.   Om1: mod sized vessel with evidence of severe stnesosis in range of  "80%  OM2: small  OM3: large vessel with evidence of severe ostial stenosis/bifrucation medicine 1.1.1.1. more distally the vessel is totally occluded. Distal Oms are visualized thought he itnra systemic collaterals. Ramyus intermedius absent.   RCA: non dominant with evcieence of moderte disease in its mid segment.      LVEDP: 46 mmHg  Estimated LVEF 20-25% with evidence of extensive global hypokinesis.     TTE 11/18: CONCLUSIONS:   1. Poorly visualized anatomical structures due to suboptimal image quality.   2. Left ventricular ejection fraction is severely decreased, calculated by Regalado's biplane at 23%.   3. There is global hypokinesis of the left ventricle with minor regional variations.   4. Spectral Doppler shows a Grade III (restrictive pattern) of left ventricular diastolic filling with an elevated left atrial pressure.   5. There is normal right ventricular global systolic function.   6. Mildly enlarged right ventricle.   7. The left atrium is mildly dilated.      Objective   BP (!) 162/95   Pulse 82   Temp 36.6 °C (97.9 °F)   Resp 20   Ht 1.702 m (5' 7\")   Wt 85.4 kg (188 lb 4.4 oz)   SpO2 95%   BMI 29.49 kg/m²   0-10 (Numeric) Pain Score: 0 - No pain   Vitals:    11/21/24 0600   Weight: 85.4 kg (188 lb 4.4 oz)          Intake/Output Summary (Last 24 hours) at 11/27/2024 1427  Last data filed at 11/27/2024 0446  Gross per 24 hour   Intake 240 ml   Output 550 ml   Net -310 ml       Physical Exam  Constitutional:       General: He is not in acute distress.     Appearance: He is obese. He is not toxic-appearing.   HENT:      Head: Normocephalic.      Mouth/Throat:      Mouth: Mucous membranes are moist.   Cardiovascular:      Rate and Rhythm: Normal rate and regular rhythm.      Heart sounds: No murmur heard.  Pulmonary:      Effort: Pulmonary effort is normal.      Breath sounds: Normal breath sounds.   Musculoskeletal:         General: Normal range of motion.      Cervical back: Neck supple. "   Skin:     General: Skin is warm and dry.   Neurological:      General: No focal deficit present.      Mental Status: He is alert and oriented to person, place, and time.   Psychiatric:         Mood and Affect: Mood normal.         Behavior: Behavior normal.        Medications  Scheduled medications  aspirin, 81 mg, oral, Daily  atorvastatin, 80 mg, oral, Nightly  [START ON 12/3/2024] chlorhexidine, 15 mL, Mouth/Throat, BID  enoxaparin, 40 mg, subcutaneous, Daily  pantoprazole, 40 mg, oral, Daily before breakfast   Or  esomeprazole, 40 mg, nasoduodenal tube, Daily before breakfast   Or  pantoprazole, 40 mg, intravenous, Daily before breakfast  insulin lispro, 0-10 Units, subcutaneous, TID AC  isosorbide mononitrate ER, 30 mg, oral, Daily  metoprolol succinate XL, 50 mg, oral, Daily  NIFEdipine ER, 30 mg, oral, Daily before breakfast  polyethylene glycol, 17 g, oral, Daily    Continuous medications       PRN medications  PRN medications: acetaminophen, alum-mag hydroxide-simeth, dextrose, dextrose, docusate sodium, glucagon, glucagon, simethicone    Labs  Results for orders placed or performed during the hospital encounter of 11/18/24 (from the past 24 hours)   POCT GLUCOSE   Result Value Ref Range    POCT Glucose 128 (H) 74 - 99 mg/dL   POCT GLUCOSE   Result Value Ref Range    POCT Glucose 155 (H) 74 - 99 mg/dL   Hepatic Function Panel   Result Value Ref Range    Albumin 3.8 3.4 - 5.0 g/dL    Bilirubin, Total 1.0 0.0 - 1.2 mg/dL    Bilirubin, Direct 0.3 0.0 - 0.3 mg/dL    Alkaline Phosphatase 75 33 - 136 U/L    ALT 16 10 - 52 U/L    AST 12 9 - 39 U/L    Total Protein 6.5 6.4 - 8.2 g/dL   CBC and Auto Differential   Result Value Ref Range    WBC 13.6 (H) 4.4 - 11.3 x10*3/uL    nRBC 0.0 0.0 - 0.0 /100 WBCs    RBC 5.35 4.50 - 5.90 x10*6/uL    Hemoglobin 15.2 13.5 - 17.5 g/dL    Hematocrit 42.8 41.0 - 52.0 %    MCV 80 80 - 100 fL    MCH 28.4 26.0 - 34.0 pg    MCHC 35.5 32.0 - 36.0 g/dL    RDW 13.0 11.5 - 14.5 %     Platelets 293 150 - 450 x10*3/uL    Neutrophils % 76.0 40.0 - 80.0 %    Immature Granulocytes %, Automated 1.0 (H) 0.0 - 0.9 %    Lymphocytes % 12.4 13.0 - 44.0 %    Monocytes % 9.3 2.0 - 10.0 %    Eosinophils % 1.0 0.0 - 6.0 %    Basophils % 0.3 0.0 - 2.0 %    Neutrophils Absolute 10.36 (H) 1.60 - 5.50 x10*3/uL    Immature Granulocytes Absolute, Automated 0.13 0.00 - 0.50 x10*3/uL    Lymphocytes Absolute 1.68 0.80 - 3.00 x10*3/uL    Monocytes Absolute 1.26 (H) 0.05 - 0.80 x10*3/uL    Eosinophils Absolute 0.13 0.00 - 0.40 x10*3/uL    Basophils Absolute 0.04 0.00 - 0.10 x10*3/uL   Magnesium   Result Value Ref Range    Magnesium 2.15 1.60 - 2.40 mg/dL   Phosphorus   Result Value Ref Range    Phosphorus 3.0 2.5 - 4.9 mg/dL   Basic Metabolic Panel   Result Value Ref Range    Glucose 179 (H) 74 - 99 mg/dL    Sodium 123 (L) 136 - 145 mmol/L    Potassium 4.3 3.5 - 5.3 mmol/L    Chloride 91 (L) 98 - 107 mmol/L    Bicarbonate 22 21 - 32 mmol/L    Anion Gap 14 10 - 20 mmol/L    Urea Nitrogen 15 6 - 23 mg/dL    Creatinine 0.91 0.50 - 1.30 mg/dL    eGFR 89 >60 mL/min/1.73m*2    Calcium 8.6 8.6 - 10.6 mg/dL   Coagulation Screen   Result Value Ref Range    Protime 16.5 (H) 9.8 - 12.8 seconds    INR 1.5 (H) 0.9 - 1.1    aPTT 36 27 - 38 seconds   POCT GLUCOSE   Result Value Ref Range    POCT Glucose 175 (H) 74 - 99 mg/dL               ASSESSMENT & PLAN  Edu Echavarria is a 74 y.o. male with a PMHx of HTN, GERD, gout, former smoker, and current cocaine and marijuana use who presented to [facility] ED on 11/17 with ~ 2 days history of palpitations and chest pain. At [facility] his initial EKG aflutter w/ RVR and ST elevtions in II/III/avF & questionable in V1-V3 w/o reciprocal changes, and labs remarkable for HS trop >02871, BNP 1100, WBC 5, CBC/RFP otherwise unremarkable. Repeat EKG aflutter w/ similar ST elevations as prior. STEMI call activated, loaded with aspirin 324mg, plavix 300mg, and started on heparin gtt 11/17. [facility]  showed triple vessel disease w/ no stenting and IABP was placed for coronary perfusion. Started on integrillin (which has now been stopped) and continued on heparin gtt. He was then transferred to [facility] for CABG evaluation.    On exam, the patient denies chest pain and otherwise feels well.   Of note, U tox was positive for cannabinoid and cocaine on admission 11/18. The patient states he last used cocaine on Saturday 11/16 and marijuana 11/17.     Cardiac surgery is consulted for surgical evaluation of his CAD.     RECOMMENDATIONS/PLAN  Dr. Villa met with patient reviewed case and available imaging.   Plan for new scheduled OR date of 12/4 pending patient is medically optimized.   Medical optimization per primary team    Daily updates/ongoing issues:   - 11/25: Initially plan was for surgery Monday 11/25, however given patient's worsening hypochloremia and hyponatremia and concern for anesthesia, surgery was cancelled and reschedule date TBD. Nephrology is consulted. Further recs pending electrolyte correction.   - 11/26: Na 121, Cl 88; nephrology recommending hydration with NS 0.9%; rescheduled OR date TBD  - 11/27 Na 123, Cl 91; continue medical optimization. Rescheduled OR date tentatively 12/4.     [facility] images in PACS to review.   TTE 11/18 EF 23%  Preop risk stratification studies/labs:  --- US Carotids/Vein Mapping/ LE US ELODIA - completed 11/21  --- PFTs (spirometry and room air ABG) -  completed 11/21  --- pCXR completed 11/19  --- MRSA, UA/Culture, LFTs, HgbA1c, TSH/T4, Lipid panel  --- CT chest without contrast - completed 11/21  --- Dental evaluation not indicated for isolated CABG surgeries     Preop orders:  - Continue ASA, high-intensity statin, and BB (or document contraindications for use) for preop CABG patients   - No ACEi/ARBs in the pre-op period (at least 48 hours)  - Hold any SGLT2 inhibitors (Farxiga, Jardiance, etc.) for at least 3 days prior to cardiac surgery to prevent euglycemic DKA  -  Hold any daily GLP-1 agonist drugs on the day of surgery. Hold any weekly GLP-1 drugs for 7 days prior to surgery. (Dulaglutide, Exenatide, Liraglutide, Lixisenatide, & Semaglutide)  - No antiplatelets other than ASA, no anticoagulants other than Heparin  - NPO after midnight, blood on hold/T&S, and preop scrubs ordered for OR 12/4      Will continue to follow along.  Thank you for the consultation.   Patient educated and all questions answered.  Please page the cardiac surgery consult pager [phone] with any questions or changes in patient condition.    Felecia Ritchie PA-C  Cardiac Surgery Consult [facility]  [facility]  Cardiac Surgery Consult Pager [phone]     11/27/2024  2:27 PM

## 2024-11-28 LAB
ALBUMIN SERPL BCP-MCNC: 3.4 G/DL (ref 3.4–5)
ALP SERPL-CCNC: 63 U/L (ref 33–136)
ALT SERPL W P-5'-P-CCNC: 14 U/L (ref 10–52)
ANION GAP SERPL CALC-SCNC: 14 MMOL/L (ref 10–20)
APTT PPP: 34 SECONDS (ref 27–38)
AST SERPL W P-5'-P-CCNC: 12 U/L (ref 9–39)
BASOPHILS # BLD AUTO: 0.08 X10*3/UL (ref 0–0.1)
BASOPHILS NFR BLD AUTO: 0.6 %
BILIRUB DIRECT SERPL-MCNC: 0.3 MG/DL (ref 0–0.3)
BILIRUB SERPL-MCNC: 1 MG/DL (ref 0–1.2)
BUN SERPL-MCNC: 10 MG/DL (ref 6–23)
CALCIUM SERPL-MCNC: 8.6 MG/DL (ref 8.6–10.6)
CHLORIDE SERPL-SCNC: 93 MMOL/L (ref 98–107)
CO2 SERPL-SCNC: 23 MMOL/L (ref 21–32)
CREAT SERPL-MCNC: 0.81 MG/DL (ref 0.5–1.3)
EGFRCR SERPLBLD CKD-EPI 2021: >90 ML/MIN/1.73M*2
EOSINOPHIL # BLD AUTO: 0.2 X10*3/UL (ref 0–0.4)
EOSINOPHIL NFR BLD AUTO: 1.4 %
ERYTHROCYTE [DISTWIDTH] IN BLOOD BY AUTOMATED COUNT: 13.4 % (ref 11.5–14.5)
GLUCOSE BLD MANUAL STRIP-MCNC: 119 MG/DL (ref 74–99)
GLUCOSE BLD MANUAL STRIP-MCNC: 150 MG/DL (ref 74–99)
GLUCOSE BLD MANUAL STRIP-MCNC: 152 MG/DL (ref 74–99)
GLUCOSE BLD MANUAL STRIP-MCNC: 206 MG/DL (ref 74–99)
GLUCOSE SERPL-MCNC: 129 MG/DL (ref 74–99)
HCT VFR BLD AUTO: 37.9 % (ref 41–52)
HGB BLD-MCNC: 13.2 G/DL (ref 13.5–17.5)
IMM GRANULOCYTES # BLD AUTO: 0.14 X10*3/UL (ref 0–0.5)
IMM GRANULOCYTES NFR BLD AUTO: 1 % (ref 0–0.9)
INR PPP: 1.5 (ref 0.9–1.1)
LYMPHOCYTES # BLD AUTO: 1.58 X10*3/UL (ref 0.8–3)
LYMPHOCYTES NFR BLD AUTO: 11.4 %
MAGNESIUM SERPL-MCNC: 1.92 MG/DL (ref 1.6–2.4)
MCH RBC QN AUTO: 28.4 PG (ref 26–34)
MCHC RBC AUTO-ENTMCNC: 34.8 G/DL (ref 32–36)
MCV RBC AUTO: 82 FL (ref 80–100)
MONOCYTES # BLD AUTO: 1.35 X10*3/UL (ref 0.05–0.8)
MONOCYTES NFR BLD AUTO: 9.8 %
NEUTROPHILS # BLD AUTO: 10.47 X10*3/UL (ref 1.6–5.5)
NEUTROPHILS NFR BLD AUTO: 75.8 %
NRBC BLD-RTO: 0 /100 WBCS (ref 0–0)
PHOSPHATE SERPL-MCNC: 2.5 MG/DL (ref 2.5–4.9)
PLATELET # BLD AUTO: 260 X10*3/UL (ref 150–450)
POTASSIUM SERPL-SCNC: 3.9 MMOL/L (ref 3.5–5.3)
PROT SERPL-MCNC: 5.9 G/DL (ref 6.4–8.2)
PROTHROMBIN TIME: 16.7 SECONDS (ref 9.8–12.8)
RBC # BLD AUTO: 4.64 X10*6/UL (ref 4.5–5.9)
SODIUM SERPL-SCNC: 126 MMOL/L (ref 136–145)
WBC # BLD AUTO: 13.8 X10*3/UL (ref 4.4–11.3)

## 2024-11-28 PROCEDURE — 83735 ASSAY OF MAGNESIUM: CPT

## 2024-11-28 PROCEDURE — 80048 BASIC METABOLIC PNL TOTAL CA: CPT

## 2024-11-28 PROCEDURE — 82947 ASSAY GLUCOSE BLOOD QUANT: CPT

## 2024-11-28 PROCEDURE — 2500000001 HC RX 250 WO HCPCS SELF ADMINISTERED DRUGS (ALT 637 FOR MEDICARE OP)

## 2024-11-28 PROCEDURE — 85025 COMPLETE CBC W/AUTO DIFF WBC: CPT

## 2024-11-28 PROCEDURE — 2500000004 HC RX 250 GENERAL PHARMACY W/ HCPCS (ALT 636 FOR OP/ED)

## 2024-11-28 PROCEDURE — 99232 SBSQ HOSP IP/OBS MODERATE 35: CPT | Performed by: STUDENT IN AN ORGANIZED HEALTH CARE EDUCATION/TRAINING PROGRAM

## 2024-11-28 PROCEDURE — 36415 COLL VENOUS BLD VENIPUNCTURE: CPT

## 2024-11-28 PROCEDURE — 1200000002 HC GENERAL ROOM WITH TELEMETRY DAILY

## 2024-11-28 PROCEDURE — 85730 THROMBOPLASTIN TIME PARTIAL: CPT

## 2024-11-28 PROCEDURE — 2500000002 HC RX 250 W HCPCS SELF ADMINISTERED DRUGS (ALT 637 FOR MEDICARE OP, ALT 636 FOR OP/ED)

## 2024-11-28 PROCEDURE — 82248 BILIRUBIN DIRECT: CPT

## 2024-11-28 PROCEDURE — 84100 ASSAY OF PHOSPHORUS: CPT

## 2024-11-28 RX ORDER — MAGNESIUM SULFATE HEPTAHYDRATE 40 MG/ML
2 INJECTION, SOLUTION INTRAVENOUS ONCE
Status: COMPLETED | OUTPATIENT
Start: 2024-11-28 | End: 2024-11-28

## 2024-11-28 RX ORDER — POTASSIUM CHLORIDE 750 MG/1
20 TABLET, FILM COATED, EXTENDED RELEASE ORAL ONCE
Status: COMPLETED | OUTPATIENT
Start: 2024-11-28 | End: 2024-11-28

## 2024-11-28 RX ORDER — HYDROXYZINE HYDROCHLORIDE 25 MG/1
25 TABLET, FILM COATED ORAL EVERY 8 HOURS PRN
Status: DISCONTINUED | OUTPATIENT
Start: 2024-11-28 | End: 2024-12-04

## 2024-11-28 ASSESSMENT — COGNITIVE AND FUNCTIONAL STATUS - GENERAL
STANDING UP FROM CHAIR USING ARMS: A LITTLE
WALKING IN HOSPITAL ROOM: A LITTLE
MOVING TO AND FROM BED TO CHAIR: A LITTLE
MOBILITY SCORE: 19
CLIMB 3 TO 5 STEPS WITH RAILING: A LITTLE
TURNING FROM BACK TO SIDE WHILE IN FLAT BAD: A LITTLE
DAILY ACTIVITIY SCORE: 23
HELP NEEDED FOR BATHING: A LITTLE

## 2024-11-28 ASSESSMENT — PAIN SCALES - GENERAL
PAINLEVEL_OUTOF10: 0 - NO PAIN
PAINLEVEL_OUTOF10: 0 - NO PAIN

## 2024-11-28 NOTE — PROGRESS NOTES
Date of Admission: 2024  Hospital Day: 11    Subjective   No CP, SOB, on RA. 1-well formed BM ON, improved appetite, feels better overall. Na improved to 126, OR next Wed.      Objective     Vitals:    24 Hour Vitals  Temp:  [36 °C (96.8 °F)-36.6 °C (97.9 °F)] 36 °C (96.8 °F)  Heart Rate:  [75-85] 75  Resp:  [16-19] 18  BP: (112-146)/(68-87) 114/68    Temp (24hrs), Av.3 °C (97.4 °F), Min:36 °C (96.8 °F), Max:36.6 °C (97.9 °F)     24 hour Intake/Output    Intake/Output Summary (Last 24 hours) at 2024 1359  Last data filed at 2024 1240  Gross per 24 hour   Intake 50 ml   Output 2050 ml   Net -2000 ml        Physical exam:  Constitutional: Well-developed male in no acute distress.  HEENT: NCAT. EOMI. No JVD  Respiratory: CTAB. No wheezes, crackles, or rhonchi. Normal respiratory effort on RA.  Cardiovascular: RRR, normal S1/S2, No murmurs, rubs, or gallops.   Abdominal: soft, NTND, no rebound or guarding  Neuro: CN II-XII grossly intact. Moving all extremities with no focal deficits  MSK: WWP, no peripheral edema  Skin: No lesions, wounds, or bruising  Psych: Appropriate mood and affect.      Medications   Scheduled Medications  aspirin, 81 mg, oral, Daily  atorvastatin, 80 mg, oral, Nightly  [START ON 12/3/2024] chlorhexidine, 15 mL, Mouth/Throat, BID  enoxaparin, 40 mg, subcutaneous, Daily  pantoprazole, 40 mg, oral, Daily before breakfast   Or  esomeprazole, 40 mg, nasoduodenal tube, Daily before breakfast   Or  pantoprazole, 40 mg, intravenous, Daily before breakfast  insulin lispro, 0-10 Units, subcutaneous, TID AC  isosorbide mononitrate ER, 30 mg, oral, Daily  metoprolol succinate XL, 50 mg, oral, Daily  NIFEdipine ER, 30 mg, oral, Daily before breakfast  polyethylene glycol, 17 g, oral, Daily     Continuous Medications      PRN Medications  PRN medications: acetaminophen, alum-mag hydroxide-simeth, dextrose, dextrose, docusate sodium, glucagon, glucagon, lubricating eye drops, simethicone      Labs  CBC  Results from last 72 hours   Lab Units 11/28/24  0659 11/27/24  0829 11/26/24  0832   WBC AUTO x10*3/uL 13.8* 13.6* 12.5*   HEMOGLOBIN g/dL 13.2* 15.2 14.8   HEMATOCRIT % 37.9* 42.8 41.5   PLATELETS AUTO x10*3/uL 260 293 234       BMP  Results from last 72 hours   Lab Units 11/28/24  0659 11/27/24  1826 11/27/24  0829   SODIUM mmol/L 126* 121* 123*   POTASSIUM mmol/L 3.9 4.4 4.3   CHLORIDE mmol/L 93* 92* 91*   BUN mg/dL 10 13 15   CREATININE mg/dL 0.81 0.85 0.91   MAGNESIUM mg/dL 1.92 2.16 2.15   PHOSPHORUS mg/dL 2.5 2.6 3.0     Serum osm 262, Uosm 581, Vanita <10    Imaging:     === 11/18/24 ===    CT CHEST WO IV CONTRAST    - Impression -  1.  Thoracic aorta is normal in course and caliber. There is mild  calcified plaque involving the arch and branch vessels. There is  bovine arch configuration with common origin of the innominate and  left common carotid artery.  2. Severe coronary artery calcifications.  3. Bibasilar atelectasis left-greater-than-right.  4. Several small, pulmonary nodules within both lungs, measuring less  than 6 mm, which are likely benign. Instructions:  Consider follow-up  noncontrast CT scan chest in 12 months.  5. Multiple enlarged retrocrural lymph nodes measuring up to 1.3 cm  which are nonspecific and may be reactive. Recommend follow-up CT of  the abdomen in 3 months to evaluate for interval change.    TTE (11/18)  CONCLUSIONS:   1. Poorly visualized anatomical structures due to suboptimal image quality.   2. Left ventricular ejection fraction is severely decreased, calculated by Regalado's biplane at 23%.   3. There is global hypokinesis of the left ventricle with minor regional variations.   4. Spectral Doppler shows a Grade III (restrictive pattern) of left ventricular diastolic filling with an elevated left atrial pressure.   5. There is normal right ventricular global systolic function.   6. Mildly enlarged right ventricle.   7. The left atrium is mildly  dilated.    KUB (11/25):  IMPRESSION:  1.  No evidence of bowel obstruction or perforation.       Assessment and plan:  Edu Echavarria is a 74 y.o. male with a PMHx of HTN, GERD, and gout who presented to [facility] ED on 11/17pm with 2d hx of intermittent palpitations and chest pain. At OSH initial EKG flutter w/ RVR and ST elevtions in II/III/avF & questionable in V1-V3 w/o reciprocal changes, and labs remarkable for HS trop >63819, BNP 1100, WBC 5, CBC/RFP otherwise unremarkable. Repeat EKG aflutter w/ similar ST elevations as prior. STEMI call activated, loaded with aspirin 324mg, plavix 300mg, and started on heparin gtt 11/17pm. LHC showed triple vessel disease w/ no stenting and IABP was placed for coronary perf. Started on integrillin (which has now been stopped) and continued on heparin gtt. Transferred to [facility] for CABG evaluation. CT surgery on board for CABG evaluation, tentative plan for OR 11/25. Hospital course c/b MARYBETH and slowly dropping plts/Hb, continuing further workup. Pt transferred to floor 11/21 for further care pre-op.     Surgery cancelled on 11/25 due to worsening hyponatremia, likely iso hypovolemia 2/2 decreased PO intake and diarrhea. Resuscitated w/ 1L fluids, nephrology on board. KUB -ve, stool PCR pending. HTN reg changed from coreg isordil and hydral to Imdur 30 and Metop XL 50 to reduce pill burden. HypoNa improving s/p continuous IV NS 80cc/hr over 24 hr.     Updates 11/28:  -s/p continuous IV NS 80cc/hr over 24 hr, HypoNa improved to 121>126, CTM  -Nephrology onboard, appreciate recs  -Diarrhea and appetite improving, feeling better  -BP improving, pt taking meds  -OR tentatively Wed 12/4     #STEMI  #Triple vessel disease  #HTN  :: HS trop >38212 at OSH  :: HS trop at : 34529 > 60072  :: LHC 11/18: Triple vessel disease (LAD prox 70%, mid 65-70%, dist 90%), Lcx OM1 80%, OM2 small, OM3 occluded with collaterals, RCA mid moderate disease. LVEDP 46mmHg, LVEF 20-25%, evidence of  extensive global hypokinesis  :: Arrived integrillin and heparin gtt  :: Aspirin and plavix loaded 11/17 ~2100  :: Utox + cannavinoid, fent (received w/ EMS), benzos, cocaine  -ASA 81mg daily  -Atorva 80mg qhs  -Stop Coreg 25mg BID, Isordil 40 TID, Hydral 50 TID as patient wants to minimize pill burden  -Now on Imdur 30, Metop XL 50, Nifedipine 30  -HOLD home metop (hx cocaine use), on coreg as above  -CT surgery following  -Surgery canceled on 11/25 due to acute on chronic HypoNa (down to 121), see below for further plans  -Pending OR tentatively Wed 12/4     #MARYBETH, resolved  -baseline cr ~0.9  -MARYBETH worsening 11/21 in AM to 1.65>1.2-->1  -FENa 0.1%, pre-renal, likely multifactorial related to contrast admin, hemodynamic changes, and decreased PO intake  -low urine sodium 11/20  Plan:  -encourage PO intake  -monitor urine output, renally dose medications to GFR     #Hyponatremia, acute on chronic  ::Na on admit 132, now 122  ::Serum osm 262, Uosm 581, Vanita <10  ::Hypotonic hypovolemic hyponatremia  -Nephrology consulted, appreciate recs  -Likely 2/2 dehydration, poor PO intake, and diarrhea  -Likely hypovolemic given high Uosm and low Vanita  -1L NS fluid resuscitation on 11/25  -s/p continuous IV NS 80cc/hr over 24 hr (11/27), HypoNa improved to 121>126 (11/28)    #Diarrhea  ::2-3x watery, loose stools over last few days, b/l for him is 1x well-formed stool  ::KUB showed nonobstructive pattern  -Stool PCR pending  -Consider anti-diarrheals if persisting     #mild thrombocytopenia, resolved  -plts and Hb slowly downtrending since admission, appears possible dilutional as all cell lines have decreased (initial labs likely with element of hemoconcentration)  -4Ts: 3pts (low prob of HIT)   -coags, bili wnl  -retic index borderline at 2.6   -LDH mildly elevated at 390 - may be related to IABP (removed 11/20)  -repeat LDH with AM labs-->249  -repeat T&S ordered with AM labs  -c/w lovenox ppx for now, considering holding if  plts continue to drop   -no signs of active bleeding     #leukocytosis - improving  -downtrending with fluids, no current signs/sx of infection   -continue to monitor off abx     #heartburn   #abdominal pain  -not on medications at home  -c/w Pantoprazole 40mg daily- continue discontinue if symptoms improve prior to dc  -tums prn  -c/w miralax, colace      #Gout  -hold allopurinol     Disposition  -PT/OT rec home care currently, pending post-operation eval      Diet: reg  Electrolytes: K >4; Mg >2  DVT ppx: lovenox ppx  GI ppx: ppi  Lines/tubes: PIV, external cath   O2: RA  Baseline Cr: 0.9  T+S: 11/18, repeat   Code status: full code - reconfirmed on transfer  Surrogate decision maker: Guillermo Barrios Jr (cousin) [phone] - reconfirmed on transfer  Guillermo's wife's #: [phone]    Patient seen and discussed with attending physician.      Red Loredo MD  PGY-1 [facility]      Cardiology Staff Addendum:    I saw and evaluated the patient on 11/28/2024.  I personally obtained the key and critical portions of the history and physical exam or was physically present for key and critical portions performed by the resident. I reviewed the resident’s documentation and discussed the patient with the resident.  I agree with the resident’s medical decision making as documented/amended in the note.    Jemal Botello MD

## 2024-11-28 NOTE — PROGRESS NOTES
"             Therapy Communication Note    Patient Name: Edu Echavarria  MRN: [id number]  Department: [place] [address]  Room: [facility]  Today's Date: 11/28/2024     Discipline: Physical Therapy    Missed Visit Reason: Missed Visit Reason: Patient refused (pt declines stating \" I just ate and  won't get OOB, just go comfortable\". Pt declines education)    Missed Time: Attempt    Comment:    "

## 2024-11-28 NOTE — CARE PLAN
Problem: Pain - Adult  Goal: Verbalizes/displays adequate comfort level or baseline comfort level  11/28/2024 1238 by Basilia Ng LPN  Outcome: Progressing  11/28/2024 1238 by Basilia Ng LPN  Outcome: Progressing     Problem: Safety - Adult  Goal: Free from fall injury  11/28/2024 1238 by Basilia Ng LPN  Outcome: Progressing  11/28/2024 1238 by Basilia Ng LPN  Outcome: Progressing     Problem: Discharge Planning  Goal: Discharge to home or other facility with appropriate resources  11/28/2024 1238 by Basilia Ng LPN  Outcome: Progressing  11/28/2024 1238 by Basilia Ng LPN  Outcome: Progressing     Problem: Chronic Conditions and Co-morbidities  Goal: Patient's chronic conditions and co-morbidity symptoms are monitored and maintained or improved  11/28/2024 1238 by Basilia Ng LPN  Outcome: Progressing  11/28/2024 1238 by Basilia Ng LPN  Outcome: Progressing     Problem: Skin  Goal: Participates in plan/prevention/treatment measures  11/28/2024 1238 by Basilia Ng LPN  Outcome: Progressing  11/28/2024 1238 by Basilia Ng LPN  Outcome: Progressing  Goal: Prevent/manage excess moisture  11/28/2024 1238 by Basilia Ng LPN  Outcome: Progressing  11/28/2024 1238 by Basilia Ng LPN  Outcome: Progressing  Goal: Prevent/minimize sheer/friction injuries  11/28/2024 1238 by Basilia Ng LPN  Outcome: Progressing  11/28/2024 1238 by Basilia Ng LPN  Outcome: Progressing   The patient's goals for the shift include      The clinical goals for the shift include patient will remain HMDS throughout shift

## 2024-11-28 NOTE — PROGRESS NOTES
Edu Echavarria   74 y.o.     MRN/Room: [id number]/7017/7017-A    Subjective: NAEO. IVF stopped yesterday. Sodium 126 this morning.     Objective:     Meds:   aspirin, 81 mg, Daily  atorvastatin, 80 mg, Nightly  [START ON 12/3/2024] chlorhexidine, 15 mL, BID  enoxaparin, 40 mg, Daily  pantoprazole, 40 mg, Daily before breakfast   Or  esomeprazole, 40 mg, Daily before breakfast   Or  pantoprazole, 40 mg, Daily before breakfast  insulin lispro, 0-10 Units, TID AC  isosorbide mononitrate ER, 30 mg, Daily  magnesium sulfate, 2 g, Once  metoprolol succinate XL, 50 mg, Daily  NIFEdipine ER, 30 mg, Daily before breakfast  polyethylene glycol, 17 g, Daily  potassium chloride CR, 20 mEq, Once         acetaminophen, 975 mg, q6h PRN  alum-mag hydroxide-simeth, 20 mL, 4x daily PRN  dextrose, 12.5 g, q15 min PRN  dextrose, 25 g, q15 min PRN  docusate sodium, 100 mg, BID PRN  glucagon, 1 mg, q15 min PRN  glucagon, 1 mg, q15 min PRN  lubricating eye drops, 1 drop, BID PRN  simethicone, 40 mg, 4x daily PRN        Vitals:    11/28/24 0749   BP: 128/77   Pulse: 76   Resp: 18   Temp: 36.6 °C (97.9 °F)   SpO2: 95%          Intake/Output Summary (Last 24 hours) at 11/28/2024 0941  Last data filed at 11/28/2024 0847  Gross per 24 hour   Intake --   Output 1700 ml   Net -1700 ml     Physical Exam:  General: Awake, alert, conversant  HEENT: Pupils equal and round, no scleral icterus or conjunctivitis  Skin: No suspect lesions or rashes noted on visible skin  Chest: Ctab, normal respiratory effort  Cardiac: Regular rate and rhythm, no murmurs appreciated  Abdomen: Mildly distended, non tender to palpation, no guarding  : No flank pain  EXT: No peripheral edema, no asymmetry noted  Neuro: AOx4, moving all limbs spontaneously, follows commands    Blood Labs:  Results from last 72 hours   Lab Units 11/28/24  0659 11/27/24  1826 11/27/24  0830 11/27/24  0829 11/26/24  0832   WBC AUTO x10*3/uL 13.8*  --   --  13.6* 12.5*   HEMOGLOBIN g/dL 13.2*   --   --  15.2 14.8   HEMATOCRIT % 37.9*  --   --  42.8 41.5   PLATELETS AUTO x10*3/uL 260  --   --  293 234   INR  1.5*  --  1.5*  --  1.3*   SODIUM mmol/L 126* 121*  --  123* 121*   POTASSIUM mmol/L 3.9 4.4  --  4.3 4.2   CHLORIDE mmol/L 93* 92*  --  91* 88*   CO2 mmol/L 23 20*  --  22 21   BUN mg/dL 10 13  --  15 17   CREATININE mg/dL 0.81 0.85  --  0.91 0.96   GLUCOSE mg/dL 129* 152*  --  179* 140*   CALCIUM mg/dL 8.6 8.4*  --  8.6 8.8   MAGNESIUM mg/dL 1.92 2.16  --  2.15 2.22   PHOSPHORUS mg/dL 2.5 2.6  --  3.0 3.3   ALBUMIN g/dL 3.4 3.7  --  3.8 3.8   ALK PHOS U/L 63  --   --  75 75   ALT U/L 14  --   --  16 18   AST U/L 12  --   --  12 13   BILIRUBIN TOTAL mg/dL 1.0  --   --  1.0 1.1      ASSESSMENT:  Edu Echavarria is a 74 y.o. male w/ a PMH of HTN, GERD, and gout who presented to OSH on 11/17 for STEMI, LHC showed triple vessel disease, now awaiting CABG. TTE 11/18 with EF of 23%. Nephrology consulted for acute on chronic hyponatremia in the setting of hypovolemia.    #Acute on Chronic Hyponatremia  - Likely In setting of dehydration and diarrhea, low po intake  - Clinical volume status: Hypovolemic  - No likely causative meds  - UA unremarkable.  Urine sodium <10, Urine osmolality 581, serum osmolality 262 (low).  - sodium improved today to 126 from 121.      Recommendations:  - Would recommend continuing conservative measures and continuing to monitor  - Encourage po fluid intake  - Will continue to follow     Discussed with attending nephrologist, Dr. Singh who agrees with the plan. Please page with any questions.    Kaycee Geller, DO  Family Medicine, R2  Nephrology

## 2024-11-29 ENCOUNTER — APPOINTMENT (OUTPATIENT)
Dept: RADIOLOGY | Facility: HOSPITAL | Age: 74
DRG: 235 | End: 2024-11-29
Payer: MEDICARE

## 2024-11-29 ENCOUNTER — APPOINTMENT (OUTPATIENT)
Dept: CARDIOLOGY | Facility: HOSPITAL | Age: 74
DRG: 235 | End: 2024-11-29
Payer: MEDICARE

## 2024-11-29 ENCOUNTER — APPOINTMENT (OUTPATIENT)
Dept: CARDIOLOGY | Facility: HOSPITAL | Age: 74
End: 2024-11-29
Payer: MEDICARE

## 2024-11-29 PROBLEM — R57.9 SHOCK (MULTI): Status: ACTIVE | Noted: 2024-11-29

## 2024-11-29 LAB
ALBUMIN SERPL BCP-MCNC: 3.8 G/DL (ref 3.4–5)
ALBUMIN SERPL BCP-MCNC: 4.6 G/DL (ref 3.4–5)
ALP SERPL-CCNC: 69 U/L (ref 33–136)
ALT SERPL W P-5'-P-CCNC: 15 U/L (ref 10–52)
ANION GAP BLDA CALCULATED.4IONS-SCNC: 11 MMO/L (ref 10–25)
ANION GAP BLDA CALCULATED.4IONS-SCNC: 12 MMO/L (ref 10–25)
ANION GAP BLDA CALCULATED.4IONS-SCNC: 14 MMO/L (ref 10–25)
ANION GAP BLDMV CALCULATED.4IONS-SCNC: 11 MMO/L (ref 10–25)
ANION GAP BLDMV CALCULATED.4IONS-SCNC: 12 MMO/L (ref 10–25)
ANION GAP BLDMV CALCULATED.4IONS-SCNC: 8 MMO/L (ref 10–25)
ANION GAP BLDV CALCULATED.4IONS-SCNC: 13 MMOL/L (ref 10–25)
ANION GAP SERPL CALC-SCNC: 17 MMOL/L (ref 10–20)
ANION GAP SERPL CALC-SCNC: 19 MMOL/L (ref 10–20)
APPEARANCE UR: CLEAR
APTT PPP: 36 SECONDS (ref 27–38)
APTT PPP: >200 SECONDS (ref 27–38)
AST SERPL W P-5'-P-CCNC: 12 U/L (ref 9–39)
BASE EXCESS BLDA CALC-SCNC: -2.7 MMOL/L (ref -2–3)
BASE EXCESS BLDA CALC-SCNC: -2.9 MMOL/L (ref -2–3)
BASE EXCESS BLDA CALC-SCNC: -8.6 MMOL/L (ref -2–3)
BASE EXCESS BLDMV CALC-SCNC: -0.1 MMOL/L (ref -2–3)
BASE EXCESS BLDMV CALC-SCNC: -5.9 MMOL/L (ref -2–3)
BASE EXCESS BLDMV CALC-SCNC: 0.8 MMOL/L (ref -2–3)
BASE EXCESS BLDV CALC-SCNC: -6.9 MMOL/L (ref -2–3)
BASOPHILS # BLD AUTO: 0.06 X10*3/UL (ref 0–0.1)
BASOPHILS # BLD AUTO: 0.13 X10*3/UL (ref 0–0.1)
BASOPHILS NFR BLD AUTO: 0.4 %
BASOPHILS NFR BLD AUTO: 0.6 %
BILIRUB DIRECT SERPL-MCNC: 0.4 MG/DL (ref 0–0.3)
BILIRUB SERPL-MCNC: 1.1 MG/DL (ref 0–1.2)
BILIRUB UR STRIP.AUTO-MCNC: NEGATIVE MG/DL
BNP SERPL-MCNC: 2290 PG/ML (ref 0–99)
BODY TEMPERATURE: 37 DEGREES CELSIUS
BODY TEMPERATURE: ABNORMAL
BUN SERPL-MCNC: 8 MG/DL (ref 6–23)
BUN SERPL-MCNC: 8 MG/DL (ref 6–23)
CA-I BLDA-SCNC: 1.09 MMOL/L (ref 1.1–1.33)
CA-I BLDA-SCNC: 1.1 MMOL/L (ref 1.1–1.33)
CA-I BLDA-SCNC: 1.13 MMOL/L (ref 1.1–1.33)
CA-I BLDMV-SCNC: 1.07 MMOL/L (ref 1.1–1.33)
CA-I BLDMV-SCNC: 1.11 MMOL/L (ref 1.1–1.33)
CA-I BLDMV-SCNC: 1.12 MMOL/L (ref 1.1–1.33)
CA-I BLDV-SCNC: 1.02 MMOL/L (ref 1.1–1.33)
CALCIUM SERPL-MCNC: 8.8 MG/DL (ref 8.6–10.6)
CALCIUM SERPL-MCNC: 9.3 MG/DL (ref 8.6–10.6)
CHLORIDE BLD-SCNC: 89 MMOL/L (ref 98–107)
CHLORIDE BLD-SCNC: 90 MMOL/L (ref 98–107)
CHLORIDE BLD-SCNC: 91 MMOL/L (ref 98–107)
CHLORIDE BLDA-SCNC: 87 MMOL/L (ref 98–107)
CHLORIDE BLDA-SCNC: 87 MMOL/L (ref 98–107)
CHLORIDE BLDA-SCNC: 89 MMOL/L (ref 98–107)
CHLORIDE BLDV-SCNC: 90 MMOL/L (ref 98–107)
CHLORIDE SERPL-SCNC: 88 MMOL/L (ref 98–107)
CHLORIDE SERPL-SCNC: 92 MMOL/L (ref 98–107)
CO2 SERPL-SCNC: 23 MMOL/L (ref 21–32)
CO2 SERPL-SCNC: 24 MMOL/L (ref 21–32)
COLOR UR: ABNORMAL
CREAT SERPL-MCNC: 0.75 MG/DL (ref 0.5–1.3)
CREAT SERPL-MCNC: 0.78 MG/DL (ref 0.5–1.3)
EGFRCR SERPLBLD CKD-EPI 2021: >90 ML/MIN/1.73M*2
EGFRCR SERPLBLD CKD-EPI 2021: >90 ML/MIN/1.73M*2
EOSINOPHIL # BLD AUTO: 0.15 X10*3/UL (ref 0–0.4)
EOSINOPHIL # BLD AUTO: 0.15 X10*3/UL (ref 0–0.4)
EOSINOPHIL NFR BLD AUTO: 0.7 %
EOSINOPHIL NFR BLD AUTO: 1 %
ERYTHROCYTE [DISTWIDTH] IN BLOOD BY AUTOMATED COUNT: 13.3 % (ref 11.5–14.5)
ERYTHROCYTE [DISTWIDTH] IN BLOOD BY AUTOMATED COUNT: 13.4 % (ref 11.5–14.5)
ERYTHROCYTE [DISTWIDTH] IN BLOOD BY AUTOMATED COUNT: 13.5 % (ref 11.5–14.5)
FLUAV RNA RESP QL NAA+PROBE: NOT DETECTED
FLUBV RNA RESP QL NAA+PROBE: NOT DETECTED
GLUCOSE BLD MANUAL STRIP-MCNC: 138 MG/DL (ref 74–99)
GLUCOSE BLD MANUAL STRIP-MCNC: 148 MG/DL (ref 74–99)
GLUCOSE BLD MANUAL STRIP-MCNC: 233 MG/DL (ref 74–99)
GLUCOSE BLD-MCNC: 202 MG/DL (ref 74–99)
GLUCOSE BLD-MCNC: 236 MG/DL (ref 74–99)
GLUCOSE BLD-MCNC: 252 MG/DL (ref 74–99)
GLUCOSE BLDA-MCNC: 207 MG/DL (ref 74–99)
GLUCOSE BLDA-MCNC: 291 MG/DL (ref 74–99)
GLUCOSE BLDA-MCNC: 377 MG/DL (ref 74–99)
GLUCOSE BLDV-MCNC: 255 MG/DL (ref 74–99)
GLUCOSE SERPL-MCNC: 128 MG/DL (ref 74–99)
GLUCOSE SERPL-MCNC: 156 MG/DL (ref 74–99)
GLUCOSE UR STRIP.AUTO-MCNC: ABNORMAL MG/DL
HCO3 BLDA-SCNC: 23.2 MMOL/L (ref 22–26)
HCO3 BLDA-SCNC: 26 MMOL/L (ref 22–26)
HCO3 BLDA-SCNC: 26.4 MMOL/L (ref 22–26)
HCO3 BLDMV-SCNC: 25.2 MMOL/L (ref 22–26)
HCO3 BLDMV-SCNC: 27.5 MMOL/L (ref 22–26)
HCO3 BLDMV-SCNC: 27.9 MMOL/L (ref 22–26)
HCO3 BLDV-SCNC: 25.1 MMOL/L (ref 22–26)
HCT VFR BLD AUTO: 40.6 % (ref 41–52)
HCT VFR BLD AUTO: 41.9 % (ref 41–52)
HCT VFR BLD AUTO: 51.1 % (ref 41–52)
HCT VFR BLD EST: 44 % (ref 41–52)
HCT VFR BLD EST: 47 % (ref 41–52)
HCT VFR BLD EST: 47 % (ref 41–52)
HCT VFR BLD EST: 48 % (ref 41–52)
HCT VFR BLD EST: 48 % (ref 41–52)
HCT VFR BLD EST: 51 % (ref 41–52)
HCT VFR BLD EST: 51 % (ref 41–52)
HGB BLD-MCNC: 14.3 G/DL (ref 13.5–17.5)
HGB BLD-MCNC: 14.6 G/DL (ref 13.5–17.5)
HGB BLD-MCNC: 17.5 G/DL (ref 13.5–17.5)
HGB BLDA-MCNC: 15.5 G/DL (ref 13.5–17.5)
HGB BLDA-MCNC: 17 G/DL (ref 13.5–17.5)
HGB BLDA-MCNC: 17 G/DL (ref 13.5–17.5)
HGB BLDMV-MCNC: 14.8 G/DL (ref 13.5–17.5)
HGB BLDMV-MCNC: 15.5 G/DL (ref 13.5–17.5)
HGB BLDMV-MCNC: 16 G/DL (ref 13.5–17.5)
HGB BLDV-MCNC: 16 G/DL (ref 13.5–17.5)
IMM GRANULOCYTES # BLD AUTO: 0.13 X10*3/UL (ref 0–0.5)
IMM GRANULOCYTES # BLD AUTO: 0.29 X10*3/UL (ref 0–0.5)
IMM GRANULOCYTES NFR BLD AUTO: 0.9 % (ref 0–0.9)
IMM GRANULOCYTES NFR BLD AUTO: 1.3 % (ref 0–0.9)
INHALED O2 CONCENTRATION: 100 %
INHALED O2 CONCENTRATION: 80 %
INHALED O2 CONCENTRATION: 80 %
INR PPP: 1.3 (ref 0.9–1.1)
INR PPP: 1.5 (ref 0.9–1.1)
KETONES UR STRIP.AUTO-MCNC: NEGATIVE MG/DL
LACTATE BLDA-SCNC: 1.8 MMOL/L (ref 0.4–2)
LACTATE BLDA-SCNC: 1.9 MMOL/L (ref 0.4–2)
LACTATE BLDA-SCNC: 2.9 MMOL/L (ref 0.4–2)
LACTATE BLDMV-SCNC: 1.5 MMOL/L (ref 0.4–2)
LACTATE BLDMV-SCNC: 1.7 MMOL/L (ref 0.4–2)
LACTATE BLDMV-SCNC: 1.8 MMOL/L (ref 0.4–2)
LACTATE BLDV-SCNC: 1.6 MMOL/L (ref 0.4–2)
LEUKOCYTE ESTERASE UR QL STRIP.AUTO: NEGATIVE
LYMPHOCYTES # BLD AUTO: 1.67 X10*3/UL (ref 0.8–3)
LYMPHOCYTES # BLD AUTO: 3.23 X10*3/UL (ref 0.8–3)
LYMPHOCYTES NFR BLD AUTO: 11.2 %
LYMPHOCYTES NFR BLD AUTO: 14.1 %
MAGNESIUM SERPL-MCNC: 2.05 MG/DL (ref 1.6–2.4)
MAGNESIUM SERPL-MCNC: 2.39 MG/DL (ref 1.6–2.4)
MCH RBC QN AUTO: 28.2 PG (ref 26–34)
MCH RBC QN AUTO: 28.5 PG (ref 26–34)
MCH RBC QN AUTO: 28.5 PG (ref 26–34)
MCHC RBC AUTO-ENTMCNC: 34.2 G/DL (ref 32–36)
MCHC RBC AUTO-ENTMCNC: 34.8 G/DL (ref 32–36)
MCHC RBC AUTO-ENTMCNC: 35.2 G/DL (ref 32–36)
MCV RBC AUTO: 81 FL (ref 80–100)
MCV RBC AUTO: 81 FL (ref 80–100)
MCV RBC AUTO: 83 FL (ref 80–100)
MONOCYTES # BLD AUTO: 1.4 X10*3/UL (ref 0.05–0.8)
MONOCYTES # BLD AUTO: 2.07 X10*3/UL (ref 0.05–0.8)
MONOCYTES NFR BLD AUTO: 9 %
MONOCYTES NFR BLD AUTO: 9.4 %
NEUTROPHILS # BLD AUTO: 11.45 X10*3/UL (ref 1.6–5.5)
NEUTROPHILS # BLD AUTO: 17.09 X10*3/UL (ref 1.6–5.5)
NEUTROPHILS NFR BLD AUTO: 74.3 %
NEUTROPHILS NFR BLD AUTO: 77.1 %
NITRITE UR QL STRIP.AUTO: NEGATIVE
NRBC BLD-RTO: 0 /100 WBCS (ref 0–0)
OXYHGB MFR BLDA: 94.9 % (ref 94–98)
OXYHGB MFR BLDA: 95.9 % (ref 94–98)
OXYHGB MFR BLDA: 96.7 % (ref 94–98)
OXYHGB MFR BLDMV: 59.9 % (ref 45–75)
OXYHGB MFR BLDMV: 64.1 % (ref 45–75)
OXYHGB MFR BLDMV: 66.2 % (ref 45–75)
OXYHGB MFR BLDV: 69.3 % (ref 45–75)
PCO2 BLDA: 103 MM HG (ref 38–42)
PCO2 BLDA: 43 MM HG (ref 38–42)
PCO2 BLDA: 63 MM HG (ref 38–42)
PCO2 BLDMV: 51 MM HG (ref 41–51)
PCO2 BLDMV: 58 MM HG (ref 41–51)
PCO2 BLDMV: 74 MM HG (ref 41–51)
PCO2 BLDV: 81 MM HG (ref 41–51)
PH BLDA: 7.01 PH (ref 7.38–7.42)
PH BLDA: 7.23 PH (ref 7.38–7.42)
PH BLDA: 7.34 PH (ref 7.38–7.42)
PH BLDMV: 7.14 PH (ref 7.33–7.43)
PH BLDMV: 7.29 PH (ref 7.33–7.43)
PH BLDMV: 7.34 PH (ref 7.33–7.43)
PH BLDV: 7.1 PH (ref 7.33–7.43)
PH UR STRIP.AUTO: 7 [PH]
PHOSPHATE SERPL-MCNC: 2.6 MG/DL (ref 2.5–4.9)
PHOSPHATE SERPL-MCNC: 3.6 MG/DL (ref 2.5–4.9)
PLATELET # BLD AUTO: 298 X10*3/UL (ref 150–450)
PLATELET # BLD AUTO: 365 X10*3/UL (ref 150–450)
PLATELET # BLD AUTO: 398 X10*3/UL (ref 150–450)
PO2 BLDA: 101 MM HG (ref 85–95)
PO2 BLDA: 139 MM HG (ref 85–95)
PO2 BLDA: 90 MM HG (ref 85–95)
PO2 BLDMV: 40 MM HG (ref 35–45)
PO2 BLDMV: 43 MM HG (ref 35–45)
PO2 BLDMV: 45 MM HG (ref 35–45)
PO2 BLDV: 48 MM HG (ref 35–45)
POTASSIUM BLDA-SCNC: 4.7 MMOL/L (ref 3.5–5.3)
POTASSIUM BLDA-SCNC: 4.8 MMOL/L (ref 3.5–5.3)
POTASSIUM BLDA-SCNC: 4.9 MMOL/L (ref 3.5–5.3)
POTASSIUM BLDMV-SCNC: 4.7 MMOL/L (ref 3.5–5.3)
POTASSIUM BLDMV-SCNC: 5.1 MMOL/L (ref 3.5–5.3)
POTASSIUM BLDMV-SCNC: 5.1 MMOL/L (ref 3.5–5.3)
POTASSIUM BLDV-SCNC: 4.6 MMOL/L (ref 3.5–5.3)
POTASSIUM SERPL-SCNC: 4 MMOL/L (ref 3.5–5.3)
POTASSIUM SERPL-SCNC: 4.8 MMOL/L (ref 3.5–5.3)
PROCALCITONIN SERPL-MCNC: 0.03 NG/ML
PROT SERPL-MCNC: 6.5 G/DL (ref 6.4–8.2)
PROT UR STRIP.AUTO-MCNC: ABNORMAL MG/DL
PROTHROMBIN TIME: 15.2 SECONDS (ref 9.8–12.8)
PROTHROMBIN TIME: 17.2 SECONDS (ref 9.8–12.8)
RBC # BLD AUTO: 5.01 X10*6/UL (ref 4.5–5.9)
RBC # BLD AUTO: 5.17 X10*6/UL (ref 4.5–5.9)
RBC # BLD AUTO: 6.15 X10*6/UL (ref 4.5–5.9)
RBC # UR STRIP.AUTO: ABNORMAL /UL
RBC #/AREA URNS AUTO: ABNORMAL /HPF
RSV RNA RESP QL NAA+PROBE: NOT DETECTED
SAO2 % BLDA: 100 % (ref 94–100)
SAO2 % BLDA: 98 % (ref 94–100)
SAO2 % BLDA: 99 % (ref 94–100)
SAO2 % BLDMV: 61 % (ref 45–75)
SAO2 % BLDMV: 65 % (ref 45–75)
SAO2 % BLDMV: 68 % (ref 45–75)
SAO2 % BLDV: 71 % (ref 45–75)
SARS-COV-2 RNA RESP QL NAA+PROBE: NOT DETECTED
SODIUM BLDA-SCNC: 117 MMOL/L (ref 136–145)
SODIUM BLDA-SCNC: 122 MMOL/L (ref 136–145)
SODIUM BLDA-SCNC: 122 MMOL/L (ref 136–145)
SODIUM BLDMV-SCNC: 121 MMOL/L (ref 136–145)
SODIUM BLDMV-SCNC: 122 MMOL/L (ref 136–145)
SODIUM BLDMV-SCNC: 123 MMOL/L (ref 136–145)
SODIUM BLDV-SCNC: 123 MMOL/L (ref 136–145)
SODIUM SERPL-SCNC: 126 MMOL/L (ref 136–145)
SODIUM SERPL-SCNC: 128 MMOL/L (ref 136–145)
SP GR UR STRIP.AUTO: 1.01
UFH PPP CHRO-ACNC: 1.7 IU/ML
UROBILINOGEN UR STRIP.AUTO-MCNC: NORMAL MG/DL
WBC # BLD AUTO: 14.9 X10*3/UL (ref 4.4–11.3)
WBC # BLD AUTO: 23 X10*3/UL (ref 4.4–11.3)
WBC # BLD AUTO: 34.5 X10*3/UL (ref 4.4–11.3)
WBC #/AREA URNS AUTO: ABNORMAL /HPF

## 2024-11-29 PROCEDURE — 5A02210 ASSISTANCE WITH CARDIAC OUTPUT USING BALLOON PUMP, CONTINUOUS: ICD-10-PCS | Performed by: STUDENT IN AN ORGANIZED HEALTH CARE EDUCATION/TRAINING PROGRAM

## 2024-11-29 PROCEDURE — 84132 ASSAY OF SERUM POTASSIUM: CPT

## 2024-11-29 PROCEDURE — 2720000007 HC OR 272 NO HCPCS: Performed by: INTERNAL MEDICINE

## 2024-11-29 PROCEDURE — 85025 COMPLETE CBC W/AUTO DIFF WBC: CPT

## 2024-11-29 PROCEDURE — 2500000002 HC RX 250 W HCPCS SELF ADMINISTERED DRUGS (ALT 637 FOR MEDICARE OP, ALT 636 FOR OP/ED)

## 2024-11-29 PROCEDURE — C1894 INTRO/SHEATH, NON-LASER: HCPCS | Performed by: INTERNAL MEDICINE

## 2024-11-29 PROCEDURE — 81001 URINALYSIS AUTO W/SCOPE: CPT

## 2024-11-29 PROCEDURE — 33967 INSERT I-AORT PERCUT DEVICE: CPT | Performed by: INTERNAL MEDICINE

## 2024-11-29 PROCEDURE — 71045 X-RAY EXAM CHEST 1 VIEW: CPT | Performed by: STUDENT IN AN ORGANIZED HEALTH CARE EDUCATION/TRAINING PROGRAM

## 2024-11-29 PROCEDURE — 31500 INSERT EMERGENCY AIRWAY: CPT | Performed by: ANESTHESIOLOGY

## 2024-11-29 PROCEDURE — 84132 ASSAY OF SERUM POTASSIUM: CPT | Performed by: INTERNAL MEDICINE

## 2024-11-29 PROCEDURE — 93325 DOPPLER ECHO COLOR FLOW MAPG: CPT | Performed by: INTERNAL MEDICINE

## 2024-11-29 PROCEDURE — 36415 COLL VENOUS BLD VENIPUNCTURE: CPT

## 2024-11-29 PROCEDURE — 2500000004 HC RX 250 GENERAL PHARMACY W/ HCPCS (ALT 636 FOR OP/ED)

## 2024-11-29 PROCEDURE — 93005 ELECTROCARDIOGRAM TRACING: CPT

## 2024-11-29 PROCEDURE — 93308 TTE F-UP OR LMTD: CPT | Performed by: INTERNAL MEDICINE

## 2024-11-29 PROCEDURE — 87040 BLOOD CULTURE FOR BACTERIA: CPT

## 2024-11-29 PROCEDURE — 71045 X-RAY EXAM CHEST 1 VIEW: CPT | Performed by: RADIOLOGY

## 2024-11-29 PROCEDURE — 85730 THROMBOPLASTIN TIME PARTIAL: CPT

## 2024-11-29 PROCEDURE — 87899 AGENT NOS ASSAY W/OPTIC: CPT

## 2024-11-29 PROCEDURE — 2500000001 HC RX 250 WO HCPCS SELF ADMINISTERED DRUGS (ALT 637 FOR MEDICARE OP)

## 2024-11-29 PROCEDURE — 93321 DOPPLER ECHO F-UP/LMTD STD: CPT | Performed by: INTERNAL MEDICINE

## 2024-11-29 PROCEDURE — 99232 SBSQ HOSP IP/OBS MODERATE 35: CPT | Performed by: STUDENT IN AN ORGANIZED HEALTH CARE EDUCATION/TRAINING PROGRAM

## 2024-11-29 PROCEDURE — C8924 2D TTE W OR W/O FOL W/CON,FU: HCPCS

## 2024-11-29 PROCEDURE — 74018 RADEX ABDOMEN 1 VIEW: CPT | Performed by: STUDENT IN AN ORGANIZED HEALTH CARE EDUCATION/TRAINING PROGRAM

## 2024-11-29 PROCEDURE — 82248 BILIRUBIN DIRECT: CPT

## 2024-11-29 PROCEDURE — 71045 X-RAY EXAM CHEST 1 VIEW: CPT

## 2024-11-29 PROCEDURE — 2780000003 HC OR 278 NO HCPCS: Performed by: INTERNAL MEDICINE

## 2024-11-29 PROCEDURE — 3E043XZ INTRODUCTION OF VASOPRESSOR INTO CENTRAL VEIN, PERCUTANEOUS APPROACH: ICD-10-PCS

## 2024-11-29 PROCEDURE — 93010 ELECTROCARDIOGRAM REPORT: CPT | Performed by: INTERNAL MEDICINE

## 2024-11-29 PROCEDURE — 99232 SBSQ HOSP IP/OBS MODERATE 35: CPT | Performed by: PHYSICIAN ASSISTANT

## 2024-11-29 PROCEDURE — 2020000001 HC ICU ROOM DAILY

## 2024-11-29 PROCEDURE — C1725 CATH, TRANSLUMIN NON-LASER: HCPCS | Performed by: INTERNAL MEDICINE

## 2024-11-29 PROCEDURE — 87637 SARSCOV2&INF A&B&RSV AMP PRB: CPT

## 2024-11-29 PROCEDURE — 83880 ASSAY OF NATRIURETIC PEPTIDE: CPT

## 2024-11-29 PROCEDURE — 74018 RADEX ABDOMEN 1 VIEW: CPT

## 2024-11-29 PROCEDURE — 85520 HEPARIN ASSAY: CPT

## 2024-11-29 PROCEDURE — 85027 COMPLETE CBC AUTOMATED: CPT

## 2024-11-29 PROCEDURE — 76937 US GUIDE VASCULAR ACCESS: CPT | Performed by: INTERNAL MEDICINE

## 2024-11-29 PROCEDURE — 82947 ASSAY GLUCOSE BLOOD QUANT: CPT

## 2024-11-29 PROCEDURE — 37799 UNLISTED PX VASCULAR SURGERY: CPT

## 2024-11-29 PROCEDURE — 2550000001 HC RX 255 CONTRASTS: Performed by: STUDENT IN AN ORGANIZED HEALTH CARE EDUCATION/TRAINING PROGRAM

## 2024-11-29 PROCEDURE — 94002 VENT MGMT INPAT INIT DAY: CPT

## 2024-11-29 PROCEDURE — 83735 ASSAY OF MAGNESIUM: CPT

## 2024-11-29 PROCEDURE — 84100 ASSAY OF PHOSPHORUS: CPT

## 2024-11-29 PROCEDURE — 84132 ASSAY OF SERUM POTASSIUM: CPT | Performed by: STUDENT IN AN ORGANIZED HEALTH CARE EDUCATION/TRAINING PROGRAM

## 2024-11-29 PROCEDURE — 2500000004 HC RX 250 GENERAL PHARMACY W/ HCPCS (ALT 636 FOR OP/ED): Performed by: INTERNAL MEDICINE

## 2024-11-29 PROCEDURE — 80053 COMPREHEN METABOLIC PANEL: CPT

## 2024-11-29 PROCEDURE — 93325 DOPPLER ECHO COLOR FLOW MAPG: CPT

## 2024-11-29 PROCEDURE — 85347 COAGULATION TIME ACTIVATED: CPT | Performed by: INTERNAL MEDICINE

## 2024-11-29 PROCEDURE — 85610 PROTHROMBIN TIME: CPT

## 2024-11-29 PROCEDURE — 84145 PROCALCITONIN (PCT): CPT

## 2024-11-29 PROCEDURE — 85347 COAGULATION TIME ACTIVATED: CPT

## 2024-11-29 PROCEDURE — 87449 NOS EACH ORGANISM AG IA: CPT

## 2024-11-29 PROCEDURE — 2500000005 HC RX 250 GENERAL PHARMACY W/O HCPCS

## 2024-11-29 PROCEDURE — C1769 GUIDE WIRE: HCPCS | Performed by: INTERNAL MEDICINE

## 2024-11-29 RX ORDER — NITROGLYCERIN 20 MG/100ML
0-200 INJECTION INTRAVENOUS CONTINUOUS
Status: DISCONTINUED | OUTPATIENT
Start: 2024-11-29 | End: 2024-11-30

## 2024-11-29 RX ORDER — BUMETANIDE 0.25 MG/ML
2 INJECTION, SOLUTION INTRAMUSCULAR; INTRAVENOUS ONCE
Status: COMPLETED | OUTPATIENT
Start: 2024-11-29 | End: 2024-11-29

## 2024-11-29 RX ORDER — LIDOCAINE HYDROCHLORIDE 20 MG/ML
INJECTION, SOLUTION INFILTRATION; PERINEURAL AS NEEDED
Status: DISCONTINUED | OUTPATIENT
Start: 2024-11-29 | End: 2024-11-29 | Stop reason: HOSPADM

## 2024-11-29 RX ORDER — NITROGLYCERIN 20 MG/100ML
INJECTION INTRAVENOUS
Status: DISPENSED
Start: 2024-11-29 | End: 2024-11-30

## 2024-11-29 RX ORDER — MIDAZOLAM HYDROCHLORIDE 1 MG/ML
0-20 INJECTION, SOLUTION INTRAVENOUS CONTINUOUS
Status: DISCONTINUED | OUTPATIENT
Start: 2024-11-29 | End: 2024-12-02

## 2024-11-29 RX ORDER — NITROGLYCERIN 20 MG/100ML
5-200 INJECTION INTRAVENOUS CONTINUOUS
Status: DISCONTINUED | OUTPATIENT
Start: 2024-11-29 | End: 2024-11-29

## 2024-11-29 RX ORDER — SODIUM NITROPRUSSIDE IN 0.9% SODIUM CHLORIDE 0.5 MG/ML
.25-5 INJECTION INTRAVENOUS CONTINUOUS
Status: DISCONTINUED | OUTPATIENT
Start: 2024-11-29 | End: 2024-12-03

## 2024-11-29 RX ORDER — HEPARIN SODIUM 1000 [USP'U]/ML
INJECTION, SOLUTION INTRAVENOUS; SUBCUTANEOUS AS NEEDED
Status: DISCONTINUED | OUTPATIENT
Start: 2024-11-29 | End: 2024-11-29 | Stop reason: HOSPADM

## 2024-11-29 RX ORDER — EPINEPHRINE 1 MG/ML
INJECTION, SOLUTION, CONCENTRATE INTRAVENOUS
Status: DISPENSED
Start: 2024-11-29 | End: 2024-11-30

## 2024-11-29 RX ORDER — EPINEPHRINE IN 0.9 % SOD CHLOR 4MG/250ML
0-1 PLASTIC BAG, INJECTION (ML) INTRAVENOUS CONTINUOUS
Status: DISCONTINUED | OUTPATIENT
Start: 2024-11-29 | End: 2024-11-30

## 2024-11-29 RX ORDER — NOREPINEPHRINE BITARTRATE/D5W 8 MG/250ML
PLASTIC BAG, INJECTION (ML) INTRAVENOUS
Status: COMPLETED
Start: 2024-11-29 | End: 2024-11-29

## 2024-11-29 RX ORDER — POLYETHYLENE GLYCOL 3350 17 G/17G
17 POWDER, FOR SOLUTION ORAL DAILY
Status: DISCONTINUED | OUTPATIENT
Start: 2024-11-29 | End: 2024-12-04

## 2024-11-29 RX ORDER — HEPARIN SODIUM 10000 [USP'U]/100ML
0-4000 INJECTION, SOLUTION INTRAVENOUS CONTINUOUS
Status: DISCONTINUED | OUTPATIENT
Start: 2024-11-29 | End: 2024-12-04

## 2024-11-29 RX ORDER — FUROSEMIDE 10 MG/ML
80 INJECTION INTRAMUSCULAR; INTRAVENOUS ONCE
Status: COMPLETED | OUTPATIENT
Start: 2024-11-29 | End: 2024-11-29

## 2024-11-29 RX ORDER — FENTANYL CITRATE-0.9 % NACL/PF 10 MCG/ML
0-300 PLASTIC BAG, INJECTION (ML) INTRAVENOUS CONTINUOUS
Status: DISCONTINUED | OUTPATIENT
Start: 2024-11-29 | End: 2024-12-02

## 2024-11-29 RX ORDER — FUROSEMIDE 10 MG/ML
40 INJECTION INTRAMUSCULAR; INTRAVENOUS ONCE
Status: DISCONTINUED | OUTPATIENT
Start: 2024-11-29 | End: 2024-11-29

## 2024-11-29 RX ORDER — EPINEPHRINE IN 0.9 % SOD CHLOR 4MG/250ML
0-1 PLASTIC BAG, INJECTION (ML) INTRAVENOUS CONTINUOUS
Status: DISCONTINUED | OUTPATIENT
Start: 2024-11-29 | End: 2024-11-29

## 2024-11-29 RX ORDER — BISACODYL 10 MG/1
10 SUPPOSITORY RECTAL DAILY
Status: DISCONTINUED | OUTPATIENT
Start: 2024-11-29 | End: 2024-12-04

## 2024-11-29 RX ORDER — NOREPINEPHRINE BITARTRATE/D5W 8 MG/250ML
0-.5 PLASTIC BAG, INJECTION (ML) INTRAVENOUS CONTINUOUS
Status: DISCONTINUED | OUTPATIENT
Start: 2024-11-29 | End: 2024-11-30

## 2024-11-29 RX ORDER — PROPOFOL 10 MG/ML
INJECTION, EMULSION INTRAVENOUS
Status: DISPENSED
Start: 2024-11-29 | End: 2024-11-30

## 2024-11-29 ASSESSMENT — PAIN - FUNCTIONAL ASSESSMENT
PAIN_FUNCTIONAL_ASSESSMENT: CPOT (CRITICAL CARE PAIN OBSERVATION TOOL)
PAIN_FUNCTIONAL_ASSESSMENT: CPOT (CRITICAL CARE PAIN OBSERVATION TOOL)

## 2024-11-29 ASSESSMENT — PAIN SCALES - GENERAL: PAINLEVEL_OUTOF10: 0 - NO PAIN

## 2024-11-29 NOTE — SIGNIFICANT EVENT
Edu Echavarria is a 74 y.o. male w/ a PMH of HTN, GERD, and gout who presented to OSH on 11/17 for STEMI, LHC showed triple vessel disease, now awaiting CABG. TTE 11/18 with EF of 23%. Nephrology consulted for acute on chronic hyponatremia in the setting of hypovolemia In setting of dehydration and diarrhea, low po intake. Na level improving. Helio encourage oral intake. No intervention required from nephrology standpoint and would like to sign off. Please reach out for any future concerns.   Thank you for the consult.    Korina Talbot MD  Nephrology Fellow.

## 2024-11-29 NOTE — PROGRESS NOTES
Occupational Therapy                 Therapy Communication Note    Patient Name: Edu Echavarria  MRN: [id number]  Department: [place] [address]  Room: 70/7017-  Today's Date: 11/29/2024     Discipline: Occupational Therapy    Missed Visit Reason: Missed Visit Reason: Patient refused (Attempted to see pt at this time for OT, however, pt adamantly refused therapy at this time despite encouragement and education re: benifits of OT. Will attempt to f/u with pt as he is agreeable to participate. RN made aware.)    Missed Time: Attempt 10:22      11/29/24 at 12:02 PM - Gisela Maciel OT

## 2024-11-29 NOTE — PROCEDURES
Central Line    Date/Time: 11/29/2024 6:51 PM    Performed by: Antione Rojas MD  Authorized by: Antione Rojas MD    Consent:     Consent obtained:  Emergent situation    Consent given by:  Healthcare agent    Risks, benefits, and alternatives were discussed: yes    Universal protocol:     Immediately prior to procedure, a time out was called: yes      Patient identity confirmed:  Hospital-assigned identification number  Pre-procedure details:     Indication(s): central venous access and hemodynamic monitoring      Hand hygiene: Hand hygiene performed prior to insertion      Sterile barrier technique: All elements of maximal sterile technique followed      Skin preparation:  Chlorhexidine with alcohol    Skin preparation agent: Skin preparation agent completely dried prior to procedure    Sedation:     Sedation type:  Moderate sedation  Anesthesia:     Anesthesia method:  Local infiltration    Local anesthetic:  Lidocaine 1% w/o epi  Procedure details:     Location:  R internal jugular    Site selection rationale:  Pulmonary artery catheter    Patient position:  Supine    Procedural supplies:  Cordis    Catheter size:  9 Fr    Landmarks identified: yes      Ultrasound guidance: yes      Sterile ultrasound techniques: Sterile gel and sterile probe covers were used      Number of attempts:  2    Successful placement: yes    Post-procedure details:     Post-procedure:  Line sutured and dressing applied    Assessment:  Blood return through all ports    Procedure completion:  Tolerated        A time-out was performed. The patient's right neck region was prepped and draped in sterile fashion using chlorhexidine scrub. Anesthesia was achieved with 3cc 1% lidocaine. The right internal jugular vein was accessed used a micropuncture needle. Venous blood was withdrawn, the syringe was disconnected and the micropuncture wire was advanced through the needle, the needle was removed while maintaining the micropuncture wire within the  vessel, then the micropuncture sheath was advanced into the vein over the wire. Venous position was confirmed with ultrasound visualization of guidewire and manometry. A larger guidewire was advanced through the sheath. A small incision was made with a 10 blade scalpel and the sheath was exchanged for a dilator with overlying Jacob 9F sheath (for serial dilation) then 75cm Amplatz soft-tip super stiff wire was advanced for support then ultimately a 9F MAC catheter was introduced and sutured in place over the guidewire after appropriate dilation.     We manipulated a [place] catheter through the venous system to the pulmonary capillary wedge position monitoring for appropriate RA and RV waveforms. No ectopy was noted on monitor. A wedge pressure was obtained. We reviewed the data and felt that it was adequate. We dressed the access site appropriate. Post procedure chest x-ray was ordered and will be reviewed for correct placement and signs of pneumothorax. Pt tolerated procedure in entirety without issue.

## 2024-11-29 NOTE — PROGRESS NOTES
Physical Therapy                 Therapy Communication Note    Patient Name: dEu Echavarria  MRN: [id number]  Department: [place] [address]  Room: 70/7017-A  Today's Date: 11/29/2024     Discipline: Physical Therapy    Missed Visit Reason: Missed Visit Reason: Patient refused (Pt stated he is not getting OOB until after surgery despite encouragement. RN made aware.)    Missed Time: Attempt    Comment:

## 2024-11-29 NOTE — SIGNIFICANT EVENT
Rapid Response Nurse Note: Rapid Response    Pager time:   Arrival time:   Event end time:   Location: [place] [id number]  [] Triage by phone or secure messaging    Rapid response initiated by:  [] Rapid response RN [] Family [] Nursing Supervisor [x] Physician   [] RADAR auto page [] Sepsis auto-page [] RN [] RT   [] NP/PA [] Other:     Primary reason for call:   [] BAT [] New CPAP/BiPAP [] Bleeding [] Change in mental status   [] Chest pain [] Code blue [] FiO2 >/= 50% [] HR </= 40 bpm   [] HR >/= 130 bpm [] Hyperglycemia [] Hypoglycemia [] RADAR    [] RR </= 8 bpm [] RR >/= 30 bpm [] SBP </= 90 mmHg [x] SpO2 < 90%   [] Seizure [] Sepsis [] Shortness of breath  [] Staff concern: see comments     Initial VS and/or RADAR VS: T 35.8 °C; ; RR 16; /106; SPO2 90%.    Providers present at bedside (if applicable): Jagjit Good MD, Primary RN, Rapid Response RN, Floor RT, Pauly Crowe DO, Dr. Samantha EUGENE    Name of ICU Provider contacted (if applicable):     Interventions:  [] None [x] ABG/VBG [x] Assist w/ICU transfer [] BAT paged    [x] Bag mask [] Blood [] Cardioversion [] Code Blue   [x] Code blue for intubation [] Code status changed [x] Chest x-ray [x] EKG   [] IV fluid/bolus [] KUB x-ray [x] Labs/cultures [x] Medication   [] Nebulizer treatment [] NIPPV (CPAP/BiPAP) [] Oxygen [] Oral airway   [] Peripheral IV [] Palliative care consult [] CT/MRI [] Sepsis protocol    [] Suctioned [] Other:     Outcome:  [] Coded and  [x] Code blue for intubation [] Coded and transferred to ICU []  on division   [] Remained on division (no change) [] Remained on division + additional monitoring [] Remained in ED [] Transferred to ED   [x] Transferred to ICU [] Transferred to inpatient status [] Transferred for interventions (procedure) [] Transferred to ICU stepdown    [] Transferred to surgery [] Transferred to telemetry [] Sepsis protocol [] STEMI protocol   [] Stroke protocol [x] Bedside nurse  instructed to page rapid response for any concerns or acute change in condition/VS     Additional Comments:     Responded to Rapid Response called for acute onset dyspnea, tachypnea, hypoxia with increased O2 demand.    Upon my arrival to the bedside patient found to be severely dyspneic with O2 saturations of 86-87%.  Placed on 100% nonrebreather with mild improvement in O2 saturations to 88-89%.  Breath sounds audible for rhonchi and crackles.    AB.23, 63, 90, 98% Lactate 2.9, HCO3 26.4    CXR concerning for pulmonary edema.    Lasix 80 mg ordered and given.    Blood cultures x2, CBC + Diff, RFP + Mag, Coag Screen ordered.  Sepsis antibiotics ordered - Zosyn and Vanco.    CICU contacted for escalation of care.  Accepted for escalation of care.    Placed on BiPAP 100% 15/5 but pt reports increased dyspnea and unable to keep O2 sats above 80%.  Replaced 100% non-rebreather which resulted in no improvement.  Started ventilation with ambu bag with mild improvement but still in the low 80s.      Code Blue called for intubation.  Intubated by anesthesia with a 7.0 at 24 at the lip with positive color change.    Transferred to CICU bed 18.  Updates provided to ICU team.

## 2024-11-29 NOTE — SIGNIFICANT EVENT
Rapid Response Note:    Called by bedside RN around 15:30 11/29 for SOB and increased WOB. On arrival, he was afebrile, tachycardic (>110), hypertensive (up to 190s systolic), tachypneic, hypoxic (satting 85% on RA), and was found to be in respiratory distress with increased WOB. On exam, b/l diffuse crackles appreciated with no murmurs. Denied chest pain but noted new, sudden-onset SOB and cough with yellow-sputum (was not on O2 prior to this). Started on 6L NC with mild increase in SpO2 to low 90s. ABG showed 7.23/63/90/lactate 2.9, found to be in acute hypoxemic hypercapnic respiratory failure. Called rapid response, ordered STAT EKG, CXR, rainbow labs, Bcx x2, and started IV lasix and zosyn.    EKG showed similar findings to prior, CXR showed b/l pleural effusions and pulmonary edema, c/f flash pulmonary edema. Notably, received gentle IV continuous fluids earlier in the week for hypovolemic hyponatremia with improvement of Na, EF 23% 11/18; possibly precipitated by infectious process.     Notified CICU fellow and pt accepted for CICU transfer. BIPAP initiated in the floors while waiting for transfer with poor response and sustained hypoxia with persistent sats in the 80s, then tried NRB with no improvement. Decision made to intubate and call Code Blue for intubation and possible impending cardiac arrest 2/2 hypoxia. Underwent rapid sequence intubation with improvement in saturation to 97% and normalization of BP to 140s systolic. Pt then transferred to CICU.    Red Loredo MD  PGY-1 IM

## 2024-11-29 NOTE — PROGRESS NOTES
CARDIAC SURGERY CONSULT PROGRESS NOTE    SUBJECTIVE  Edu Echavarria is a 74 y.o. male with a PMHx of HTN, GERD, gout, former smoker, and current cocaine and marijuana use who presented to [facility] ED on 11/17 with ~ 2 days history of palpitations and chest pain. At [facility] his initial EKG aflutter w/ RVR and ST elevtions in II/III/avF & questionable in V1-V3 w/o reciprocal changes, and labs remarkable for HS trop >50173, BNP 1100, WBC 5, CBC/RFP otherwise unremarkable. Repeat EKG aflutter w/ similar ST elevations as prior. STEMI call activated, loaded with aspirin 324mg, plavix 300mg, and started on heparin gtt 11/17. LHC showed triple vessel disease w/ no stenting and IABP was placed for coronary perfusion. Started on integrillin (which has now been stopped) and continued on heparin gtt. He was then transferred to [facility] for CABG evaluation.    On exam, the patient denies chest pain and otherwise feels well.   Of note, U tox was positive for cannabinoid and cocaine on admission 11/18. The patient states he last used cocaine on Saturday 11/16 and marijuana 11/17.     Cardiac surgery is consulted for surgical evaluation of his CAD.     [facility] 11/18:  LAD: large vessel which is extensively and diffusely diseased. Prox/ostial LAD evidence of a hazy eccentric stensois ~70%. LAD w/ evidence of extensive disease w/ stenosis in its proximal part of approx 65-70% followed by aneursymatic dilatation jutp roximal to first septal , which si folllowed by his stenosis in range of 70%. Mid LAD has evidence of a stenosis in range of 90%. Distal LAD is severely diseased with evidence of a long lesion in the range of 90%.      D1: small  D2: mod sized, mildly diffusely disease  Lcx: large anatomically dominant vessel which gives off 2 obtuse marginals, mod to severe diffuse disease. Setnosis medicine 1.1.1.1 at the bifucation/takeoff of 3rd OM.   Om1: mod sized vessel with evidence of severe stnesosis in range of  "80%  OM2: small  OM3: large vessel with evidence of severe ostial stenosis/bifrucation medicine 1.1.1.1. more distally the vessel is totally occluded. Distal Oms are visualized thought he itnra systemic collaterals. Ramyus intermedius absent.   RCA: non dominant with evcieence of moderte disease in its mid segment.      LVEDP: 46 mmHg  Estimated LVEF 20-25% with evidence of extensive global hypokinesis.     TTE 11/18: CONCLUSIONS:   1. Poorly visualized anatomical structures due to suboptimal image quality.   2. Left ventricular ejection fraction is severely decreased, calculated by Regalado's biplane at 23%.   3. There is global hypokinesis of the left ventricle with minor regional variations.   4. Spectral Doppler shows a Grade III (restrictive pattern) of left ventricular diastolic filling with an elevated left atrial pressure.   5. There is normal right ventricular global systolic function.   6. Mildly enlarged right ventricle.   7. The left atrium is mildly dilated.      Objective   /80   Pulse 86   Temp 36.6 °C (97.9 °F)   Resp 18   Ht 1.702 m (5' 7\")   Wt 85.4 kg (188 lb 4.4 oz)   SpO2 (!) 89%   BMI 29.49 kg/m²   0-10 (Numeric) Pain Score: 0 - No pain   Vitals:    11/21/24 0600   Weight: 85.4 kg (188 lb 4.4 oz)          Intake/Output Summary (Last 24 hours) at 11/29/2024 1209  Last data filed at 11/29/2024 0850  Gross per 24 hour   Intake 50 ml   Output 2700 ml   Net -2650 ml          Medications  Scheduled medications  aspirin, 81 mg, oral, Daily  atorvastatin, 80 mg, oral, Nightly  bisacodyl, 10 mg, rectal, Daily  [START ON 12/3/2024] chlorhexidine, 15 mL, Mouth/Throat, BID  enoxaparin, 40 mg, subcutaneous, Daily  pantoprazole, 40 mg, oral, Daily before breakfast   Or  esomeprazole, 40 mg, nasoduodenal tube, Daily before breakfast   Or  pantoprazole, 40 mg, intravenous, Daily before breakfast  insulin lispro, 0-10 Units, subcutaneous, TID AC  isosorbide mononitrate ER, 30 mg, oral, " Daily  metoprolol succinate XL, 50 mg, oral, Daily  NIFEdipine ER, 30 mg, oral, Daily before breakfast  polyethylene glycol, 17 g, oral, Daily    Continuous medications       PRN medications  PRN medications: acetaminophen, alum-mag hydroxide-simeth, dextrose, dextrose, docusate sodium, glucagon, glucagon, hydrOXYzine HCL, lubricating eye drops, simethicone    Labs  Results for orders placed or performed during the hospital encounter of 11/18/24 (from the past 24 hours)   POCT GLUCOSE   Result Value Ref Range    POCT Glucose 206 (H) 74 - 99 mg/dL   POCT GLUCOSE   Result Value Ref Range    POCT Glucose 119 (H) 74 - 99 mg/dL   POCT GLUCOSE   Result Value Ref Range    POCT Glucose 150 (H) 74 - 99 mg/dL   POCT GLUCOSE   Result Value Ref Range    POCT Glucose 138 (H) 74 - 99 mg/dL   Hepatic Function Panel   Result Value Ref Range    Albumin 3.8 3.4 - 5.0 g/dL    Bilirubin, Total 1.1 0.0 - 1.2 mg/dL    Bilirubin, Direct 0.4 (H) 0.0 - 0.3 mg/dL    Alkaline Phosphatase 69 33 - 136 U/L    ALT 15 10 - 52 U/L    AST 12 9 - 39 U/L    Total Protein 6.5 6.4 - 8.2 g/dL   CBC and Auto Differential   Result Value Ref Range    WBC 14.9 (H) 4.4 - 11.3 x10*3/uL    nRBC 0.0 0.0 - 0.0 /100 WBCs    RBC 5.01 4.50 - 5.90 x10*6/uL    Hemoglobin 14.3 13.5 - 17.5 g/dL    Hematocrit 40.6 (L) 41.0 - 52.0 %    MCV 81 80 - 100 fL    MCH 28.5 26.0 - 34.0 pg    MCHC 35.2 32.0 - 36.0 g/dL    RDW 13.5 11.5 - 14.5 %    Platelets 298 150 - 450 x10*3/uL    Neutrophils % 77.1 40.0 - 80.0 %    Immature Granulocytes %, Automated 0.9 0.0 - 0.9 %    Lymphocytes % 11.2 13.0 - 44.0 %    Monocytes % 9.4 2.0 - 10.0 %    Eosinophils % 1.0 0.0 - 6.0 %    Basophils % 0.4 0.0 - 2.0 %    Neutrophils Absolute 11.45 (H) 1.60 - 5.50 x10*3/uL    Immature Granulocytes Absolute, Automated 0.13 0.00 - 0.50 x10*3/uL    Lymphocytes Absolute 1.67 0.80 - 3.00 x10*3/uL    Monocytes Absolute 1.40 (H) 0.05 - 0.80 x10*3/uL    Eosinophils Absolute 0.15 0.00 - 0.40 x10*3/uL     Basophils Absolute 0.06 0.00 - 0.10 x10*3/uL   Coagulation Screen   Result Value Ref Range    Protime 15.2 (H) 9.8 - 12.8 seconds    INR 1.3 (H) 0.9 - 1.1    aPTT 36 27 - 38 seconds   Magnesium   Result Value Ref Range    Magnesium 2.05 1.60 - 2.40 mg/dL   Phosphorus   Result Value Ref Range    Phosphorus 2.6 2.5 - 4.9 mg/dL   Basic Metabolic Panel   Result Value Ref Range    Glucose 128 (H) 74 - 99 mg/dL    Sodium 128 (L) 136 - 145 mmol/L    Potassium 4.0 3.5 - 5.3 mmol/L    Chloride 92 (L) 98 - 107 mmol/L    Bicarbonate 23 21 - 32 mmol/L    Anion Gap 17 10 - 20 mmol/L    Urea Nitrogen 8 6 - 23 mg/dL    Creatinine 0.78 0.50 - 1.30 mg/dL    eGFR >90 >60 mL/min/1.73m*2    Calcium 8.8 8.6 - 10.6 mg/dL   POCT GLUCOSE   Result Value Ref Range    POCT Glucose 148 (H) 74 - 99 mg/dL               ASSESSMENT & PLAN  Edu Echavarria is a 74 y.o. male with a PMHx of HTN, GERD, gout, former smoker, and current cocaine and marijuana use who presented to [facility] ED on 11/17 with ~ 2 days history of palpitations and chest pain. At [facility] his initial EKG aflutter w/ RVR and ST elevtions in II/III/avF & questionable in V1-V3 w/o reciprocal changes, and labs remarkable for HS trop >37040, BNP 1100, WBC 5, CBC/RFP otherwise unremarkable. Repeat EKG aflutter w/ similar ST elevations as prior. STEMI call activated, loaded with aspirin 324mg, plavix 300mg, and started on heparin gtt 11/17. LHC showed triple vessel disease w/ no stenting and IABP was placed for coronary perfusion. Started on integrillin (which has now been stopped) and continued on heparin gtt. He was then transferred to [facility] for CABG evaluation.    On exam, the patient denies chest pain and otherwise feels well.   Of note, U tox was positive for cannabinoid and cocaine on admission 11/18. The patient states he last used cocaine on Saturday 11/16 and marijuana 11/17.     Cardiac surgery is consulted for surgical evaluation of his CAD.      RECOMMENDATIONS/PLAN  Dr. Villa met with patient reviewed case and available imaging.   Plan for new scheduled OR date of 12/4 pending patient is medically optimized.   Medical optimization per primary team    Daily updates/ongoing issues:   - 11/25: Initially plan was for surgery Monday 11/25, however given patient's worsening hypochloremia and hyponatremia and concern for anesthesia, surgery was cancelled and reschedule date TBD. Nephrology is consulted. Further recs pending electrolyte correction.   - 11/26: Na 121, Cl 88; nephrology recommending hydration with NS 0.9%; rescheduled OR date TBD  - 11/27 Na 123, Cl 91; continue medical optimization. Rescheduled OR date tentatively 12/4.   - 11/29 Na 128; Cl 92; OR date 12/4.     [facility] images in PACS to review.   TTE 11/18 EF 23%  Preop risk stratification studies/labs:  --- US Carotids/Vein Mapping/ LE US ELODIA - completed 11/21  --- PFTs (spirometry and room air ABG) -  completed 11/21  --- pCXR completed 11/19  --- MRSA, UA/Culture, LFTs, HgbA1c, TSH/T4, Lipid panel  --- CT chest without contrast - completed 11/21  --- Dental evaluation not indicated for isolated CABG surgeries     Preop orders:  - Continue ASA, high-intensity statin, and BB (or document contraindications for use) for preop CABG patients   - No ACEi/ARBs in the pre-op period (at least 48 hours)  - Hold any SGLT2 inhibitors (Farxiga, Jardiance, etc.) for at least 3 days prior to cardiac surgery to prevent euglycemic DKA  - Hold any daily GLP-1 agonist drugs on the day of surgery. Hold any weekly GLP-1 drugs for 7 days prior to surgery. (Dulaglutide, Exenatide, Liraglutide, Lixisenatide, & Semaglutide)  - No antiplatelets other than ASA, no anticoagulants other than Heparin  - NPO after midnight, blood on hold/T&S, and preop scrubs ordered for OR 12/4      Will continue to follow along.  Thank you for the consultation.   Please page the cardiac surgery consult pager [phone] with any questions or  changes in patient condition.    Felecia Ritchie PA-C  Cardiac Surgery Consult [facility]  [facility]  Cardiac Surgery Consult Pager [phone]     11/29/2024  12:09 PM

## 2024-11-29 NOTE — PROGRESS NOTES
Date of Admission: 2024  Hospital Day: 12    Subjective   No CP, SOB, on RA. Feels constipated and bloated, wants laxative, improved appetite. Na improved to 128, OR next Wed.      Objective     Vitals:    24 Hour Vitals  Temp:  [36.3 °C (97.3 °F)-36.6 °C (97.9 °F)] 36.6 °C (97.9 °F)  Heart Rate:  [65-86] 86  Resp:  [16-19] 18  BP: (115-140)/(65-83) 140/80    Temp (24hrs), Av.4 °C (97.6 °F), Min:36.3 °C (97.3 °F), Max:36.6 °C (97.9 °F)     24 hour Intake/Output    Intake/Output Summary (Last 24 hours) at 2024 1224  Last data filed at 2024 0850  Gross per 24 hour   Intake 50 ml   Output 2350 ml   Net -2300 ml        Physical exam:  Constitutional: Well-developed male in no acute distress.  HEENT: NCAT. EOMI. No JVD  Respiratory: CTAB. No wheezes, crackles, or rhonchi. Normal respiratory effort on RA.  Cardiovascular: RRR, normal S1/S2, No murmurs, rubs, or gallops.   Abdominal: soft, NTND, no rebound or guarding  Neuro: CN II-XII grossly intact. Moving all extremities with no focal deficits  MSK: WWP, no peripheral edema  Skin: No lesions, wounds, or bruising  Psych: Appropriate mood and affect.      Medications   Scheduled Medications  aspirin, 81 mg, oral, Daily  atorvastatin, 80 mg, oral, Nightly  bisacodyl, 10 mg, rectal, Daily  [START ON 12/3/2024] chlorhexidine, 15 mL, Mouth/Throat, BID  enoxaparin, 40 mg, subcutaneous, Daily  pantoprazole, 40 mg, oral, Daily before breakfast   Or  esomeprazole, 40 mg, nasoduodenal tube, Daily before breakfast   Or  pantoprazole, 40 mg, intravenous, Daily before breakfast  insulin lispro, 0-10 Units, subcutaneous, TID AC  isosorbide mononitrate ER, 30 mg, oral, Daily  metoprolol succinate XL, 50 mg, oral, Daily  NIFEdipine ER, 30 mg, oral, Daily before breakfast  polyethylene glycol, 17 g, oral, Daily     Continuous Medications      PRN Medications  PRN medications: acetaminophen, alum-mag hydroxide-simeth, dextrose, dextrose, docusate sodium, glucagon,  glucagon, hydrOXYzine HCL, lubricating eye drops, simethicone     Labs  CBC  Results from last 72 hours   Lab Units 11/29/24  0802 11/28/24  0659 11/27/24  0829   WBC AUTO x10*3/uL 14.9* 13.8* 13.6*   HEMOGLOBIN g/dL 14.3 13.2* 15.2   HEMATOCRIT % 40.6* 37.9* 42.8   PLATELETS AUTO x10*3/uL 298 260 293       BMP  Results from last 72 hours   Lab Units 11/29/24  0802 11/28/24  0659 11/27/24  1826   SODIUM mmol/L 128* 126* 121*   POTASSIUM mmol/L 4.0 3.9 4.4   CHLORIDE mmol/L 92* 93* 92*   BUN mg/dL 8 10 13   CREATININE mg/dL 0.78 0.81 0.85   MAGNESIUM mg/dL 2.05 1.92 2.16   PHOSPHORUS mg/dL 2.6 2.5 2.6     Serum osm 262, Uosm 581, Vanita <10    Imaging:     === 11/18/24 ===    CT CHEST WO IV CONTRAST    - Impression -  1.  Thoracic aorta is normal in course and caliber. There is mild  calcified plaque involving the arch and branch vessels. There is  bovine arch configuration with common origin of the innominate and  left common carotid artery.  2. Severe coronary artery calcifications.  3. Bibasilar atelectasis left-greater-than-right.  4. Several small, pulmonary nodules within both lungs, measuring less  than 6 mm, which are likely benign. Instructions:  Consider follow-up  noncontrast CT scan chest in 12 months.  5. Multiple enlarged retrocrural lymph nodes measuring up to 1.3 cm  which are nonspecific and may be reactive. Recommend follow-up CT of  the abdomen in 3 months to evaluate for interval change.    TTE (11/18)  CONCLUSIONS:   1. Poorly visualized anatomical structures due to suboptimal image quality.   2. Left ventricular ejection fraction is severely decreased, calculated by Regalado's biplane at 23%.   3. There is global hypokinesis of the left ventricle with minor regional variations.   4. Spectral Doppler shows a Grade III (restrictive pattern) of left ventricular diastolic filling with an elevated left atrial pressure.   5. There is normal right ventricular global systolic function.   6. Mildly enlarged  right ventricle.   7. The left atrium is mildly dilated.    KUB (11/25):  IMPRESSION:  1.  No evidence of bowel obstruction or perforation.       Assessment and plan:  Edu Echavarria is a 74 y.o. male with a PMHx of HTN, GERD, and gout who presented to [facility] ED on 11/17pm with 2d hx of intermittent palpitations and chest pain. At OSH initial EKG flutter w/ RVR and ST elevtions in II/III/avF & questionable in V1-V3 w/o reciprocal changes, and labs remarkable for HS trop >77772, BNP 1100, WBC 5, CBC/RFP otherwise unremarkable. Repeat EKG aflutter w/ similar ST elevations as prior. STEMI call activated, loaded with aspirin 324mg, plavix 300mg, and started on heparin gtt 11/17pm. LHC showed triple vessel disease w/ no stenting and IABP was placed for coronary perf. Started on integrillin (which has now been stopped) and continued on heparin gtt. Transferred to [facility] for CABG evaluation. CT surgery on board for CABG evaluation, tentative plan for OR 11/25. Hospital course c/b MARYBETH and slowly dropping plts/Hb, continuing further workup. Pt transferred to floor 11/21 for further care pre-op.     Surgery cancelled on 11/25 due to worsening hyponatremia, likely iso hypovolemia 2/2 decreased PO intake and diarrhea. Resuscitated w/ 1L fluids, nephrology on board. KUB -ve, stool PCR pending. HTN reg changed from coreg isordil and hydral to Imdur 30 and Metop XL 50 to reduce pill burden. HypoNa improving s/p continuous IV NS 80cc/hr over 24 hr.     Updates 11/29:  -HypoNa improved to 121>126>128, CTM  -Nephrology onboard, appreciate recs  -Feels constipated today, start suppository  -BP improving, pt taking meds  -OR tentatively Wed 12/4     #STEMI  #Triple vessel disease  #HTN  :: HS trop >42286 at OSH  :: HS trop at : 29664 > 92380  :: LHC 11/18: Triple vessel disease (LAD prox 70%, mid 65-70%, dist 90%), Lcx OM1 80%, OM2 small, OM3 occluded with collaterals, RCA mid moderate disease. LVEDP 46mmHg, LVEF 20-25%, evidence  of extensive global hypokinesis  :: Arrived integrillin and heparin gtt  :: Aspirin and plavix loaded 11/17 ~2100  :: Utox + cannavinoid, fent (received w/ EMS), benzos, cocaine  -ASA 81mg daily  -Atorva 80mg qhs  -Stop Coreg 25mg BID, Isordil 40 TID, Hydral 50 TID as patient wants to minimize pill burden  -Now on Imdur 30, Metop XL 50, Nifedipine 30  -HOLD home metop (hx cocaine use), on coreg as above  -CT surgery following  -Surgery canceled on 11/25 due to acute on chronic HypoNa (down to 121), see below for further plans  -Pending OR tentatively Wed 12/4     #MARYBETH, resolved  -baseline cr ~0.9  -MARYBETH worsening 11/21 in AM to 1.65>1.2-->1  -FENa 0.1%, pre-renal, likely multifactorial related to contrast admin, hemodynamic changes, and decreased PO intake  -low urine sodium 11/20  Plan:  -encourage PO intake  -monitor urine output, renally dose medications to GFR     #Hyponatremia, acute on chronic, resolving  ::Na on admit 132, now 122  ::Serum osm 262, Uosm 581, Vanita <10  ::Hypotonic hypovolemic hyponatremia  -Nephrology consulted, appreciate recs  -Likely 2/2 dehydration, poor PO intake, and diarrhea  -Likely hypovolemic given high Uosm and low Vanita  -1L NS fluid resuscitation on 11/25  -s/p continuous IV NS 80cc/hr over 24 hr (11/27), HypoNa improved to 121>126>128 (11/29)    #Diarrhea,resolved  #Constipation  ::2-3x watery, loose stools over last few days, b/l for him is 1x well-formed stool  ::KUB showed nonobstructive pattern  -Stool PCR -ve  -Constipation 11/29, start suppository     #mild thrombocytopenia, resolved  -plts and Hb slowly downtrending since admission, appears possible dilutional as all cell lines have decreased (initial labs likely with element of hemoconcentration)  -4Ts: 3pts (low prob of HIT)   -coags, bili wnl  -retic index borderline at 2.6   -LDH mildly elevated at 390 - may be related to IABP (removed 11/20)  -repeat LDH with AM labs-->249  -repeat T&S ordered with AM labs  -c/w lovenox  ppx for now, considering holding if plts continue to drop   -no signs of active bleeding     #leukocytosis - improving  -downtrending with fluids, no current signs/sx of infection   -continue to monitor off abx     #heartburn   #abdominal pain  -not on medications at home  -c/w Pantoprazole 40mg daily- continue discontinue if symptoms improve prior to dc  -tums prn  -c/w miralax, colace      #Gout  -hold allopurinol     Disposition  -PT/OT rec home care currently, pending post-operation eval      Diet: reg  Electrolytes: K >4; Mg >2  DVT ppx: lovenox ppx  GI ppx: ppi  Lines/tubes: PIV, external cath   O2: RA  Baseline Cr: 0.9  T+S: 11/18, repeat   Code status: full code - reconfirmed on transfer  Surrogate decision maker: Guillermo Barrios Jr (cousin) [phone] - reconfirmed on transfer  Guillermo's wife's #: [phone]    Patient seen and discussed with attending physician.      Red Loredo MD  PGY-1 [facility]      Cardiology Staff Addendum:    I saw and evaluated the patient on 11/29/2024.  I personally obtained the key and critical portions of the history and physical exam or was physically present for key and critical portions performed by the resident. I reviewed the resident’s documentation and discussed the patient with the resident.  I agree with the resident’s medical decision making as documented/amended in the note.    Jemal Botello MD

## 2024-11-29 NOTE — PROCEDURES
Airway  Date/Time: 11/29/2024 4:38 PM  Urgency: emergent    Airway not difficult    Staffing  Performed: resident   Authorized by: Pauly Crowe DO    Performed by: Pauly Crowe DO  Patient location during procedure: patient's room    Consent for Airway (if performed for an anesthetic, see related documentation for consents)  Patient identity confirmed: arm band  Consent: The procedure was performed in an emergent situation.      Indications and Patient Condition  Indications for airway management: airway protection, respiratory distress, respiratory failure and hypoxemia  Spontaneous Ventilation: absent  Sedation level: deep  Preoxygenated: yes  Patient position: sniffing  Mask difficulty assessment: 2 - vent by mask + OA or adjuvant +/- NMBA  No planned trial extubation    Final Airway Details  Final airway type: endotracheal airway      Successful airway: ETT  Cuffed: yes   Successful intubation technique: video laryngoscopy  Facilitating devices/methods: cricoid pressure and intubating stylet  Endotracheal tube insertion site: oral  Blade: Abdiel  Blade size: #4  ETT size (mm): 7.0  Cormack-Lehane Classification: grade I - full view of glottis  Placement verified by: chest auscultation and capnometry   Measured from: lips  ETT to lips (cm): 24  Number of attempts at approach: 1  Number of other approaches attempted: 1    Additional Comments  Verification was completed of correct patient, procedure, positioning and necessary equipment. Suction available.  Patient was placed in the sniffing position.  Induction was performed with 10mg etomidate and 100mg succinylcholine. A MAC 4 blade was inserted into the oropharynx at which time the vocal cords were visualized. A 7.0-[place] endotracheal tube was inserted and visualized going through the vocal cords. The stylette was removed. Colorimetric change was visualized on the CO2 meter x5. Breath sounds were heard in both lung fields equally. The  endotracheal tube was placed at 24 cm at the lips.  The tube was secured in place with tie.    COMPLICATIONS:   None

## 2024-11-29 NOTE — NURSING NOTE
Code Blue called.      16:30:  Code White Rapid RN with family updating him at this time.      1640: Pt transferred to the ICU

## 2024-11-29 NOTE — PROGRESS NOTES
Edu Echavarria is a 74 y.o. male on day 11 of admission presenting with STEMI (ST elevation myocardial infarction) (Multi).    Transitional Care Coordination Progress Note:   Patient discussed during interdisciplinary rounds.   Team members present: KUNAL EUGENE  Plan per Medical/Surgical team: Pending surgery(CABG)12/4  Discharge disposition: TBD  Status: Inpatient   Payer: Medicare  Potential Barriers: NMR   ADOD: 12/4

## 2024-11-29 NOTE — CARE PLAN
Problem: Pain - Adult  Goal: Verbalizes/displays adequate comfort level or baseline comfort level  Outcome: Progressing     Problem: Safety - Adult  Goal: Free from fall injury  Outcome: Progressing     Problem: Discharge Planning  Goal: Discharge to home or other facility with appropriate resources  Outcome: Progressing     Problem: Chronic Conditions and Co-morbidities  Goal: Patient's chronic conditions and co-morbidity symptoms are monitored and maintained or improved  Outcome: Progressing     Problem: Skin  Goal: Participates in plan/prevention/treatment measures  Outcome: Progressing  Goal: Prevent/manage excess moisture  Outcome: Progressing  Goal: Prevent/minimize sheer/friction injuries  Outcome: Progressing   The patient's goals for the shift include      The clinical goals for the shift include remain hds and no falls

## 2024-11-29 NOTE — PROCEDURES
Performed as emergent procedure given significant clinical deterioration, patient's famiy to be updated post-procedure.    The patient was prepped and draped in the usual sterile manner using chlorhexidine scrub. 1% lidocaine was used to numb the region. The left radial artery was palpated and successfully cannulated on the third pass under ultrasound guidance. Pulsatile, arterial blood was visualized and the artery was then threaded using the Seldinger technique and a catheter was then sutured into place. Good wave-form was obtained. The patient tolerated the procedure well without any immediate complications. The area was cleaned and Tegaderm was applied.

## 2024-11-30 ENCOUNTER — APPOINTMENT (OUTPATIENT)
Dept: RADIOLOGY | Facility: HOSPITAL | Age: 74
End: 2024-11-30
Payer: MEDICARE

## 2024-11-30 LAB
ALBUMIN SERPL BCP-MCNC: 3.2 G/DL (ref 3.4–5)
ALBUMIN SERPL BCP-MCNC: 3.2 G/DL (ref 3.4–5)
ALP SERPL-CCNC: 70 U/L (ref 33–136)
ALT SERPL W P-5'-P-CCNC: 18 U/L (ref 10–52)
ANION GAP BLDA CALCULATED.4IONS-SCNC: 11 MMO/L (ref 10–25)
ANION GAP BLDA CALCULATED.4IONS-SCNC: 14 MMO/L (ref 10–25)
ANION GAP BLDA CALCULATED.4IONS-SCNC: 7 MMO/L (ref 10–25)
ANION GAP BLDA CALCULATED.4IONS-SCNC: 8 MMO/L (ref 10–25)
ANION GAP BLDMV CALCULATED.4IONS-SCNC: 10 MMO/L (ref 10–25)
ANION GAP BLDMV CALCULATED.4IONS-SCNC: 13 MMO/L (ref 10–25)
ANION GAP BLDMV CALCULATED.4IONS-SCNC: 4 MMO/L (ref 10–25)
ANION GAP BLDMV CALCULATED.4IONS-SCNC: 7 MMO/L (ref 10–25)
ANION GAP BLDMV CALCULATED.4IONS-SCNC: 7 MMO/L (ref 10–25)
ANION GAP BLDV CALCULATED.4IONS-SCNC: 8 MMOL/L (ref 10–25)
ANION GAP SERPL CALC-SCNC: 14 MMOL/L (ref 10–20)
ANION GAP SERPL CALC-SCNC: 17 MMOL/L (ref 10–20)
APTT PPP: 43 SECONDS (ref 27–38)
AST SERPL W P-5'-P-CCNC: 32 U/L (ref 9–39)
BASE EXCESS BLDA CALC-SCNC: -4.8 MMOL/L (ref -2–3)
BASE EXCESS BLDA CALC-SCNC: 3.8 MMOL/L (ref -2–3)
BASE EXCESS BLDA CALC-SCNC: 5.9 MMOL/L (ref -2–3)
BASE EXCESS BLDA CALC-SCNC: 6.4 MMOL/L (ref -2–3)
BASE EXCESS BLDMV CALC-SCNC: -2.3 MMOL/L (ref -2–3)
BASE EXCESS BLDMV CALC-SCNC: 10.5 MMOL/L (ref -2–3)
BASE EXCESS BLDMV CALC-SCNC: 3 MMOL/L (ref -2–3)
BASE EXCESS BLDMV CALC-SCNC: 4.1 MMOL/L (ref -2–3)
BASE EXCESS BLDMV CALC-SCNC: 5.4 MMOL/L (ref -2–3)
BASE EXCESS BLDV CALC-SCNC: 5.6 MMOL/L (ref -2–3)
BASOPHILS # BLD AUTO: 0.06 X10*3/UL (ref 0–0.1)
BASOPHILS NFR BLD AUTO: 0.2 %
BILIRUB DIRECT SERPL-MCNC: 0.4 MG/DL (ref 0–0.3)
BILIRUB SERPL-MCNC: 1 MG/DL (ref 0–1.2)
BODY SURFACE AREA: 2.05 M2
BODY TEMPERATURE: 37 DEGREES CELSIUS
BUN SERPL-MCNC: 12 MG/DL (ref 6–23)
BUN SERPL-MCNC: 15 MG/DL (ref 6–23)
CA-I BLDA-SCNC: 1 MMOL/L (ref 1.1–1.33)
CA-I BLDA-SCNC: 1.05 MMOL/L (ref 1.1–1.33)
CA-I BLDA-SCNC: 1.07 MMOL/L (ref 1.1–1.33)
CA-I BLDA-SCNC: 1.07 MMOL/L (ref 1.1–1.33)
CA-I BLDMV-SCNC: 0.66 MMOL/L (ref 1.1–1.33)
CA-I BLDMV-SCNC: 1.06 MMOL/L (ref 1.1–1.33)
CA-I BLDMV-SCNC: 1.08 MMOL/L (ref 1.1–1.33)
CA-I BLDV-SCNC: 1.03 MMOL/L (ref 1.1–1.33)
CALCIUM SERPL-MCNC: 7.7 MG/DL (ref 8.6–10.6)
CALCIUM SERPL-MCNC: 7.8 MG/DL (ref 8.6–10.6)
CHLORIDE BLD-SCNC: 90 MMOL/L (ref 98–107)
CHLORIDE BLD-SCNC: 91 MMOL/L (ref 98–107)
CHLORIDE BLD-SCNC: 92 MMOL/L (ref 98–107)
CHLORIDE BLDA-SCNC: 87 MMOL/L (ref 98–107)
CHLORIDE BLDA-SCNC: 89 MMOL/L (ref 98–107)
CHLORIDE BLDA-SCNC: 90 MMOL/L (ref 98–107)
CHLORIDE BLDA-SCNC: 92 MMOL/L (ref 98–107)
CHLORIDE BLDV-SCNC: 92 MMOL/L (ref 98–107)
CHLORIDE SERPL-SCNC: 91 MMOL/L (ref 98–107)
CHLORIDE SERPL-SCNC: 92 MMOL/L (ref 98–107)
CO2 SERPL-SCNC: 21 MMOL/L (ref 21–32)
CO2 SERPL-SCNC: 29 MMOL/L (ref 21–32)
CREAT SERPL-MCNC: 1.38 MG/DL (ref 0.5–1.3)
CREAT SERPL-MCNC: 1.56 MG/DL (ref 0.5–1.3)
EGFRCR SERPLBLD CKD-EPI 2021: 47 ML/MIN/1.73M*2
EGFRCR SERPLBLD CKD-EPI 2021: 54 ML/MIN/1.73M*2
EJECTION FRACTION: 28 %
EOSINOPHIL # BLD AUTO: 0 X10*3/UL (ref 0–0.4)
EOSINOPHIL NFR BLD AUTO: 0 %
ERYTHROCYTE [DISTWIDTH] IN BLOOD BY AUTOMATED COUNT: 13.2 % (ref 11.5–14.5)
GLUCOSE BLD MANUAL STRIP-MCNC: 160 MG/DL (ref 74–99)
GLUCOSE BLD MANUAL STRIP-MCNC: 161 MG/DL (ref 74–99)
GLUCOSE BLD MANUAL STRIP-MCNC: 166 MG/DL (ref 74–99)
GLUCOSE BLD MANUAL STRIP-MCNC: 199 MG/DL (ref 74–99)
GLUCOSE BLD-MCNC: 177 MG/DL (ref 74–99)
GLUCOSE BLD-MCNC: 177 MG/DL (ref 74–99)
GLUCOSE BLD-MCNC: 181 MG/DL (ref 74–99)
GLUCOSE BLD-MCNC: 193 MG/DL (ref 74–99)
GLUCOSE BLD-MCNC: 198 MG/DL (ref 74–99)
GLUCOSE BLDA-MCNC: 209 MG/DL (ref 74–99)
GLUCOSE BLDA-MCNC: 221 MG/DL (ref 74–99)
GLUCOSE BLDA-MCNC: 284 MG/DL (ref 74–99)
GLUCOSE BLDA-MCNC: 402 MG/DL (ref 74–99)
GLUCOSE BLDV-MCNC: 170 MG/DL (ref 74–99)
GLUCOSE SERPL-MCNC: 164 MG/DL (ref 74–99)
GLUCOSE SERPL-MCNC: 211 MG/DL (ref 74–99)
HCO3 BLDA-SCNC: 20.5 MMOL/L (ref 22–26)
HCO3 BLDA-SCNC: 25.1 MMOL/L (ref 22–26)
HCO3 BLDA-SCNC: 28.2 MMOL/L (ref 22–26)
HCO3 BLDA-SCNC: 29.4 MMOL/L (ref 22–26)
HCO3 BLDMV-SCNC: 24.2 MMOL/L (ref 22–26)
HCO3 BLDMV-SCNC: 25.7 MMOL/L (ref 22–26)
HCO3 BLDMV-SCNC: 27.4 MMOL/L (ref 22–26)
HCO3 BLDMV-SCNC: 29.9 MMOL/L (ref 22–26)
HCO3 BLDMV-SCNC: 35.1 MMOL/L (ref 22–26)
HCO3 BLDV-SCNC: 28.7 MMOL/L (ref 22–26)
HCT VFR BLD AUTO: 40.4 % (ref 41–52)
HCT VFR BLD EST: 42 % (ref 41–52)
HCT VFR BLD EST: 43 % (ref 41–52)
HCT VFR BLD EST: 44 % (ref 41–52)
HGB BLD-MCNC: 14.3 G/DL (ref 13.5–17.5)
HGB BLDA-MCNC: 14.1 G/DL (ref 13.5–17.5)
HGB BLDA-MCNC: 14.3 G/DL (ref 13.5–17.5)
HGB BLDA-MCNC: 14.6 G/DL (ref 13.5–17.5)
HGB BLDA-MCNC: 14.7 G/DL (ref 13.5–17.5)
HGB BLDMV-MCNC: 13.9 G/DL (ref 13.5–17.5)
HGB BLDMV-MCNC: 13.9 G/DL (ref 13.5–17.5)
HGB BLDMV-MCNC: 14.2 G/DL (ref 13.5–17.5)
HGB BLDMV-MCNC: 14.4 G/DL (ref 13.5–17.5)
HGB BLDMV-MCNC: 14.5 G/DL (ref 13.5–17.5)
HGB BLDV-MCNC: 14 G/DL (ref 13.5–17.5)
HOLD SPECIMEN: NORMAL
IMM GRANULOCYTES # BLD AUTO: 0.31 X10*3/UL (ref 0–0.5)
IMM GRANULOCYTES NFR BLD AUTO: 1 % (ref 0–0.9)
INHALED O2 CONCENTRATION: 32 %
INHALED O2 CONCENTRATION: 32 %
INHALED O2 CONCENTRATION: 40 %
INHALED O2 CONCENTRATION: 50 %
INHALED O2 CONCENTRATION: 50 %
INHALED O2 CONCENTRATION: 80 %
INHALED O2 CONCENTRATION: 80 %
INR PPP: 1.5 (ref 0.9–1.1)
LACTATE BLDA-SCNC: 1.1 MMOL/L (ref 0.4–2)
LACTATE BLDA-SCNC: 1.1 MMOL/L (ref 0.4–2)
LACTATE BLDA-SCNC: 1.7 MMOL/L (ref 0.4–2)
LACTATE BLDA-SCNC: 2.5 MMOL/L (ref 0.4–2)
LACTATE BLDMV-SCNC: 1.4 MMOL/L (ref 0.4–2)
LACTATE BLDMV-SCNC: 1.4 MMOL/L (ref 0.4–2)
LACTATE BLDMV-SCNC: 1.6 MMOL/L (ref 0.4–2)
LACTATE BLDMV-SCNC: 1.7 MMOL/L (ref 0.4–2)
LACTATE BLDMV-SCNC: 2.5 MMOL/L (ref 0.4–2)
LACTATE BLDV-SCNC: 1 MMOL/L (ref 0.4–2)
LEGIONELLA AG UR QL: NEGATIVE
LYMPHOCYTES # BLD AUTO: 1.49 X10*3/UL (ref 0.8–3)
LYMPHOCYTES NFR BLD AUTO: 4.6 %
MAGNESIUM SERPL-MCNC: 1.63 MG/DL (ref 1.6–2.4)
MAGNESIUM SERPL-MCNC: 1.86 MG/DL (ref 1.6–2.4)
MCH RBC QN AUTO: 28.3 PG (ref 26–34)
MCHC RBC AUTO-ENTMCNC: 35.4 G/DL (ref 32–36)
MCV RBC AUTO: 80 FL (ref 80–100)
MONOCYTES # BLD AUTO: 2.26 X10*3/UL (ref 0.05–0.8)
MONOCYTES NFR BLD AUTO: 7 %
MRSA DNA SPEC QL NAA+PROBE: NOT DETECTED
NEUTROPHILS # BLD AUTO: 28.12 X10*3/UL (ref 1.6–5.5)
NEUTROPHILS NFR BLD AUTO: 87.2 %
NRBC BLD-RTO: 0 /100 WBCS (ref 0–0)
OXYHGB MFR BLDA: 93.2 % (ref 94–98)
OXYHGB MFR BLDA: 96.1 % (ref 94–98)
OXYHGB MFR BLDA: 97.2 % (ref 94–98)
OXYHGB MFR BLDA: 97.5 % (ref 94–98)
OXYHGB MFR BLDMV: 63.1 % (ref 45–75)
OXYHGB MFR BLDMV: 64.3 % (ref 45–75)
OXYHGB MFR BLDMV: 66.1 % (ref 45–75)
OXYHGB MFR BLDMV: 66.5 % (ref 45–75)
OXYHGB MFR BLDMV: 77 % (ref 45–75)
OXYHGB MFR BLDV: 70.5 % (ref 45–75)
PCO2 BLDA: 28 MM HG (ref 38–42)
PCO2 BLDA: 33 MM HG (ref 38–42)
PCO2 BLDA: 36 MM HG (ref 38–42)
PCO2 BLDA: 38 MM HG (ref 38–42)
PCO2 BLDMV: 33 MM HG (ref 41–51)
PCO2 BLDMV: 36 MM HG (ref 41–51)
PCO2 BLDMV: 42 MM HG (ref 41–51)
PCO2 BLDMV: 45 MM HG (ref 41–51)
PCO2 BLDMV: 47 MM HG (ref 41–51)
PCO2 BLDV: 36 MM HG (ref 41–51)
PH BLDA: 7.34 PH (ref 7.38–7.42)
PH BLDA: 7.52 PH (ref 7.38–7.42)
PH BLDA: 7.54 PH (ref 7.38–7.42)
PH BLDA: 7.56 PH (ref 7.38–7.42)
PH BLDMV: 7.32 PH (ref 7.33–7.43)
PH BLDMV: 7.46 PH (ref 7.33–7.43)
PH BLDMV: 7.49 PH (ref 7.33–7.43)
PH BLDMV: 7.5 PH (ref 7.33–7.43)
PH BLDMV: 7.5 PH (ref 7.33–7.43)
PH BLDV: 7.51 PH (ref 7.33–7.43)
PHOSPHATE SERPL-MCNC: 3.6 MG/DL (ref 2.5–4.9)
PHOSPHATE SERPL-MCNC: 4.3 MG/DL (ref 2.5–4.9)
PLATELET # BLD AUTO: 332 X10*3/UL (ref 150–450)
PO2 BLDA: 116 MM HG (ref 85–95)
PO2 BLDA: 229 MM HG (ref 85–95)
PO2 BLDA: 70 MM HG (ref 85–95)
PO2 BLDA: 84 MM HG (ref 85–95)
PO2 BLDMV: 39 MM HG (ref 35–45)
PO2 BLDMV: 40 MM HG (ref 35–45)
PO2 BLDMV: 40 MM HG (ref 35–45)
PO2 BLDMV: 41 MM HG (ref 35–45)
PO2 BLDMV: 47 MM HG (ref 35–45)
PO2 BLDV: 42 MM HG (ref 35–45)
POTASSIUM BLDA-SCNC: 3.5 MMOL/L (ref 3.5–5.3)
POTASSIUM BLDA-SCNC: 3.7 MMOL/L (ref 3.5–5.3)
POTASSIUM BLDA-SCNC: 4.1 MMOL/L (ref 3.5–5.3)
POTASSIUM BLDA-SCNC: 4.1 MMOL/L (ref 3.5–5.3)
POTASSIUM BLDMV-SCNC: 3.3 MMOL/L (ref 3.5–5.3)
POTASSIUM BLDMV-SCNC: 3.7 MMOL/L (ref 3.5–5.3)
POTASSIUM BLDMV-SCNC: 3.8 MMOL/L (ref 3.5–5.3)
POTASSIUM BLDMV-SCNC: 4.1 MMOL/L (ref 3.5–5.3)
POTASSIUM BLDMV-SCNC: 4.1 MMOL/L (ref 3.5–5.3)
POTASSIUM BLDV-SCNC: 3.6 MMOL/L (ref 3.5–5.3)
POTASSIUM SERPL-SCNC: 3.3 MMOL/L (ref 3.5–5.3)
POTASSIUM SERPL-SCNC: 4.2 MMOL/L (ref 3.5–5.3)
PROT SERPL-MCNC: 5.3 G/DL (ref 6.4–8.2)
PROTHROMBIN TIME: 16.7 SECONDS (ref 9.8–12.8)
RBC # BLD AUTO: 5.05 X10*6/UL (ref 4.5–5.9)
RIGHT VENTRICLE PEAK SYSTOLIC PRESSURE: 71.9 MMHG
S PNEUM AG UR QL: NEGATIVE
SAO2 % BLDA: 100 % (ref 94–100)
SAO2 % BLDA: 100 % (ref 94–100)
SAO2 % BLDA: 96 % (ref 94–100)
SAO2 % BLDA: 99 % (ref 94–100)
SAO2 % BLDMV: 65 % (ref 45–75)
SAO2 % BLDMV: 66 % (ref 45–75)
SAO2 % BLDMV: 68 % (ref 45–75)
SAO2 % BLDMV: 68 % (ref 45–75)
SAO2 % BLDMV: 79 % (ref 45–75)
SAO2 % BLDV: 72 % (ref 45–75)
SODIUM BLDA-SCNC: 117 MMOL/L (ref 136–145)
SODIUM BLDA-SCNC: 121 MMOL/L (ref 136–145)
SODIUM BLDA-SCNC: 123 MMOL/L (ref 136–145)
SODIUM BLDA-SCNC: 124 MMOL/L (ref 136–145)
SODIUM BLDMV-SCNC: 123 MMOL/L (ref 136–145)
SODIUM BLDMV-SCNC: 125 MMOL/L (ref 136–145)
SODIUM BLDMV-SCNC: 126 MMOL/L (ref 136–145)
SODIUM BLDV-SCNC: 125 MMOL/L (ref 136–145)
SODIUM SERPL-SCNC: 126 MMOL/L (ref 136–145)
SODIUM SERPL-SCNC: 131 MMOL/L (ref 136–145)
TRICUSPID ANNULAR PLANE SYSTOLIC EXCURSION: 1.7 CM
UFH PPP CHRO-ACNC: 0.1 IU/ML
UFH PPP CHRO-ACNC: 0.2 IU/ML
UFH PPP CHRO-ACNC: 0.3 IU/ML
UFH PPP CHRO-ACNC: 0.8 IU/ML
VENTILATOR MODE: ABNORMAL
WBC # BLD AUTO: 32.2 X10*3/UL (ref 4.4–11.3)

## 2024-11-30 PROCEDURE — 2500000004 HC RX 250 GENERAL PHARMACY W/ HCPCS (ALT 636 FOR OP/ED)

## 2024-11-30 PROCEDURE — 2500000001 HC RX 250 WO HCPCS SELF ADMINISTERED DRUGS (ALT 637 FOR MEDICARE OP)

## 2024-11-30 PROCEDURE — 37799 UNLISTED PX VASCULAR SURGERY: CPT | Performed by: STUDENT IN AN ORGANIZED HEALTH CARE EDUCATION/TRAINING PROGRAM

## 2024-11-30 PROCEDURE — 93010 ELECTROCARDIOGRAM REPORT: CPT | Performed by: INTERNAL MEDICINE

## 2024-11-30 PROCEDURE — 85025 COMPLETE CBC W/AUTO DIFF WBC: CPT

## 2024-11-30 PROCEDURE — 82947 ASSAY GLUCOSE BLOOD QUANT: CPT

## 2024-11-30 PROCEDURE — 85520 HEPARIN ASSAY: CPT

## 2024-11-30 PROCEDURE — 71045 X-RAY EXAM CHEST 1 VIEW: CPT

## 2024-11-30 PROCEDURE — 80053 COMPREHEN METABOLIC PANEL: CPT

## 2024-11-30 PROCEDURE — 83735 ASSAY OF MAGNESIUM: CPT

## 2024-11-30 PROCEDURE — 84132 ASSAY OF SERUM POTASSIUM: CPT

## 2024-11-30 PROCEDURE — 71045 X-RAY EXAM CHEST 1 VIEW: CPT | Performed by: STUDENT IN AN ORGANIZED HEALTH CARE EDUCATION/TRAINING PROGRAM

## 2024-11-30 PROCEDURE — 84132 ASSAY OF SERUM POTASSIUM: CPT | Performed by: INTERNAL MEDICINE

## 2024-11-30 PROCEDURE — 2500000002 HC RX 250 W HCPCS SELF ADMINISTERED DRUGS (ALT 637 FOR MEDICARE OP, ALT 636 FOR OP/ED)

## 2024-11-30 PROCEDURE — 2020000001 HC ICU ROOM DAILY

## 2024-11-30 PROCEDURE — 82248 BILIRUBIN DIRECT: CPT

## 2024-11-30 PROCEDURE — 99291 CRITICAL CARE FIRST HOUR: CPT

## 2024-11-30 PROCEDURE — 37799 UNLISTED PX VASCULAR SURGERY: CPT

## 2024-11-30 PROCEDURE — 94003 VENT MGMT INPAT SUBQ DAY: CPT

## 2024-11-30 PROCEDURE — 87075 CULTR BACTERIA EXCEPT BLOOD: CPT

## 2024-11-30 PROCEDURE — 82435 ASSAY OF BLOOD CHLORIDE: CPT | Performed by: INTERNAL MEDICINE

## 2024-11-30 PROCEDURE — 84100 ASSAY OF PHOSPHORUS: CPT

## 2024-11-30 PROCEDURE — 87641 MR-STAPH DNA AMP PROBE: CPT

## 2024-11-30 PROCEDURE — 85610 PROTHROMBIN TIME: CPT

## 2024-11-30 PROCEDURE — 84132 ASSAY OF SERUM POTASSIUM: CPT | Performed by: STUDENT IN AN ORGANIZED HEALTH CARE EDUCATION/TRAINING PROGRAM

## 2024-11-30 RX ORDER — NIFEDIPINE 30 MG/1
30 TABLET, FILM COATED, EXTENDED RELEASE ORAL
Status: CANCELLED | OUTPATIENT
Start: 2024-12-01

## 2024-11-30 RX ORDER — METOPROLOL SUCCINATE 50 MG/1
50 TABLET, EXTENDED RELEASE ORAL DAILY
Status: CANCELLED | OUTPATIENT
Start: 2024-12-01

## 2024-11-30 RX ORDER — EPINEPHRINE IN 0.9 % SOD CHLOR 4MG/250ML
0-1 PLASTIC BAG, INJECTION (ML) INTRAVENOUS CONTINUOUS
Status: DISCONTINUED | OUTPATIENT
Start: 2024-11-30 | End: 2024-11-30

## 2024-11-30 RX ORDER — VANCOMYCIN HYDROCHLORIDE 1 G/20ML
INJECTION, POWDER, LYOPHILIZED, FOR SOLUTION INTRAVENOUS DAILY PRN
Status: DISCONTINUED | OUTPATIENT
Start: 2024-11-30 | End: 2024-12-02 | Stop reason: ALTCHOICE

## 2024-11-30 RX ORDER — VANCOMYCIN 2 GRAM/500 ML IN 0.9 % SODIUM CHLORIDE INTRAVENOUS
2000 ONCE
Status: COMPLETED | OUTPATIENT
Start: 2024-11-30 | End: 2024-11-30

## 2024-11-30 RX ORDER — MILRINONE LACTATE 0.2 MG/ML
.1-.75 INJECTION, SOLUTION INTRAVENOUS CONTINUOUS
Status: DISCONTINUED | OUTPATIENT
Start: 2024-11-30 | End: 2024-12-03

## 2024-11-30 RX ORDER — ISOSORBIDE MONONITRATE 30 MG/1
30 TABLET, EXTENDED RELEASE ORAL DAILY
Status: CANCELLED | OUTPATIENT
Start: 2024-12-01

## 2024-11-30 RX ORDER — POTASSIUM CHLORIDE 20 MEQ/1
60 TABLET, EXTENDED RELEASE ORAL ONCE
Status: COMPLETED | OUTPATIENT
Start: 2024-11-30 | End: 2024-11-30

## 2024-11-30 ASSESSMENT — PAIN - FUNCTIONAL ASSESSMENT
PAIN_FUNCTIONAL_ASSESSMENT: 0-10
PAIN_FUNCTIONAL_ASSESSMENT: CPOT (CRITICAL CARE PAIN OBSERVATION TOOL)
PAIN_FUNCTIONAL_ASSESSMENT: CPOT (CRITICAL CARE PAIN OBSERVATION TOOL)
PAIN_FUNCTIONAL_ASSESSMENT: 0-10

## 2024-11-30 ASSESSMENT — PAIN SCALES - GENERAL
PAINLEVEL_OUTOF10: 0 - NO PAIN
PAINLEVEL_OUTOF10: 4
PAINLEVEL_OUTOF10: 0 - NO PAIN

## 2024-11-30 NOTE — CONSULTS
Vancomycin Dosing by Pharmacy- INITIAL    Edu Echavarria is a 74 y.o. year old male who Pharmacy has been consulted for vancomycin dosing for pneumonia. Based on the patient's indication and renal status this patient will be dosed based on a goal trough/random level of 15-20.     Renal function is currently declining.    Visit Vitals  BP (!) 150/26   Pulse 69   Temp 36.6 °C (97.9 °F) (Core)   Resp 24        Lab Results   Component Value Date    CREATININE 1.38 (H) 2024    CREATININE 0.75 2024    CREATININE 0.78 2024    CREATININE 0.81 2024        Patient weight is as follows:   Vitals:    24   Weight: 89.3 kg (196 lb 13.9 oz)       Cultures:  No results found for the encounter in last 14 days.        I/O last 3 completed shifts:  In: 100 (1.2 mL/kg) [I.V.:50 (0.6 mL/kg); IV Piggyback:50]  Out: 4150 (48.6 mL/kg) [Urine:4150 (1.3 mL/kg/hr)]  Weight: 85.4 kg   I/O during current shift:  I/O this shift:  In: 349.5 [I.V.:299.5; IV Piggyback:50]  Out: 560 [Urine:550; Blood:10]    Temp (24hrs), Av.5 °C (97.7 °F), Min:35.8 °C (96.4 °F), Max:37.1 °C (98.8 °F)         Assessment/Plan     Patient will be dosed by levels, starting with 2000 mg once.  Follow-up level tomorrow morning.  Will continue to monitor renal function daily while on vancomycin and order serum creatinine at least every 48 hours if not already ordered.  Follow for continued vancomycin needs, clinical response, and signs/symptoms of toxicity.       Fly Perez, PharmD

## 2024-11-30 NOTE — SIGNIFICANT EVENT
Patient seen this morning, with next of kin relative (Guillermo Barrios) present at bedside. They expressed that the patient's wishes at this point would be for no resuscitation or intubation. Patient aware and following commands, nodded to confirm.   Decision made to attempt extubation today. Will engage in further discussion about goals of care once patient is extubated.  Code status changed to DNR and No Intubation.

## 2024-11-30 NOTE — PROGRESS NOTES
"Edu Echavarria is a 74 y.o. male on day 12 of admission presenting with STEMI (ST elevation myocardial infarction) (Multi).    Objective   Intraaortic balloon pump via right common femoral artery.   No concerns at this point, Xray review and is well positioned in a lower position but working fine 1:1.    Physical Exam  Sedated, intubated  CVS- normal s1, s2, no murmurs  Resp -CTAB  Abd- Soft, NT, ND  Neuro  No gross motor. Sensory deficits   Groin access sites no hematoma  No LE edema      Last Recorded Vitals  Blood pressure (!) 150/26, pulse (!) 138, temperature 37.2 °C (99 °F), temperature source Core, resp. rate 15, height 1.702 m (5' 7\"), weight 89.3 kg (196 lb 13.9 oz), SpO2 95%.  Intake/Output last 3 Shifts:  I/O last 3 completed shifts:  In: 497.6 (5.6 mL/kg) [I.V.:347.6 (3.9 mL/kg); IV Piggyback:150]  Out: 3610 (40.4 mL/kg) [Urine:3600 (1.1 mL/kg/hr); Blood:10]  Weight: 89.3 kg       Last Labs:  CBC - 11/30/2024:  1:12 AM  32.2 14.3 332    40.4      BMP  126  92  12                  ----------------<211     4.2  21  1.38     CMP - 11/30/2024:  1:12 AM  7.7 5.3 32 --- 1.0   4.3 3.2 18 70      PTT - 11/30/2024:  1:12 AM  1.5   16.7 43     Troponin I, High Sensitivity (CMC)   Date/Time Value Ref Range Status   11/18/2024 02:54 PM 69,947 (HH) 0 - 53 ng/L Final     Comment:     Previous result verified on 11/18/2024 1233 on specimen/case [id number] called with component [facility] for procedure Troponin I, High Sensitivity with value 74,662 ng/L.     BNP   Date/Time Value Ref Range Status   11/29/2024 04:39 PM 2,290 (H) 0 - 99 pg/mL Final      Ejection Fractions:  EF   Date/Time Value Ref Range Status   11/29/2024 05:23 PM 28 %    11/18/2024 03:44 PM 23 %       PLAN  - This patient will be discussed at the CHIP team about PCI options  - About the balloon pump, no changes for now and the IC team will keep following up.    I spent 30 minutes in the professional and overall care of this patient.      Manolo QUINTANA" MD Faisal

## 2024-11-30 NOTE — SIGNIFICANT EVENT
11/29/24 1946   Pre-Procedure Checklist   Emergent Line Insertion Yes   Type of Line to be Placed Introducer   Consent Obtained Yes   Emergency Medication Necessary No   Patient Identified with 2 Independent Identifiers Yes   Review of Allergies, Anticoagulation, Relevant Labs, ECG/Telemetry Yes   Risks/Benefits/Alternatives Discussed with Patient/POA/Legal Representative Yes   Stop Sign on Door Yes   Time Out Performed Yes   Catheter Exchange Yes   Positioning and Preparation Checklist   All People, Including Patient, in the Room with Cap and Mask Yes   Fluoroscopy Used to Identify Vessel and Guide Insertion; Sterile Cover Used No   Ultrasound Used to Identify Vessel and Guide Insertion; Sterile Cover Used Yes   Full Barrier Precautions Followed (Mask, Cap, Gown, Gloves) Yes   Hands Washed Yes   Monitors Attached with Sound Alarms On Yes   Full Body Sterile Drape (Head-to-Toe) Used to Cover Patient Yes   [place] Position (For IJ and Subclavian) Yes   CHG Skin Prep Used and Allowed to Air Dry Prior to Skin Procedure Yes   Procedure Checklist   Blood Aspirated From All Lumens, All Ports Subsequently Flushed Yes   Catheter Caps Placed On All Lumens; Lumens Clamped Yes   Maintain Guidewire Control Throughout, Ensuring Guidewire Removal Yes   Maintain Sterile Field Throughout Insertion Yes   Catheter Secured Yes   Confirmatory Test of Venous Placement Blood gas   Post-Procedure Checklist   Date and Time Written on Dressing Yes   Sharp and Wire Count and Safe Disposal of all Sharps/Wires Yes   Sterile Dressing Applied Per Protocol Yes   X-ray Ordered or ECG Image Yes

## 2024-11-30 NOTE — SIGNIFICANT EVENT
11/30/24 1433   Invasive Vent Information   Ventilation Hours 21.72   Vent On/Off Off     Pt extubated to 3L

## 2024-11-30 NOTE — POST-PROCEDURE NOTE
Physician Transition of Care Summary  Invasive Cardiovascular Lab    Procedure Date: 11/29/2024  Attending:    * Mari Banuelos - Primary  Resident/Fellow/Other Assistant: Surgeons and Role:     * Ra Flores MD - Fellow    Indications:   Pre-op Diagnosis      * Shock (Multi) [R57.9]    Post-procedure diagnosis:   Post-op Diagnosis     * Shock (Multi) [R57.9]    Procedure(s):     * IABP Insertion      Procedure Findings and Description of the Procedure:   Intraaortic balloon pump insertion via right common femoral artery access.   Ultrasound guided micropuncture/access and confirmed under flouro.   Sheath sutured in placed and dressed.       Complications:   None    Stents/Implants:   Implants       No implant documentation for this case.            Anticoagulation/Antiplatelet Plan:   10,000 units of heparin given in the lab  Continue IV Heparin drip in CICU     Estimated Blood Loss:   10 mL    Anesthesia: Moderate Sedation Anesthesia Staff: No anesthesia staff entered.    Any Specimen(s) Removed:   No specimens collected during this procedure.    Disposition:   CICU      Electronically signed by: Ra Flores MD, 11/29/2024 9:44 PM

## 2024-11-30 NOTE — CARE PLAN
The clinical goals for the shift include Patient will remain hemodynamically stable      Problem: Skin  Goal: Prevent/minimize sheer/friction injuries  Flowsheets (Taken 11/29/2024 2338)  Prevent/minimize sheer/friction injuries:   Use pull sheet   Turn/reposition every 2 hours/use positioning/transfer devices     Problem: Safety - Medical Restraint  Goal: Remains free of injury from restraints (Restraint for Interference with Medical Device)  Flowsheets (Taken 11/29/2024 3628)  Remains free of injury from restraints (restraint for interference with medical device): Every 2 hours: Monitor safety, psychosocial status, comfort, nutrition and hydration

## 2024-11-30 NOTE — PROGRESS NOTES
Cardiac ICU Progress Note, 11/30/2024    Admit Date: 11/18/2024   Hospital Length of Stay: 12   ICU Length of Stay: 18h     History of Present Illness  Edu Echavarria is a 74 y.o. male on day 18h of admission for STEMI (ST elevation myocardial infarction) (Multi).    Interval History  IABP placed overnight  Weaned off norepi, now on milrinone  Started on Lasix gtt  Vent settings decreased, will attempt extubation  Family present this morning, with patient responsive to questions. Discussed code status and that patient would not want further invasive procedures. Decision was made to change code status to DNR/DNI. Will attempt extubation today and plan for GOC discussion with patient involved.    Subjective  Patient is intubated. Alert and following commands.    Objective    Vitals  Visit Vitals  BP (!) 150/26   Pulse 79   Temp 36.9 °C (98.4 °F) (Temporal)   Resp 20        Vent settings    Most Recent Range Past 24hrs   Mode Volume control/assist control    FiO2 (S) 40 % FiO2 (%)  Min: 40 %   Min taken time: 11/30/24 0519  Max: 80 %   Max taken time: 11/29/24 2105   Rate 20 Resp Rate (Set)  Min: 20   Min taken time: 11/30/24 0842  Max: 28   Max taken time: 11/29/24 1941   Vt 450 mL  Vt (Set, mL)  Min: 400 mL   Min taken time: 11/29/24 1941  Max: 480 mL   Max taken time: 11/30/24 0353   PEEP 8 cm H20 PEEP/CPAP (cm H2O)  Min: 8 cm H20   Min taken time: 11/30/24 0842  Max: 15 cm H20   Max taken time: 11/29/24 1941       Invasive Hemodynamics   Most Recent Range Past 24hrs   BP (Art) 123/33 Arterial Line BP 1  Min: 75/44  Max: 153/79   MAP(Art) 70 mmHg Arterial Line MAP 1 (mmHg)   Min: 53 mmHg  Max: 104 mmHg   RA/CVP   No data recorded   PA 41/23 PAP  Min: 40/22  Max: 80/46   PA(mean) 30 mmHg PAP (Mean)  Min: 29 mmHg  Max: 58 mmHg   PCWP 13 mmHg PCWP (mmHg)  Min: 13 mmHg  Max: 32 mmHg   CO 4.2 L/min CO (L/min)  Min: 3.1 L/min  Max: 6 L/min   CI 2.1 L/min/m2 CI (L/min/m2)  Min: 1.5 L/min/m2  Max: 3 L/min/m2   Mixed Venous 68  % SVO2 (%)  Min: 68 %  Max: 79 %   SVR  1189 (dyne*sec)/cm5 SVR (dyne*sec)/cm5  Min: 769 (dyne*sec)/cm5  Max: 1264 (dyne*sec)/cm5    (dyne*sec)/cm5 PVR (dyne*sec)/cm5  Min: 305 (dyne*sec)/cm5  Max: 384 (dyne*sec)/cm5     I/O    Intake/Output Summary (Last 24 hours) at 11/30/2024 1135  Last data filed at 11/30/2024 1100  Gross per 24 hour   Intake 497.62 ml   Output 2710 ml   Net -2212.38 ml        Physical Exam  Physical Exam  Constitutional:       General: He is awake.      Interventions: He is sedated and intubated.   Cardiovascular:      Rate and Rhythm: Normal rate and regular rhythm.   Pulmonary:      Effort: He is intubated.   Abdominal:      General: Bowel sounds are normal.      Palpations: Abdomen is soft.   Musculoskeletal:      Right lower leg: No edema.      Left lower leg: No edema.         Labs    CBC:  Results from last 7 days   Lab Units 11/30/24  0112 11/29/24  2222 11/29/24  1606   WBC AUTO x10*3/uL 32.2* 34.5* 23.0*   HEMOGLOBIN g/dL 14.3 14.6 17.5   PLATELETS AUTO x10*3/uL 332 365 398     BMP:  Results from last 7 days   Lab Units 11/30/24  0112 11/29/24  1606 11/29/24  0802   SODIUM mmol/L 126* 126* 128*   POTASSIUM mmol/L 4.2 4.8 4.0   CHLORIDE mmol/L 92* 88* 92*   CO2 mmol/L 21 24 23   ANION GAP mmol/L 17 19 17   BUN mg/dL 12 8 8   CREATININE mg/dL 1.38* 0.75 0.78   EGFR mL/min/1.73m*2 54* >90 >90   CALCIUM mg/dL 7.7* 9.3 8.8   PHOSPHORUS mg/dL 4.3 3.6 2.6   MAGNESIUM mg/dL 1.86 2.39 2.05   GLUCOSE mg/dL 211* 156* 128*     LFT:  Results from last 7 days   Lab Units 11/30/24  0112 11/29/24  0802 11/28/24  0659   AST U/L 32 12 12   ALT U/L 18 15 14   ALK PHOS U/L 70 69 63   BILIRUBIN TOTAL mg/dL 1.0 1.1 1.0   BILIRUBIN DIRECT mg/dL 0.4* 0.4* 0.3     Cardiac:  Results from last 7 days   Lab Units 11/29/24  1639   BNP pg/mL 2,290*     Coag:  Results from last 7 days   Lab Units 11/30/24  0112 11/29/24  2222 11/29/24  0802   PROTIME seconds 16.7* 17.2* 15.2*   APTT seconds 43* >200* 36   INR   1.5* 1.5* 1.3*     UA:   Results from last 7 days   Lab Units 11/29/24  2230   COLOR U  Light-Yellow   PH U  7.0   SPEC GRAV UR  1.009   PROTEIN U mg/dL 30 (1+)*   BLOOD UR  0.03 (TRACE)*   NITRITE U  NEGATIVE   WBC UR /HPF 1-5     ABG:  Results from last 7 days   Lab Units 11/30/24  1025 11/30/24  0628 11/30/24  0505 11/30/24  0504 11/29/24  2359   POCT PH, ARTERIAL pH 7.54*  --  7.56*  --  7.34*   POCT PO2, ARTERIAL mm Hg 84*  --  116*  --  229*   POCT PCO2, ARTERIAL mm Hg 33*  --  28*  --  38   FIO2 % 40 40 50   < > 80    < > = values in this interval not displayed.     VBG:  Results from last 7 days   Lab Units 11/29/24  1807   POCT PH, VENOUS pH 7.10*   POCT PCO2, VENOUS mm Hg 81*       Cardiac Data    EKG  Encounter Date: 11/18/24   ECG 12 Lead   Result Value    Ventricular Rate 87    Atrial Rate 87    HI Interval 146    QRS Duration 106    QT Interval 396    QTC Calculation(Bazett) 476    P Axis 69    R Axis 89    T Axis 111    QRS Count 14    Q Onset 215    P Onset 142    P Offset 207    T Offset 413    QTC Fredericia 448    Narrative    Normal sinus rhythm  ST elevation, consider inferior injury or acute infarct  ** ** ACUTE MI / STEMI ** **  Consider right ventricular involvement in acute inferior infarct  Abnormal ECG  When compared with ECG of 18-NOV-2024 11:11,  Borderline criteria for Lateral infarct are no longer Present  Borderline criteria for Inferior infarct are no longer Present  ST now depressed in Lateral leads  Nonspecific T wave abnormality no longer evident in Anterior leads      Transthoracic Echo (TTE) Limited 11/29/24    1. Poorly visualized anatomical structures due to suboptimal image quality.   2. Left ventricular ejection fraction is severely decreased, by visual estimate at 25-30%.   3. There is global hypokinesis of the left ventricle with minor regional variations.   4. Left ventricular cavity size is mild to moderately dilated.   5. There is mildly reduced right ventricular  systolic function.   6. Mildly enlarged right ventricle.   7. The left atrium is enlarged.   8. Severely elevated right ventricular systolic pressure.     Transthoracic Echo (TTE) Complete 11/18/24   1. Poorly visualized anatomical structures due to suboptimal image quality.   2. Left ventricular ejection fraction is severely decreased, calculated by Regalado's biplane at 23%.   3. There is global hypokinesis of the left ventricle with minor regional variations.   4. Spectral Doppler shows a Grade III (restrictive pattern) of left ventricular diastolic filling with an elevated left atrial pressure.   5. There is normal right ventricular global systolic function.   6. Mildly enlarged right ventricle.   7. The left atrium is mildly dilated.      Imaging  XR chest 1 view    Result Date: 11/30/2024  1.  Similar interstitial/alveolar pulmonary edema. 2. IABP is appropriately positioned. Other medical devices as above.   I personally reviewed the images/study and I agree with the findings as stated by Dr. Abhijeet Chavira. This study was interpreted at [place], [place].   MACRO: None     Dictation workstation:   [id number]    XR chest 1 view    Result Date: 11/30/2024  1. Redemonstrated severe bilateral pulmonary edema and bilateral pleural effusions. Overall lung aeration appears slightly worsened as compared to prior radiograph from earlier same day, likely due to layering of pleural effusions on present supine radiograph. 2. Mild gaseous prominence of bowel loops within included upper to mid abdomen and correlate with concern for ileus. Otherwise, nonobstructive bowel gas pattern. 3. Enteric tube projects 7.1 cm from the catrachito. Other medical support devices are as detailed above.   I personally reviewed the images/study and I agree with the findings as stated by Resident John Yuan MD.   MACRO: NONE.   Signed by: Jovi Alonso 11/30/2024 8:50 AM Dictation workstation:    [id number]    XR abdomen 1 view    Result Date: 11/30/2024  1. Redemonstrated severe bilateral pulmonary edema and bilateral pleural effusions. Overall lung aeration appears slightly worsened as compared to prior radiograph from earlier same day, likely due to layering of pleural effusions on present supine radiograph. 2. Mild gaseous prominence of bowel loops within included upper to mid abdomen and correlate with concern for ileus. Otherwise, nonobstructive bowel gas pattern. 3. Enteric tube projects 7.1 cm from the catrachito. Other medical support devices are as detailed above.   I personally reviewed the images/study and I agree with the findings as stated by Resident John Yuan MD.   MACRO: NONE.   Signed by: Jovi Alonso 11/30/2024 8:50 AM Dictation workstation:   [id number]    XR chest 1 view    Result Date: 11/30/2024  1. Interval retraction of the IABP with the marker now projecting in the mid descending aorta at the level of the pulmonary artery, below the AP window. Recommend placement into the proximal descending aorta for optimal positioning. 2. Interval improvement of interstitial/alveolar pulmonary edema.   I personally reviewed the images/study and I agree with the findings as stated by Kevin Guidry DO, PGY-3. This study was interpreted at [facility], [place], [place].   MACRO: None     Dictation workstation:   [id number]    XR abdomen 1 view    Result Date: 11/25/2024  1.  No evidence of bowel obstruction or perforation.   MACRO: None   Signed by: Familia Segura 11/25/2024 10:09 AM Dictation workstation:   [id number]    CT chest wo IV contrast    Result Date: 11/21/2024  1.  Thoracic aorta is normal in course and caliber. There is mild calcified plaque involving the arch and branch vessels. There is bovine arch configuration with common origin of the innominate and left common carotid artery. 2. Severe coronary artery calcifications. 3. Bibasilar  atelectasis left-greater-than-right. 4. Several small, pulmonary nodules within both lungs, measuring less than 6 mm, which are likely benign. Instructions:  Consider follow-up noncontrast CT scan chest in 12 months. 5. Multiple enlarged retrocrural lymph nodes measuring up to 1.3 cm which are nonspecific and may be reactive. Recommend follow-up CT of the abdomen in 3 months to evaluate for interval change.     I personally reviewed the images/study and I agree with the findings as stated by Tate Rodriguez DO PGY-2. This study was interpreted at [place], [place].   MACRO: Critical Finding:  See findings. Notification was initiated on 11/21/2024 at 9:38 am by  Keanu Rene.  (**-YCF-**) Instructions:   Signed by: Keanu Rene 11/21/2024 9:39 AM Dictation workstation:   [id number]    XR chest 1 view    Result Date: 11/20/2024  1.  Unchanged positioning of the intra-aortic balloon pump catheter with the radiopaque marker projecting over the descending thoracic aorta just cranial to the level of the left mainstem bronchus. 2. No focal consolidation, pleural effusion, or pneumothorax.   I personally reviewed the image(s)/study and resident interpretation. I agree with the findings as stated by resident Oscar Covington. Data analyzed and images interpreted at [facility], [place], [place].   MACRO: None   Signed by: Vamsi Pavon 11/20/2024 9:11 AM Dictation workstation:   [id number]    XR chest 1 view    Result Date: 11/19/2024  1.  Interval adjustment of the intra-aortic balloon pump catheter, with the radiopaque marker projecting over upper descending thoracic aorta, just cranial to the level of left principal bronchus. 2. No evidence of acute cardiopulmonary process.   I personally reviewed the image(s)/study and resident interpretation. I agree with the findings as stated by resident Oscar Covington. Data analyzed and images interpreted  at [place], [place].   MACRO: None   Signed by: Jovi Alonso 11/19/2024 10:42 AM Dictation workstation:   [id number]    XR chest 1 view    Result Date: 11/18/2024  1. No focal infiltrate, pleural effusion, edema or pneumothorax. 2. Intra-aortic balloon pump radiopaque marker as described above.       Signed by: Tory Jean 11/18/2024 12:19 PM Dictation workstation:   [id number]       Inpatient Medications    Continuous medications  EPINEPHrine, 0-1 mcg/kg/min, Last Rate: Stopped (11/30/24 0541)  fentaNYL, 0-300 mcg/hr, Last Rate: 75 mcg/hr (11/30/24 0400)  furosemide, 10 mg/hr, Last Rate: 10 mg/hr (11/30/24 0400)  heparin, 0-4,000 Units/hr, Last Rate: 1,200 Units/hr (11/30/24 0641)  midazolam, 0-20 mg/hr, Last Rate: Stopped (11/30/24 1134)  milrinone, 0.1-0.75 mcg/kg/min, Last Rate: 0.125 mcg/kg/min (11/30/24 0534)  nitroglycerin, 0-200 mcg/min, Last Rate: 0 mcg/min (11/29/24 2000)  nitroprusside, 0.25-5 mcg/kg/min  norepinephrine, 0-0.5 mcg/kg/min, Last Rate: Stopped (11/29/24 3855)        Scheduled medications  aspirin, 81 mg, oral, Daily  atorvastatin, 80 mg, oral, Nightly  bisacodyl, 10 mg, rectal, Daily  pantoprazole, 40 mg, oral, Daily before breakfast   Or  esomeprazole, 40 mg, nasoduodenal tube, Daily before breakfast   Or  pantoprazole, 40 mg, intravenous, Daily before breakfast  fentaNYL, 25 mcg, intravenous, Once  insulin lispro, 0-10 Units, subcutaneous, TID AC  [Held by provider] isosorbide mononitrate ER, 30 mg, oral, Daily  [Held by provider] metoprolol succinate XL, 50 mg, oral, Daily  [Held by provider] NIFEdipine ER, 30 mg, oral, Daily before breakfast  piperacillin-tazobactam, 3.375 g, intravenous, q6h  polyethylene glycol, 17 g, oral, Daily        PRN medications  PRN medications: acetaminophen, alum-mag hydroxide-simeth, dextrose, dextrose, docusate sodium, fentaNYL, glucagon, glucagon, heparin, hydrOXYzine HCL, lubricating eye drops,  midazolam, midazolam, oxygen, simethicone, vancomycin      Assessment & Plan  Edu Echavarria is a 74 y.o. male with a PMHx of HTN, GERD, and gout who presented to [facility] ED on 11/17pm with 2d hx of intermittent palpitations and chest pain. At OSH initial EKG flutter w/ RVR and ST elevtions in II/III/avF & questionable in V1-V3 w/o reciprocal changes, and labs remarkable for HS trop >27974, BNP 1100, WBC 5, CBC/RFP otherwise unremarkable. Repeat EKG aflutter w/ similar ST elevations as prior. STEMI call activated, loaded with aspirin 324mg, plavix 300mg, and started on heparin gtt 11/17pm. LHC showed triple vessel disease w/ no stenting and IABP was placed for coronary perf. Started on integrillin (which has now been stopped) and continued on heparin gtt. Transferred to [facility] for CABG evaluation. CT surgery on board for CABG evaluation, tentative plan for OR 11/25. Hospital course c/b MARYBETH and slowly dropping plts/Hb, continuing further workup. Pt transferred to floor 11/21 for further care pre-op.      Surgery cancelled on 11/25 due to worsening hyponatremia, likely iso hypovolemia 2/2 decreased PO intake and diarrhea. Resuscitated w/ 1L fluids, nephrology on board. KUB -ve, stool PCR pending. HTN reg changed from coreg isordil and hydral to Imdur 30 and Metop XL 50 to reduce pill burden. HypoNa improving s/p continuous IV NS 80cc/hr over 24 hr.     Rapid response called 11/29 for worsening AHRF and hypertensive emergency, eventually requiring intubation and CICU transfer. Initially started on nitroglycerin gtt which was then discontinued given drop in BP; started on norepinephrine and then milrinone. Also given 80 mg Lasix. Ragland Yesenai showing high filling pressures, started on a Bumex drip, switched to Lasix. Shock code called, decision made to insert IABP.     Updates 11/30:  IABP placed overnight  Weaned off norepi, now on milrinone  Started on Lasix gtt  Vent settings decreased, will attempt  extubation  Family present this morning, with patient responsive to questions. Discussed code status and that patient would not want further invasive procedures. Decision was made to change code status to DNR/DNI. Will attempt extubation today and plan for C discussion with patient involved.    Neurologic  - Patient intubated and sedated, on Fentanyl and Midazolam     Cardiovascular  #Cardiogenic Shock  #STEMI  #Triple Vessel CAD  - HS trop >45570 at OSH, HS trop at : 81331 > 82132  - Arrived on integrillin and heparin gtt, aspirin and plavix loaded 11/17 ~2100  - Utox + cannavinoid, fent (received w/ EMS), benzos, cocaine  - [facility] 11/18: Triple vessel disease (LAD prox 70%, mid 65-70%, dist 90%), Lcx OM1 80%, OM2 small, OM3 occluded with collaterals, RCA mid moderate disease.   - LVEF 20-25%, evidence of extensive global hypokinesis  - Initially admitted to CICU with IABP in place, pulled out on 11/20  - Transferred to regular floor on 11/21  - Rapid response called 11/29 for worsening AHRF and hypertensive emergency, requiring intubation and CICU transfer.   - BNP 2290 (from 1470 on admission)  Plan:  IABP placed last night  Now on Milrinone, off Levo  Bumex gtt changed to Lasix gtt  Nitroprusside gtt for afterload reduction  Holding home anti-hypertensives  OR date pending (Wednesday?)    Respiratory / Infectious Disease  #AHRF  - In the setting of cardiogenic shock  - WBC up to 32.2 today  - Flu/Covid/RSV negative  - MRSA PCR negative  - Cultures pending  Plan:  On Zosyn, stopped Vanc  Follow-up cultures  Consider further infectious workup    Renal  #MARYBETH  #Hyponatremia  - Patient with acute on chronic hyponatremia  - Nephrology following, appreciate recs  - Treated for hypotonic hypovolemic hyponatremia with fluids  - Na improved to 128 then 126, stable  - On CICU arrival, patient with AHRF and pulmonary edema  - Started on diuresis with 80 mg Lasix, then Bumex gtt --> Lasix gtt + boluses  - Cr up to 1.38  today, up from 0.75 likely 2/2 diuresis  Plan:  C/w Lasix gtt, spot dose boluses  Monitor urine output and RFPs  Follow-up nephrology recs    Gastrointestinal  #Ileus  - KUB: Mild gaseous prominence of bowel loops within included upper to mid abdomen and correlate with concern for ileus  Plan:  Bowel regimen  Enteral feeding asap      Fluids: PRN  Electrolytes: PRN  Nutrition: NPO Diet Except: Sips with meds; Effective midnight  NPO Diet; Effective now  Lines: PRN  DVT prophylaxis: Lovenox subq  GI prophylaxis:     Code Status: Full Code (Confirmed on admission)   Emergency Contact / NOK: Extended Emergency Contact Information  Primary Emergency Contact: Guillermo Barrios  Mobile Phone: [phone]  Relation: Relative  Preferred language: English   needed? No  Secondary Emergency Contact: COLIN ROMERO  Mobile Phone: [phone]  Relation: Friend  Preferred language: English   needed? No        Kj Marquez MD, MSc  Internal Medicine PGY-1    11/30/2024

## 2024-11-30 NOTE — H&P
CARDIAC ICU ADMISSION NOTE  11/18/2024     Chief Complaint  74 y.o. male patient admitted to the CICU for cardiogenic shock.    History of Present Illness  Edu Echavarria is a 74 y.o. male with PMHx of HTN, GERD, and gout who initially presented to [facility] ED on 11/17pm with 2d hx of intermittent palpitations and chest pain.     [facility] hospital course  At OSH initial EKG flutter w/ RVR and ST elevtions in II/III/avF & questionable in V1-V3 w/o reciprocal changes, and labs remarkable for HS trop >47224, BNP 1100, WBC 5, CBC/RFP otherwise unremarkable. Repeat EKG aflutter w/ similar ST elevations as prior. STEMI call activated, loaded with aspirin 324mg, plavix 300mg, and started on heparin gtt 11/17pm. LHC showed triple vessel disease w/ no stenting and IABP was placed for coronary perf. Started on integrillin (which has now been stopped) and continued on heparin gtt.     Hospital course  He was transferred to [facility] for CABG evaluation. CT surgery on board for CABG evaluation, tentative plan for OR 11/25. Hospital course c/b MARYBETH and slowly dropping plts/Hb, continuing further workup. Pt transferred to floor 11/21 for further care pre-op. Hospital course was complicated with worsening MARYBETH, most likely in the setting of dec PO intake, recent contrast administration during cath, and BP drop, resolved with fluids. Surgery was cancelled on 11/25 due to worsening hyponatremia, likely iso hypovolemia 2/2 decreased PO intake and diarrhea. He was resuscitated w/ 1L fluids, nephrology on board. HTN reg changed from coreg isordil and hydral to Imdur 30 and Metop XL 50 to reduce pill burden. HypoNa improving s/p continuous IV NS 80cc/hr over 24 hr. OR was initially planned for 12/4.    RR and CICU transfer  Rapid response called around 15:30 11/29 for SOB and increased WOB. On arrival, he was afebrile, tachycardic (>110), hypertensive (up to 190s systolic), tachypneic, hypoxic (satting 85% on RA), and was found to be  in respiratory distress with increased WOB. On exam, b/l diffuse crackles appreciated with no murmurs. Denied chest pain but noted new, sudden-onset SOB and cough with yellow-sputum (was not on O2 prior to this). Started on 6L NC with mild increase in SpO2 to low 90s. ABG showed 7.23/63/90/lactate 2.9, found to be in acute hypoxemic hypercapnic respiratory failure. Given worsening AHRF refractory to NIV he was intubated and transferred to the CICU.     CICU Admission  In the CICU, patient was Initially started on nitroglycerin gtt which was then discontinued given drop in BP; started on norepinephrine and then epinephrine. Also given 80 mg Lasix. [place] Yesenia was placed showing high filling pressures, started on a Bumex drip. Shock code called, decision made to insert IABP.    Objective Data        11/29/2024     4:51 PM 11/29/2024     4:53 PM 11/29/2024     5:00 PM 11/29/2024     5:40 PM 11/29/2024     6:00 PM 11/29/2024     6:05 PM 11/29/2024     7:00 PM   Vitals   Systolic 152 161 143 145      Diastolic 91 94 85 88      Heart Rate 104 102 103 97 95 94 84   Resp 28 36 28 24 36 23 14       Vent settings:    Most Recent Range Past 24hrs   Mode Volume control/assist control    FiO2   No data recorded   Rate 28 Resp Rate (Set)  Min: 28   Min taken time: 11/29/24 1814  Max: 28   Max taken time: 11/29/24 1814   Vt 400 mL  Vt (Set, mL)  Min: 400 mL   Min taken time: 11/29/24 1814  Max: 400 mL   Max taken time: 11/29/24 1814   PEEP 15 cm H20 PEEP/CPAP (cm H2O)  Min: 15 cm H20   Min taken time: 11/29/24 1814  Max: 15 cm H20   Max taken time: 11/29/24 1814     Invasive Hemodynamics:    Most Recent Range Past 24hrs   BP (Art) 121/64 Arterial Line BP 1  Min: 83/50  Max: 153/79   MAP(Art) 80 mmHg Arterial Line MAP 1 (mmHg)   Min: 60 mmHg  Max: 104 mmHg   RA/CVP   No data recorded   PA 80/46 PAP  Min: 80/46  Max: 80/46   PA(mean) 58 mmHg PAP (Mean)  Min: 58 mmHg  Max: 58 mmHg   PCWP   No data recorded   CO   No data recorded   CI   " No data recorded   Mixed Venous   No data recorded   SVR    No data recorded   PVR   No data recorded       Vitals  /88   Pulse 84   Temp 35.8 °C (96.4 °F)   Resp 14   Ht 1.702 m (5' 7\")   Wt 85.4 kg (188 lb 4.4 oz)   SpO2 96%   BMI 29.49 kg/m²       Intake/Output Summary (Last 24 hours) at 11/29/2024 2055  Last data filed at 11/29/2024 1929  Gross per 24 hour   Intake 50 ml   Output 2250 ml   Net -2200 ml      Vitals:    11/21/24 0600   Weight: 85.4 kg (188 lb 4.4 oz)        Physical Exam   Physical Exam  Constitutional:       Interventions: He is sedated and intubated.   Cardiovascular:      Rate and Rhythm: Normal rate and regular rhythm.   Pulmonary:      Effort: He is intubated.      Breath sounds: Rhonchi present.   Abdominal:      Palpations: Abdomen is soft.        Labs  CBC:  Results from last 7 days   Lab Units 11/29/24  1606 11/29/24  0802 11/28/24  0659   WBC AUTO x10*3/uL 23.0* 14.9* 13.8*   HEMOGLOBIN g/dL 17.5 14.3 13.2*   PLATELETS AUTO x10*3/uL 398 298 260     BMP:  Results from last 7 days   Lab Units 11/29/24  1606 11/29/24  0802 11/28/24  0659   SODIUM mmol/L 126* 128* 126*   POTASSIUM mmol/L 4.8 4.0 3.9   CHLORIDE mmol/L 88* 92* 93*   CO2 mmol/L 24 23 23   ANION GAP mmol/L 19 17 14   BUN mg/dL 8 8 10   CREATININE mg/dL 0.75 0.78 0.81   EGFR mL/min/1.73m*2 >90 >90 >90   CALCIUM mg/dL 9.3 8.8 8.6   PHOSPHORUS mg/dL 3.6 2.6 2.5   MAGNESIUM mg/dL 2.39 2.05 1.92   GLUCOSE mg/dL 156* 128* 129*     LFT:  Results from last 7 days   Lab Units 11/29/24  0802 11/28/24  0659 11/27/24  0829   AST U/L 12 12 12   ALT U/L 15 14 16   ALK PHOS U/L 69 63 75   BILIRUBIN TOTAL mg/dL 1.1 1.0 1.0   BILIRUBIN DIRECT mg/dL 0.4* 0.3 0.3     Cardiac:  Results from last 7 days   Lab Units 11/29/24  1639   BNP pg/mL 2,290*     Coag:  Results from last 7 days   Lab Units 11/29/24  0802 11/28/24  0659 11/27/24  0830   PROTIME seconds 15.2* 16.7* 16.5*   APTT seconds 36 34 36   INR  1.3* 1.5* 1.5*     UA: " "  Results from last 7 days   Lab Units 11/24/24  2334   COLOR U  Yellow   PH U  6.5   SPEC GRAV UR  1.022   PROTEIN U mg/dL 50 (1+)*   BLOOD UR  NEGATIVE   NITRITE U  NEGATIVE   WBC UR /HPF 1-5     ABG:  Results from last 7 days   Lab Units 11/29/24  1947 11/29/24  1933 11/29/24  1826 11/29/24  1739 11/29/24  1603   POCT PH, ARTERIAL pH 7.34*  --   --  7.01* 7.23*   POCT PO2, ARTERIAL mm Hg 101*  --   --  139* 90   POCT PCO2, ARTERIAL mm Hg 43*  --   --  103* 63*   FIO2 % 80 100 100 100 100     VBG:  Results from last 7 days   Lab Units 11/29/24  1807   POCT PH, VENOUS pH 7.10*   POCT PCO2, VENOUS mm Hg 81*       Results from last 7 days   Lab Units 11/29/24  1606 11/29/24  0802 11/28/24  0659   WBC AUTO x10*3/uL 23.0* 14.9* 13.8*   HEMOGLOBIN g/dL 17.5 14.3 13.2*   HEMATOCRIT % 51.1 40.6* 37.9*   PLATELETS AUTO x10*3/uL 398 298 260     Results from last 7 days   Lab Units 11/29/24  1606 11/29/24  0802 11/28/24  0659   SODIUM mmol/L 126* 128* 126*   POTASSIUM mmol/L 4.8 4.0 3.9   CO2 mmol/L 24 23 23   ANION GAP mmol/L 19 17 14   BUN mg/dL 8 8 10   CREATININE mg/dL 0.75 0.78 0.81   GLUCOSE mg/dL 156* 128* 129*   EGFR mL/min/1.73m*2 >90 >90 >90   MAGNESIUM mg/dL 2.39 2.05 1.92   PHOSPHORUS mg/dL 3.6 2.6 2.5      Results from last 7 days   Lab Units 11/29/24  0802 11/28/24  0659 11/27/24  0829   ALT U/L 15 14 16   AST U/L 12 12 12   ALK PHOS U/L 69 63 75      Results from last 7 days   Lab Units 11/29/24  0802 11/28/24  0659 11/27/24  0830   INR  1.3* 1.5* 1.5*     Results from last 7 days   Lab Units 11/29/24  1947 11/29/24  1933 11/29/24  1826 11/29/24  1739 11/29/24  1603   POCT PH, ARTERIAL pH 7.34*  --   --  7.01* 7.23*   POCT PO2, ARTERIAL mm Hg 101*  --   --  139* 90   POCT PCO2, ARTERIAL mm Hg 43*  --   --  103* 63*   FIO2 % 80 100 100 100 100     Results from last 7 days   Lab Units 11/29/24  1807   POCT PH, VENOUS pH 7.10*   POCT PCO2, VENOUS mm Hg 81*           No lab exists for component: \"BNPRESU\", " "\"CPKT\"            No results found for: \"CKTOTAL\", \"CKMB\", \"CKMBINDEX\", \"TROPONINI\"   Lab Results   Component Value Date    HGBA1C 6.3 (H) 11/18/2024      Lab Results   Component Value Date    CHOL 143 11/18/2024     Lab Results   Component Value Date    HDL 31.4 11/18/2024     Lab Results   Component Value Date    LDLCALC 85 11/18/2024     Lab Results   Component Value Date    TRIG 135 11/18/2024     No components found for: \"CHOLHDL\"     Imaging    XR abdomen 1 view    Result Date: 11/25/2024  1.  No evidence of bowel obstruction or perforation.   MACRO: None   Signed by: Familia Segura 11/25/2024 10:09 AM Dictation workstation:   [id number]    CT chest wo IV contrast    Result Date: 11/21/2024  1.  Thoracic aorta is normal in course and caliber. There is mild calcified plaque involving the arch and branch vessels. There is bovine arch configuration with common origin of the innominate and left common carotid artery. 2. Severe coronary artery calcifications. 3. Bibasilar atelectasis left-greater-than-right. 4. Several small, pulmonary nodules within both lungs, measuring less than 6 mm, which are likely benign. Instructions:  Consider follow-up noncontrast CT scan chest in 12 months. 5. Multiple enlarged retrocrural lymph nodes measuring up to 1.3 cm which are nonspecific and may be reactive. Recommend follow-up CT of the abdomen in 3 months to evaluate for interval change.     I personally reviewed the images/study and I agree with the findings as stated by Tate Rodriguez DO PGY-2. This study was interpreted at [place], [place].   MACRO: Critical Finding:  See findings. Notification was initiated on 11/21/2024 at 9:38 am by  Keanu Rene.  (**-YCF-**) Instructions:   Signed by: Keanu Rene 11/21/2024 9:39 AM Dictation workstation:   [id number]    XR chest 1 view    Result Date: 11/20/2024  1.  Unchanged positioning of the intra-aortic balloon pump catheter with " the radiopaque marker projecting over the descending thoracic aorta just cranial to the level of the left mainstem bronchus. 2. No focal consolidation, pleural effusion, or pneumothorax.   I personally reviewed the image(s)/study and resident interpretation. I agree with the findings as stated by resident Oscar Covington. Data analyzed and images interpreted at [place], [place].   MACRO: None   Signed by: Vamsi Pavon 11/20/2024 9:11 AM Dictation workstation:   [id number]    XR chest 1 view    Result Date: 11/19/2024  1.  Interval adjustment of the intra-aortic balloon pump catheter, with the radiopaque marker projecting over upper descending thoracic aorta, just cranial to the level of left principal bronchus. 2. No evidence of acute cardiopulmonary process.   I personally reviewed the image(s)/study and resident interpretation. I agree with the findings as stated by resident Oscar Covington. Data analyzed and images interpreted at [place], [place].   MACRO: None   Signed by: Jovi Alonso 11/19/2024 10:42 AM Dictation workstation:   [id number]    XR chest 1 view    Result Date: 11/18/2024  1. No focal infiltrate, pleural effusion, edema or pneumothorax. 2. Intra-aortic balloon pump radiopaque marker as described above.       Signed by: Tory Jean 11/18/2024 12:19 PM Dictation workstation:   [id number]        ED Interventions:  Medications   glucagon (Glucagen) injection 1 mg ( intramuscular MAR Hold 11/29/24 2011)   dextrose 50 % injection 25 g ( intravenous MAR Hold 11/29/24 2011)   glucagon (Glucagen) injection 1 mg ( intramuscular MAR Hold 11/29/24 2011)   dextrose 50 % injection 12.5 g ( intravenous MAR Hold 11/29/24 2011)   aspirin EC tablet 81 mg ( oral Dose Auto Held 12/4/24 0900)   acetaminophen (Tylenol) tablet 975 mg ( oral MAR Hold 11/29/24 2011)   pantoprazole (ProtoNix) EC tablet 40 mg ( oral Dose Auto  Held 12/6/24 0700)     Or   esomeprazole (NexIUM) suspension 40 mg ( nasoduodenal tube Dose Auto Held 12/6/24 0700)     Or   pantoprazole (ProtoNix) injection 40 mg ( intravenous Dose Auto Held 12/6/24 0700)   atorvastatin (Lipitor) tablet 80 mg ( oral Dose Auto Held 12/5/24 2100)   insulin lispro injection 0-10 Units ( subcutaneous Dose Auto Held 12/5/24 1600)   mupirocin (Bactroban) 2 % ointment ( Topical Given 11/23/24 2052)   atropine syringe  - Omnicell Override Pull (  Not Given 11/20/24 1634)   simethicone (Mylicon) chewable tablet 40 mg ( oral MAR Hold 11/29/24 2011)   alum-mag hydroxide-simeth (Mylanta) 200-200-20 mg/5 mL oral suspension 20 mL ( oral MAR Hold 11/29/24 2011)   docusate sodium (Colace) oral liquid 100 mg ( oral MAR Hold 11/29/24 2011)   enoxaparin (Lovenox) syringe 40 mg ( subcutaneous Dose Auto Held 12/3/24 0900)   isosorbide mononitrate ER (Imdur) 24 hr tablet 30 mg ( oral Dose Auto Held 12/3/24 0900)   metoprolol succinate XL (Toprol-XL) 24 hr tablet 50 mg ( oral Dose Auto Held 12/3/24 0900)   NIFEdipine ER (Adalat CC) 24 hr tablet 30 mg ( oral Dose Auto Held 12/4/24 0700)   sodium chloride 0.9% infusion (0 mL/hr intravenous Stopped 11/27/24 1218)   chlorhexidine (Peridex) 0.12 % solution 15 mL ( Mouth/Throat Dose Auto Held 12/3/24 2100)   lubricating eye drops ophthalmic solution 1 drop ( Both Eyes MAR Hold 11/29/24 2011)   hydrOXYzine HCL (Atarax) tablet 25 mg ( oral MAR Hold 11/29/24 2011)   bisacodyl (Dulcolax) suppository 10 mg ( rectal Dose Auto Held 12/3/24 0900)   piperacillin-tazobactam (Zosyn) 3.375 g in dextrose (iso) IV 50 mL (0 g intravenous Stopped 11/29/24 1728)   nitroprusside (Nipride) infusion 500 mcg/mL (100 mL vial) (adult) ( intravenous Not Given 11/29/24 1727)   propofol (Diprivan) infusion 10 mg/mL  - Omnicell Override Pull (0 mcg/kg/min  Stopped 11/29/24 1957)   polyethylene glycol (Glycolax, Miralax) packet 17 g ( oral Dose Auto Held 12/3/24 0900)   midazolam  (Versed) bolus from bag 1 mg ( intravenous MAR Hold 11/29/24 2011)   midazolam (Versed) bolus from bag 1 mg ( intravenous MAR Hold 11/29/24 2011)   midazolam in NS (Versed) 1 mg/mL infusion solution (3 mg/hr intravenous New Bag 11/29/24 1927)   fentaNYL bolus from bag 25 mcg ( intravenous MAR Hold 11/29/24 2011)   fentaNYL bolus from bag 25 mcg ( intravenous MAR Hold 11/29/24 2011)   fentanyl (Sublimaze) 1000 mcg in sodium chloride 0.9% 100 mL (10 mcg/mL) infusion (premix) (75 mcg/hr intravenous New Bag 11/29/24 1927)   nitroglycerin (Tridil) 50 mg in dextrose 5% 250 mL (0.2 mg/mL) infusion (premix) (0 mcg/min intravenous Stopped 11/29/24 1931)   oxygen (O2) therapy (has no administration in time range)   EPINEPHrine (Adrenalin) 4 mg in sodium chloride 0.9% 250 mL (16 mcg/mL) infusion (premix) (0 mcg/kg/min × 73.8 kg (Adjusted) intravenous Held Per Protocol 11/29/24 2021)   EPINEPHrine HCl (PF) (Adrenalin) injection 1 mg/mL (1 mL)  - Omnicell Override Pull (  Not Given 11/29/24 2021)   EPINEPHrine HCl (PF) (Adrenalin) injection 1 mg/mL (1 mL)  - Omnicell Override Pull (  Not Given 11/29/24 2021)   bumetanide (Bumex) 25 mg in 100 mL (0.25 mg/mL) infusion (has no administration in time range)   norepinephrine (Levophed) 8 mg in dextrose 5% 250 mL (0.032 mg/mL) infusion (premix) (0.18 mcg/kg/min × 85.4 kg intravenous New Bag 11/29/24 1949)   perflutren lipid microspheres (Definity) injection 0.5-10 mL of dilution (3 mL of dilution intravenous Given 11/18/24 1513)   hydrALAZINE (Apresoline) tablet 10 mg (10 mg oral Given 11/19/24 0136)   cyclobenzaprine (Flexeril) tablet 5 mg (5 mg oral Given 11/19/24 0136)   potassium chloride (Klor-Con) packet 40 mEq (40 mEq oral Given 11/19/24 8686)   potassium chloride CR (Klor-Con M20) ER tablet 40 mEq (40 mEq oral Given 11/19/24 0831)   isosorbide dinitrate (Isordil) tablet 10 mg (10 mg oral Given 11/19/24 1009)   hydrALAZINE (Apresoline) tablet 50 mg (50 mg oral Given 11/19/24  1604)   isosorbide dinitrate (Isordil) tablet 20 mg (20 mg oral Given 11/19/24 1604)   ondansetron (Zofran) injection 4 mg (4 mg intravenous Given 11/19/24 2323)   lactated Ringer's bolus 500 mL (0 mL intravenous Stopped 11/20/24 1417)   fentaNYL PF (Sublimaze) injection 25 mcg (25 mcg intravenous Given 11/20/24 1535)   alum-mag hydroxide-simeth (Mylanta) 200-200-20 mg/5 mL oral suspension 20 mL (20 mL oral Given 11/20/24 2013)   lactated Ringer's bolus 500 mL (0 mL intravenous Stopped 11/21/24 1122)   chlorhexidine (Peridex) 0.12 % solution 15 mL (15 mL Mouth/Throat Given 11/24/24 2129)   potassium chloride CR (Klor-Con) ER tablet 20 mEq (20 mEq oral Given 11/22/24 1617)   sodium chloride 0.9 % bolus 500 mL (0 mL intravenous Stopped 11/25/24 1403)   sodium chloride 0.9 % bolus 500 mL (0 mL intravenous Stopped 11/25/24 2106)   metoprolol tartrate (Lopressor) injection 10 mg (10 mg intravenous Given 11/26/24 0110)   potassium chloride CR (Klor-Con) ER tablet 20 mEq (20 mEq oral Given 11/28/24 0959)   magnesium sulfate 2 g in sterile water for injection 50 mL (0 g intravenous Stopped 11/28/24 1215)   furosemide (Lasix) injection 80 mg (80 mg intravenous Given 11/29/24 1727)   perflutren lipid microspheres (Definity) injection 0.5-10 mL of dilution (3 mL of dilution intravenous Given 11/29/24 1925)   bumetanide (Bumex) injection 2 mg (2 mg intravenous Given 11/29/24 1958)       Cardiac Data  EKG  Encounter Date: 11/18/24   ECG 12 Lead   Result Value    Ventricular Rate 87    Atrial Rate 87    NM Interval 146    QRS Duration 106    QT Interval 396    QTC Calculation(Bazett) 476    P Axis 69    R Axis 89    T Axis 111    QRS Count 14    Q Onset 215    P Onset 142    P Offset 207    T Offset 413    QTC Fredericia 448    Narrative    Normal sinus rhythm  ST elevation, consider inferior injury or acute infarct  ** ** ACUTE MI / STEMI ** **  Consider right ventricular involvement in acute inferior infarct  Abnormal ECG  When  compared with ECG of 18-NOV-2024 11:11,  Borderline criteria for Lateral infarct are no longer Present  Borderline criteria for Inferior infarct are no longer Present  ST now depressed in Lateral leads  Nonspecific T wave abnormality no longer evident in Anterior leads     Transthoracic Echo (TTE) Limited 11/29/24    1. Poorly visualized anatomical structures due to suboptimal image quality.   2. Left ventricular ejection fraction is severely decreased, by visual estimate at 25-30%.   3. There is global hypokinesis of the left ventricle with minor regional variations.   4. Left ventricular cavity size is mild to moderately dilated.   5. There is mildly reduced right ventricular systolic function.   6. Mildly enlarged right ventricle.   7. The left atrium is enlarged.   8. Severely elevated right ventricular systolic pressure.    Transthoracic Echo (TTE) Complete 11/18/24   1. Poorly visualized anatomical structures due to suboptimal image quality.   2. Left ventricular ejection fraction is severely decreased, calculated by Regalado's biplane at 23%.   3. There is global hypokinesis of the left ventricle with minor regional variations.   4. Spectral Doppler shows a Grade III (restrictive pattern) of left ventricular diastolic filling with an elevated left atrial pressure.   5. There is normal right ventricular global systolic function.   6. Mildly enlarged right ventricle.   7. The left atrium is mildly dilated.    Cardiac Catheterization 11/18 (Alfredo)  - Left dominant System  - Left Main: Significant disease, bifurcating to the LAD and circumflex arteries  - LAD: Large vessel which is extensively and diffusely diseased. Proximal/ostial LAD has evidence of a hazy eccentric stenosis in the range of 70%.  LAD continues as a large vessel with evidence of extensive disease with stenosis in its proximal part of approximately 65 to 70%, followed by aneurysmatic dilatation just proximal to the first septal ,  which is followed by his stenosis in the range of 70%.  Mid LAD has evidence of a stenosis in the range of 90%..  Distal LAD is severely diseased with evidence of a long lesion in the range of 90%.  D1: Small  D2: Moderate-sized, mildly diffusely diseased  - LCx: Large, anatomically dominant vessel which gives off 2 obtuse marginals, moderate to severe diffuse disease.  Stenosis Blanco 1.1.1 at the bifurcation/takeoff of the third OM.  OM1: Moderately sized vessel with evidence of severe stenosis in the range of 80%.  OM2: Small  OM 3: Large vessel with evidence of severe ostial stenosis/bifurcation Blanco 1.1.1, as mentioned above.  More distally, the vessel is totally occluded.  Distal OM's are visualized through the intra systemic collaterals  Ramus Intermedius: Absent  - RCA: Nondominant with evidence of moderate disease in its mid segment  - LVEDP: 46 mmHg   - LV EJECTION FRACTION and WALL MOTION: Estimated EF around 20 to 25% with evidence of extensive global hypokinesis.     Past Medical History  No past medical history on file.    Surgical History  No past surgical history on file.    Social History  He reports that he quit smoking about 52 years ago. His smoking use included cigarettes. He started smoking about 39 years ago. He has never used smokeless tobacco. He reports current drug use. Drug: Marijuana. No history on file for alcohol use.    Family History  No family history on file.    Allergies  Patient has no known allergies.    Home Medications  Prior to Admission medications    Medication Sig Start Date End Date Taking? Authorizing Provider   allopurinol (Zyloprim) 100 mg tablet Take 1 tablet (100 mg) by mouth once daily.    Historical Provider, MD   metoprolol succinate XL ([place]sp[place] Sprinkle) 100 mg 24 hr capsule Take 1 capsule (100 mg) by mouth once daily. Capsules must be opened and contents sprinkled on a small amount of soft food or liquid (non-warmed) and administered within 15 minutes of  preparation. Do not crush or chew granules.    Historical Provider, MD       Inpatient Medications  Scheduled medications  [Transfer Hold] aspirin, 81 mg, oral, Daily  [Transfer Hold] atorvastatin, 80 mg, oral, Nightly  [Transfer Hold] bisacodyl, 10 mg, rectal, Daily  [Transfer Hold] chlorhexidine, 15 mL, Mouth/Throat, BID  [Transfer Hold] enoxaparin, 40 mg, subcutaneous, Daily  EPINEPHrine HCl (PF), , ,   EPINEPHrine HCl (PF), , ,   [Transfer Hold] pantoprazole, 40 mg, oral, Daily before breakfast   Or  [Transfer Hold] esomeprazole, 40 mg, nasoduodenal tube, Daily before breakfast   Or  [Transfer Hold] pantoprazole, 40 mg, intravenous, Daily before breakfast  [Transfer Hold] fentaNYL, 25 mcg, intravenous, Once  [Transfer Hold] insulin lispro, 0-10 Units, subcutaneous, TID AC  [Transfer Hold] isosorbide mononitrate ER, 30 mg, oral, Daily  [Transfer Hold] metoprolol succinate XL, 50 mg, oral, Daily  [Transfer Hold] NIFEdipine ER, 30 mg, oral, Daily before breakfast  piperacillin-tazobactam, 3.375 g, intravenous, q6h  [Transfer Hold] polyethylene glycol, 17 g, oral, Daily  propofol, , ,       Continuous medications  bumetanide, 0.25 mg/hr  EPINEPHrine, 0-1 mcg/kg/min (Adjusted), Last Rate: Stopped (11/29/24 2021)  fentaNYL, 0-300 mcg/hr, Last Rate: 75 mcg/hr (11/29/24 1927)  midazolam, 0-20 mg/hr, Last Rate: 3 mg/hr (11/29/24 1927)  nitroglycerin, 0-200 mcg/min, Last Rate: Stopped (11/29/24 1931)  nitroprusside, 0.25-5 mcg/kg/min  norepinephrine, 0-0.5 mcg/kg/min, Last Rate: 0.18 mcg/kg/min (11/29/24 1949)      PRN medications  PRN medications: [Transfer Hold] acetaminophen, [Transfer Hold] alum-mag hydroxide-simeth, [Transfer Hold] dextrose, [Transfer Hold] dextrose, [Transfer Hold] docusate sodium, EPINEPHrine HCl (PF), EPINEPHrine HCl (PF), [Transfer Hold] fentaNYL, [Transfer Hold] glucagon, [Transfer Hold] glucagon, [Transfer Hold] hydrOXYzine HCL, [Transfer Hold] lubricating eye drops, [Transfer Hold] midazolam,  [Transfer Hold] midazolam, oxygen, propofol, [Transfer Hold] simethicone     Assessment/Plan   Edu Echavarria is a 74 y.o. male with a PMHx of HTN, GERD, and gout who presented to [facility] ED on 11/17pm with 2d hx of intermittent palpitations and chest pain. At OSH initial EKG flutter w/ RVR and ST elevtions in II/III/avF & questionable in V1-V3 w/o reciprocal changes, and labs remarkable for HS trop >74542, BNP 1100, WBC 5, CBC/RFP otherwise unremarkable. Repeat EKG aflutter w/ similar ST elevations as prior. STEMI call activated, loaded with aspirin 324mg, plavix 300mg, and started on heparin gtt 11/17pm. LHC showed triple vessel disease w/ no stenting and IABP was placed for coronary perf. Started on integrillin (which has now been stopped) and continued on heparin gtt. Transferred to [facility] for CABG evaluation. CT surgery on board for CABG evaluation, tentative plan for OR 11/25. Hospital course c/b MARYBETH and slowly dropping plts/Hb, continuing further workup. Pt transferred to floor 11/21 for further care pre-op.      Surgery cancelled on 11/25 due to worsening hyponatremia, likely iso hypovolemia 2/2 decreased PO intake and diarrhea. Resuscitated w/ 1L fluids, nephrology on board. KUB -ve, stool PCR pending. HTN reg changed from coreg isordil and hydral to Imdur 30 and Metop XL 50 to reduce pill burden. HypoNa improving s/p continuous IV NS 80cc/hr over 24 hr.    Rapid response called 11/29 for worsening AHRF and hypertensive emergency, eventually requiring intubation and CICU transfer. Initially started on nitroglycerin gtt which was then discontinued given drop in BP; started on norepinephrine and then milrinone. Also given 80 mg Lasix. [place] showing high filling pressures, started on a Bumex drip. Shock code called, decision made to insert IABP.    Neurologic  - Patient intubated and sedated, on Fentanyl and Midazolam    Cardiovascular  #Cardiogenic Shock  #STEMI  #Triple Vessel CAD  - HS trop >79574 at  [facility]H, HS trop at : 68013 > 05186  - [facility] 11/18: Triple vessel disease (LAD prox 70%, mid 65-70%, dist 90%), Lcx OM1 80%, OM2 small, OM3 occluded with collaterals, RCA mid moderate disease.   - LVEDP 46mmHg, LVEF 20-25%, evidence of extensive global hypokinesis  - Arrived on integrillin and heparin gtt, aspirin and plavix loaded 11/17 ~2100  - Utox + cannavinoid, fent (received w/ EMS), benzos, cocaine  - Initially admitted to CICU with IABP in place, pulled out on 11/20  - Transferred to regular floor on 11/21  - Rapid response called 11/29 for worsening AHRF and hypertensive emergency, requiring intubation and CICU transfer.   - Initially hypertensive, started on nitroglycerin gtt, discontinued given hypotension, started pressors  Plan:  IABP placement ongoing  Started on Epi + Norepi  Started on Bumex gtt 1 cc/hr  Holding home anti-hypertensives  OR tentatively Wed 12/4, will defer until stable    Respiratory  #AHRF  - In the setting of cardiogenic shock  - Possible infectious process given WBC 23  Plan:  Vanc/Zosyn started  Pending Flu/Covid PCR, Legionella Ag  Pending blood cultures     Renal  #Hyponatremia  - Na on admit 132, now 122  - Serum osm 262, Uosm 581, Vanita <10  - Hypotonic hypovolemic hyponatremia  - Nephrology consulted, appreciate recs  - Likely 2/2 dehydration, poor PO intake, and diarrhea  - 1L NS fluid resuscitation on 11/25  - S/p fluid resuscitation, HypoNa improved to 121>126>128 (11/29)  Plan:  Monitor RFPs  Holding fluids given cardiogenic shock  Pending update on nephrology recs    Infectious Disease  #Leucocytosis  #AHRF  - WBC up to 23 this PM from 15 in AM  - Given AHRF, c/f pneumonia  Plan:  Patient intubated  On Vanc/Zosyn  Pending sputum cultures    Other  #Gout  - Holding allopurinol      Fluids: PRN  Electrolytes: PRN  Nutrition: NPO Diet Except: Sips with meds; Effective midnight  NPO Diet; Effective now  Lines: PRN  DVT prophylaxis: Heparin subq  GI prophylaxis: Protonix    Code  Status: Full Code (Confirmed on admission)   Emergency Contact / NOK: Extended Emergency Contact Information  Primary Emergency Contact: Guillermo Barrios  Mobile Phone: [phone]  Relation: Relative  Preferred language: English   needed? No  Secondary Emergency Contact: COLIN ROMERO  Mobile Phone: [phone]  Relation: Friend  Preferred language: English   needed? No        Kj Marquez MD, MSc  Internal Medicine PGY-1

## 2024-12-01 ENCOUNTER — APPOINTMENT (OUTPATIENT)
Dept: RADIOLOGY | Facility: HOSPITAL | Age: 74
End: 2024-12-01
Payer: MEDICARE

## 2024-12-01 VITALS
BODY MASS INDEX: 31.49 KG/M2 | SYSTOLIC BLOOD PRESSURE: 150 MMHG | HEART RATE: 84 BPM | DIASTOLIC BLOOD PRESSURE: 26 MMHG | OXYGEN SATURATION: 94 % | TEMPERATURE: 98.4 F | WEIGHT: 200.62 LBS | RESPIRATION RATE: 19 BRPM | HEIGHT: 67 IN

## 2024-12-01 LAB
ALBUMIN SERPL BCP-MCNC: 3 G/DL (ref 3.4–5)
ALBUMIN SERPL BCP-MCNC: 3.1 G/DL (ref 3.4–5)
ALBUMIN SERPL BCP-MCNC: 3.1 G/DL (ref 3.4–5)
ALP SERPL-CCNC: 60 U/L (ref 33–136)
ALT SERPL W P-5'-P-CCNC: 14 U/L (ref 10–52)
ANION GAP BLDMV CALCULATED.4IONS-SCNC: 3 MMO/L (ref 10–25)
ANION GAP BLDMV CALCULATED.4IONS-SCNC: 5 MMO/L (ref 10–25)
ANION GAP BLDMV CALCULATED.4IONS-SCNC: 9 MMO/L (ref 10–25)
ANION GAP SERPL CALC-SCNC: 15 MMOL/L (ref 10–20)
ANION GAP SERPL CALC-SCNC: 15 MMOL/L (ref 10–20)
APTT PPP: 76 SECONDS (ref 27–38)
AST SERPL W P-5'-P-CCNC: 19 U/L (ref 9–39)
BACTERIA BLD CULT: NORMAL
BACTERIA BLD CULT: NORMAL
BACTERIA SPEC RESP CULT: NORMAL
BASE EXCESS BLDMV CALC-SCNC: 10.3 MMOL/L (ref -2–3)
BASE EXCESS BLDMV CALC-SCNC: 5.2 MMOL/L (ref -2–3)
BASE EXCESS BLDMV CALC-SCNC: 8.7 MMOL/L (ref -2–3)
BASOPHILS # BLD AUTO: 0.07 X10*3/UL (ref 0–0.1)
BASOPHILS NFR BLD AUTO: 0.4 %
BILIRUB DIRECT SERPL-MCNC: 0.5 MG/DL (ref 0–0.3)
BILIRUB SERPL-MCNC: 1.3 MG/DL (ref 0–1.2)
BODY TEMPERATURE: 37 DEGREES CELSIUS
BUN SERPL-MCNC: 16 MG/DL (ref 6–23)
BUN SERPL-MCNC: 19 MG/DL (ref 6–23)
CA-I BLDMV-SCNC: 1.06 MMOL/L (ref 1.1–1.33)
CA-I BLDMV-SCNC: 1.09 MMOL/L (ref 1.1–1.33)
CA-I BLDMV-SCNC: 1.1 MMOL/L (ref 1.1–1.33)
CALCIUM SERPL-MCNC: 7.9 MG/DL (ref 8.6–10.6)
CALCIUM SERPL-MCNC: 8.1 MG/DL (ref 8.6–10.6)
CHLORIDE BLD-SCNC: 92 MMOL/L (ref 98–107)
CHLORIDE BLD-SCNC: 92 MMOL/L (ref 98–107)
CHLORIDE BLD-SCNC: 94 MMOL/L (ref 98–107)
CHLORIDE SERPL-SCNC: 90 MMOL/L (ref 98–107)
CHLORIDE SERPL-SCNC: 94 MMOL/L (ref 98–107)
CO2 SERPL-SCNC: 28 MMOL/L (ref 21–32)
CO2 SERPL-SCNC: 28 MMOL/L (ref 21–32)
CREAT SERPL-MCNC: 1.64 MG/DL (ref 0.5–1.3)
CREAT SERPL-MCNC: 1.69 MG/DL (ref 0.5–1.3)
EGFRCR SERPLBLD CKD-EPI 2021: 42 ML/MIN/1.73M*2
EGFRCR SERPLBLD CKD-EPI 2021: 44 ML/MIN/1.73M*2
EOSINOPHIL # BLD AUTO: 0.09 X10*3/UL (ref 0–0.4)
EOSINOPHIL NFR BLD AUTO: 0.5 %
ERYTHROCYTE [DISTWIDTH] IN BLOOD BY AUTOMATED COUNT: 13.2 % (ref 11.5–14.5)
GLUCOSE BLD MANUAL STRIP-MCNC: 155 MG/DL (ref 74–99)
GLUCOSE BLD MANUAL STRIP-MCNC: 164 MG/DL (ref 74–99)
GLUCOSE BLD MANUAL STRIP-MCNC: 207 MG/DL (ref 74–99)
GLUCOSE BLD-MCNC: 145 MG/DL (ref 74–99)
GLUCOSE BLD-MCNC: 170 MG/DL (ref 74–99)
GLUCOSE BLD-MCNC: 208 MG/DL (ref 74–99)
GLUCOSE SERPL-MCNC: 158 MG/DL (ref 74–99)
GLUCOSE SERPL-MCNC: 193 MG/DL (ref 74–99)
GRAM STN SPEC: NORMAL
GRAM STN SPEC: NORMAL
HCO3 BLDMV-SCNC: 29.9 MMOL/L (ref 22–26)
HCO3 BLDMV-SCNC: 32.8 MMOL/L (ref 22–26)
HCO3 BLDMV-SCNC: 35.1 MMOL/L (ref 22–26)
HCT VFR BLD AUTO: 37.8 % (ref 41–52)
HCT VFR BLD EST: 40 % (ref 41–52)
HCT VFR BLD EST: 43 % (ref 41–52)
HCT VFR BLD EST: 43 % (ref 41–52)
HGB BLD-MCNC: 13.9 G/DL (ref 13.5–17.5)
HGB BLDMV-MCNC: 13.4 G/DL (ref 13.5–17.5)
HGB BLDMV-MCNC: 14.4 G/DL (ref 13.5–17.5)
HGB BLDMV-MCNC: 14.4 G/DL (ref 13.5–17.5)
IMM GRANULOCYTES # BLD AUTO: 0.1 X10*3/UL (ref 0–0.5)
IMM GRANULOCYTES NFR BLD AUTO: 0.5 % (ref 0–0.9)
INHALED O2 CONCENTRATION: 28 %
INHALED O2 CONCENTRATION: 28 %
INHALED O2 CONCENTRATION: 38 %
INR PPP: 1.6 (ref 0.9–1.1)
LACTATE BLDMV-SCNC: 0.9 MMOL/L (ref 0.4–2)
LACTATE BLDMV-SCNC: 1.1 MMOL/L (ref 0.4–2)
LACTATE BLDMV-SCNC: 1.5 MMOL/L (ref 0.4–2)
LYMPHOCYTES # BLD AUTO: 1.99 X10*3/UL (ref 0.8–3)
LYMPHOCYTES NFR BLD AUTO: 10.4 %
MAGNESIUM SERPL-MCNC: 1.76 MG/DL (ref 1.6–2.4)
MAGNESIUM SERPL-MCNC: 2.25 MG/DL (ref 1.6–2.4)
MCH RBC QN AUTO: 28.3 PG (ref 26–34)
MCHC RBC AUTO-ENTMCNC: 36.8 G/DL (ref 32–36)
MCV RBC AUTO: 77 FL (ref 80–100)
MONOCYTES # BLD AUTO: 1.64 X10*3/UL (ref 0.05–0.8)
MONOCYTES NFR BLD AUTO: 8.5 %
NEUTROPHILS # BLD AUTO: 15.33 X10*3/UL (ref 1.6–5.5)
NEUTROPHILS NFR BLD AUTO: 79.7 %
NRBC BLD-RTO: 0 /100 WBCS (ref 0–0)
OXYHGB MFR BLDMV: 70.7 % (ref 45–75)
OXYHGB MFR BLDMV: 71.6 % (ref 45–75)
OXYHGB MFR BLDMV: 73.3 % (ref 45–75)
PCO2 BLDMV: 42 MM HG (ref 41–51)
PCO2 BLDMV: 43 MM HG (ref 41–51)
PCO2 BLDMV: 46 MM HG (ref 41–51)
PH BLDMV: 7.45 PH (ref 7.33–7.43)
PH BLDMV: 7.49 PH (ref 7.33–7.43)
PH BLDMV: 7.5 PH (ref 7.33–7.43)
PHOSPHATE SERPL-MCNC: 2.5 MG/DL (ref 2.5–4.9)
PHOSPHATE SERPL-MCNC: 3.7 MG/DL (ref 2.5–4.9)
PLATELET # BLD AUTO: 197 X10*3/UL (ref 150–450)
PO2 BLDMV: 43 MM HG (ref 35–45)
PO2 BLDMV: 44 MM HG (ref 35–45)
PO2 BLDMV: 44 MM HG (ref 35–45)
POTASSIUM BLDMV-SCNC: 3.6 MMOL/L (ref 3.5–5.3)
POTASSIUM BLDMV-SCNC: 3.8 MMOL/L (ref 3.5–5.3)
POTASSIUM BLDMV-SCNC: 4.1 MMOL/L (ref 3.5–5.3)
POTASSIUM SERPL-SCNC: 3.5 MMOL/L (ref 3.5–5.3)
POTASSIUM SERPL-SCNC: 3.6 MMOL/L (ref 3.5–5.3)
PROT SERPL-MCNC: 5.2 G/DL (ref 6.4–8.2)
PROTHROMBIN TIME: 18.3 SECONDS (ref 9.8–12.8)
RBC # BLD AUTO: 4.92 X10*6/UL (ref 4.5–5.9)
SAO2 % BLDMV: 73 % (ref 45–75)
SAO2 % BLDMV: 74 % (ref 45–75)
SAO2 % BLDMV: 75 % (ref 45–75)
SODIUM BLDMV-SCNC: 126 MMOL/L (ref 136–145)
SODIUM BLDMV-SCNC: 127 MMOL/L (ref 136–145)
SODIUM BLDMV-SCNC: 128 MMOL/L (ref 136–145)
SODIUM SERPL-SCNC: 129 MMOL/L (ref 136–145)
SODIUM SERPL-SCNC: 133 MMOL/L (ref 136–145)
UFH PPP CHRO-ACNC: 0.3 IU/ML
UFH PPP CHRO-ACNC: 0.4 IU/ML
VANCOMYCIN SERPL-MCNC: 10 UG/ML (ref 5–20)
WBC # BLD AUTO: 19.2 X10*3/UL (ref 4.4–11.3)

## 2024-12-01 PROCEDURE — 99291 CRITICAL CARE FIRST HOUR: CPT

## 2024-12-01 PROCEDURE — 2500000001 HC RX 250 WO HCPCS SELF ADMINISTERED DRUGS (ALT 637 FOR MEDICARE OP)

## 2024-12-01 PROCEDURE — 83735 ASSAY OF MAGNESIUM: CPT

## 2024-12-01 PROCEDURE — 71045 X-RAY EXAM CHEST 1 VIEW: CPT

## 2024-12-01 PROCEDURE — 2500000004 HC RX 250 GENERAL PHARMACY W/ HCPCS (ALT 636 FOR OP/ED): Performed by: INTERNAL MEDICINE

## 2024-12-01 PROCEDURE — 82248 BILIRUBIN DIRECT: CPT

## 2024-12-01 PROCEDURE — 2500000002 HC RX 250 W HCPCS SELF ADMINISTERED DRUGS (ALT 637 FOR MEDICARE OP, ALT 636 FOR OP/ED)

## 2024-12-01 PROCEDURE — 84132 ASSAY OF SERUM POTASSIUM: CPT

## 2024-12-01 PROCEDURE — 2020000001 HC ICU ROOM DAILY

## 2024-12-01 PROCEDURE — 82947 ASSAY GLUCOSE BLOOD QUANT: CPT

## 2024-12-01 PROCEDURE — 80202 ASSAY OF VANCOMYCIN: CPT | Performed by: INTERNAL MEDICINE

## 2024-12-01 PROCEDURE — 84132 ASSAY OF SERUM POTASSIUM: CPT | Performed by: STUDENT IN AN ORGANIZED HEALTH CARE EDUCATION/TRAINING PROGRAM

## 2024-12-01 PROCEDURE — 2500000005 HC RX 250 GENERAL PHARMACY W/O HCPCS

## 2024-12-01 PROCEDURE — 85610 PROTHROMBIN TIME: CPT

## 2024-12-01 PROCEDURE — 37799 UNLISTED PX VASCULAR SURGERY: CPT

## 2024-12-01 PROCEDURE — 71045 X-RAY EXAM CHEST 1 VIEW: CPT | Performed by: RADIOLOGY

## 2024-12-01 PROCEDURE — 85520 HEPARIN ASSAY: CPT

## 2024-12-01 PROCEDURE — 2500000004 HC RX 250 GENERAL PHARMACY W/ HCPCS (ALT 636 FOR OP/ED)

## 2024-12-01 PROCEDURE — 80053 COMPREHEN METABOLIC PANEL: CPT

## 2024-12-01 PROCEDURE — 85025 COMPLETE CBC W/AUTO DIFF WBC: CPT

## 2024-12-01 RX ORDER — ALUMINUM HYDROXIDE, MAGNESIUM HYDROXIDE, AND SIMETHICONE 1200; 120; 1200 MG/30ML; MG/30ML; MG/30ML
10 SUSPENSION ORAL 4 TIMES DAILY PRN
Status: DISCONTINUED | OUTPATIENT
Start: 2024-12-01 | End: 2024-12-04

## 2024-12-01 RX ORDER — ISOSORBIDE DINITRATE 10 MG/1
10 TABLET ORAL
Status: DISCONTINUED | OUTPATIENT
Start: 2024-12-01 | End: 2024-12-01

## 2024-12-01 RX ORDER — HYDRALAZINE HYDROCHLORIDE 25 MG/1
25 TABLET, FILM COATED ORAL 3 TIMES DAILY
Status: DISCONTINUED | OUTPATIENT
Start: 2024-12-01 | End: 2024-12-01

## 2024-12-01 RX ORDER — ISOSORBIDE DINITRATE 20 MG/1
20 TABLET ORAL
Status: DISCONTINUED | OUTPATIENT
Start: 2024-12-01 | End: 2024-12-03

## 2024-12-01 RX ORDER — POTASSIUM CHLORIDE 20 MEQ/1
40 TABLET, EXTENDED RELEASE ORAL ONCE
Status: DISCONTINUED | OUTPATIENT
Start: 2024-12-01 | End: 2024-12-01

## 2024-12-01 RX ORDER — MAGNESIUM SULFATE HEPTAHYDRATE 40 MG/ML
2 INJECTION, SOLUTION INTRAVENOUS ONCE
Status: COMPLETED | OUTPATIENT
Start: 2024-12-01 | End: 2024-12-01

## 2024-12-01 RX ORDER — POTASSIUM CHLORIDE 14.9 MG/ML
20 INJECTION INTRAVENOUS
Status: COMPLETED | OUTPATIENT
Start: 2024-12-01 | End: 2024-12-01

## 2024-12-01 RX ORDER — HYDRALAZINE HYDROCHLORIDE 50 MG/1
50 TABLET, FILM COATED ORAL 3 TIMES DAILY
Status: DISCONTINUED | OUTPATIENT
Start: 2024-12-01 | End: 2024-12-03

## 2024-12-01 RX ORDER — POTASSIUM CHLORIDE 29.8 MG/ML
40 INJECTION INTRAVENOUS ONCE
Status: COMPLETED | OUTPATIENT
Start: 2024-12-01 | End: 2024-12-01

## 2024-12-01 RX ORDER — VANCOMYCIN HYDROCHLORIDE
1250 ONCE
Status: COMPLETED | OUTPATIENT
Start: 2024-12-01 | End: 2024-12-01

## 2024-12-01 ASSESSMENT — PAIN SCALES - GENERAL
PAINLEVEL_OUTOF10: 5 - MODERATE PAIN
PAINLEVEL_OUTOF10: 0 - NO PAIN
PAINLEVEL_OUTOF10: 6
PAINLEVEL_OUTOF10: 5 - MODERATE PAIN
PAINLEVEL_OUTOF10: 5 - MODERATE PAIN
PAINLEVEL_OUTOF10: 0 - NO PAIN
PAINLEVEL_OUTOF10: 0 - NO PAIN
PAINLEVEL_OUTOF10: 10 - WORST POSSIBLE PAIN

## 2024-12-01 ASSESSMENT — PAIN DESCRIPTION - DESCRIPTORS: DESCRIPTORS: ACHING

## 2024-12-01 ASSESSMENT — PAIN - FUNCTIONAL ASSESSMENT
PAIN_FUNCTIONAL_ASSESSMENT: 0-10

## 2024-12-01 ASSESSMENT — PAIN DESCRIPTION - LOCATION: LOCATION: GENERALIZED

## 2024-12-01 NOTE — CARE PLAN
Problem: Pain - Adult  Goal: Verbalizes/displays adequate comfort level or baseline comfort level  Outcome: Progressing     Problem: Safety - Adult  Goal: Free from fall injury  Outcome: Progressing     Problem: Discharge Planning  Goal: Discharge to home or other facility with appropriate resources  Outcome: Progressing     Problem: Chronic Conditions and Co-morbidities  Goal: Patient's chronic conditions and co-morbidity symptoms are monitored and maintained or improved  Outcome: Progressing     Problem: Skin  Goal: Participates in plan/prevention/treatment measures  Outcome: Progressing  Flowsheets (Taken 11/20/2024 0810 by Bisi Hardwick RN)  Participates in plan/prevention/treatment measures: Elevate heels  Goal: Prevent/manage excess moisture  Outcome: Progressing  Flowsheets (Taken 11/21/2024 0833 by Jamil Orellana RN)  Prevent/manage excess moisture:   Moisturize dry skin   Monitor for/manage infection if present  Goal: Prevent/minimize sheer/friction injuries  Outcome: Progressing  Flowsheets (Taken 11/30/2024 2018 by Andres Sanchez RN)  Prevent/minimize sheer/friction injuries:   Use pull sheet   Turn/reposition every 2 hours/use positioning/transfer devices     Problem: Safety - Medical Restraint  Goal: Remains free of injury from restraints (Restraint for Interference with Medical Device)  Outcome: Progressing  Goal: Free from restraint(s) (Restraint for Interference with Medical Device)  Outcome: Progressing

## 2024-12-01 NOTE — PROGRESS NOTES
Vancomycin Dosing by Pharmacy- FOLLOW UP    Edu Echavarria is a 74 y.o. year old male who Pharmacy has been consulted for vancomycin dosing for pneumonia. Based on the patient's indication and renal status this patient is being dosed based on a goal trough/random level of 15-20.     Renal function is currently stable.    Current vancomycin dose: dose by level. Received 2 gm on  at 0600    Most recent trough level: 10 mcg/mL    Visit Vitals  BP (!) 150/26   Pulse 76   Temp 36.4 °C (97.5 °F) (Temporal)   Resp 16        Lab Results   Component Value Date    CREATININE 1.69 (H) 2024    CREATININE 1.56 (H) 2024    CREATININE 1.38 (H) 2024    CREATININE 0.75 2024        Patient weight is as follows:   Vitals:    24 0000   Weight: 91 kg (200 lb 9.9 oz)       Cultures:  No results found for the encounter in last 14 days.       I/O last 3 completed shifts:  In: 1742.9 (19.2 mL/kg) [I.V.:1342.9 (14.8 mL/kg); IV Piggyback:400]  Out: 5470 (60.1 mL/kg) [Urine:5460 (1.7 mL/kg/hr); Blood:10]  Weight: 91 kg   I/O during current shift:  No intake/output data recorded.    Temp (24hrs), Av.7 °C (98 °F), Min:36.1 °C (97 °F), Max:37.4 °C (99.3 °F)      Assessment/Plan    Below goal trough level of 15-20. Will give a one time dose of 1250 mg  The next level will be obtained on  at 0500. May be obtained sooner if clinically indicated.   Will continue to monitor renal function daily while on vancomycin and order serum creatinine at least every 48 hours if not already ordered.  Follow for continued vancomycin needs, clinical response, and signs/symptoms of toxicity.       Kayleigh Mandel, PharmD

## 2024-12-01 NOTE — CARE PLAN
The clinical goals for the shift include Patient will remain hemodynamically stable      Problem: Pain - Adult  Goal: Verbalizes/displays adequate comfort level or baseline comfort level  Flowsheets (Taken 11/30/2024 2018)  Verbalizes/displays adequate comfort level or baseline comfort level: Encourage patient to monitor pain and request assistance     Problem: Skin  Goal: Prevent/minimize sheer/friction injuries  Flowsheets (Taken 11/30/2024 2018)  Prevent/minimize sheer/friction injuries:   Use pull sheet   Turn/reposition every 2 hours/use positioning/transfer devices

## 2024-12-01 NOTE — PROGRESS NOTES
Cardiac ICU Progress Note, 12/1/2024    Admit Date: 11/18/2024   Hospital Length of Stay: 13   ICU Length of Stay: 1d 17h     History of Present Illness  Edu Echavarria is a 74 y.o. male on day 1d 17h of admission for STEMI (ST elevation myocardial infarction) (Multi).    Interval History  Patient successfully extubated yesterday  Still on Milrinone, Lasix gtt stopped, will spot dose  Will review revascularization option with CTS and Interventional  Confirmed DNR/DNI status with patient today. However, he is interested in revascularization with CABG or PCI and understands temporary reversal of code status    Subjective  Patient is intubated. Alert and following commands. Confirmed DNR/DNI status with patient today. However, he is interested in revascularization with CABG or PCI and understands temporary reversal of code status.     Objective    Vitals  Visit Vitals  BP (!) 150/26   Pulse 78   Temp 36.4 °C (97.5 °F) (Temporal)   Resp 15        Vent settings    Most Recent Range Past 24hrs   Mode Pressure support    FiO2 (S) 40 % No data recorded   Rate 20 No data recorded   Vt 450 mL  No data recorded   PEEP 5 cm H20 PEEP/CPAP (cm H2O)  Min: 5 cm H20   Min taken time: 11/30/24 1428  Max: 5 cm H20   Max taken time: 11/30/24 1428       Invasive Hemodynamics   Most Recent Range Past 24hrs   BP (Art) 135/50 Arterial Line BP 1  Min: 104/42  Max: 144/42   MAP(Art) 86 mmHg Arterial Line MAP 1 (mmHg)   Min: 63 mmHg  Max: 90 mmHg   RA/CVP   No data recorded   PA 44/13 PAP  Min: 31/16  Max: 50/20   PA(mean) 25 mmHg PAP (Mean)  Min: 20 mmHg  Max: 32 mmHg   PCWP 17 mmHg PCWP (mmHg)  Min: 13 mmHg  Max: 17 mmHg   CO 6 L/min CO (L/min)  Min: 5 L/min  Max: 6 L/min   CI 3 L/min/m2 CI (L/min/m2)  Min: 2.5 L/min/m2  Max: 3 L/min/m2   Mixed Venous 74 % SVO2 (%)  Min: 66 %  Max: 74 %   SVR  1033 (dyne*sec)/cm5 SVR (dyne*sec)/cm5  Min: 920 (dyne*sec)/cm5  Max: 1033 (dyne*sec)/cm5    (dyne*sec)/cm5 PVR (dyne*sec)/cm5  Min: 129  (dyne*sec)/cm5  Max: 185 (dyne*sec)/cm5     I/O    Intake/Output Summary (Last 24 hours) at 12/1/2024 1023  Last data filed at 12/1/2024 0600  Gross per 24 hour   Intake 1295.31 ml   Output 3710 ml   Net -2414.69 ml        Physical Exam  Physical Exam  Constitutional:       General: He is awake.      Interventions: He is sedated and intubated.   Cardiovascular:      Rate and Rhythm: Normal rate and regular rhythm.   Pulmonary:      Effort: Pulmonary effort is normal. He is intubated.      Breath sounds: Normal breath sounds.   Abdominal:      General: Bowel sounds are normal.      Palpations: Abdomen is soft.   Musculoskeletal:      Right lower leg: No edema.      Left lower leg: No edema.         Labs    CBC:  Results from last 7 days   Lab Units 12/01/24  0002 11/30/24  0112 11/29/24  2222   WBC AUTO x10*3/uL 19.2* 32.2* 34.5*   HEMOGLOBIN g/dL 13.9 14.3 14.6   PLATELETS AUTO x10*3/uL 197 332 365     BMP:  Results from last 7 days   Lab Units 12/01/24  0003 11/30/24  1714 11/30/24  0112   SODIUM mmol/L 129* 131* 126*   POTASSIUM mmol/L 3.5 3.3* 4.2   CHLORIDE mmol/L 90* 91* 92*   CO2 mmol/L 28 29 21   ANION GAP mmol/L 15 14 17   BUN mg/dL 16 15 12   CREATININE mg/dL 1.69* 1.56* 1.38*   EGFR mL/min/1.73m*2 42* 47* 54*   CALCIUM mg/dL 7.9* 7.8* 7.7*   PHOSPHORUS mg/dL 3.7 3.6 4.3   MAGNESIUM mg/dL 1.76 1.63 1.86   GLUCOSE mg/dL 158* 164* 211*     LFT:  Results from last 7 days   Lab Units 12/01/24  0003 11/30/24  0112 11/29/24  0802   AST U/L 19 32 12   ALT U/L 14 18 15   ALK PHOS U/L 60 70 69   BILIRUBIN TOTAL mg/dL 1.3* 1.0 1.1   BILIRUBIN DIRECT mg/dL 0.5* 0.4* 0.4*     Cardiac:  Results from last 7 days   Lab Units 11/29/24  1639   BNP pg/mL 2,290*     Coag:  Results from last 7 days   Lab Units 12/01/24  0002 11/30/24  0112 11/29/24  2222   PROTIME seconds 18.3* 16.7* 17.2*   APTT seconds 76* 43* >200*   INR  1.6* 1.5* 1.5*     UA:   Results from last 7 days   Lab Units 11/29/24  2230   COLOR U  Light-Yellow    PH U  7.0   SPEC GRAV UR  1.009   PROTEIN U mg/dL 30 (1+)*   BLOOD UR  0.03 (TRACE)*   NITRITE U  NEGATIVE   WBC UR /HPF 1-5     ABG:  Results from last 7 days   Lab Units 12/01/24  0534 11/30/24  2311 11/30/24  1714 11/30/24  1416 11/30/24  1130 11/30/24  1025 11/30/24  0628 11/30/24  0505   POCT PH, ARTERIAL pH  --   --   --  7.52*  --  7.54*  --  7.56*   POCT PO2, ARTERIAL mm Hg  --   --   --  70*  --  84*  --  116*   POCT PCO2, ARTERIAL mm Hg  --   --   --  36*  --  33*  --  28*   FIO2 % 38 32 32 40   < > 40   < > 50    < > = values in this interval not displayed.     VBG:  Results from last 7 days   Lab Units 11/30/24  1130 11/29/24  1807   POCT PH, VENOUS pH 7.51* 7.10*   POCT PCO2, VENOUS mm Hg 36* 81*       Cardiac Data    EKG  Encounter Date: 11/18/24   ECG 12 Lead   Result Value    Ventricular Rate 87    Atrial Rate 87    NY Interval 146    QRS Duration 106    QT Interval 396    QTC Calculation(Bazett) 476    P Axis 69    R Axis 89    T Axis 111    QRS Count 14    Q Onset 215    P Onset 142    P Offset 207    T Offset 413    QTC Fredericia 448    Narrative    Normal sinus rhythm  ST elevation, consider inferior injury or acute infarct  ** ** ACUTE MI / STEMI ** **  Consider right ventricular involvement in acute inferior infarct  Abnormal ECG  When compared with ECG of 18-NOV-2024 11:11,  Borderline criteria for Lateral infarct are no longer Present  Borderline criteria for Inferior infarct are no longer Present  ST now depressed in Lateral leads  Nonspecific T wave abnormality no longer evident in Anterior leads      Transthoracic Echo (TTE) Limited 11/29/24    1. Poorly visualized anatomical structures due to suboptimal image quality.   2. Left ventricular ejection fraction is severely decreased, by visual estimate at 25-30%.   3. There is global hypokinesis of the left ventricle with minor regional variations.   4. Left ventricular cavity size is mild to moderately dilated.   5. There is mildly  reduced right ventricular systolic function.   6. Mildly enlarged right ventricle.   7. The left atrium is enlarged.   8. Severely elevated right ventricular systolic pressure.     Transthoracic Echo (TTE) Complete 11/18/24   1. Poorly visualized anatomical structures due to suboptimal image quality.   2. Left ventricular ejection fraction is severely decreased, calculated by Regalado's biplane at 23%.   3. There is global hypokinesis of the left ventricle with minor regional variations.   4. Spectral Doppler shows a Grade III (restrictive pattern) of left ventricular diastolic filling with an elevated left atrial pressure.   5. There is normal right ventricular global systolic function.   6. Mildly enlarged right ventricle.   7. The left atrium is mildly dilated.      Imaging    XR chest 1 view    Result Date: 11/30/2024  1. IABP has been retracted with radiopaque marker now projecting over upper descending thoracic aorta, just cranial to the level of left principal bronchus. Rest of medical devices as above. 2. Interval improvement in diffuse pulmonary edema. Suggestion of trace/small bilateral pleural effusions.   I personally reviewed the images/study and I agree with the findings as stated by Keivn Guidry DO, PGY-3. This study was interpreted at [place], [place].   MACRO: None   Signed by: Jovi Alonso 11/30/2024 8:57 AM Dictation workstation:   [id number]    XR chest 1 view    Result Date: 11/30/2024  1. Medical devices as above. IABP radiopaque marker overlies upper most descending thoracic aorta. 2. Similar diffuse pulmonary edema and layering bilateral pleural effusions.   I personally reviewed the images/study and I agree with the findings as stated by Dr. Abhijeet Chavira. This study was interpreted at [place], [place].   MACRO: None   Signed by: Jovi Alonso 11/30/2024 8:52 AM Dictation workstation:    [id number]    XR chest 1 view    Result Date: 11/30/2024  1. Redemonstrated severe bilateral pulmonary edema and bilateral pleural effusions. Overall lung aeration appears slightly worsened as compared to prior radiograph from earlier same day, likely due to layering of pleural effusions on present supine radiograph. 2. Mild gaseous prominence of bowel loops within included upper to mid abdomen and correlate with concern for ileus. Otherwise, nonobstructive bowel gas pattern. 3. Enteric tube projects 7.1 cm from the catrachito. Other medical support devices are as detailed above.   I personally reviewed the images/study and I agree with the findings as stated by Resident John Yuan MD.   MACRO: NONE.   Signed by: Jovi Alonso 11/30/2024 8:50 AM Dictation workstation:   [id number]    XR abdomen 1 view    Result Date: 11/30/2024  1. Redemonstrated severe bilateral pulmonary edema and bilateral pleural effusions. Overall lung aeration appears slightly worsened as compared to prior radiograph from earlier same day, likely due to layering of pleural effusions on present supine radiograph. 2. Mild gaseous prominence of bowel loops within included upper to mid abdomen and correlate with concern for ileus. Otherwise, nonobstructive bowel gas pattern. 3. Enteric tube projects 7.1 cm from the catrachito. Other medical support devices are as detailed above.   I personally reviewed the images/study and I agree with the findings as stated by Resident John Yuan MD.   MACRO: NONE.   Signed by: Jovi Alonso 11/30/2024 8:50 AM Dictation workstation:   [id number]    XR abdomen 1 view    Result Date: 11/25/2024  1.  No evidence of bowel obstruction or perforation.   MACRO: None   Signed by: Familia Segura 11/25/2024 10:09 AM Dictation workstation:   [id number]    CT chest wo IV contrast    Result Date: 11/21/2024  1.  Thoracic aorta is normal in course and caliber. There is mild calcified plaque involving the arch and branch  vessels. There is bovine arch configuration with common origin of the innominate and left common carotid artery. 2. Severe coronary artery calcifications. 3. Bibasilar atelectasis left-greater-than-right. 4. Several small, pulmonary nodules within both lungs, measuring less than 6 mm, which are likely benign. Instructions:  Consider follow-up noncontrast CT scan chest in 12 months. 5. Multiple enlarged retrocrural lymph nodes measuring up to 1.3 cm which are nonspecific and may be reactive. Recommend follow-up CT of the abdomen in 3 months to evaluate for interval change.     I personally reviewed the images/study and I agree with the findings as stated by Tate Rodriguez DO PGY-2. This study was interpreted at [place], [place].   MACRO: Critical Finding:  See findings. Notification was initiated on 11/21/2024 at 9:38 am by  Keanu Rene.  (**-YCF-**) Instructions:   Signed by: Keanu Rene 11/21/2024 9:39 AM Dictation workstation:   [id number]    XR chest 1 view    Result Date: 11/20/2024  1.  Unchanged positioning of the intra-aortic balloon pump catheter with the radiopaque marker projecting over the descending thoracic aorta just cranial to the level of the left mainstem bronchus. 2. No focal consolidation, pleural effusion, or pneumothorax.   I personally reviewed the image(s)/study and resident interpretation. I agree with the findings as stated by resident Oscar Covington. Data analyzed and images interpreted at [place], [place].   MACRO: None   Signed by: Vamsi Pavon 11/20/2024 9:11 AM Dictation workstation:   [id number]    XR chest 1 view    Result Date: 11/19/2024  1.  Interval adjustment of the intra-aortic balloon pump catheter, with the radiopaque marker projecting over upper descending thoracic aorta, just cranial to the level of left principal bronchus. 2. No evidence of acute cardiopulmonary process.   I  personally reviewed the image(s)/study and resident interpretation. I agree with the findings as stated by resident Oscar Covington. Data analyzed and images interpreted at [facility], [place], [place].   MACRO: None   Signed by: Jovi Alonso 11/19/2024 10:42 AM Dictation workstation:   [id number]    XR chest 1 view    Result Date: 11/18/2024  1. No focal infiltrate, pleural effusion, edema or pneumothorax. 2. Intra-aortic balloon pump radiopaque marker as described above.       Signed by: Tory Jean 11/18/2024 12:19 PM Dictation workstation:   [id number]         Inpatient Medications    Continuous medications  amiodarone, 0.5 mg/min, Last Rate: 0.5 mg/min (12/01/24 0656)  fentaNYL, 0-300 mcg/hr, Last Rate: Stopped (11/30/24 1258)  [Held by provider] furosemide, 10 mg/hr, Last Rate: Stopped (11/30/24 1754)  heparin, 0-4,000 Units/hr, Last Rate: 1,600 Units/hr (12/01/24 0400)  midazolam, 0-20 mg/hr, Last Rate: Stopped (11/30/24 1134)  milrinone, 0.1-0.75 mcg/kg/min, Last Rate: 0.125 mcg/kg/min (12/01/24 0400)  nitroprusside, 0.25-5 mcg/kg/min      Scheduled medications  aspirin, 81 mg, oral, Daily  atorvastatin, 80 mg, oral, Nightly  bisacodyl, 10 mg, rectal, Daily  pantoprazole, 40 mg, oral, Daily before breakfast   Or  esomeprazole, 40 mg, nasoduodenal tube, Daily before breakfast   Or  pantoprazole, 40 mg, intravenous, Daily before breakfast  fentaNYL, 25 mcg, intravenous, Once  insulin lispro, 0-10 Units, subcutaneous, TID AC  magnesium sulfate, 2 g, intravenous, Once  oxygen, , inhalation, Continuous - Inhalation  piperacillin-tazobactam, 3.375 g, intravenous, q6h  polyethylene glycol, 17 g, oral, Daily  potassium chloride, 40 mEq, intravenous, Once  vancomycin, 1,250 mg, intravenous, Once      PRN medications  PRN medications: acetaminophen, alum-mag hydroxide-simeth, dextrose, dextrose, docusate sodium, fentaNYL, glucagon, glucagon, heparin, hydrOXYzine HCL,  lubricating eye drops, midazolam, midazolam, oxygen, simethicone, vancomycin      Assessment & Plan  Edu Echavarria is a 74 y.o. male with a PMHx of HTN, GERD, and gout who presented to [facility] ED on 11/17pm with 2d hx of intermittent palpitations and chest pain. At OSH initial EKG flutter w/ RVR and ST elevtions in II/III/avF & questionable in V1-V3 w/o reciprocal changes, and labs remarkable for HS trop >74924, BNP 1100, WBC 5, CBC/RFP otherwise unremarkable. Repeat EKG aflutter w/ similar ST elevations as prior. STEMI call activated, loaded with aspirin 324mg, plavix 300mg, and started on heparin gtt 11/17pm. LHC showed triple vessel disease w/ no stenting and IABP was placed for coronary perf. Started on integrillin (which has now been stopped) and continued on heparin gtt. Transferred to [facility] for CABG evaluation. CT surgery on board for CABG evaluation, tentative plan for OR 11/25. Hospital course c/b MARYBETH and slowly dropping plts/Hb, continuing further workup. Pt transferred to floor 11/21 for further care pre-op.      Surgery cancelled on 11/25 due to worsening hyponatremia, likely iso hypovolemia 2/2 decreased PO intake and diarrhea. Resuscitated w/ 1L fluids, nephrology on board. KUB -ve, stool PCR pending. HTN reg changed from coreg isordil and hydral to Imdur 30 and Metop XL 50 to reduce pill burden. HypoNa improving s/p continuous IV NS 80cc/hr over 24 hr.     Rapid response called 11/29 for worsening AHRF and hypertensive emergency, eventually requiring intubation and CICU transfer. Initially started on nitroglycerin gtt which was then discontinued given drop in BP; started on norepinephrine and then milrinone. Also given 80 mg Lasix. [place] Yesenia showing high filling pressures, started on a Bumex drip, switched to Lasix. Shock code called, decision made to insert IABP.     Updates 12/1  - Patient extubated yesterday, now on NC  - Code status changed to DNR/DNI  - To be discussed at [facility] for PCI vs  CABG  - Will discuss if patient wants to pursue any intervention    Neurologic  - Patient extubated 11/30    Cardiovascular  #Cardiogenic Shock  #STEMI  #Triple Vessel CAD  - HS trop >02183 at OSH, HS trop at : 75716 > 88039  - Arrived on integrillin and heparin gtt, aspirin and plavix loaded 11/17 ~2100  - Utox + cannavinoid, fent (received w/ EMS), benzos, cocaine  - [facility] 11/18: Triple vessel disease (LAD prox 70%, mid 65-70%, dist 90%), Lcx OM1 80%, OM2 small, OM3 occluded with collaterals, RCA mid moderate disease.   - LVEF 20-25%, evidence of extensive global hypokinesis  - Initially admitted to CICU with IABP in place, pulled out on 11/20  - Transferred to regular floor on 11/21  - Rapid response called 11/29 for worsening AHRF and hypertensive emergency, requiring intubation and CICU transfer.   - BNP 2290 (from 1470 on admission)  Plan:  IABP placed 11/29  Now on Milrinone, off Levo  Stopped Lasix gtt, now spot dosing  Nitroprusside gtt for afterload reduction  Holding home anti-hypertensives  Pending CHIP discussion    Respiratory / Infectious Disease  #AHRF  - In the setting of cardiogenic shock  - WBC down to 19.2 today  - Flu/Covid/RSV/Leg/Strep negative  - MRSA PCR negative  - Cultures no growth to date  Plan:  On Zosyn  Follow-up cultures    Renal  #MARYBETH  #Hyponatremia  - Patient with acute on chronic hyponatremia  - Nephrology following, appreciate recs  - Treated for hypotonic hypovolemic hyponatremia with fluids  - Na improved to 128 then 126, stable  - On CICU arrival, patient with AHRF and pulmonary edema  - Started on diuresis with 80 mg Lasix, then Bumex gtt --> Lasix gtt + boluses  - Net negative 5.9 L in the past 48 hours  - Cr up to 1.69 today, from 1.56 yesterday  Plan:  Stopped Lasix gtt, now spot dosing  Will give 80 mg today  Monitor urine output and RFPs  Follow-up nephrology recs    Gastrointestinal  #Ileus  - KUB: Mild gaseous prominence of bowel loops within included upper to mid  abdomen and correlate with concern for ileus  Plan:  Bowel regimen  Will attempt enteral feeding today      Fluids: PRN  Electrolytes: PRN  Nutrition: NPO Diet Except: Sips with meds; Effective midnight  NPO Diet; Effective now  Lines: PRN  DVT prophylaxis: Lovenox subq  GI prophylaxis:     Code Status: DNR and No Intubation (Confirmed on admission)   Emergency Contact / NOK: Extended Emergency Contact Information  Primary Emergency Contact: Guillermo Barrios  Mobile Phone: [phone]  Relation: Relative  Preferred language: English   needed? No  Secondary Emergency Contact: COLIN ROMERO  Mobile Phone: [phone]  Relation: Friend  Preferred language: English   needed? No        Kj Marquez MD, MSc  Internal Medicine PGY-1    12/1/2024

## 2024-12-01 NOTE — PROGRESS NOTES
"Edu Echavarria is a 74 y.o. male on day 13 of admission presenting with STEMI (ST elevation myocardial infarction) (Multi).    Objective   I evaluate the position of the balloon pump and adjust 2 cm by the Xray.  Augmentation and bp wave form are ok.  Kept it 1:1    Physical Exam  AO x 3  CVS- normal s1, s2, IABP sound   Resp -CTAB  Abd- Soft, NT, ND  Neuro  No gross motor. Sensory deficits   Groin access sites no hematoma, swelling   No LE edema      Last Recorded Vitals  Blood pressure (!) 150/26, pulse 80, temperature 36.4 °C (97.5 °F), temperature source Temporal, resp. rate 18, height 1.702 m (5' 7\"), weight 91 kg (200 lb 9.9 oz), SpO2 98%.    Intake/Output last 3 Shifts:  I/O last 3 completed shifts:  In: 1742.9 (19.2 mL/kg) [I.V.:1342.9 (14.8 mL/kg); IV Piggyback:400]  Out: 5470 (60.1 mL/kg) [Urine:5460 (1.7 mL/kg/hr); Blood:10]  Weight: 91 kg   Plan  - No changes at the mechanical support by now.  - interventional cardiology team will keep following up within the primary team.     I spent 50 minutes in the professional and overall care of this patient.      Manolo Malone MD      "

## 2024-12-02 ENCOUNTER — APPOINTMENT (OUTPATIENT)
Dept: RADIOLOGY | Facility: HOSPITAL | Age: 74
DRG: 235 | End: 2024-12-02
Payer: MEDICARE

## 2024-12-02 LAB
ABO GROUP (TYPE) IN BLOOD: NORMAL
ALBUMIN SERPL BCP-MCNC: 3.3 G/DL (ref 3.4–5)
ALBUMIN SERPL BCP-MCNC: 3.3 G/DL (ref 3.4–5)
ALBUMIN SERPL BCP-MCNC: 3.4 G/DL (ref 3.4–5)
ALP SERPL-CCNC: 67 U/L (ref 33–136)
ALT SERPL W P-5'-P-CCNC: 12 U/L (ref 10–52)
ANION GAP BLDMV CALCULATED.4IONS-SCNC: 5 MMO/L (ref 10–25)
ANION GAP BLDMV CALCULATED.4IONS-SCNC: 6 MMO/L (ref 10–25)
ANION GAP BLDMV CALCULATED.4IONS-SCNC: 6 MMO/L (ref 10–25)
ANION GAP SERPL CALC-SCNC: 16 MMOL/L (ref 10–20)
ANION GAP SERPL CALC-SCNC: 16 MMOL/L (ref 10–20)
ANTIBODY SCREEN: NORMAL
APTT PPP: 71 SECONDS (ref 27–38)
AST SERPL W P-5'-P-CCNC: 15 U/L (ref 9–39)
BACTERIA SPEC RESP CULT: NORMAL
BASE EXCESS BLDMV CALC-SCNC: 6.6 MMOL/L (ref -2–3)
BASE EXCESS BLDMV CALC-SCNC: 6.9 MMOL/L (ref -2–3)
BASE EXCESS BLDMV CALC-SCNC: 8.2 MMOL/L (ref -2–3)
BASOPHILS # BLD AUTO: 0.08 X10*3/UL (ref 0–0.1)
BASOPHILS NFR BLD AUTO: 0.5 %
BILIRUB DIRECT SERPL-MCNC: 0.4 MG/DL (ref 0–0.3)
BILIRUB SERPL-MCNC: 1.1 MG/DL (ref 0–1.2)
BODY TEMPERATURE: 37 DEGREES CELSIUS
BUN SERPL-MCNC: 14 MG/DL (ref 6–23)
BUN SERPL-MCNC: 17 MG/DL (ref 6–23)
CA-I BLDMV-SCNC: 1.06 MMOL/L (ref 1.1–1.33)
CA-I BLDMV-SCNC: 1.08 MMOL/L (ref 1.1–1.33)
CA-I BLDMV-SCNC: 1.1 MMOL/L (ref 1.1–1.33)
CALCIUM SERPL-MCNC: 8.2 MG/DL (ref 8.6–10.6)
CALCIUM SERPL-MCNC: 8.3 MG/DL (ref 8.6–10.6)
CHLORIDE BLD-SCNC: 94 MMOL/L (ref 98–107)
CHLORIDE BLD-SCNC: 94 MMOL/L (ref 98–107)
CHLORIDE BLD-SCNC: 95 MMOL/L (ref 98–107)
CHLORIDE SERPL-SCNC: 92 MMOL/L (ref 98–107)
CHLORIDE SERPL-SCNC: 92 MMOL/L (ref 98–107)
CO2 SERPL-SCNC: 29 MMOL/L (ref 21–32)
CO2 SERPL-SCNC: 29 MMOL/L (ref 21–32)
CREAT SERPL-MCNC: 1.39 MG/DL (ref 0.5–1.3)
CREAT SERPL-MCNC: 1.46 MG/DL (ref 0.5–1.3)
EGFRCR SERPLBLD CKD-EPI 2021: 50 ML/MIN/1.73M*2
EGFRCR SERPLBLD CKD-EPI 2021: 54 ML/MIN/1.73M*2
EOSINOPHIL # BLD AUTO: 0.19 X10*3/UL (ref 0–0.4)
EOSINOPHIL NFR BLD AUTO: 1.2 %
ERYTHROCYTE [DISTWIDTH] IN BLOOD BY AUTOMATED COUNT: 13.1 % (ref 11.5–14.5)
GLUCOSE BLD MANUAL STRIP-MCNC: 130 MG/DL (ref 74–99)
GLUCOSE BLD MANUAL STRIP-MCNC: 148 MG/DL (ref 74–99)
GLUCOSE BLD MANUAL STRIP-MCNC: 166 MG/DL (ref 74–99)
GLUCOSE BLD MANUAL STRIP-MCNC: 174 MG/DL (ref 74–99)
GLUCOSE BLD-MCNC: 155 MG/DL (ref 74–99)
GLUCOSE BLD-MCNC: 171 MG/DL (ref 74–99)
GLUCOSE BLD-MCNC: 171 MG/DL (ref 74–99)
GLUCOSE SERPL-MCNC: 150 MG/DL (ref 74–99)
GLUCOSE SERPL-MCNC: 155 MG/DL (ref 74–99)
GRAM STN SPEC: NORMAL
GRAM STN SPEC: NORMAL
HCO3 BLDMV-SCNC: 30.3 MMOL/L (ref 22–26)
HCO3 BLDMV-SCNC: 31.3 MMOL/L (ref 22–26)
HCO3 BLDMV-SCNC: 31.9 MMOL/L (ref 22–26)
HCT VFR BLD AUTO: 38.1 % (ref 41–52)
HCT VFR BLD EST: 41 % (ref 41–52)
HCT VFR BLD EST: 41 % (ref 41–52)
HCT VFR BLD EST: 42 % (ref 41–52)
HGB BLD-MCNC: 13.5 G/DL (ref 13.5–17.5)
HGB BLDMV-MCNC: 13.5 G/DL (ref 13.5–17.5)
HGB BLDMV-MCNC: 13.5 G/DL (ref 13.5–17.5)
HGB BLDMV-MCNC: 14 G/DL (ref 13.5–17.5)
IMM GRANULOCYTES # BLD AUTO: 0.08 X10*3/UL (ref 0–0.5)
IMM GRANULOCYTES NFR BLD AUTO: 0.5 % (ref 0–0.9)
INHALED O2 CONCENTRATION: 25 %
INR PPP: 1.4 (ref 0.9–1.1)
LACTATE BLDMV-SCNC: 0.8 MMOL/L (ref 0.4–2)
LACTATE BLDMV-SCNC: 1 MMOL/L (ref 0.4–2)
LACTATE BLDMV-SCNC: 1.1 MMOL/L (ref 0.4–2)
LYMPHOCYTES # BLD AUTO: 1.31 X10*3/UL (ref 0.8–3)
LYMPHOCYTES NFR BLD AUTO: 8.6 %
MAGNESIUM SERPL-MCNC: 2.16 MG/DL (ref 1.6–2.4)
MCH RBC QN AUTO: 27.8 PG (ref 26–34)
MCHC RBC AUTO-ENTMCNC: 35.4 G/DL (ref 32–36)
MCV RBC AUTO: 78 FL (ref 80–100)
MGC ASCENT PFT - FEV1 - PRE: 1.58
MGC ASCENT PFT - FEV1 - PREDICTED: 2.66
MGC ASCENT PFT - FVC - PRE: 2.9
MGC ASCENT PFT - FVC - PREDICTED: 3.52
MONOCYTES # BLD AUTO: 1.48 X10*3/UL (ref 0.05–0.8)
MONOCYTES NFR BLD AUTO: 9.7 %
NEUTROPHILS # BLD AUTO: 12.1 X10*3/UL (ref 1.6–5.5)
NEUTROPHILS NFR BLD AUTO: 79.5 %
NRBC BLD-RTO: 0 /100 WBCS (ref 0–0)
OXYHGB MFR BLDMV: 72.4 % (ref 45–75)
OXYHGB MFR BLDMV: 75 % (ref 45–75)
OXYHGB MFR BLDMV: 75.9 % (ref 45–75)
PCO2 BLDMV: 38 MM HG (ref 41–51)
PCO2 BLDMV: 40 MM HG (ref 41–51)
PCO2 BLDMV: 44 MM HG (ref 41–51)
PH BLDMV: 7.46 PH (ref 7.33–7.43)
PH BLDMV: 7.51 PH (ref 7.33–7.43)
PH BLDMV: 7.51 PH (ref 7.33–7.43)
PHOSPHATE SERPL-MCNC: 3.1 MG/DL (ref 2.5–4.9)
PHOSPHATE SERPL-MCNC: 3.1 MG/DL (ref 2.5–4.9)
PLATELET # BLD AUTO: 169 X10*3/UL (ref 150–450)
PO2 BLDMV: 43 MM HG (ref 35–45)
PO2 BLDMV: 45 MM HG (ref 35–45)
PO2 BLDMV: 47 MM HG (ref 35–45)
POTASSIUM BLDMV-SCNC: 3.4 MMOL/L (ref 3.5–5.3)
POTASSIUM BLDMV-SCNC: 3.4 MMOL/L (ref 3.5–5.3)
POTASSIUM BLDMV-SCNC: 3.8 MMOL/L (ref 3.5–5.3)
POTASSIUM SERPL-SCNC: 3.6 MMOL/L (ref 3.5–5.3)
POTASSIUM SERPL-SCNC: 3.8 MMOL/L (ref 3.5–5.3)
PROT SERPL-MCNC: 5.3 G/DL (ref 6.4–8.2)
PROTHROMBIN TIME: 16 SECONDS (ref 9.8–12.8)
RBC # BLD AUTO: 4.86 X10*6/UL (ref 4.5–5.9)
RH FACTOR (ANTIGEN D): NORMAL
SAO2 % BLDMV: 75 % (ref 45–75)
SAO2 % BLDMV: 77 % (ref 45–75)
SAO2 % BLDMV: 78 % (ref 45–75)
SODIUM BLDMV-SCNC: 126 MMOL/L (ref 136–145)
SODIUM BLDMV-SCNC: 128 MMOL/L (ref 136–145)
SODIUM BLDMV-SCNC: 128 MMOL/L (ref 136–145)
SODIUM SERPL-SCNC: 133 MMOL/L (ref 136–145)
SODIUM SERPL-SCNC: 133 MMOL/L (ref 136–145)
UFH PPP CHRO-ACNC: 0.3 IU/ML
VANCOMYCIN TROUGH SERPL-MCNC: 10.2 UG/ML (ref 5–20)
WBC # BLD AUTO: 15.2 X10*3/UL (ref 4.4–11.3)

## 2024-12-02 PROCEDURE — 84132 ASSAY OF SERUM POTASSIUM: CPT

## 2024-12-02 PROCEDURE — 86850 RBC ANTIBODY SCREEN: CPT

## 2024-12-02 PROCEDURE — 84132 ASSAY OF SERUM POTASSIUM: CPT | Performed by: STUDENT IN AN ORGANIZED HEALTH CARE EDUCATION/TRAINING PROGRAM

## 2024-12-02 PROCEDURE — 85027 COMPLETE CBC AUTOMATED: CPT

## 2024-12-02 PROCEDURE — 2500000004 HC RX 250 GENERAL PHARMACY W/ HCPCS (ALT 636 FOR OP/ED)

## 2024-12-02 PROCEDURE — 86923 COMPATIBILITY TEST ELECTRIC: CPT

## 2024-12-02 PROCEDURE — 85025 COMPLETE CBC W/AUTO DIFF WBC: CPT

## 2024-12-02 PROCEDURE — 82947 ASSAY GLUCOSE BLOOD QUANT: CPT

## 2024-12-02 PROCEDURE — 2500000001 HC RX 250 WO HCPCS SELF ADMINISTERED DRUGS (ALT 637 FOR MEDICARE OP)

## 2024-12-02 PROCEDURE — 99291 CRITICAL CARE FIRST HOUR: CPT

## 2024-12-02 PROCEDURE — 83735 ASSAY OF MAGNESIUM: CPT

## 2024-12-02 PROCEDURE — 2020000001 HC ICU ROOM DAILY

## 2024-12-02 PROCEDURE — 85520 HEPARIN ASSAY: CPT

## 2024-12-02 PROCEDURE — 85610 PROTHROMBIN TIME: CPT

## 2024-12-02 PROCEDURE — 37799 UNLISTED PX VASCULAR SURGERY: CPT | Performed by: STUDENT IN AN ORGANIZED HEALTH CARE EDUCATION/TRAINING PROGRAM

## 2024-12-02 PROCEDURE — 80202 ASSAY OF VANCOMYCIN: CPT | Performed by: INTERNAL MEDICINE

## 2024-12-02 PROCEDURE — 82248 BILIRUBIN DIRECT: CPT

## 2024-12-02 PROCEDURE — 71045 X-RAY EXAM CHEST 1 VIEW: CPT

## 2024-12-02 PROCEDURE — 2500000005 HC RX 250 GENERAL PHARMACY W/O HCPCS

## 2024-12-02 PROCEDURE — 71045 X-RAY EXAM CHEST 1 VIEW: CPT | Performed by: RADIOLOGY

## 2024-12-02 PROCEDURE — 2500000002 HC RX 250 W HCPCS SELF ADMINISTERED DRUGS (ALT 637 FOR MEDICARE OP, ALT 636 FOR OP/ED)

## 2024-12-02 PROCEDURE — 37799 UNLISTED PX VASCULAR SURGERY: CPT

## 2024-12-02 RX ORDER — VANCOMYCIN/0.9 % SOD CHLORIDE 1.5G/250ML
1500 PLASTIC BAG, INJECTION (ML) INTRAVENOUS EVERY 24 HOURS
Status: DISCONTINUED | OUTPATIENT
Start: 2024-12-02 | End: 2024-12-02 | Stop reason: CLARIF

## 2024-12-02 RX ORDER — POTASSIUM CHLORIDE 1.5 G/1.58G
40 POWDER, FOR SOLUTION ORAL ONCE
Status: COMPLETED | OUTPATIENT
Start: 2024-12-02 | End: 2024-12-02

## 2024-12-02 ASSESSMENT — PAIN - FUNCTIONAL ASSESSMENT
PAIN_FUNCTIONAL_ASSESSMENT: 0-10

## 2024-12-02 ASSESSMENT — PAIN SCALES - GENERAL
PAINLEVEL_OUTOF10: 0 - NO PAIN

## 2024-12-02 NOTE — PROGRESS NOTES
Occupational Therapy    Communication Note    Patient Name: Edu Echavarria  MRN: [id number]  Today's Date: 12/2/2024   Room: 18/18-A    Discipline: Occupational Therapy      Missed Visit Reason:  (0851: pt on bedrest with IABP at this time. OT re-evaluation deferred.)      12/02/24 at 8:52 AM   Samantha Romo, OT   Rehab Office: [phone]      Likely in setting of daily ETOH   -Hx of chronic alcohol use, Cirrhosis     #thrombocytopenia likely in setting of liver cirrhosis  -Pt endorses easy bruising  -Pt also with 50 pound weight loss and fatigue  - Monitor on daily CBC Likely in setting of daily ETOH   -Hx of chronic alcohol use, Cirrhosis     #thrombocytopenia likely in setting of liver cirrhosis  -Pt endorses easy bruising  -Pt also with 50 pound weight loss and fatigue  - Monitor on daily CBC  - f/u PT/INR

## 2024-12-02 NOTE — PROGRESS NOTES
Subjective Data:  Patient denies any SOB/CP. IABP with stable positioning and augmenting well.    Overnight Events:    None     Objective Data:  Last Recorded Vitals:  Vitals:    12/02/24 1600 12/02/24 1700 12/02/24 1755 12/02/24 1800   BP:       Pulse: 89 88 86 85   Resp: 17 21 14 16   Temp: 37 °C (98.6 °F)      TempSrc: Temporal      SpO2: 97% 97% 98% 98%   Weight:       Height:           Last Labs:  CBC - 12/2/2024:  1:48 AM  15.2 13.5 169    38.1      CMP - 12/2/2024:  1:48 AM  8.2 5.3 15 --- 1.1   3.1 3.3; 3.3 12 67      PTT - 12/2/2024:  1:48 AM  1.4   16.0 71     TROPHS   Date/Time Value Ref Range Status   11/18/2024 02:54 PM 69,947 0 - 53 ng/L Final     Comment:     Previous result verified on 11/18/2024 1233 on specimen/case [id number] called with component [facility] for procedure Troponin I, High Sensitivity with value 74,662 ng/L.   11/18/2024 11:22 AM 74,662 0 - 53 ng/L Final     BNP   Date/Time Value Ref Range Status   11/29/2024 04:39 PM 2,290 0 - 99 pg/mL Final   11/18/2024 11:22 AM 1,470 0 - 99 pg/mL Final     HGBA1C   Date/Time Value Ref Range Status   11/18/2024 11:22 AM 6.3 See comment % Final     LDLCALC   Date/Time Value Ref Range Status   11/18/2024 11:22 AM 85 <=99 mg/dL Final     Comment:                                 Near   Borderline      AGE      Desirable  Optimal    High     High     Very High     0-19 Y     0 - 109     ---    110-129   >/= 130     ----    20-24 Y     0 - 119     ---    120-159   >/= 160     ----      >24 Y     0 -  99   100-129  130-159   160-189     >/=190       VLDL   Date/Time Value Ref Range Status   11/18/2024 11:22 AM 27 0 - 40 mg/dL Final      Last I/O:  I/O last 3 completed shifts:  In: 2450.4 (27.6 mL/kg) [P.O.:340; I.V.:1410.4 (15.9 mL/kg); IV Piggyback:700]  Out: 5065 (57.1 mL/kg) [Urine:5065 (1.6 mL/kg/hr)]  Weight: 88.7 kg     Past Cardiology Tests (Last 3 Years):  EKG:  ECG 12 Lead 11/22/2024 (Preliminary)      ECG 12 lead  11/18/2024    Echo:  Transthoracic Echo (TTE) Limited 11/29/2024      Transthoracic Echo (TTE) Complete 11/18/2024    Ejection Fractions:  EF   Date/Time Value Ref Range Status   11/29/2024 05:23 PM 28 %    11/18/2024 03:44 PM 23 %      Cath:  Cardiac Catheterization Procedure 11/29/2024    Stress Test:  No results found for this or any previous visit from the past 1095 days.    Cardiac Imaging:  No results found for this or any previous visit from the past 1095 days.      Inpatient Medications:  Scheduled medications   Medication Dose Route Frequency    aspirin  81 mg oral Daily    atorvastatin  80 mg oral Nightly    bisacodyl  10 mg rectal Daily    hydrALAZINE  50 mg oral TID    insulin lispro  0-10 Units subcutaneous TID AC    isosorbide dinitrate  20 mg oral TID    oxygen   inhalation Continuous - Inhalation    pantoprazole  40 mg oral Daily before breakfast    polyethylene glycol  17 g oral Daily     PRN medications   Medication    acetaminophen    alum-mag hydroxide-simeth    dextrose    dextrose    docusate sodium    glucagon    glucagon    heparin    hydrOXYzine HCL    lubricating eye drops    midazolam    midazolam    oxygen    simethicone    vancomycin     Continuous Medications   Medication Dose Last Rate    amiodarone  0.5 mg/min 0.5 mg/min (12/02/24 0904)    [Held by provider] furosemide  10 mg/hr Stopped (11/30/24 1754)    heparin  0-4,000 Units/hr 1,600 Units/hr (12/02/24 0700)    milrinone  0.1-0.75 mcg/kg/min 0.125 mcg/kg/min (12/02/24 0904)    nitroprusside  0.25-5 mcg/kg/min         Physical Exam:  General: NAD, lying in bed  Skin: warm and dry   Head/ neck: no JVD seen at 90 degrees  Cardiac: RRR, S1, S2   Pulm: CTAB, room air   GI: soft, nontender   Extremities: no LE edema, right groin IABP soft and nontender, RLE warm with +doppler pulse  Neuro: no focal neuro deficits   Psych: appropriate mood and behavior       Assessment/Plan   74-year-old male with a past medical of hypertension, GERD and  gout who initially presented to [facility] on 11/17/2024 with 2 days of intermittent palpitations and chest pain.  He ruled in for ACS NSTEMI with a peak troponin of >27,000 and was also found to be atrial flutter.  He underwent coronary angiography that demonstrated severe multivessel coronary artery disease.  He was then transferred to [facility] for CABG evaluation and was planned for surgical revascularization 11/25/2024.  Although this was canceled due to worsening hyponatremia secondary to hypovolemia.  He was given IV fluids and then developed acute hypoxic respiratory failure requiring intubation and transferred to CICU.  Intra-aortic balloon pump was placed and right heart cath demonstrated preserved cardiac output and index.  Transthoracic echocardiogram in 11/29/2024 demonstrated an LVEF of 25 to 30%.    12/1 IABP repositioned by 2cm by IC fellow    12/2 IABP stable, pending CHIP meeting tonight regarding revasc options     Recommendations:  - daily CXR with IABP, 12/2 reviewed and stable   - 12/2 IABP currently 1:1, assisted /57 (121), aug 167  - please maintain strict bedrest while IABP in place  - closely monitor groin insertion site and distal pulses  - May elevate HOB no greater than 30 degrees  - May log roll patient side to side without flexing involved extremity    Interventional Cardiology will follow.    CICU Fellow Pager: [phone] anytime  Endovascular /Limb Salvage Team Pager: [phone] for day coverage (8am-5pm)  Interventional Cardiology Fellow Pager: [phone] (7AM-5PM) Night coverage: [facility] Cross Cover [id number]  [facility]: [id number]       Code Status:  DNR and No Intubation    I spent 30 minutes in the professional and overall care of this patient.    Cecilia Goff, APRN-CNP

## 2024-12-02 NOTE — PROGRESS NOTES
Communication Note    Patient Name: Edu Echavarria  MRN: [id number]  Today's Date: 12/2/2024   Room: 18/18-A    Discipline: Physical Therapy      PT Missed Visit: Yes  Missed Visit Reason:  (Patient on bedrest c IABP pending CABG.  Will monitor and follow up for re-evaluation as medically appropriate.)      12/02/24 at 9:18 AM   Akil Lara, PT   Rehab Office: [phone]

## 2024-12-02 NOTE — PROGRESS NOTES
"Vancomycin Dosing by Pharmacy    Edu Echavarria is a 74 y.o. year old male who Pharmacy has been consulted for vancomycin dosing for pneumonia. Based on the patient's indication and renal status this patient is being dosed based on a goal AUC of 400-600.     Renal function is currently CrCl=47.9 ml/min.      Current vancomycin dose: 1250 mg x1 dose yesterday  Most recent trough level: 10.2 mcg/mL    Visit Vitals  BP (!) 150/26   Pulse 78   Temp 36.2 °C (97.2 °F)   Resp 18   Ht 1.702 m (5' 7\")   Wt 88.7 kg (195 lb 8 oz)   SpO2 97%   BMI 30.62 kg/m²   Smoking Status Former   BSA 2.05 m²        Lab Results   Component Value Date    CREATININE 1.46 (H) 12/02/2024    CREATININE 1.64 (H) 12/01/2024    CREATININE 1.69 (H) 12/01/2024    CREATININE 1.56 (H) 11/30/2024        Lab Results   Component Value Date    VANCORANDOM 10.0 12/01/2024    VANCOTROUGH 10.2 12/02/2024        I/O last 3 completed shifts:  In: 2450.4 (27.6 mL/kg) [P.O.:340; I.V.:1410.4 (15.9 mL/kg); IV Piggyback:700]  Out: 5065 (57.1 mL/kg) [Urine:5065 (1.6 mL/kg/hr)]  Weight: 88.7 kg       Staph/MRSA Screen Culture   Date/Time Value Ref Range Status   11/19/2024 03:10 PM No Staphylococcus aureus isolated  Final     Blood Culture   Date/Time Value Ref Range Status   11/29/2024 04:39 PM No growth at 2 days  Preliminary   11/29/2024 04:39 PM No growth at 2 days  Preliminary     Gram Stain   Date/Time Value Ref Range Status   11/30/2024 01:40 AM (3+) Moderate Polymorphonuclear leukocytes  Final   11/30/2024 01:40 AM No predominant organism  Final          Assessment/Plan    Patient clearing drug. Will start pAUC dosing at 1500 mg q24h    This dosing regimen is predicted by [employer] to result in the following pharmacokinetic parameters:  Loading dose: N/A  Regimen: 1500 mg IV every 24 hours.  Start time: 12:20 on 12/02/2024  Exposure target: AUC24 (range)400-600 mg/L.hr   [id number]: 464 mg/L.hr  AUC24,ss: 507 mg/L.hr  Probability of AUC24 > 400: 96 %  Ctrough,ss: " 14.8 mg/L  Probability of Ctrough,ss > 20: 11 %      The next level will be obtained on 12/4/24 at 1st AM labs. May be obtained sooner if clinically indicated.   Will continue to monitor renal function daily while on vancomycin and order serum creatinine at least every 48 hours if not already ordered.  Follow for continued vancomycin needs, clinical response, and signs/symptoms of toxicity.     Chris Suárez, PharmD

## 2024-12-02 NOTE — CARE PLAN
The patient's goals for the shift include  controlled pain, bowel movement, and soothe upset stomach    The clinical goals for the shift include remain hds throughout shift      Problem: Pain - Adult  Goal: Verbalizes/displays adequate comfort level or baseline comfort level  Outcome: Progressing     Problem: Safety - Adult  Goal: Free from fall injury  Outcome: Progressing     Problem: Discharge Planning  Goal: Discharge to home or other facility with appropriate resources  Outcome: Progressing     Problem: Chronic Conditions and Co-morbidities  Goal: Patient's chronic conditions and co-morbidity symptoms are monitored and maintained or improved  Outcome: Progressing     Problem: Skin  Goal: Participates in plan/prevention/treatment measures  Outcome: Progressing  Flowsheets (Taken 12/2/2024 0158)  Participates in plan/prevention/treatment measures: Elevate heels  Goal: Prevent/manage excess moisture  Outcome: Progressing  Flowsheets (Taken 12/2/2024 0158)  Prevent/manage excess moisture: Cleanse incontinence/protect with barrier cream  Goal: Prevent/minimize sheer/friction injuries  Outcome: Progressing  Flowsheets (Taken 12/2/2024 0158)  Prevent/minimize sheer/friction injuries: Turn/reposition every 2 hours/use positioning/transfer devices     Problem: Safety - Medical Restraint  Goal: Remains free of injury from restraints (Restraint for Interference with Medical Device)  Outcome: Progressing  Goal: Free from restraint(s) (Restraint for Interference with Medical Device)  Outcome: Progressing

## 2024-12-02 NOTE — PROGRESS NOTES
Cardiac ICU Progress Note, 12/2/2024    Admit Date: 11/18/2024   Hospital Length of Stay: 14   ICU Length of Stay: 2d 15h     History of Present Illness  Edu Echavarria is a 74 y.o. male on day 2d 15h of admission for STEMI (ST elevation myocardial infarction) (Multi).    Interval History  Patient successfully extubated yesterday  Still on Milrinone, Lasix gtt stopped, will spot dose. Did not need ON for goal UO > 500cc  UO noted to be~800cc ON   Will review revascularization option with CTS and Interventional  Confirmed DNR/DNI status with patient yesterday. However, he is interested in revascularization with CABG or PCI and understands temporary reversal of code status    Subjective  Patient seen and evaluated at bedside.     This morning, pt c/o feeling constipation and overall feels ill but otherwise no other specific complaints.     Denied fever/chills, HA, chest pain, chest tightness, shortness of breath, orthopnea, pain, ABD pain, N/V, LE edema    Pt states that he prefers not to undergo surgery but is amenable to final recommendations pending CHIP meeting later today.     Objective    Vitals  Visit Vitals  BP (!) 150/26   Pulse 77   Temp 37.1 °C (98.8 °F) (Core)   Resp 16        Vent settings    Most Recent Range Past 24hrs   Mode Pressure support    FiO2 (S) 40 % No data recorded   Rate 20 No data recorded   Vt 450 mL  No data recorded   PEEP 5 cm H20 No data recorded       Invasive Hemodynamics   Most Recent Range Past 24hrs   BP (Art) 134/47 Arterial Line BP 1  Min: 124/46  Max: 156/46   MAP(Art) 86 mmHg Arterial Line MAP 1 (mmHg)   Min: 75 mmHg  Max: 99 mmHg   RA/CVP   No data recorded   PA 44/13 PAP  Min: 37/15  Max: 59/19   PA(mean) 24 mmHg PAP (Mean)  Min: 23 mmHg  Max: 33 mmHg   PCWP 15 mmHg PCWP (mmHg)  Min: 15 mmHg  Max: 18 mmHg   CO 6.7 L/min CO (L/min)  Min: 6 L/min  Max: 6.7 L/min   CI 3.3 L/min/m2 CI (L/min/m2)  Min: 3 L/min/m2  Max: 3.3 L/min/m2   Mixed Venous 73 % SVO2 (%)  Min: 73 %  Max: 75 %    SVR  1101 (dyne*sec)/cm5 SVR (dyne*sec)/cm5  Min: 1015 (dyne*sec)/cm5  Max: 1115 (dyne*sec)/cm5    (dyne*sec)/cm5 PVR (dyne*sec)/cm5  Min: 156 (dyne*sec)/cm5  Max: 200 (dyne*sec)/cm5     I/O    Intake/Output Summary (Last 24 hours) at 12/2/2024 0814  Last data filed at 12/2/2024 0700  Gross per 24 hour   Intake 1512.43 ml   Output 2720 ml   Net -1207.57 ml        Physical Exam  Physical Exam  Constitutional:       General: He is awake. He is not in acute distress.     Appearance: He is ill-appearing. He is not toxic-appearing.      Interventions: Nasal cannula in place.      Comments: On 2L O2 via NC, satting 93-94%    HENT:      Head: Normocephalic and atraumatic.   Neck:      Comments: [place] Yesenia catheter in place without evidence of erythema, drainage or edema   Cardiovascular:      Rate and Rhythm: Normal rate and regular rhythm.      Pulses:           Radial pulses are 2+ on the right side and 2+ on the left side.      Heart sounds: Heart sounds not distant.      Comments: IABP pumping sound audible   Pulmonary:      Effort: Pulmonary effort is normal.      Breath sounds: Normal breath sounds.   Abdominal:      General: Bowel sounds are normal.      Palpations: Abdomen is soft.      Tenderness: There is no abdominal tenderness. There is no guarding or rebound.   Genitourinary:     Comments: [place] catheter in place, draining clear yellow urine   Musculoskeletal:      Right lower leg: No edema.      Left lower leg: No edema.   Neurological:      Mental Status: He is alert.   Psychiatric:         Behavior: Behavior is cooperative.       Labs    CBC:  Results from last 7 days   Lab Units 12/02/24  0148 12/01/24  0002 11/30/24  0112   WBC AUTO x10*3/uL 15.2* 19.2* 32.2*   HEMOGLOBIN g/dL 13.5 13.9 14.3   PLATELETS AUTO x10*3/uL 169 197 332     BMP:  Results from last 7 days   Lab Units 12/02/24  0148 12/01/24  1657 12/01/24  0003   SODIUM mmol/L 133* 133* 129*   POTASSIUM mmol/L 3.6 3.6 3.5   CHLORIDE mmol/L  92* 94* 90*   CO2 mmol/L 29 28 28   ANION GAP mmol/L 16 15 15   BUN mg/dL 17 19 16   CREATININE mg/dL 1.46* 1.64* 1.69*   EGFR mL/min/1.73m*2 50* 44* 42*   CALCIUM mg/dL 8.2* 8.1* 7.9*   PHOSPHORUS mg/dL 3.1 2.5 3.7   MAGNESIUM mg/dL 2.16 2.25 1.76   GLUCOSE mg/dL 155* 193* 158*     LFT:  Results from last 7 days   Lab Units 12/02/24  0148 12/01/24  0003 11/30/24  0112   AST U/L 15 19 32   ALT U/L 12 14 18   ALK PHOS U/L 67 60 70   BILIRUBIN TOTAL mg/dL 1.1 1.3* 1.0   BILIRUBIN DIRECT mg/dL 0.4* 0.5* 0.4*     Cardiac:  Results from last 7 days   Lab Units 11/29/24  1639   BNP pg/mL 2,290*     Coag:  Results from last 7 days   Lab Units 12/02/24  0148 12/01/24  0002 11/30/24  0112   PROTIME seconds 16.0* 18.3* 16.7*   APTT seconds 71* 76* 43*   INR  1.4* 1.6* 1.5*     UA:   Results from last 7 days   Lab Units 11/29/24  2230   COLOR U  Light-Yellow   PH U  7.0   SPEC GRAV UR  1.009   PROTEIN U mg/dL 30 (1+)*   BLOOD UR  0.03 (TRACE)*   NITRITE U  NEGATIVE   WBC UR /HPF 1-5     ABG:  Results from last 7 days   Lab Units 12/02/24  0524 12/02/24  0006 12/01/24  1658 11/30/24  1714 11/30/24  1416 11/30/24  1130 11/30/24  1025 11/30/24  0628 11/30/24  0505   POCT PH, ARTERIAL pH  --   --   --   --  7.52*  --  7.54*  --  7.56*   POCT PO2, ARTERIAL mm Hg  --   --   --   --  70*  --  84*  --  116*   POCT PCO2, ARTERIAL mm Hg  --   --   --   --  36*  --  33*  --  28*   FIO2 % 25 25 28   < > 40   < > 40   < > 50    < > = values in this interval not displayed.     VBG:  Results from last 7 days   Lab Units 11/30/24  1130 11/29/24  1807   POCT PH, VENOUS pH 7.51* 7.10*   POCT PCO2, VENOUS mm Hg 36* 81*       Cardiac Data    EKG  Encounter Date: 11/18/24   ECG 12 Lead   Result Value    Ventricular Rate 87    Atrial Rate 87    TN Interval 146    QRS Duration 106    QT Interval 396    QTC Calculation(Bazett) 476    P Axis 69    R Axis 89    T Axis 111    QRS Count 14    Q Onset 215    P Onset 142    P Offset 207    T Offset 413     QTC Fredericia 448    Narrative    Normal sinus rhythm  ST elevation, consider inferior injury or acute infarct  ** ** ACUTE MI / STEMI ** **  Consider right ventricular involvement in acute inferior infarct  Abnormal ECG  When compared with ECG of 18-NOV-2024 11:11,  Borderline criteria for Lateral infarct are no longer Present  Borderline criteria for Inferior infarct are no longer Present  ST now depressed in Lateral leads  Nonspecific T wave abnormality no longer evident in Anterior leads      Transthoracic Echo (TTE) Limited 11/29/24    1. Poorly visualized anatomical structures due to suboptimal image quality.   2. Left ventricular ejection fraction is severely decreased, by visual estimate at 25-30%.   3. There is global hypokinesis of the left ventricle with minor regional variations.   4. Left ventricular cavity size is mild to moderately dilated.   5. There is mildly reduced right ventricular systolic function.   6. Mildly enlarged right ventricle.   7. The left atrium is enlarged.   8. Severely elevated right ventricular systolic pressure.     Transthoracic Echo (TTE) Complete 11/18/24   1. Poorly visualized anatomical structures due to suboptimal image quality.   2. Left ventricular ejection fraction is severely decreased, calculated by Regalado's biplane at 23%.   3. There is global hypokinesis of the left ventricle with minor regional variations.   4. Spectral Doppler shows a Grade III (restrictive pattern) of left ventricular diastolic filling with an elevated left atrial pressure.   5. There is normal right ventricular global systolic function.   6. Mildly enlarged right ventricle.   7. The left atrium is mildly dilated.      Imaging    XR chest 1 view    Result Date: 11/30/2024  1. IABP has been retracted with radiopaque marker now projecting over upper descending thoracic aorta, just cranial to the level of left principal bronchus. Rest of medical devices as above. 2. Interval improvement in diffuse  pulmonary edema. Suggestion of trace/small bilateral pleural effusions.   I personally reviewed the images/study and I agree with the findings as stated by Kevin Guidry DO, PGY-3. This study was interpreted at [place], [place].   MACRO: None   Signed by: Jovi Alonso 11/30/2024 8:57 AM Dictation workstation:   [id number]    XR chest 1 view    Result Date: 11/30/2024  1. Medical devices as above. IABP radiopaque marker overlies upper most descending thoracic aorta. 2. Similar diffuse pulmonary edema and layering bilateral pleural effusions.   I personally reviewed the images/study and I agree with the findings as stated by Dr. Abhijeet Chavira. This study was interpreted at [place], [place].   MACRO: None   Signed by: Jovi Alonso 11/30/2024 8:52 AM Dictation workstation:   [id number]    XR chest 1 view    Result Date: 11/30/2024  1. Redemonstrated severe bilateral pulmonary edema and bilateral pleural effusions. Overall lung aeration appears slightly worsened as compared to prior radiograph from earlier same day, likely due to layering of pleural effusions on present supine radiograph. 2. Mild gaseous prominence of bowel loops within included upper to mid abdomen and correlate with concern for ileus. Otherwise, nonobstructive bowel gas pattern. 3. Enteric tube projects 7.1 cm from the catrachito. Other medical support devices are as detailed above.   I personally reviewed the images/study and I agree with the findings as stated by Resident John Yuan MD.   MACRO: NONE.   Signed by: Jovi Alonso 11/30/2024 8:50 AM Dictation workstation:   [id number]    XR abdomen 1 view    Result Date: 11/30/2024  1. Redemonstrated severe bilateral pulmonary edema and bilateral pleural effusions. Overall lung aeration appears slightly worsened as compared to prior radiograph from earlier same day, likely due to layering of pleural effusions on  present supine radiograph. 2. Mild gaseous prominence of bowel loops within included upper to mid abdomen and correlate with concern for ileus. Otherwise, nonobstructive bowel gas pattern. 3. Enteric tube projects 7.1 cm from the catracihto. Other medical support devices are as detailed above.   I personally reviewed the images/study and I agree with the findings as stated by Resident John Yuan MD.   MACRO: NONE.   Signed by: Jovi Alonso 11/30/2024 8:50 AM Dictation workstation:   [id number]    XR abdomen 1 view    Result Date: 11/25/2024  1.  No evidence of bowel obstruction or perforation.   MACRO: None   Signed by: Familia Segura 11/25/2024 10:09 AM Dictation workstation:   [id number]    CT chest wo IV contrast    Result Date: 11/21/2024  1.  Thoracic aorta is normal in course and caliber. There is mild calcified plaque involving the arch and branch vessels. There is bovine arch configuration with common origin of the innominate and left common carotid artery. 2. Severe coronary artery calcifications. 3. Bibasilar atelectasis left-greater-than-right. 4. Several small, pulmonary nodules within both lungs, measuring less than 6 mm, which are likely benign. Instructions:  Consider follow-up noncontrast CT scan chest in 12 months. 5. Multiple enlarged retrocrural lymph nodes measuring up to 1.3 cm which are nonspecific and may be reactive. Recommend follow-up CT of the abdomen in 3 months to evaluate for interval change.     I personally reviewed the images/study and I agree with the findings as stated by Tate Rodriguez DO PGY-2. This study was interpreted at [place], [place].   MACRO: Critical Finding:  See findings. Notification was initiated on 11/21/2024 at 9:38 am by  Keanu Rene.  (**-YCF-**) Instructions:   Signed by: Keanu Rene 11/21/2024 9:39 AM Dictation workstation:   [id number]    XR chest 1 view    Result Date: 11/20/2024  1.  Unchanged positioning  of the intra-aortic balloon pump catheter with the radiopaque marker projecting over the descending thoracic aorta just cranial to the level of the left mainstem bronchus. 2. No focal consolidation, pleural effusion, or pneumothorax.   I personally reviewed the image(s)/study and resident interpretation. I agree with the findings as stated by resident Oscar Covington. Data analyzed and images interpreted at [place], [place].   MACRO: None   Signed by: Vamsi Pavon 11/20/2024 9:11 AM Dictation workstation:   [id number]    XR chest 1 view    Result Date: 11/19/2024  1.  Interval adjustment of the intra-aortic balloon pump catheter, with the radiopaque marker projecting over upper descending thoracic aorta, just cranial to the level of left principal bronchus. 2. No evidence of acute cardiopulmonary process.   I personally reviewed the image(s)/study and resident interpretation. I agree with the findings as stated by resident Oscar Covington. Data analyzed and images interpreted at [place], [place].   MACRO: None   Signed by: Jovi Alonso 11/19/2024 10:42 AM Dictation workstation:   [id number]    XR chest 1 view    Result Date: 11/18/2024  1. No focal infiltrate, pleural effusion, edema or pneumothorax. 2. Intra-aortic balloon pump radiopaque marker as described above.       Signed by: Tory Jean 11/18/2024 12:19 PM Dictation workstation:   [id number]         Inpatient Medications    Continuous medications  amiodarone, 0.5 mg/min, Last Rate: 0.5 mg/min (12/02/24 0643)  fentaNYL, 0-300 mcg/hr, Last Rate: Stopped (11/30/24 1258)  [Held by provider] furosemide, 10 mg/hr, Last Rate: Stopped (11/30/24 1754)  heparin, 0-4,000 Units/hr, Last Rate: 1,600 Units/hr (12/02/24 0643)  midazolam, 0-20 mg/hr, Last Rate: Stopped (11/30/24 1134)  milrinone, 0.1-0.75 mcg/kg/min, Last Rate: 0.125 mcg/kg/min (12/02/24  0643)  nitroprusside, 0.25-5 mcg/kg/min      Scheduled medications  aspirin, 81 mg, oral, Daily  atorvastatin, 80 mg, oral, Nightly  bisacodyl, 10 mg, rectal, Daily  pantoprazole, 40 mg, oral, Daily before breakfast   Or  esomeprazole, 40 mg, nasoduodenal tube, Daily before breakfast   Or  pantoprazole, 40 mg, intravenous, Daily before breakfast  fentaNYL, 25 mcg, intravenous, Once  hydrALAZINE, 50 mg, oral, TID  insulin lispro, 0-10 Units, subcutaneous, TID AC  isosorbide dinitrate, 20 mg, oral, TID  oxygen, , inhalation, Continuous - Inhalation  polyethylene glycol, 17 g, oral, Daily  potassium chloride, 40 mEq, oral, Once      PRN medications  PRN medications: acetaminophen, alum-mag hydroxide-simeth, dextrose, dextrose, docusate sodium, fentaNYL, glucagon, glucagon, heparin, hydrOXYzine HCL, lubricating eye drops, midazolam, midazolam, oxygen, simethicone, vancomycin      Assessment & Plan  Edu Echavarria is a 74 y.o. male with a PMHx of HTN, GERD, and gout who presented to [facility] ED on 11/17pm with 2d hx of intermittent palpitations and chest pain. At OSH initial EKG flutter w/ RVR and ST elevtions in II/III/avF & questionable in V1-V3 w/o reciprocal changes, and labs remarkable for HS trop >46649, BNP 1100, WBC 5, CBC/RFP otherwise unremarkable. Repeat EKG aflutter w/ similar ST elevations as prior. STEMI call activated, loaded with aspirin 324mg, plavix 300mg, and started on heparin gtt 11/17pm. LHC showed triple vessel disease w/ no stenting and IABP was placed for coronary perf. Started on integrillin (which has now been stopped) and continued on heparin gtt. Transferred to [facility] for CABG evaluation. CT surgery on board for CABG evaluation, tentative plan for OR 11/25. Hospital course c/b MARYBETH and slowly dropping plts/Hb, continuing further workup. Pt transferred to floor 11/21 for further care pre-op.      Surgery cancelled on 11/25 due to worsening hyponatremia, likely iso hypovolemia 2/2 decreased PO  intake and diarrhea. Resuscitated w/ 1L fluids, nephrology on board. KUB -ve, stool PCR pending. HTN reg changed from coreg isordil and hydral to Imdur 30 and Metop XL 50 to reduce pill burden. HypoNa improving s/p continuous IV NS 80cc/hr over 24 hr.     Rapid response called 11/29 for worsening AHRF and hypertensive emergency, eventually requiring intubation and CICU transfer. Initially started on nitroglycerin gtt which was then discontinued given drop in BP; started on norepinephrine and then milrinone. Also given 80 mg Lasix. [place] Yesenia showing high filling pressures, started on a Bumex drip, switched to Lasix. Shock code called, decision made to insert IABP.     Updates 12/2  -Hydralazine increased to 50mg TID  -Isosorbide Dinitrate increased to 20mg TID  -This AM satting appropriately on 2L NC following extubation yesterday  -Latest [place] numbers: PAWP 16, CI 3.09, SvO2 77%  - Code status changed to DNR/DNI  - To be discussed at CHIP meeting for PCI vs CABG today. Will discuss if patient wants to pursue any intervention    Neurologic  - Patient extubated 11/30    Cardiovascular  #Cardiogenic Shock  #STEMI  #Triple Vessel CAD  - HS trop >13651 at OSH, HS trop at [facility]: 32670 > 06527  - Arrived on integrillin and heparin gtt, aspirin and plavix loaded 11/17 ~2100  - Utox + cannavinoid, fent (received w/ EMS), benzos, cocaine  - [facility] 11/18: Triple vessel disease (LAD prox 70%, mid 65-70%, dist 90%), Lcx OM1 80%, OM2 small, OM3 occluded with collaterals, RCA mid moderate disease.   - LVEF 20-25%, evidence of extensive global hypokinesis  - Initially admitted to CICU with IABP in place, pulled out on 11/20, and transferred to regular floor on 11/21  - Rapid response called 11/29 for worsening AHRF and hypertensive emergency, requiring intubation and CICU transfer.   - BNP 2290 11/29 (from 1470 on admission)  Plan:  IABP placed 11/29  Now on Milrinone, off Levo  Stopped Lasix gtt, now spot dosing.   Nitroprusside gtt, as  well as Hydralazine 50mg TID and Isosorbide dinitrate 20mg TID for afterload reduction  Holding home anti-hypertensives  Final interventions (CABG vs PCI) pending CHIP discussion today     Respiratory/ Infectious Disease  #AHRF (improving)   :: In the setting of cardiogenic shock  :: WBC downtrending to15.2 today  :: Flu/Covid/RSV/Leg/Strep negative  :: MRSA PCR negative  :: Cultures no growth to date  Plan:  Had dc'd Vanc/Zosyn given infectious workup   Follow-up cultures    Renal  #MARYBETH (improving)  #Hyponatremia (resolving)   - Patient with acute on chronic hyponatremia though improving  - Nephrology following, appreciate recs  - Treated for hypotonic hypovolemic hyponatremia with fluids  - Na stable at 133 this AM, for >24h  - On CICU arrival, patient with AHRF and pulmonary edema  :: Was initially started on diuresis with 80 mg Lasix, then Bumex gtt --> Lasix gtt + boluses --> boluses   :: Net negative ~1500 over last >48h   ::  Cr downtrending to 1.46 today (<1.64<1.56)   Plan:  Stopped Lasix gtt, now spot dosing  Monitor urine output and RFPs  Follow-up nephrology recs    #Hx Gout  :: Currently asx   :: Baseline on Allopuriol. Holding I/s/o MARYBETH    Gastrointestinal  #C/f Ileus  - KUB: Mild gaseous prominence of bowel loops within included upper to mid abdomen and correlate with concern for ileus  Plan:  Bowel regimen: miralax daily and bisacodyl suppository   Tolerating enteral feeding       Fluids: PRN  Electrolytes: PRN  Nutrition: Cardiac   Lines: pIVs, A-line, [place] Yesenia cath,   DVT prophylaxis: Lovenox subq  GI prophylaxis: PPI    Code Status: DNR and No Intubation (Confirmed on admission) (with exception of temporary reversal of code status for CABG or PCI)   Emergency Contact / NOK: Extended Emergency Contact Information  Primary Emergency Contact: RobintatiGuillermo  Mobile Phone: [phone]  Relation: Relative  Preferred language: English   needed? No  Secondary Emergency Contact:  COLIN ROMERO  Mobile Phone: [phone]  Relation: Friend  Preferred language: English   needed? No        Samantha Maldonado MD  Internal Medicine PGY-1    12/2/2024

## 2024-12-02 NOTE — SIGNIFICANT EVENT
D MEETING   Date: 12/2/24    Patient: Edu Echavarria  MRN: [id number]  Referring: Dr. De La Cruz        HPI: 74-year-old male with a past medical of hypertension, GERD and gout who initially presented to [place] on 11/17/2024 with 2 days of intermittent palpitations and chest pain.  He ruled in for ACS NSTEMI with a peak troponin of >27,000 and was also found to be atrial flutter.  He underwent coronary angiography that demonstrated severe multivessel coronary artery disease.  He was then transferred to [facility] for CABG evaluation and was planned for surgical revascularization 11/25/2024.  Although this was canceled due to worsening hyponatremia secondary to hypovolemia.  He was given IV fluids and then developed acute hypoxic respiratory failure requiring intubation and transferred to CICU.  Intra-aortic balloon pump was placed and right heart cath demonstrated preserved cardiac output and index.  Transthoracic echocardiogram in 11/29/2024 demonstrated an LVEF of 25 to 30%.        Physicians and Surgeons in attendance: Allen Mcclure, Melissa, Parveen, Fortino, .     After discussion, we propose the following strategy: Patient's goals of care need to be clarified.  There are some question on whether or not the patient would want any intervention on his coronary arteries.  Dr. Villa (the cardiac surgeon consult of this patient) plans to follow-up with the primary team for further clarification about goals of care.    Need for rediscussion after 30 days:  N

## 2024-12-03 ENCOUNTER — ANESTHESIA EVENT (OUTPATIENT)
Dept: OPERATING ROOM | Facility: HOSPITAL | Age: 74
End: 2024-12-03
Payer: MEDICARE

## 2024-12-03 ENCOUNTER — APPOINTMENT (OUTPATIENT)
Dept: RADIOLOGY | Facility: HOSPITAL | Age: 74
DRG: 235 | End: 2024-12-03
Payer: MEDICARE

## 2024-12-03 ENCOUNTER — APPOINTMENT (OUTPATIENT)
Dept: CARDIOLOGY | Facility: HOSPITAL | Age: 74
End: 2024-12-03
Payer: MEDICARE

## 2024-12-03 LAB
ALBUMIN SERPL BCP-MCNC: 3.2 G/DL (ref 3.4–5)
ALBUMIN SERPL BCP-MCNC: 3.2 G/DL (ref 3.4–5)
ALBUMIN SERPL BCP-MCNC: 3.4 G/DL (ref 3.4–5)
ALP SERPL-CCNC: 63 U/L (ref 33–136)
ALT SERPL W P-5'-P-CCNC: 11 U/L (ref 10–52)
ANION GAP BLDMV CALCULATED.4IONS-SCNC: 6 MMO/L (ref 10–25)
ANION GAP BLDMV CALCULATED.4IONS-SCNC: 7 MMO/L (ref 10–25)
ANION GAP SERPL CALC-SCNC: 14 MMOL/L (ref 10–20)
ANION GAP SERPL CALC-SCNC: 17 MMOL/L (ref 10–20)
APTT PPP: 124 SECONDS (ref 27–38)
AST SERPL W P-5'-P-CCNC: 11 U/L (ref 9–39)
BACTERIA BLD CULT: NORMAL
BACTERIA BLD CULT: NORMAL
BASE EXCESS BLDMV CALC-SCNC: 6.2 MMOL/L (ref -2–3)
BASE EXCESS BLDMV CALC-SCNC: 7 MMOL/L (ref -2–3)
BASE EXCESS BLDMV CALC-SCNC: 7 MMOL/L (ref -2–3)
BASE EXCESS BLDMV CALC-SCNC: 7.4 MMOL/L (ref -2–3)
BASOPHILS # BLD AUTO: 0.06 X10*3/UL (ref 0–0.1)
BASOPHILS NFR BLD AUTO: 0.5 %
BILIRUB DIRECT SERPL-MCNC: 0.4 MG/DL (ref 0–0.3)
BILIRUB SERPL-MCNC: 1.1 MG/DL (ref 0–1.2)
BODY TEMPERATURE: 37 DEGREES CELSIUS
BUN SERPL-MCNC: 17 MG/DL (ref 6–23)
BUN SERPL-MCNC: 18 MG/DL (ref 6–23)
CA-I BLDMV-SCNC: 1.11 MMOL/L (ref 1.1–1.33)
CA-I BLDMV-SCNC: 1.12 MMOL/L (ref 1.1–1.33)
CA-I BLDMV-SCNC: 1.12 MMOL/L (ref 1.1–1.33)
CA-I BLDMV-SCNC: 1.14 MMOL/L (ref 1.1–1.33)
CALCIUM SERPL-MCNC: 8.2 MG/DL (ref 8.6–10.6)
CALCIUM SERPL-MCNC: 8.5 MG/DL (ref 8.6–10.6)
CHLORIDE BLD-SCNC: 93 MMOL/L (ref 98–107)
CHLORIDE BLD-SCNC: 93 MMOL/L (ref 98–107)
CHLORIDE BLD-SCNC: 94 MMOL/L (ref 98–107)
CHLORIDE BLD-SCNC: 94 MMOL/L (ref 98–107)
CHLORIDE SERPL-SCNC: 92 MMOL/L (ref 98–107)
CHLORIDE SERPL-SCNC: 93 MMOL/L (ref 98–107)
CO2 SERPL-SCNC: 27 MMOL/L (ref 21–32)
CO2 SERPL-SCNC: 28 MMOL/L (ref 21–32)
CREAT SERPL-MCNC: 1.43 MG/DL (ref 0.5–1.3)
CREAT SERPL-MCNC: 1.53 MG/DL (ref 0.5–1.3)
EGFRCR SERPLBLD CKD-EPI 2021: 48 ML/MIN/1.73M*2
EGFRCR SERPLBLD CKD-EPI 2021: 52 ML/MIN/1.73M*2
EJECTION FRACTION APICAL 4 CHAMBER: 26.6
EJECTION FRACTION: 28 %
EOSINOPHIL # BLD AUTO: 0.28 X10*3/UL (ref 0–0.4)
EOSINOPHIL NFR BLD AUTO: 2.4 %
ERYTHROCYTE [DISTWIDTH] IN BLOOD BY AUTOMATED COUNT: 13.2 % (ref 11.5–14.5)
ERYTHROCYTE [DISTWIDTH] IN BLOOD BY AUTOMATED COUNT: 13.4 % (ref 11.5–14.5)
GLUCOSE BLD MANUAL STRIP-MCNC: 129 MG/DL (ref 74–99)
GLUCOSE BLD MANUAL STRIP-MCNC: 149 MG/DL (ref 74–99)
GLUCOSE BLD MANUAL STRIP-MCNC: 156 MG/DL (ref 74–99)
GLUCOSE BLD MANUAL STRIP-MCNC: 198 MG/DL (ref 74–99)
GLUCOSE BLD-MCNC: 149 MG/DL (ref 74–99)
GLUCOSE BLD-MCNC: 167 MG/DL (ref 74–99)
GLUCOSE BLD-MCNC: 175 MG/DL (ref 74–99)
GLUCOSE BLD-MCNC: 218 MG/DL (ref 74–99)
GLUCOSE SERPL-MCNC: 150 MG/DL (ref 74–99)
GLUCOSE SERPL-MCNC: 172 MG/DL (ref 74–99)
HCO3 BLDMV-SCNC: 30.6 MMOL/L (ref 22–26)
HCO3 BLDMV-SCNC: 31.3 MMOL/L (ref 22–26)
HCO3 BLDMV-SCNC: 31.3 MMOL/L (ref 22–26)
HCO3 BLDMV-SCNC: 32 MMOL/L (ref 22–26)
HCT VFR BLD AUTO: 35.7 % (ref 41–52)
HCT VFR BLD AUTO: 36.5 % (ref 41–52)
HCT VFR BLD EST: 39 % (ref 41–52)
HCT VFR BLD EST: 39 % (ref 41–52)
HCT VFR BLD EST: 41 % (ref 41–52)
HCT VFR BLD EST: 42 % (ref 41–52)
HGB BLD-MCNC: 12.8 G/DL (ref 13.5–17.5)
HGB BLD-MCNC: 13 G/DL (ref 13.5–17.5)
HGB BLDMV-MCNC: 13 G/DL (ref 13.5–17.5)
HGB BLDMV-MCNC: 13.1 G/DL (ref 13.5–17.5)
HGB BLDMV-MCNC: 13.5 G/DL (ref 13.5–17.5)
HGB BLDMV-MCNC: 14 G/DL (ref 13.5–17.5)
IMM GRANULOCYTES # BLD AUTO: 0.04 X10*3/UL (ref 0–0.5)
IMM GRANULOCYTES NFR BLD AUTO: 0.3 % (ref 0–0.9)
INHALED O2 CONCENTRATION: 25 %
INHALED O2 CONCENTRATION: 28 %
INR PPP: 1.3 (ref 0.9–1.1)
LACTATE BLDMV-SCNC: 0.8 MMOL/L (ref 0.4–2)
LACTATE BLDMV-SCNC: 0.8 MMOL/L (ref 0.4–2)
LACTATE BLDMV-SCNC: 0.9 MMOL/L (ref 0.4–2)
LACTATE BLDMV-SCNC: 1.1 MMOL/L (ref 0.4–2)
LEFT ATRIUM VOLUME AREA LENGTH INDEX BSA: 26.8 ML/M2
LEFT VENTRICLE INTERNAL DIMENSION DIASTOLE: 4.8 CM (ref 3.5–6)
LYMPHOCYTES # BLD AUTO: 1.33 X10*3/UL (ref 0.8–3)
LYMPHOCYTES NFR BLD AUTO: 11.3 %
MAGNESIUM SERPL-MCNC: 1.92 MG/DL (ref 1.6–2.4)
MCH RBC QN AUTO: 28.3 PG (ref 26–34)
MCH RBC QN AUTO: 28.4 PG (ref 26–34)
MCHC RBC AUTO-ENTMCNC: 35.6 G/DL (ref 32–36)
MCHC RBC AUTO-ENTMCNC: 35.9 G/DL (ref 32–36)
MCV RBC AUTO: 79 FL (ref 80–100)
MCV RBC AUTO: 79 FL (ref 80–100)
MONOCYTES # BLD AUTO: 1.34 X10*3/UL (ref 0.05–0.8)
MONOCYTES NFR BLD AUTO: 11.4 %
NEUTROPHILS # BLD AUTO: 8.68 X10*3/UL (ref 1.6–5.5)
NEUTROPHILS NFR BLD AUTO: 74.1 %
NRBC BLD-RTO: 0 /100 WBCS (ref 0–0)
NRBC BLD-RTO: 0 /100 WBCS (ref 0–0)
OXYHGB MFR BLDMV: 72.6 % (ref 45–75)
OXYHGB MFR BLDMV: 73.5 % (ref 45–75)
OXYHGB MFR BLDMV: 73.9 % (ref 45–75)
OXYHGB MFR BLDMV: 74.5 % (ref 45–75)
PCO2 BLDMV: 42 MM HG (ref 41–51)
PCO2 BLDMV: 44 MM HG (ref 41–51)
PH BLDMV: 7.47 PH (ref 7.33–7.43)
PH BLDMV: 7.47 PH (ref 7.33–7.43)
PH BLDMV: 7.48 PH (ref 7.33–7.43)
PH BLDMV: 7.48 PH (ref 7.33–7.43)
PHOSPHATE SERPL-MCNC: 3 MG/DL (ref 2.5–4.9)
PHOSPHATE SERPL-MCNC: 3.7 MG/DL (ref 2.5–4.9)
PLATELET # BLD AUTO: 118 X10*3/UL (ref 150–450)
PLATELET # BLD AUTO: 126 X10*3/UL (ref 150–450)
PO2 BLDMV: 42 MM HG (ref 35–45)
PO2 BLDMV: 42 MM HG (ref 35–45)
PO2 BLDMV: 43 MM HG (ref 35–45)
PO2 BLDMV: 44 MM HG (ref 35–45)
POTASSIUM BLDMV-SCNC: 3.6 MMOL/L (ref 3.5–5.3)
POTASSIUM BLDMV-SCNC: 3.8 MMOL/L (ref 3.5–5.3)
POTASSIUM BLDMV-SCNC: 3.9 MMOL/L (ref 3.5–5.3)
POTASSIUM BLDMV-SCNC: 3.9 MMOL/L (ref 3.5–5.3)
POTASSIUM SERPL-SCNC: 3.5 MMOL/L (ref 3.5–5.3)
POTASSIUM SERPL-SCNC: 3.9 MMOL/L (ref 3.5–5.3)
PROT SERPL-MCNC: 5.6 G/DL (ref 6.4–8.2)
PROTHROMBIN TIME: 14.4 SECONDS (ref 9.8–12.8)
RBC # BLD AUTO: 4.51 X10*6/UL (ref 4.5–5.9)
RBC # BLD AUTO: 4.6 X10*6/UL (ref 4.5–5.9)
SAO2 % BLDMV: 75 % (ref 45–75)
SAO2 % BLDMV: 76 % (ref 45–75)
SAO2 % BLDMV: 76 % (ref 45–75)
SAO2 % BLDMV: 77 % (ref 45–75)
SODIUM BLDMV-SCNC: 126 MMOL/L (ref 136–145)
SODIUM BLDMV-SCNC: 127 MMOL/L (ref 136–145)
SODIUM BLDMV-SCNC: 127 MMOL/L (ref 136–145)
SODIUM BLDMV-SCNC: 128 MMOL/L (ref 136–145)
SODIUM SERPL-SCNC: 130 MMOL/L (ref 136–145)
SODIUM SERPL-SCNC: 133 MMOL/L (ref 136–145)
TRICUSPID ANNULAR PLANE SYSTOLIC EXCURSION: 2 CM
UFH PPP CHRO-ACNC: 0.4 IU/ML
WBC # BLD AUTO: 11.7 X10*3/UL (ref 4.4–11.3)
WBC # BLD AUTO: 13.5 X10*3/UL (ref 4.4–11.3)

## 2024-12-03 PROCEDURE — 2500000005 HC RX 250 GENERAL PHARMACY W/O HCPCS: Performed by: PHYSICIAN ASSISTANT

## 2024-12-03 PROCEDURE — 2500000001 HC RX 250 WO HCPCS SELF ADMINISTERED DRUGS (ALT 637 FOR MEDICARE OP)

## 2024-12-03 PROCEDURE — 2020000001 HC ICU ROOM DAILY

## 2024-12-03 PROCEDURE — 2500000005 HC RX 250 GENERAL PHARMACY W/O HCPCS

## 2024-12-03 PROCEDURE — 2500000004 HC RX 250 GENERAL PHARMACY W/ HCPCS (ALT 636 FOR OP/ED)

## 2024-12-03 PROCEDURE — 84132 ASSAY OF SERUM POTASSIUM: CPT

## 2024-12-03 PROCEDURE — 84075 ASSAY ALKALINE PHOSPHATASE: CPT

## 2024-12-03 PROCEDURE — 2500000001 HC RX 250 WO HCPCS SELF ADMINISTERED DRUGS (ALT 637 FOR MEDICARE OP): Performed by: INTERNAL MEDICINE

## 2024-12-03 PROCEDURE — 2500000001 HC RX 250 WO HCPCS SELF ADMINISTERED DRUGS (ALT 637 FOR MEDICARE OP): Performed by: PHYSICIAN ASSISTANT

## 2024-12-03 PROCEDURE — 37799 UNLISTED PX VASCULAR SURGERY: CPT

## 2024-12-03 PROCEDURE — 71045 X-RAY EXAM CHEST 1 VIEW: CPT

## 2024-12-03 PROCEDURE — 93308 TTE F-UP OR LMTD: CPT | Performed by: INTERNAL MEDICINE

## 2024-12-03 PROCEDURE — C8924 2D TTE W OR W/O FOL W/CON,FU: HCPCS

## 2024-12-03 PROCEDURE — 2500000004 HC RX 250 GENERAL PHARMACY W/ HCPCS (ALT 636 FOR OP/ED): Performed by: THORACIC SURGERY (CARDIOTHORACIC VASCULAR SURGERY)

## 2024-12-03 PROCEDURE — 85025 COMPLETE CBC W/AUTO DIFF WBC: CPT

## 2024-12-03 PROCEDURE — 83735 ASSAY OF MAGNESIUM: CPT

## 2024-12-03 PROCEDURE — 71045 X-RAY EXAM CHEST 1 VIEW: CPT | Performed by: RADIOLOGY

## 2024-12-03 PROCEDURE — 82947 ASSAY GLUCOSE BLOOD QUANT: CPT

## 2024-12-03 PROCEDURE — 2500000002 HC RX 250 W HCPCS SELF ADMINISTERED DRUGS (ALT 637 FOR MEDICARE OP, ALT 636 FOR OP/ED)

## 2024-12-03 PROCEDURE — 99291 CRITICAL CARE FIRST HOUR: CPT

## 2024-12-03 PROCEDURE — 85520 HEPARIN ASSAY: CPT

## 2024-12-03 PROCEDURE — 85610 PROTHROMBIN TIME: CPT

## 2024-12-03 RX ORDER — CHLORHEXIDINE GLUCONATE ORAL RINSE 1.2 MG/ML
15 SOLUTION DENTAL 2 TIMES DAILY
Status: DISCONTINUED | OUTPATIENT
Start: 2024-12-03 | End: 2024-12-04

## 2024-12-03 RX ORDER — HYDRALAZINE HYDROCHLORIDE 50 MG/1
TABLET, FILM COATED ORAL
Status: COMPLETED
Start: 2024-12-03 | End: 2024-12-03

## 2024-12-03 RX ORDER — MUPIROCIN 20 MG/G
0.5 OINTMENT TOPICAL 2 TIMES DAILY
Status: DISCONTINUED | OUTPATIENT
Start: 2024-12-03 | End: 2024-12-04

## 2024-12-03 RX ORDER — ISOSORBIDE DINITRATE 10 MG/1
40 TABLET ORAL
Status: DISCONTINUED | OUTPATIENT
Start: 2024-12-03 | End: 2024-12-04

## 2024-12-03 RX ORDER — HYDRALAZINE HYDROCHLORIDE 50 MG/1
50 TABLET, FILM COATED ORAL 3 TIMES DAILY
Status: DISCONTINUED | OUTPATIENT
Start: 2024-12-03 | End: 2024-12-03

## 2024-12-03 RX ORDER — POTASSIUM CHLORIDE 1.5 G/1.58G
40 POWDER, FOR SOLUTION ORAL ONCE
Status: COMPLETED | OUTPATIENT
Start: 2024-12-03 | End: 2024-12-03

## 2024-12-03 RX ORDER — HYDRALAZINE HYDROCHLORIDE 50 MG/1
50 TABLET, FILM COATED ORAL 3 TIMES DAILY
Status: DISCONTINUED | OUTPATIENT
Start: 2024-12-03 | End: 2024-12-04

## 2024-12-03 RX ORDER — PANTOPRAZOLE SODIUM 40 MG/1
40 TABLET, DELAYED RELEASE ORAL
Status: DISCONTINUED | OUTPATIENT
Start: 2024-12-03 | End: 2024-12-04

## 2024-12-03 RX ORDER — PANTOPRAZOLE SODIUM 40 MG/1
40 TABLET, DELAYED RELEASE ORAL
Status: DISCONTINUED | OUTPATIENT
Start: 2024-12-04 | End: 2024-12-03

## 2024-12-03 RX ORDER — DIPHENHYDRAMINE HCL 12.5MG/5ML
12.5 LIQUID (ML) ORAL ONCE
Status: COMPLETED | OUTPATIENT
Start: 2024-12-03 | End: 2024-12-03

## 2024-12-03 RX ORDER — PANTOPRAZOLE SODIUM 40 MG/10ML
INJECTION, POWDER, LYOPHILIZED, FOR SOLUTION INTRAVENOUS
Status: COMPLETED
Start: 2024-12-03 | End: 2024-12-03

## 2024-12-03 RX ORDER — HYDRALAZINE HYDROCHLORIDE 25 MG/1
TABLET, FILM COATED ORAL
Status: DISPENSED
Start: 2024-12-03 | End: 2024-12-04

## 2024-12-03 RX ORDER — PANTOPRAZOLE SODIUM 40 MG/10ML
40 INJECTION, POWDER, LYOPHILIZED, FOR SOLUTION INTRAVENOUS
Status: DISCONTINUED | OUTPATIENT
Start: 2024-12-04 | End: 2024-12-03

## 2024-12-03 RX ORDER — PANTOPRAZOLE SODIUM 40 MG/10ML
40 INJECTION, POWDER, LYOPHILIZED, FOR SOLUTION INTRAVENOUS
Status: DISCONTINUED | OUTPATIENT
Start: 2024-12-03 | End: 2024-12-04

## 2024-12-03 ASSESSMENT — PAIN SCALES - GENERAL
PAINLEVEL_OUTOF10: 0 - NO PAIN

## 2024-12-03 ASSESSMENT — PAIN - FUNCTIONAL ASSESSMENT
PAIN_FUNCTIONAL_ASSESSMENT: 0-10

## 2024-12-03 NOTE — PROGRESS NOTES
Occupational Therapy    Communication Note    Patient Name: Edu Echavarria  MRN: [id number]  Today's Date: 12/3/2024   Room: 18/18-A    Discipline: Occupational Therapy      Missed Visit Reason:  (0934: pt refused to participate)      12/03/24 at 11:56 AM   Samantha Romo, OT   Rehab Office: [phone]

## 2024-12-03 NOTE — PROGRESS NOTES
Physical Therapy                 Therapy Communication Note    Patient Name: Edu Echavarria  MRN: [id number]  Department: [facility]  Room: 18/18-A  Today's Date: 12/3/2024     Discipline: Physical Therapy    Missed Visit Reason: Missed Visit Reason:  (Pt refused PT at this time- stating he wants to know the answer about surgery or not.)    Missed Time: Attempt    Comment:

## 2024-12-03 NOTE — PROGRESS NOTES
Cardiac ICU Progress Note, 12/3/2024    Admit Date: 11/18/2024   Hospital Length of Stay: 15   ICU Length of Stay: 3d 13h     History of Present Illness  Edu Echavarria is a 74 y.o. male on day 3d 13h of admission for STEMI (ST elevation myocardial infarction) (Multi).    Interval History  Milrinone dc'd this AM at~0555.   CHIP meeting occurred yesterday though will need to clarify goals of care as pt may not want to undergo interventions of coronary AA   Lasix gtt stopped, will spot dose. Did not need ON.  UO noted to be~750cc ON   Confirmed DNR/DNI status with patient. However, pt is interested in revascularization with CABG or PCI and understands temporary reversal of code status    Subjective  Patient seen and evaluated at bedside.     This morning, pt c/o feeling constipation and overall feels ill but otherwise no other specific complaints.     Denied fever/chills, HA, chest pain, chest tightness, shortness of breath, orthopnea, pain, ABD pain, N/V, LE edema    Pt states that he would be willing to undergo intervention to coronary AA either surgery or PCI depending on final recs by CTS or interventional cards    Objective    Vitals  Visit Vitals  BP (!) 150/26   Pulse 75   Temp 35.7 °C (96.3 °F) (Temporal)   Resp 15        Vent settings    Most Recent Range Past 24hrs   Mode Pressure support    FiO2 (S) 40 % No data recorded   Rate 20 No data recorded   Vt 450 mL  No data recorded   PEEP 5 cm H20 No data recorded       Invasive Hemodynamics   Most Recent Range Past 24hrs   BP (Art) 138/51 Arterial Line BP 1  Min: 113/39  Max: 171/60   MAP(Art) 87 mmHg Arterial Line MAP 1 (mmHg)   Min: 69 mmHg  Max: 111 mmHg   RA/CVP   No data recorded   PA 46/11 PAP  Min: 43/8  Max: 67/16   PA(mean) 23 mmHg PAP (Mean)  Min: 21 mmHg  Max: 39 mmHg   PCWP 15 mmHg No data recorded   CO 7.2 L/min CO (L/min)  Min: 7.2 L/min  Max: 7.2 L/min   CI 3.6 L/min/m2 CI (L/min/m2)  Min: 3.6 L/min/m2  Max: 3.6 L/min/m2   Mixed Venous 78 % SVO2  (%)  Min: 78 %  Max: 78 %   SVR  877 (dyne*sec)/cm5 SVR (dyne*sec)/cm5  Min: 877 (dyne*sec)/cm5  Max: 877 (dyne*sec)/cm5    (dyne*sec)/cm5 No data recorded     I/O    Intake/Output Summary (Last 24 hours) at 12/3/2024 0633  Last data filed at 12/3/2024 0300  Gross per 24 hour   Intake 1395.7 ml   Output 3370 ml   Net -1974.3 ml        Physical Exam  Physical Exam  Constitutional:       General: He is awake. He is not in acute distress.     Appearance: He is ill-appearing. He is not toxic-appearing.      Interventions: Nasal cannula in place.      Comments: On 2L O2 via NC, satting 93-94%    HENT:      Head: Normocephalic and atraumatic.   Neck:      Comments: [place] Yesenia catheter in place without evidence of erythema, drainage or edema   Cardiovascular:      Rate and Rhythm: Normal rate and regular rhythm.      Pulses:           Radial pulses are 2+ on the right side and 2+ on the left side.      Heart sounds: Heart sounds not distant.      Comments: IABP pumping sound audible   Pulmonary:      Effort: Pulmonary effort is normal.      Breath sounds: Normal breath sounds.   Abdominal:      General: Bowel sounds are normal.      Palpations: Abdomen is soft.      Tenderness: There is no abdominal tenderness. There is no guarding or rebound.   Genitourinary:     Comments: [place] catheter in place, draining clear yellow urine   Musculoskeletal:      Right lower leg: No edema.      Left lower leg: No edema.   Neurological:      Mental Status: He is alert.   Psychiatric:         Behavior: Behavior is cooperative.       Labs    CBC:  Results from last 7 days   Lab Units 12/03/24  0517 12/02/24  2351 12/02/24  0148   WBC AUTO x10*3/uL 11.7* 13.5* 15.2*   HEMOGLOBIN g/dL 13.0* 12.8* 13.5   PLATELETS AUTO x10*3/uL 118* 126* 169     BMP:  Results from last 7 days   Lab Units 12/02/24  1730 12/02/24  0148 12/01/24  1657 12/01/24  0003   SODIUM mmol/L 133* 133* 133* 129*   POTASSIUM mmol/L 3.8 3.6 3.6 3.5   CHLORIDE mmol/L  92* 92* 94* 90*   CO2 mmol/L 29 29 28 28   ANION GAP mmol/L 16 16 15 15   BUN mg/dL 14 17 19 16   CREATININE mg/dL 1.39* 1.46* 1.64* 1.69*   EGFR mL/min/1.73m*2 54* 50* 44* 42*   CALCIUM mg/dL 8.3* 8.2* 8.1* 7.9*   PHOSPHORUS mg/dL 3.1 3.1 2.5 3.7   MAGNESIUM mg/dL  --  2.16 2.25 1.76   GLUCOSE mg/dL 150* 155* 193* 158*     LFT:  Results from last 7 days   Lab Units 12/02/24  0148 12/01/24  0003 11/30/24  0112   AST U/L 15 19 32   ALT U/L 12 14 18   ALK PHOS U/L 67 60 70   BILIRUBIN TOTAL mg/dL 1.1 1.3* 1.0   BILIRUBIN DIRECT mg/dL 0.4* 0.5* 0.4*     Cardiac:  Results from last 7 days   Lab Units 11/29/24  1639   BNP pg/mL 2,290*     Coag:  Results from last 7 days   Lab Units 12/03/24  0517 12/02/24  0148 12/01/24  0002   PROTIME seconds 14.4* 16.0* 18.3*   APTT seconds 124* 71* 76*   INR  1.3* 1.4* 1.6*     UA:   Results from last 7 days   Lab Units 11/29/24  2230   COLOR U  Light-Yellow   PH U  7.0   SPEC GRAV UR  1.009   PROTEIN U mg/dL 30 (1+)*   BLOOD UR  0.03 (TRACE)*   NITRITE U  NEGATIVE   WBC UR /HPF 1-5     ABG:  Results from last 7 days   Lab Units 12/03/24  0517 12/02/24  2351 12/02/24  1731 11/30/24  1714 11/30/24  1416 11/30/24  1130 11/30/24  1025 11/30/24  0628 11/30/24  0505   POCT PH, ARTERIAL pH  --   --   --   --  7.52*  --  7.54*  --  7.56*   POCT PO2, ARTERIAL mm Hg  --   --   --   --  70*  --  84*  --  116*   POCT PCO2, ARTERIAL mm Hg  --   --   --   --  36*  --  33*  --  28*   FIO2 % 25 25 25   < > 40   < > 40   < > 50    < > = values in this interval not displayed.     VBG:  Results from last 7 days   Lab Units 11/30/24  1130 11/29/24  1807   POCT PH, VENOUS pH 7.51* 7.10*   POCT PCO2, VENOUS mm Hg 36* 81*       Cardiac Data    EKG  Encounter Date: 11/18/24   ECG 12 Lead   Result Value    Ventricular Rate 87    Atrial Rate 87    AK Interval 146    QRS Duration 106    QT Interval 396    QTC Calculation(Bazett) 476    P Axis 69    R Axis 89    T Axis 111    QRS Count 14    Q Onset 215    P  Onset 142    P Offset 207    T Offset 413    QTC Fredericia 448    Narrative    Normal sinus rhythm  ST elevation, consider inferior injury or acute infarct  ** ** ACUTE MI / STEMI ** **  Consider right ventricular involvement in acute inferior infarct  Abnormal ECG  When compared with ECG of 18-NOV-2024 11:11,  Borderline criteria for Lateral infarct are no longer Present  Borderline criteria for Inferior infarct are no longer Present  ST now depressed in Lateral leads  Nonspecific T wave abnormality no longer evident in Anterior leads      Transthoracic Echo (TTE) Limited 11/29/24    1. Poorly visualized anatomical structures due to suboptimal image quality.   2. Left ventricular ejection fraction is severely decreased, by visual estimate at 25-30%.   3. There is global hypokinesis of the left ventricle with minor regional variations.   4. Left ventricular cavity size is mild to moderately dilated.   5. There is mildly reduced right ventricular systolic function.   6. Mildly enlarged right ventricle.   7. The left atrium is enlarged.   8. Severely elevated right ventricular systolic pressure.     Transthoracic Echo (TTE) Complete 11/18/24   1. Poorly visualized anatomical structures due to suboptimal image quality.   2. Left ventricular ejection fraction is severely decreased, calculated by Regalado's biplane at 23%.   3. There is global hypokinesis of the left ventricle with minor regional variations.   4. Spectral Doppler shows a Grade III (restrictive pattern) of left ventricular diastolic filling with an elevated left atrial pressure.   5. There is normal right ventricular global systolic function.   6. Mildly enlarged right ventricle.   7. The left atrium is mildly dilated.      Imaging    XR chest 1 view    Result Date: 11/30/2024  1. IABP has been retracted with radiopaque marker now projecting over upper descending thoracic aorta, just cranial to the level of left principal bronchus. Rest of medical devices  as above. 2. Interval improvement in diffuse pulmonary edema. Suggestion of trace/small bilateral pleural effusions.   I personally reviewed the images/study and I agree with the findings as stated by Kevin Guidry DO, PGY-3. This study was interpreted at [facility], [place], [place].   MACRO: None   Signed by: Jovi Alonso 11/30/2024 8:57 AM Dictation workstation:   [id number]    XR chest 1 view    Result Date: 11/30/2024  1. Medical devices as above. IABP radiopaque marker overlies upper most descending thoracic aorta. 2. Similar diffuse pulmonary edema and layering bilateral pleural effusions.   I personally reviewed the images/study and I agree with the findings as stated by Dr. Abhijeet Chavira. This study was interpreted at [facility], [place], [place].   MACRO: None   Signed by: Jovi Alonso 11/30/2024 8:52 AM Dictation workstation:   [id number]    XR chest 1 view    Result Date: 11/30/2024  1. Redemonstrated severe bilateral pulmonary edema and bilateral pleural effusions. Overall lung aeration appears slightly worsened as compared to prior radiograph from earlier same day, likely due to layering of pleural effusions on present supine radiograph. 2. Mild gaseous prominence of bowel loops within included upper to mid abdomen and correlate with concern for ileus. Otherwise, nonobstructive bowel gas pattern. 3. Enteric tube projects 7.1 cm from the catrachito. Other medical support devices are as detailed above.   I personally reviewed the images/study and I agree with the findings as stated by Resident John Yuan MD.   MACRO: NONE.   Signed by: Jovi Alonso 11/30/2024 8:50 AM Dictation workstation:   [id number]    XR abdomen 1 view    Result Date: 11/30/2024  1. Redemonstrated severe bilateral pulmonary edema and bilateral pleural effusions. Overall lung aeration appears slightly worsened as compared to prior radiograph from earlier same day,  likely due to layering of pleural effusions on present supine radiograph. 2. Mild gaseous prominence of bowel loops within included upper to mid abdomen and correlate with concern for ileus. Otherwise, nonobstructive bowel gas pattern. 3. Enteric tube projects 7.1 cm from the catrachito. Other medical support devices are as detailed above.   I personally reviewed the images/study and I agree with the findings as stated by Resident John Yuan MD.   MACRO: NONE.   Signed by: Jovi Alonso 11/30/2024 8:50 AM Dictation workstation:   [id number]    XR abdomen 1 view    Result Date: 11/25/2024  1.  No evidence of bowel obstruction or perforation.   MACRO: None   Signed by: Familia Segura 11/25/2024 10:09 AM Dictation workstation:   [id number]    CT chest wo IV contrast    Result Date: 11/21/2024  1.  Thoracic aorta is normal in course and caliber. There is mild calcified plaque involving the arch and branch vessels. There is bovine arch configuration with common origin of the innominate and left common carotid artery. 2. Severe coronary artery calcifications. 3. Bibasilar atelectasis left-greater-than-right. 4. Several small, pulmonary nodules within both lungs, measuring less than 6 mm, which are likely benign. Instructions:  Consider follow-up noncontrast CT scan chest in 12 months. 5. Multiple enlarged retrocrural lymph nodes measuring up to 1.3 cm which are nonspecific and may be reactive. Recommend follow-up CT of the abdomen in 3 months to evaluate for interval change.     I personally reviewed the images/study and I agree with the findings as stated by Tate Rodriguez DO PGY-2. This study was interpreted at [place], [place].   MACRO: Critical Finding:  See findings. Notification was initiated on 11/21/2024 at 9:38 am by  Keanu Rene.  (**-YCF-**) Instructions:   Signed by: Keanu Rene 11/21/2024 9:39 AM Dictation workstation:   [id number]    XR chest 1  view    Result Date: 11/20/2024  1.  Unchanged positioning of the intra-aortic balloon pump catheter with the radiopaque marker projecting over the descending thoracic aorta just cranial to the level of the left mainstem bronchus. 2. No focal consolidation, pleural effusion, or pneumothorax.   I personally reviewed the image(s)/study and resident interpretation. I agree with the findings as stated by resident Oscar Covington. Data analyzed and images interpreted at [place], [place].   MACRO: None   Signed by: Vamsi Pavon 11/20/2024 9:11 AM Dictation workstation:   [id number]    XR chest 1 view    Result Date: 11/19/2024  1.  Interval adjustment of the intra-aortic balloon pump catheter, with the radiopaque marker projecting over upper descending thoracic aorta, just cranial to the level of left principal bronchus. 2. No evidence of acute cardiopulmonary process.   I personally reviewed the image(s)/study and resident interpretation. I agree with the findings as stated by resident Oscar Covington. Data analyzed and images interpreted at [place], [place].   MACRO: None   Signed by: Jovi Alonso 11/19/2024 10:42 AM Dictation workstation:   [id number]    XR chest 1 view    Result Date: 11/18/2024  1. No focal infiltrate, pleural effusion, edema or pneumothorax. 2. Intra-aortic balloon pump radiopaque marker as described above.       Signed by: Tory Jean 11/18/2024 12:19 PM Dictation workstation:   [id number]         Inpatient Medications    Continuous medications  amiodarone, 0.5 mg/min, Last Rate: 0.5 mg/min (12/03/24 0220)  [Held by provider] furosemide, 10 mg/hr, Last Rate: Stopped (11/30/24 1754)  heparin, 0-4,000 Units/hr, Last Rate: 1,600 Units/hr (12/03/24 0220)  nitroprusside, 0.25-5 mcg/kg/min      Scheduled medications  aspirin, 81 mg, oral, Daily  atorvastatin, 80 mg, oral, Nightly  bisacodyl, 10 mg,  rectal, Daily  hydrALAZINE, 50 mg, oral, TID  insulin lispro, 0-10 Units, subcutaneous, TID AC  isosorbide dinitrate, 20 mg, oral, TID  oxygen, , inhalation, Continuous - Inhalation  pantoprazole, 40 mg, oral, Daily before breakfast  polyethylene glycol, 17 g, oral, Daily      PRN medications  PRN medications: acetaminophen, alum-mag hydroxide-simeth, dextrose, dextrose, docusate sodium, glucagon, glucagon, heparin, hydrOXYzine HCL, lubricating eye drops, midazolam, midazolam, oxygen, simethicone      Assessment & Plan  Edu Echavarria is a 74 y.o. male with a PMHx of HTN, GERD, and gout who presented to [facility] ED on 11/17pm with 2d hx of intermittent palpitations and chest pain. At OSH initial EKG flutter w/ RVR and ST elevtions in II/III/avF & questionable in V1-V3 w/o reciprocal changes, and labs remarkable for HS trop >34199, BNP 1100, WBC 5, CBC/RFP otherwise unremarkable. Repeat EKG aflutter w/ similar ST elevations as prior. STEMI call activated, loaded with aspirin 324mg, plavix 300mg, and started on heparin gtt 11/17pm. LHC showed triple vessel disease w/ no stenting and IABP was placed for coronary perf. Started on integrillin (which has now been stopped) and continued on heparin gtt. Transferred to [facility] for CABG evaluation. CT surgery on board for CABG evaluation, tentative plan for OR 11/25. Hospital course c/b MARYBETH and slowly dropping plts/Hb, continuing further workup. Pt transferred to floor 11/21 for further care pre-op.      Surgery cancelled on 11/25 due to worsening hyponatremia, likely iso hypovolemia 2/2 decreased PO intake and diarrhea. Resuscitated w/ 1L fluids, nephrology on board. KUB -ve, stool PCR pending. HTN reg changed from coreg isordil and hydral to Imdur 30 and Metop XL 50 to reduce pill burden. HypoNa improving s/p continuous IV NS 80cc/hr over 24 hr.     Rapid response called 11/29 for worsening AHRF and hypertensive emergency, eventually requiring intubation and CICU transfer.  Initially started on nitroglycerin gtt which was then discontinued given drop in BP; started on norepinephrine and then milrinone. Also given 80 mg Lasix. [place] Yesenia showing high filling pressures, started on a Bumex drip, switched to Lasix. Shock code called, decision made to insert IABP.     Updates 12/3/24:   -Milrinone dc'd this AM at ~05:55.   -Updated [place] Numbers (off milrinone, and with 1:1 IABP at 2.5 hrs post DC of milrinone): CVP (RA) 4, CO 7.1, CI 3.51,   -This AM satting appropriately on 2L NC following extubation   -DC'd nitroprusside gtt. Increased isordil to 40mg TID. If SBP maintaining >90s-100s, can consider increasing dose of isordil or hydral as needed to avoid further complications from HTN, given episode of flash pulm edema earlier this admission  -Pt agreeable to undergoing surgery with CTS.       Neurologic  MAVERICK   - Patient extubated 11/30      Cardiovascular  #Cardiogenic Shock  #STEMI  #Triple Vessel CAD  - HS trop >66151 at OSH, HS trop at : 27302 > 82226  - Arrived on integrillin and heparin gtt, aspirin and plavix loaded 11/17 ~2100  - Utox + cannavinoid, fent (received w/ EMS), benzos, cocaine  - [facility] 11/18: Triple vessel disease (LAD prox 70%, mid 65-70%, dist 90%), Lcx OM1 80%, OM2 small, OM3 occluded with collaterals, RCA mid moderate disease.   - LVEF 20-25%, evidence of extensive global hypokinesis  - Initially admitted to CICU with IABP in place, pulled out on 11/20, and transferred to regular floor on 11/21  - Rapid response called 11/29 for worsening AHRF and hypertensive emergency, requiring intubation and CICU transfer.   - BNP 2290 11/29 (from 1470 on admission)  Plan:  IABP placed 11/29  Milrinone DC'd this AM. F/u [place] numbers consistent with numbers prior when pt was on milrinone previously   Stopped Lasix gtt, now spot dosing.   Dc'd Nitroprusside gtt. Increased PO afterload reduction Hydralazine 75mg TID and Isosorbide dinitrate 40mg TID. Will plan to re-eval tonight and  possibly increase dose for better afterload reduction. Dc'd nitroprusside gtt  Holding home anti-hypertensives  Overall will plan for pt to undergo CABG  Will plan to leave [place] Yesenia catheter in place.   Counseled pt on participating in PT sessions given long hospital stay and to avoid de-conditioning, to which pt understood and agreed.     Respiratory/ Infectious Disease  #AHRF (improving)   #Leukocytosis   :: In the setting of cardiogenic shock/flash pulmonary edema   :: WBC downtrending to 11.7 today  :: Flu/Covid/RSV/Leg/Strep negative  :: MRSA PCR negative  :: Cultures no growth to date  Plan:  DC'd Vanc/Zosyn (11/29-12/2) given infectious workup (-) to date     Renal  #MARYBETH (improving)  #Hyponatremia (resolving)   - Patient with acute on chronic hyponatremia though improving and stable   - Nephrology following, appreciate recs  - Treated for hypotonic hypovolemic hyponatremia with fluids  - Na stable at 133 this AM, for >36h   - On CICU arrival, patient with AHRF and pulmonary edema  :: Was initially started on diuresis with 80 mg Lasix, then Bumex gtt --> Lasix gtt + boluses --> Lasix boluses only, no logner needing to maintain adequate UO  :: Net negative ~2400 over last 24h   ::  Cr downtrending to 1.46 today (<1.64<1.56)   Plan:  Stopped Lasix gtt, now spot dosing  Monitor urine output and RFPs  Follow-up nephrology recs    #Hx Gout  :: Currently asx   :: Baseline on Allopuriol. Holding I/s/o MARYBETH    Gastrointestinal  #C/f Ileus  - KUB: Mild gaseous prominence of bowel loops within included upper to mid abdomen and correlate with concern for ileus  Plan:  Bowel regimen: miralax daily and bisacodyl suppository   Tolerating enteral feeding       Fluids: PRN  Electrolytes: PRN  Nutrition: Cardiac   Lines: pIVs, A-line, [place] Yesenia cath,   DVT prophylaxis: Lovenox subq  GI prophylaxis: PPI    Code Status: DNR and No Intubation (Confirmed on admission) (with exception of temporary reversal of code status for CABG  or PCI)   Emergency Contact / NOK: Extended Emergency Contact Information  Primary Emergency Contact: Guillermo Barrios  Mobile Phone: [phone]  Relation: Relative  Preferred language: English   needed? No  Secondary Emergency Contact: COLIN ROMERO  Mobile Phone: [phone]  Relation: Friend  Preferred language: English   needed? No        Samantha Maldonado MD  Internal Medicine PGY-1    12/3/2024

## 2024-12-03 NOTE — PROGRESS NOTES
Social Work Progress Note  - ICU TREATMENT PLAN: Patient was admitted to CICU for cardiogenic shock.   - Payer: Medicare  -Support System: Relative Guillermo and friend Brandon are listed in the chart.    - Planned Disposition: Pending medical outcome and rehab recommendations.  - Barriers to discharge: None at this time. SW will continue to follow.  JIHAN GARZA

## 2024-12-03 NOTE — CARE PLAN
The patient's goals for the shift include  rest throughout shift    The clinical goals for the shift include remain hds throughout shift      Problem: Pain - Adult  Goal: Verbalizes/displays adequate comfort level or baseline comfort level  Outcome: Progressing     Problem: Safety - Adult  Goal: Free from fall injury  Outcome: Progressing     Problem: Discharge Planning  Goal: Discharge to home or other facility with appropriate resources  Outcome: Progressing     Problem: Chronic Conditions and Co-morbidities  Goal: Patient's chronic conditions and co-morbidity symptoms are monitored and maintained or improved  Outcome: Progressing     Problem: Skin  Goal: Participates in plan/prevention/treatment measures  Outcome: Progressing  Flowsheets (Taken 12/3/2024 0214)  Participates in plan/prevention/treatment measures: Elevate heels  Goal: Prevent/manage excess moisture  Outcome: Progressing  Flowsheets (Taken 12/3/2024 0214)  Prevent/manage excess moisture: Cleanse incontinence/protect with barrier cream  Goal: Prevent/minimize sheer/friction injuries  Outcome: Progressing  Flowsheets (Taken 12/3/2024 0214)  Prevent/minimize sheer/friction injuries: Turn/reposition every 2 hours/use positioning/transfer devices     Problem: Safety - Medical Restraint  Goal: Remains free of injury from restraints (Restraint for Interference with Medical Device)  Outcome: Progressing  Goal: Free from restraint(s) (Restraint for Interference with Medical Device)  Outcome: Progressing

## 2024-12-03 NOTE — PROGRESS NOTES
Cardiac Surgery:  I had a detailed conversation with the patient and his cousin.  Given the CHIP meeting results last evening, he would like to pursue surgical revascularization.  I discussed with him his code status and we agreed:  he will be a full code from surgery until discharge.  We discussed that sometimes after surgery the breathing tube has to go back in, he is in agreement to do whatever is necessary to recover for surgery.  He also stated, that if he were to be in a persistent vegetative state, he would not want to be on a prolonged vent status with no meaningful chance of recovery.   He cousin, who will be making decisions afterwards was at the bedside and agreed.      For the purposes of this hospital stay, until his is discharged after surgery, he will be a full code.     Plan for CABG tomorrow morning.       Sunil Villa MD

## 2024-12-03 NOTE — PROGRESS NOTES
"Edu Echavarria is a 74 y.o. male on day 15 of admission presenting with STEMI (ST elevation myocardial infarction) (Multi).    Subjective   x       Objective     Physical Exam    Last Recorded Vitals  Blood pressure (!) 150/26, pulse 78, temperature 37.1 °C (98.8 °F), temperature source Temporal, resp. rate 15, height 1.702 m (5' 7\"), weight 88 kg (194 lb), SpO2 98%.  Intake/Output last 3 Shifts:  I/O last 3 completed shifts:  In: 1668.8 (19 mL/kg) [I.V.:1168.8 (13.3 mL/kg); IV Piggyback:500]  Out: 4685 (53.2 mL/kg) [Urine:4685 (1.5 mL/kg/hr)]  Weight: 88 kg     Relevant Results                   This patient has a urinary catheter   Reason for the urinary catheter remaining today? Urine catheter unnecessary, will be removed today               Assessment/Plan   This SmartSection is not supported in the current .  x       I spent x minutes in the professional and overall care of this patient.      Sunil Villa MD      "

## 2024-12-03 NOTE — CONSULTS
Wound Care Consult     Visit Date: 12/3/2024      Patient Name: Edu Echavarria         MRN: [id number]           YOB: 1950     Reason for Consult: Left buttock stage 1         Wound History: first encounter with the patient      Pertinent Labs:   Albumin   Date Value Ref Range Status   12/03/2024 3.2 (L) 3.4 - 5.0 g/dL Final   12/03/2024 3.2 (L) 3.4 - 5.0 g/dL Final       Wound Assessment:  Wound 12/03/24 Pressure Injury Buttock (Active)   Wound Image   12/03/24 1107   Site Assessment Red;Non-blanchable erythema 12/03/24 1107   Bharati-Wound Assessment Clean;Blanchable erythema 12/03/24 1107   Non-staged Wound Description Not applicable 12/03/24 1107   Pressure Injury Stage 1 12/03/24 1107   Shape irregular 12/03/24 1107   Wound Length (cm) 2 cm 12/03/24 1107   Wound Width (cm) 1 cm 12/03/24 1107   Wound Surface Area (cm^2) 2 cm^2 12/03/24 1107   Wound Depth (cm) 0 cm 12/03/24 1107   Wound Volume (cm^3) 0 cm^3 12/03/24 1107   State of Healing Non-healing 12/03/24 1107   Drainage Description None 12/03/24 1107   Drainage Amount None 12/03/24 1107   Dressing Silicone border dressing 12/03/24 1107   Dressing Changed New 12/03/24 1107   Dressing Status Clean;Dry 12/03/24 1107       Wound Team Summary Assessment: The wound care team came to bedside to assess the patient's left buttocks for a possible pressure injury.  The patient has a stage 1 on his left buttock that is red and nonblanchable.  The buttock was dressed with a mepilex border dressing and an air mattress was added to the bed to offload the patient's bony prominences.      Wound Team Plan:   Recommendation: Daily inspection  Cleanse the buttock with a bath wipe and gently pat dry   Cover with a mepilex border dressing  Continue to turn the patient 2 hours   Utilize repositioning sheets, wedges and pillows to offload the bony prominences      Prince Horne RN CWON  12/3/2024  12:48 PM

## 2024-12-04 ENCOUNTER — APPOINTMENT (OUTPATIENT)
Dept: CARDIOLOGY | Facility: HOSPITAL | Age: 74
DRG: 235 | End: 2024-12-04
Payer: MEDICARE

## 2024-12-04 ENCOUNTER — APPOINTMENT (OUTPATIENT)
Dept: RADIOLOGY | Facility: HOSPITAL | Age: 74
DRG: 235 | End: 2024-12-04
Payer: MEDICARE

## 2024-12-04 ENCOUNTER — ANESTHESIA (OUTPATIENT)
Dept: OPERATING ROOM | Facility: HOSPITAL | Age: 74
End: 2024-12-04
Payer: MEDICARE

## 2024-12-04 ENCOUNTER — HOSPITAL ENCOUNTER (OUTPATIENT)
Dept: OPERATING ROOM | Facility: HOSPITAL | Age: 74
Discharge: HOME | End: 2024-12-04

## 2024-12-04 LAB
ACT BLD: 120 SEC (ref 82–174)
ACT BLD: 126 SEC (ref 82–174)
ACT BLD: 381 SEC (ref 89–169)
ACT BLD: 487 SEC (ref 82–174)
ACT BLD: 508 SEC (ref 82–174)
ACT BLD: 564 SEC (ref 82–174)
ACT BLD: 576 SEC (ref 82–174)
ACT BLD: 690 SEC (ref 82–174)
ALBUMIN SERPL BCP-MCNC: 3.5 G/DL (ref 3.4–5)
ALBUMIN SERPL BCP-MCNC: 3.5 G/DL (ref 3.4–5)
ALP SERPL-CCNC: 67 U/L (ref 33–136)
ALT SERPL W P-5'-P-CCNC: 14 U/L (ref 10–52)
ANION GAP BLDA CALCULATED.4IONS-SCNC: 10 MMO/L (ref 10–25)
ANION GAP BLDA CALCULATED.4IONS-SCNC: 10 MMO/L (ref 10–25)
ANION GAP BLDA CALCULATED.4IONS-SCNC: 11 MMO/L (ref 10–25)
ANION GAP BLDA CALCULATED.4IONS-SCNC: 12 MMO/L (ref 10–25)
ANION GAP BLDA CALCULATED.4IONS-SCNC: 13 MMO/L (ref 10–25)
ANION GAP BLDA CALCULATED.4IONS-SCNC: 13 MMO/L (ref 10–25)
ANION GAP BLDA CALCULATED.4IONS-SCNC: 16 MMO/L (ref 10–25)
ANION GAP BLDA CALCULATED.4IONS-SCNC: 5 MMO/L (ref 10–25)
ANION GAP BLDA CALCULATED.4IONS-SCNC: 6 MMO/L (ref 10–25)
ANION GAP BLDA CALCULATED.4IONS-SCNC: 8 MMO/L (ref 10–25)
ANION GAP BLDA CALCULATED.4IONS-SCNC: 9 MMO/L (ref 10–25)
ANION GAP BLDMV CALCULATED.4IONS-SCNC: 7 MMO/L (ref 10–25)
ANION GAP BLDV CALCULATED.4IONS-SCNC: 11 MMOL/L (ref 10–25)
ANION GAP SERPL CALC-SCNC: 16 MMOL/L (ref 10–20)
APTT PPP: 58 SECONDS (ref 27–38)
AST SERPL W P-5'-P-CCNC: 18 U/L (ref 9–39)
ATRIAL RATE: 100 BPM
ATRIAL RATE: 300 BPM
ATRIAL RATE: 74 BPM
BASE EXCESS BLDA CALC-SCNC: -0.8 MMOL/L (ref -2–3)
BASE EXCESS BLDA CALC-SCNC: -1.5 MMOL/L (ref -2–3)
BASE EXCESS BLDA CALC-SCNC: -1.8 MMOL/L (ref -2–3)
BASE EXCESS BLDA CALC-SCNC: -2.4 MMOL/L (ref -2–3)
BASE EXCESS BLDA CALC-SCNC: -5 MMOL/L (ref -2–3)
BASE EXCESS BLDA CALC-SCNC: 1.6 MMOL/L (ref -2–3)
BASE EXCESS BLDA CALC-SCNC: 2.3 MMOL/L (ref -2–3)
BASE EXCESS BLDA CALC-SCNC: 2.4 MMOL/L (ref -2–3)
BASE EXCESS BLDA CALC-SCNC: 3.2 MMOL/L (ref -2–3)
BASE EXCESS BLDA CALC-SCNC: 3.8 MMOL/L (ref -2–3)
BASE EXCESS BLDA CALC-SCNC: 4.1 MMOL/L (ref -2–3)
BASE EXCESS BLDA CALC-SCNC: 7.2 MMOL/L (ref -2–3)
BASE EXCESS BLDA CALC-SCNC: 7.3 MMOL/L (ref -2–3)
BASE EXCESS BLDMV CALC-SCNC: 5.2 MMOL/L (ref -2–3)
BASE EXCESS BLDV CALC-SCNC: 3.5 MMOL/L (ref -2–3)
BASOPHILS # BLD AUTO: 0.03 X10*3/UL (ref 0–0.1)
BASOPHILS # BLD AUTO: 0.06 X10*3/UL (ref 0–0.1)
BASOPHILS NFR BLD AUTO: 0.1 %
BASOPHILS NFR BLD AUTO: 0.5 %
BILIRUB DIRECT SERPL-MCNC: 0.3 MG/DL (ref 0–0.3)
BILIRUB SERPL-MCNC: 1 MG/DL (ref 0–1.2)
BLOOD EXPIRATION DATE: NORMAL
BLOOD EXPIRATION DATE: NORMAL
BODY TEMPERATURE: 37 DEGREES CELSIUS
BUN SERPL-MCNC: 17 MG/DL (ref 6–23)
CA-I BLDA-SCNC: 1.02 MMOL/L (ref 1.1–1.33)
CA-I BLDA-SCNC: 1.06 MMOL/L (ref 1.1–1.33)
CA-I BLDA-SCNC: 1.09 MMOL/L (ref 1.1–1.33)
CA-I BLDA-SCNC: 1.1 MMOL/L (ref 1.1–1.33)
CA-I BLDA-SCNC: 1.1 MMOL/L (ref 1.1–1.33)
CA-I BLDA-SCNC: 1.11 MMOL/L (ref 1.1–1.33)
CA-I BLDA-SCNC: 1.13 MMOL/L (ref 1.1–1.33)
CA-I BLDA-SCNC: 1.16 MMOL/L (ref 1.1–1.33)
CA-I BLDA-SCNC: 1.18 MMOL/L (ref 1.1–1.33)
CA-I BLDA-SCNC: 1.19 MMOL/L (ref 1.1–1.33)
CA-I BLDA-SCNC: 1.21 MMOL/L (ref 1.1–1.33)
CA-I BLDA-SCNC: 1.25 MMOL/L (ref 1.1–1.33)
CA-I BLDA-SCNC: 1.32 MMOL/L (ref 1.1–1.33)
CA-I BLDMV-SCNC: 1.13 MMOL/L (ref 1.1–1.33)
CA-I BLDV-SCNC: 1.11 MMOL/L (ref 1.1–1.33)
CALCIUM SERPL-MCNC: 8.7 MG/DL (ref 8.6–10.6)
CFT FORM KAOLIN IND BLD RES TEG: 1 MIN (ref 0.8–2.1)
CFT FORM KAOLIN IND BLD RES TEG: 3.6 MIN (ref 0.8–2.1)
CHLORIDE BLD-SCNC: 94 MMOL/L (ref 98–107)
CHLORIDE BLDA-SCNC: 100 MMOL/L (ref 98–107)
CHLORIDE BLDA-SCNC: 101 MMOL/L (ref 98–107)
CHLORIDE BLDA-SCNC: 94 MMOL/L (ref 98–107)
CHLORIDE BLDA-SCNC: 94 MMOL/L (ref 98–107)
CHLORIDE BLDA-SCNC: 95 MMOL/L (ref 98–107)
CHLORIDE BLDA-SCNC: 96 MMOL/L (ref 98–107)
CHLORIDE BLDA-SCNC: 96 MMOL/L (ref 98–107)
CHLORIDE BLDA-SCNC: 98 MMOL/L (ref 98–107)
CHLORIDE BLDA-SCNC: 98 MMOL/L (ref 98–107)
CHLORIDE BLDV-SCNC: 94 MMOL/L (ref 98–107)
CHLORIDE SERPL-SCNC: 93 MMOL/L (ref 98–107)
CLOT ANGLE.KAOLIN INDUCED BLD RES TEG: 57 DEG (ref 63–78)
CLOT ANGLE.KAOLIN INDUCED BLD RES TEG: 76 DEG (ref 63–78)
CLOT INIT KAO IND P HEP NEUT BLD RES TEG: 16.7 MIN (ref 4.6–9.1)
CLOT INIT KAO IND P HEP NEUT BLD RES TEG: 7.5 MIN (ref 4.6–9.1)
CLOT INIT KAO IND P HEP NEUT BLD RES TEG: 7.9 MIN (ref 4.3–8.3)
CLOT INIT KAO IND P HEP NEUT BLD RES TEG: >17 MIN (ref 4.3–8.3)
CO2 SERPL-SCNC: 27 MMOL/L (ref 21–32)
COHGB MFR BLDA: 1 %
COHGB MFR BLDA: 1.1 %
COHGB MFR BLDA: 1.1 %
COHGB MFR BLDA: 1.2 %
COHGB MFR BLDA: 1.3 %
COHGB MFR BLDA: 1.4 %
COHGB MFR BLDA: 1.4 %
COHGB MFR BLDA: 1.6 %
COHGB MFR BLDA: 1.8 %
COHGB MFR BLDV: 2.2 %
CREAT SERPL-MCNC: 1.35 MG/DL (ref 0.5–1.3)
DISPENSE STATUS: NORMAL
DISPENSE STATUS: NORMAL
DO-HGB MFR BLDA: 0 % (ref 0–5)
DO-HGB MFR BLDA: 0 % (ref 0–5)
DO-HGB MFR BLDA: 0.1 % (ref 0–5)
DO-HGB MFR BLDA: 0.1 % (ref 0–5)
DO-HGB MFR BLDA: 0.3 % (ref 0–5)
DO-HGB MFR BLDA: 0.5 % (ref 0–5)
DO-HGB MFR BLDA: 0.6 % (ref 0–5)
DO-HGB MFR BLDA: 0.6 % (ref 0–5)
DO-HGB MFR BLDA: 1 % (ref 0–5)
EGFRCR SERPLBLD CKD-EPI 2021: 55 ML/MIN/1.73M*2
EOSINOPHIL # BLD AUTO: 0.04 X10*3/UL (ref 0–0.4)
EOSINOPHIL # BLD AUTO: 0.35 X10*3/UL (ref 0–0.4)
EOSINOPHIL NFR BLD AUTO: 0.1 %
EOSINOPHIL NFR BLD AUTO: 3 %
ERYTHROCYTE [DISTWIDTH] IN BLOOD BY AUTOMATED COUNT: 13.4 % (ref 11.5–14.5)
ERYTHROCYTE [DISTWIDTH] IN BLOOD BY AUTOMATED COUNT: 13.7 % (ref 11.5–14.5)
FIBRINOGEN BLD CALC-MCNC: 456 MG/DL (ref 278–581)
FIBRINOGEN BLD CALC-MCNC: 639 MG/DL (ref 278–581)
GLUCOSE BLD MANUAL STRIP-MCNC: 154 MG/DL (ref 74–99)
GLUCOSE BLD MANUAL STRIP-MCNC: 175 MG/DL (ref 74–99)
GLUCOSE BLD-MCNC: 157 MG/DL (ref 74–99)
GLUCOSE BLDA-MCNC: 135 MG/DL (ref 74–99)
GLUCOSE BLDA-MCNC: 144 MG/DL (ref 74–99)
GLUCOSE BLDA-MCNC: 154 MG/DL (ref 74–99)
GLUCOSE BLDA-MCNC: 157 MG/DL (ref 74–99)
GLUCOSE BLDA-MCNC: 158 MG/DL (ref 74–99)
GLUCOSE BLDA-MCNC: 160 MG/DL (ref 74–99)
GLUCOSE BLDA-MCNC: 165 MG/DL (ref 74–99)
GLUCOSE BLDA-MCNC: 167 MG/DL (ref 74–99)
GLUCOSE BLDA-MCNC: 176 MG/DL (ref 74–99)
GLUCOSE BLDA-MCNC: 179 MG/DL (ref 74–99)
GLUCOSE BLDA-MCNC: 186 MG/DL (ref 74–99)
GLUCOSE BLDA-MCNC: 186 MG/DL (ref 74–99)
GLUCOSE BLDA-MCNC: 194 MG/DL (ref 74–99)
GLUCOSE BLDV-MCNC: 184 MG/DL (ref 74–99)
GLUCOSE SERPL-MCNC: 152 MG/DL (ref 74–99)
HCO3 BLDA-SCNC: 21.6 MMOL/L (ref 22–26)
HCO3 BLDA-SCNC: 23.2 MMOL/L (ref 22–26)
HCO3 BLDA-SCNC: 23.7 MMOL/L (ref 22–26)
HCO3 BLDA-SCNC: 24.3 MMOL/L (ref 22–26)
HCO3 BLDA-SCNC: 24.7 MMOL/L (ref 22–26)
HCO3 BLDA-SCNC: 27.3 MMOL/L (ref 22–26)
HCO3 BLDA-SCNC: 27.6 MMOL/L (ref 22–26)
HCO3 BLDA-SCNC: 27.8 MMOL/L (ref 22–26)
HCO3 BLDA-SCNC: 27.9 MMOL/L (ref 22–26)
HCO3 BLDA-SCNC: 29.2 MMOL/L (ref 22–26)
HCO3 BLDA-SCNC: 29.2 MMOL/L (ref 22–26)
HCO3 BLDA-SCNC: 30.1 MMOL/L (ref 22–26)
HCO3 BLDA-SCNC: 32 MMOL/L (ref 22–26)
HCO3 BLDMV-SCNC: 29.9 MMOL/L (ref 22–26)
HCO3 BLDV-SCNC: 29.5 MMOL/L (ref 22–26)
HCT VFR BLD AUTO: 24.5 % (ref 41–52)
HCT VFR BLD AUTO: 39.4 % (ref 41–52)
HCT VFR BLD EST: 26 % (ref 41–52)
HCT VFR BLD EST: 27 % (ref 41–52)
HCT VFR BLD EST: 28 % (ref 41–52)
HCT VFR BLD EST: 28 % (ref 41–52)
HCT VFR BLD EST: 29 % (ref 41–52)
HCT VFR BLD EST: 30 % (ref 41–52)
HCT VFR BLD EST: 31 % (ref 41–52)
HCT VFR BLD EST: 41 % (ref 41–52)
HCT VFR BLD EST: 41 % (ref 41–52)
HCT VFR BLD EST: 44 % (ref 41–52)
HGB BLD-MCNC: 13.6 G/DL (ref 13.5–17.5)
HGB BLD-MCNC: 8.3 G/DL (ref 13.5–17.5)
HGB BLDA-MCNC: 10 G/DL (ref 13.5–17.5)
HGB BLDA-MCNC: 10 G/DL (ref 13.5–17.5)
HGB BLDA-MCNC: 10.2 G/DL (ref 13.5–17.5)
HGB BLDA-MCNC: 10.2 G/DL (ref 13.5–17.5)
HGB BLDA-MCNC: 10.3 G/DL (ref 13.5–17.5)
HGB BLDA-MCNC: 10.4 G/DL (ref 13.5–17.5)
HGB BLDA-MCNC: 10.4 G/DL (ref 13.5–17.5)
HGB BLDA-MCNC: 13.5 G/DL (ref 13.5–17.5)
HGB BLDA-MCNC: 13.5 G/DL (ref 13.5–17.5)
HGB BLDA-MCNC: 14.5 G/DL (ref 13.5–17.5)
HGB BLDA-MCNC: 14.5 G/DL (ref 13.5–17.5)
HGB BLDA-MCNC: 8.6 G/DL (ref 13.5–17.5)
HGB BLDA-MCNC: 9.1 G/DL (ref 13.5–17.5)
HGB BLDA-MCNC: 9.2 G/DL (ref 13.5–17.5)
HGB BLDA-MCNC: 9.2 G/DL (ref 13.5–17.5)
HGB BLDA-MCNC: 9.4 G/DL (ref 13.5–17.5)
HGB BLDA-MCNC: 9.4 G/DL (ref 13.5–17.5)
HGB BLDA-MCNC: 9.5 G/DL (ref 13.5–17.5)
HGB BLDMV-MCNC: 13.6 G/DL (ref 13.5–17.5)
HGB BLDV-MCNC: 10.2 G/DL (ref 13.5–17.5)
IMM GRANULOCYTES # BLD AUTO: 0.06 X10*3/UL (ref 0–0.5)
IMM GRANULOCYTES # BLD AUTO: 0.32 X10*3/UL (ref 0–0.5)
IMM GRANULOCYTES NFR BLD AUTO: 0.5 % (ref 0–0.9)
IMM GRANULOCYTES NFR BLD AUTO: 1.2 % (ref 0–0.9)
INHALED O2 CONCENTRATION: 100 %
INHALED O2 CONCENTRATION: 100 %
INHALED O2 CONCENTRATION: 21 %
INHALED O2 CONCENTRATION: 25 %
INHALED O2 CONCENTRATION: 50 %
INHALED O2 CONCENTRATION: 70 %
INHALED O2 CONCENTRATION: 75 %
INHALED O2 CONCENTRATION: 80 %
INHALED O2 CONCENTRATION: 80 %
INHALED O2 CONCENTRATION: 85 %
INR PPP: 1.2 (ref 0.9–1.1)
LACTATE BLDA-SCNC: 0.9 MMOL/L (ref 0.4–2)
LACTATE BLDA-SCNC: 0.9 MMOL/L (ref 0.4–2)
LACTATE BLDA-SCNC: 1 MMOL/L (ref 0.4–2)
LACTATE BLDA-SCNC: 1.1 MMOL/L (ref 0.4–2)
LACTATE BLDA-SCNC: 1.2 MMOL/L (ref 0.4–2)
LACTATE BLDA-SCNC: 1.3 MMOL/L (ref 0.4–2)
LACTATE BLDA-SCNC: 2.1 MMOL/L (ref 0.4–2)
LACTATE BLDA-SCNC: 3.5 MMOL/L (ref 0.4–2)
LACTATE BLDA-SCNC: 3.6 MMOL/L (ref 0.4–2)
LACTATE BLDA-SCNC: 4 MMOL/L (ref 0.4–2)
LACTATE BLDA-SCNC: 4.1 MMOL/L (ref 0.4–2)
LACTATE BLDA-SCNC: 4.5 MMOL/L (ref 0.4–2)
LACTATE BLDA-SCNC: 4.9 MMOL/L (ref 0.4–2)
LACTATE BLDMV-SCNC: 0.7 MMOL/L (ref 0.4–2)
LACTATE BLDV-SCNC: 1 MMOL/L (ref 0.4–2)
LYMPHOCYTES # BLD AUTO: 1.13 X10*3/UL (ref 0.8–3)
LYMPHOCYTES # BLD AUTO: 1.42 X10*3/UL (ref 0.8–3)
LYMPHOCYTES NFR BLD AUTO: 12.2 %
LYMPHOCYTES NFR BLD AUTO: 4.1 %
MA KAOLIN BLD RES TEG: 54 MM (ref 52–69)
MA KAOLIN BLD RES TEG: 61 MM (ref 52–69)
MA KAOLIN+TF BLD RES TEG: 57 MM (ref 52–70)
MA KAOLIN+TF BLD RES TEG: 65 MM (ref 52–70)
MA TF IND+IIB-IIIA INH BLD RES TEG: 25 MM (ref 15–32)
MA TF IND+IIB-IIIA INH BLD RES TEG: 35 MM (ref 15–32)
MAGNESIUM SERPL-MCNC: 1.87 MG/DL (ref 1.6–2.4)
MCH RBC QN AUTO: 27.6 PG (ref 26–34)
MCH RBC QN AUTO: 28.7 PG (ref 26–34)
MCHC RBC AUTO-ENTMCNC: 33.9 G/DL (ref 32–36)
MCHC RBC AUTO-ENTMCNC: 34.5 G/DL (ref 32–36)
MCV RBC AUTO: 80 FL (ref 80–100)
MCV RBC AUTO: 85 FL (ref 80–100)
METHGB MFR BLDA: 0.6 % (ref 0–1.5)
METHGB MFR BLDA: 0.8 % (ref 0–1.5)
METHGB MFR BLDA: 0.8 % (ref 0–1.5)
METHGB MFR BLDA: 0.9 % (ref 0–1.5)
METHGB MFR BLDA: 1.1 % (ref 0–1.5)
METHGB MFR BLDA: 1.1 % (ref 0–1.5)
METHGB MFR BLDA: 1.2 % (ref 0–1.5)
METHGB MFR BLDV: 0.8 % (ref 0–1.5)
MONOCYTES # BLD AUTO: 1.39 X10*3/UL (ref 0.05–0.8)
MONOCYTES # BLD AUTO: 2.45 X10*3/UL (ref 0.05–0.8)
MONOCYTES NFR BLD AUTO: 12 %
MONOCYTES NFR BLD AUTO: 8.9 %
NEUTROPHILS # BLD AUTO: 23.45 X10*3/UL (ref 1.6–5.5)
NEUTROPHILS # BLD AUTO: 8.35 X10*3/UL (ref 1.6–5.5)
NEUTROPHILS NFR BLD AUTO: 71.8 %
NEUTROPHILS NFR BLD AUTO: 85.6 %
NRBC BLD-RTO: 0 /100 WBCS (ref 0–0)
NRBC BLD-RTO: 0 /100 WBCS (ref 0–0)
OXYHGB MFR BLDA: 96 % (ref 94–98)
OXYHGB MFR BLDA: 96 % (ref 94–98)
OXYHGB MFR BLDA: 96.3 % (ref 94–98)
OXYHGB MFR BLDA: 97 % (ref 94–98)
OXYHGB MFR BLDA: 97 % (ref 94–98)
OXYHGB MFR BLDA: 97.1 % (ref 94–98)
OXYHGB MFR BLDA: 97.2 % (ref 94–98)
OXYHGB MFR BLDA: 97.2 % (ref 94–98)
OXYHGB MFR BLDA: 97.3 % (ref 94–98)
OXYHGB MFR BLDA: 97.5 % (ref 94–98)
OXYHGB MFR BLDA: 97.7 % (ref 94–98)
OXYHGB MFR BLDA: 97.7 % (ref 94–98)
OXYHGB MFR BLDA: 97.9 % (ref 94–98)
OXYHGB MFR BLDA: 98 % (ref 94–98)
OXYHGB MFR BLDA: 98 % (ref 94–98)
OXYHGB MFR BLDMV: 74.7 % (ref 45–75)
OXYHGB MFR BLDV: 86.8 % (ref 45–75)
P AXIS: 50 DEGREES
P AXIS: 68 DEGREES
P AXIS: 86 DEGREES
P OFFSET: 203 MS
P OFFSET: 213 MS
P ONSET: 138 MS
P ONSET: 153 MS
PCO2 BLDA: 35 MM HG (ref 38–42)
PCO2 BLDA: 36 MM HG (ref 38–42)
PCO2 BLDA: 42 MM HG (ref 38–42)
PCO2 BLDA: 43 MM HG (ref 38–42)
PCO2 BLDA: 45 MM HG (ref 38–42)
PCO2 BLDA: 45 MM HG (ref 38–42)
PCO2 BLDA: 46 MM HG (ref 38–42)
PCO2 BLDA: 48 MM HG (ref 38–42)
PCO2 BLDA: 50 MM HG (ref 38–42)
PCO2 BLDMV: 43 MM HG (ref 41–51)
PCO2 BLDV: 51 MM HG (ref 41–51)
PH BLDA: 7.28 PH (ref 7.38–7.42)
PH BLDA: 7.32 PH (ref 7.38–7.42)
PH BLDA: 7.32 PH (ref 7.38–7.42)
PH BLDA: 7.33 PH (ref 7.38–7.42)
PH BLDA: 7.35 PH (ref 7.38–7.42)
PH BLDA: 7.39 PH (ref 7.38–7.42)
PH BLDA: 7.41 PH (ref 7.38–7.42)
PH BLDA: 7.41 PH (ref 7.38–7.42)
PH BLDA: 7.42 PH (ref 7.38–7.42)
PH BLDA: 7.43 PH (ref 7.38–7.42)
PH BLDA: 7.43 PH (ref 7.38–7.42)
PH BLDA: 7.46 PH (ref 7.38–7.42)
PH BLDA: 7.53 PH (ref 7.38–7.42)
PH BLDMV: 7.45 PH (ref 7.33–7.43)
PH BLDV: 7.37 PH (ref 7.33–7.43)
PHOSPHATE SERPL-MCNC: 3.5 MG/DL (ref 2.5–4.9)
PLATELET # BLD AUTO: 101 X10*3/UL (ref 150–450)
PLATELET # BLD AUTO: 111 X10*3/UL (ref 150–450)
PO2 BLDA: 152 MM HG (ref 85–95)
PO2 BLDA: 170 MM HG (ref 85–95)
PO2 BLDA: 199 MM HG (ref 85–95)
PO2 BLDA: 237 MM HG (ref 85–95)
PO2 BLDA: 292 MM HG (ref 85–95)
PO2 BLDA: 309 MM HG (ref 85–95)
PO2 BLDA: 310 MM HG (ref 85–95)
PO2 BLDA: 324 MM HG (ref 85–95)
PO2 BLDA: 339 MM HG (ref 85–95)
PO2 BLDA: 386 MM HG (ref 85–95)
PO2 BLDA: 435 MM HG (ref 85–95)
PO2 BLDA: 511 MM HG (ref 85–95)
PO2 BLDA: 93 MM HG (ref 85–95)
PO2 BLDMV: 47 MM HG (ref 35–45)
PO2 BLDV: 56 MM HG (ref 35–45)
POTASSIUM BLDA-SCNC: 3.8 MMOL/L (ref 3.5–5.3)
POTASSIUM BLDA-SCNC: 3.9 MMOL/L (ref 3.5–5.3)
POTASSIUM BLDA-SCNC: 4 MMOL/L (ref 3.5–5.3)
POTASSIUM BLDA-SCNC: 4 MMOL/L (ref 3.5–5.3)
POTASSIUM BLDA-SCNC: 4.2 MMOL/L (ref 3.5–5.3)
POTASSIUM BLDA-SCNC: 4.3 MMOL/L (ref 3.5–5.3)
POTASSIUM BLDA-SCNC: 4.4 MMOL/L (ref 3.5–5.3)
POTASSIUM BLDA-SCNC: 4.5 MMOL/L (ref 3.5–5.3)
POTASSIUM BLDA-SCNC: 4.8 MMOL/L (ref 3.5–5.3)
POTASSIUM BLDA-SCNC: 4.9 MMOL/L (ref 3.5–5.3)
POTASSIUM BLDA-SCNC: 5 MMOL/L (ref 3.5–5.3)
POTASSIUM BLDA-SCNC: 5.2 MMOL/L (ref 3.5–5.3)
POTASSIUM BLDA-SCNC: 5.6 MMOL/L (ref 3.5–5.3)
POTASSIUM BLDMV-SCNC: 3.7 MMOL/L (ref 3.5–5.3)
POTASSIUM BLDV-SCNC: 5 MMOL/L (ref 3.5–5.3)
POTASSIUM SERPL-SCNC: 3.8 MMOL/L (ref 3.5–5.3)
PR INTERVAL: 132 MS
PR INTERVAL: 146 MS
PRODUCT BLOOD TYPE: 600
PRODUCT BLOOD TYPE: 6200
PRODUCT CODE: NORMAL
PRODUCT CODE: NORMAL
PROT SERPL-MCNC: 6 G/DL (ref 6.4–8.2)
PROTHROMBIN TIME: 13.2 SECONDS (ref 9.8–12.8)
Q ONSET: 211 MS
Q ONSET: 216 MS
Q ONSET: 219 MS
QRS COUNT: 12 BEATS
QRS COUNT: 16 BEATS
QRS COUNT: 23 BEATS
QRS DURATION: 120 MS
QRS DURATION: 96 MS
QRS DURATION: 96 MS
QT INTERVAL: 348 MS
QT INTERVAL: 354 MS
QT INTERVAL: 402 MS
QTC CALCULATION(BAZETT): 446 MS
QTC CALCULATION(BAZETT): 448 MS
QTC CALCULATION(BAZETT): 540 MS
QTC FREDERICIA: 412 MS
QTC FREDERICIA: 431 MS
QTC FREDERICIA: 469 MS
R AXIS: 57 DEGREES
R AXIS: 70 DEGREES
R AXIS: 81 DEGREES
RBC # BLD AUTO: 2.89 X10*6/UL (ref 4.5–5.9)
RBC # BLD AUTO: 4.93 X10*6/UL (ref 4.5–5.9)
SAO2 % BLDA: 100 % (ref 94–100)
SAO2 % BLDA: 99 % (ref 94–100)
SAO2 % BLDMV: 77 % (ref 45–75)
SAO2 % BLDV: 90 % (ref 45–75)
SODIUM BLDA-SCNC: 126 MMOL/L (ref 136–145)
SODIUM BLDA-SCNC: 128 MMOL/L (ref 136–145)
SODIUM BLDA-SCNC: 129 MMOL/L (ref 136–145)
SODIUM BLDA-SCNC: 129 MMOL/L (ref 136–145)
SODIUM BLDA-SCNC: 130 MMOL/L (ref 136–145)
SODIUM BLDA-SCNC: 130 MMOL/L (ref 136–145)
SODIUM BLDA-SCNC: 131 MMOL/L (ref 136–145)
SODIUM BLDA-SCNC: 132 MMOL/L (ref 136–145)
SODIUM BLDA-SCNC: 133 MMOL/L (ref 136–145)
SODIUM BLDA-SCNC: 134 MMOL/L (ref 136–145)
SODIUM BLDMV-SCNC: 127 MMOL/L (ref 136–145)
SODIUM BLDV-SCNC: 129 MMOL/L (ref 136–145)
SODIUM SERPL-SCNC: 132 MMOL/L (ref 136–145)
T AXIS: 118 DEGREES
T AXIS: 125 DEGREES
T AXIS: 230 DEGREES
T OFFSET: 385 MS
T OFFSET: 393 MS
T OFFSET: 420 MS
TEST COMMENT: ABNORMAL
TEST COMMENT: ABNORMAL
UFH PPP CHRO-ACNC: <0.1 IU/ML
UNIT ABO: NORMAL
UNIT ABO: NORMAL
UNIT NUMBER: NORMAL
UNIT NUMBER: NORMAL
UNIT RH: NORMAL
UNIT RH: NORMAL
UNIT VOLUME: 314
UNIT VOLUME: 345
VENTRICULAR RATE: 100 BPM
VENTRICULAR RATE: 140 BPM
VENTRICULAR RATE: 74 BPM
WBC # BLD AUTO: 11.6 X10*3/UL (ref 4.4–11.3)
WBC # BLD AUTO: 27.4 X10*3/UL (ref 4.4–11.3)

## 2024-12-04 PROCEDURE — 3600000012 HC PERFUSION TIME - EACH INCREMENTAL 1 MINUTE: Performed by: THORACIC SURGERY (CARDIOTHORACIC VASCULAR SURGERY)

## 2024-12-04 PROCEDURE — 84132 ASSAY OF SERUM POTASSIUM: CPT

## 2024-12-04 PROCEDURE — 2500000001 HC RX 250 WO HCPCS SELF ADMINISTERED DRUGS (ALT 637 FOR MEDICARE OP)

## 2024-12-04 PROCEDURE — C1900 LEAD, CORONARY VENOUS: HCPCS | Performed by: THORACIC SURGERY (CARDIOTHORACIC VASCULAR SURGERY)

## 2024-12-04 PROCEDURE — 85576 BLOOD PLATELET AGGREGATION: CPT

## 2024-12-04 PROCEDURE — P9037 PLATE PHERES LEUKOREDU IRRAD: HCPCS

## 2024-12-04 PROCEDURE — 83735 ASSAY OF MAGNESIUM: CPT

## 2024-12-04 PROCEDURE — 85610 PROTHROMBIN TIME: CPT

## 2024-12-04 PROCEDURE — 5A1221Z PERFORMANCE OF CARDIAC OUTPUT, CONTINUOUS: ICD-10-PCS | Performed by: THORACIC SURGERY (CARDIOTHORACIC VASCULAR SURGERY)

## 2024-12-04 PROCEDURE — 94002 VENT MGMT INPAT INIT DAY: CPT

## 2024-12-04 PROCEDURE — 2500000004 HC RX 250 GENERAL PHARMACY W/ HCPCS (ALT 636 FOR OP/ED)

## 2024-12-04 PROCEDURE — 93005 ELECTROCARDIOGRAM TRACING: CPT

## 2024-12-04 PROCEDURE — 83050 HGB METHEMOGLOBIN QUAN: CPT

## 2024-12-04 PROCEDURE — 82947 ASSAY GLUCOSE BLOOD QUANT: CPT

## 2024-12-04 PROCEDURE — 85025 COMPLETE CBC W/AUTO DIFF WBC: CPT

## 2024-12-04 PROCEDURE — 3700000001 HC GENERAL ANESTHESIA TIME - INITIAL BASE CHARGE: Performed by: THORACIC SURGERY (CARDIOTHORACIC VASCULAR SURGERY)

## 2024-12-04 PROCEDURE — 71045 X-RAY EXAM CHEST 1 VIEW: CPT | Performed by: RADIOLOGY

## 2024-12-04 PROCEDURE — 2720000007 HC OR 272 NO HCPCS: Performed by: THORACIC SURGERY (CARDIOTHORACIC VASCULAR SURGERY)

## 2024-12-04 PROCEDURE — 33519 CABG ARTERY-VEIN THREE: CPT | Performed by: THORACIC SURGERY (CARDIOTHORACIC VASCULAR SURGERY)

## 2024-12-04 PROCEDURE — 2500000004 HC RX 250 GENERAL PHARMACY W/ HCPCS (ALT 636 FOR OP/ED): Performed by: THORACIC SURGERY (CARDIOTHORACIC VASCULAR SURGERY)

## 2024-12-04 PROCEDURE — A4312 CATH W/O BAG 2-WAY SILICONE: HCPCS | Performed by: THORACIC SURGERY (CARDIOTHORACIC VASCULAR SURGERY)

## 2024-12-04 PROCEDURE — 2500000002 HC RX 250 W HCPCS SELF ADMINISTERED DRUGS (ALT 637 FOR MEDICARE OP, ALT 636 FOR OP/ED)

## 2024-12-04 PROCEDURE — 36430 TRANSFUSION BLD/BLD COMPNT: CPT | Mod: GC

## 2024-12-04 PROCEDURE — 06BP4ZZ EXCISION OF RIGHT SAPHENOUS VEIN, PERCUTANEOUS ENDOSCOPIC APPROACH: ICD-10-PCS | Performed by: THORACIC SURGERY (CARDIOTHORACIC VASCULAR SURGERY)

## 2024-12-04 PROCEDURE — 2500000005 HC RX 250 GENERAL PHARMACY W/O HCPCS

## 2024-12-04 PROCEDURE — 33508 ENDOSCOPIC VEIN HARVEST: CPT | Performed by: THORACIC SURGERY (CARDIOTHORACIC VASCULAR SURGERY)

## 2024-12-04 PROCEDURE — 2020000001 HC ICU ROOM DAILY

## 2024-12-04 PROCEDURE — 82248 BILIRUBIN DIRECT: CPT

## 2024-12-04 PROCEDURE — 94760 N-INVAS EAR/PLS OXIMETRY 1: CPT

## 2024-12-04 PROCEDURE — A4649 SURGICAL SUPPLIES: HCPCS | Performed by: THORACIC SURGERY (CARDIOTHORACIC VASCULAR SURGERY)

## 2024-12-04 PROCEDURE — 2500000005 HC RX 250 GENERAL PHARMACY W/O HCPCS: Performed by: THORACIC SURGERY (CARDIOTHORACIC VASCULAR SURGERY)

## 2024-12-04 PROCEDURE — 82330 ASSAY OF CALCIUM: CPT

## 2024-12-04 PROCEDURE — 2780000003 HC OR 278 NO HCPCS: Performed by: THORACIC SURGERY (CARDIOTHORACIC VASCULAR SURGERY)

## 2024-12-04 PROCEDURE — 02L70CK OCCLUSION OF LEFT ATRIAL APPENDAGE WITH EXTRALUMINAL DEVICE, OPEN APPROACH: ICD-10-PCS | Performed by: THORACIC SURGERY (CARDIOTHORACIC VASCULAR SURGERY)

## 2024-12-04 PROCEDURE — 85520 HEPARIN ASSAY: CPT

## 2024-12-04 PROCEDURE — 3700000002 HC GENERAL ANESTHESIA TIME - EACH INCREMENTAL 1 MINUTE: Performed by: THORACIC SURGERY (CARDIOTHORACIC VASCULAR SURGERY)

## 2024-12-04 PROCEDURE — 99291 CRITICAL CARE FIRST HOUR: CPT

## 2024-12-04 PROCEDURE — 2500000001 HC RX 250 WO HCPCS SELF ADMINISTERED DRUGS (ALT 637 FOR MEDICARE OP): Performed by: INTERNAL MEDICINE

## 2024-12-04 PROCEDURE — 71045 X-RAY EXAM CHEST 1 VIEW: CPT

## 2024-12-04 PROCEDURE — 84075 ASSAY ALKALINE PHOSPHATASE: CPT

## 2024-12-04 PROCEDURE — 82810 BLOOD GASES O2 SAT ONLY: CPT

## 2024-12-04 PROCEDURE — 021209W BYPASS CORONARY ARTERY, THREE ARTERIES FROM AORTA WITH AUTOLOGOUS VENOUS TISSUE, OPEN APPROACH: ICD-10-PCS | Performed by: THORACIC SURGERY (CARDIOTHORACIC VASCULAR SURGERY)

## 2024-12-04 PROCEDURE — 37799 UNLISTED PX VASCULAR SURGERY: CPT

## 2024-12-04 PROCEDURE — P9045 ALBUMIN (HUMAN), 5%, 250 ML: HCPCS | Mod: JZ

## 2024-12-04 PROCEDURE — 6360000002 HC RX 636 FACTOR: Mod: JZ

## 2024-12-04 PROCEDURE — 3600000018 HC OR TIME - INITIAL BASE CHARGE - PROCEDURE LEVEL SIX: Performed by: THORACIC SURGERY (CARDIOTHORACIC VASCULAR SURGERY)

## 2024-12-04 PROCEDURE — 85347 COAGULATION TIME ACTIVATED: CPT

## 2024-12-04 PROCEDURE — 02100Z9 BYPASS CORONARY ARTERY, ONE ARTERY FROM LEFT INTERNAL MAMMARY, OPEN APPROACH: ICD-10-PCS | Performed by: THORACIC SURGERY (CARDIOTHORACIC VASCULAR SURGERY)

## 2024-12-04 PROCEDURE — 33533 CABG ARTERIAL SINGLE: CPT | Performed by: THORACIC SURGERY (CARDIOTHORACIC VASCULAR SURGERY)

## 2024-12-04 PROCEDURE — A33536 PR CABG, ARTERIAL, FOUR+: Performed by: ANESTHESIOLOGY

## 2024-12-04 PROCEDURE — 99100 ANES PT EXTEME AGE<1 YR&>70: CPT | Performed by: ANESTHESIOLOGY

## 2024-12-04 PROCEDURE — 85384 FIBRINOGEN ACTIVITY: CPT

## 2024-12-04 PROCEDURE — 3600000017 HC OR TIME - EACH INCREMENTAL 1 MINUTE - PROCEDURE LEVEL SIX: Performed by: THORACIC SURGERY (CARDIOTHORACIC VASCULAR SURGERY)

## 2024-12-04 PROCEDURE — 0W9D0ZZ DRAINAGE OF PERICARDIAL CAVITY, OPEN APPROACH: ICD-10-PCS | Performed by: THORACIC SURGERY (CARDIOTHORACIC VASCULAR SURGERY)

## 2024-12-04 PROCEDURE — 85730 THROMBOPLASTIN TIME PARTIAL: CPT

## 2024-12-04 PROCEDURE — 3600000011 HC PERFUSION TIME - INITIAL BASE CHARGE: Performed by: THORACIC SURGERY (CARDIOTHORACIC VASCULAR SURGERY)

## 2024-12-04 PROCEDURE — P9017 PLASMA 1 DONOR FRZ W/IN 8 HR: HCPCS

## 2024-12-04 RX ORDER — MAGNESIUM SULFATE HEPTAHYDRATE 500 MG/ML
INJECTION, SOLUTION INTRAMUSCULAR; INTRAVENOUS AS NEEDED
Status: DISCONTINUED | OUTPATIENT
Start: 2024-12-04 | End: 2024-12-04

## 2024-12-04 RX ORDER — NOREPINEPHRINE BITARTRATE/D5W 8 MG/250ML
0-1 PLASTIC BAG, INJECTION (ML) INTRAVENOUS CONTINUOUS
Status: DISCONTINUED | OUTPATIENT
Start: 2024-12-04 | End: 2024-12-06

## 2024-12-04 RX ORDER — MAGNESIUM SULFATE HEPTAHYDRATE 40 MG/ML
2 INJECTION, SOLUTION INTRAVENOUS EVERY 6 HOURS PRN
Status: DISCONTINUED | OUTPATIENT
Start: 2024-12-04 | End: 2024-12-13

## 2024-12-04 RX ORDER — PHENYLEPHRINE HCL IN 0.9% NACL 1 MG/10 ML
SYRINGE (ML) INTRAVENOUS AS NEEDED
Status: DISCONTINUED | OUTPATIENT
Start: 2024-12-04 | End: 2024-12-04

## 2024-12-04 RX ORDER — EPINEPHRINE IN 0.9 % SOD CHLOR 4MG/250ML
PLASTIC BAG, INJECTION (ML) INTRAVENOUS CONTINUOUS PRN
Status: DISCONTINUED | OUTPATIENT
Start: 2024-12-04 | End: 2024-12-04

## 2024-12-04 RX ORDER — POTASSIUM CHLORIDE 29.8 MG/ML
40 INJECTION INTRAVENOUS EVERY 6 HOURS PRN
Status: DISCONTINUED | OUTPATIENT
Start: 2024-12-04 | End: 2024-12-13

## 2024-12-04 RX ORDER — PANTOPRAZOLE SODIUM 40 MG/10ML
40 INJECTION, POWDER, LYOPHILIZED, FOR SOLUTION INTRAVENOUS
Status: DISCONTINUED | OUTPATIENT
Start: 2024-12-05 | End: 2024-12-06

## 2024-12-04 RX ORDER — CALCIUM GLUCONATE 20 MG/ML
2 INJECTION, SOLUTION INTRAVENOUS EVERY 6 HOURS PRN
Status: DISCONTINUED | OUTPATIENT
Start: 2024-12-04 | End: 2024-12-13

## 2024-12-04 RX ORDER — OXYCODONE HYDROCHLORIDE 5 MG/1
5 TABLET ORAL EVERY 4 HOURS PRN
Status: DISCONTINUED | OUTPATIENT
Start: 2024-12-04 | End: 2024-12-18 | Stop reason: HOSPADM

## 2024-12-04 RX ORDER — AMOXICILLIN 250 MG
2 CAPSULE ORAL 2 TIMES DAILY
Status: DISCONTINUED | OUTPATIENT
Start: 2024-12-04 | End: 2024-12-09

## 2024-12-04 RX ORDER — OXYCODONE HYDROCHLORIDE 10 MG/1
10 TABLET ORAL EVERY 4 HOURS PRN
Status: DISCONTINUED | OUTPATIENT
Start: 2024-12-04 | End: 2024-12-18 | Stop reason: HOSPADM

## 2024-12-04 RX ORDER — NALOXONE HYDROCHLORIDE 0.4 MG/ML
0.2 INJECTION, SOLUTION INTRAMUSCULAR; INTRAVENOUS; SUBCUTANEOUS EVERY 5 MIN PRN
Status: DISCONTINUED | OUTPATIENT
Start: 2024-12-04 | End: 2024-12-13

## 2024-12-04 RX ORDER — FUROSEMIDE 10 MG/ML
INJECTION INTRAMUSCULAR; INTRAVENOUS AS NEEDED
Status: DISCONTINUED | OUTPATIENT
Start: 2024-12-04 | End: 2024-12-04

## 2024-12-04 RX ORDER — CEFAZOLIN 1 G/1
INJECTION, POWDER, FOR SOLUTION INTRAVENOUS AS NEEDED
Status: DISCONTINUED | OUTPATIENT
Start: 2024-12-04 | End: 2024-12-04

## 2024-12-04 RX ORDER — FENTANYL CITRATE 50 UG/ML
INJECTION, SOLUTION INTRAMUSCULAR; INTRAVENOUS AS NEEDED
Status: DISCONTINUED | OUTPATIENT
Start: 2024-12-04 | End: 2024-12-04

## 2024-12-04 RX ORDER — ESMOLOL HYDROCHLORIDE 10 MG/ML
INJECTION INTRAVENOUS AS NEEDED
Status: DISCONTINUED | OUTPATIENT
Start: 2024-12-04 | End: 2024-12-04

## 2024-12-04 RX ORDER — CALCIUM CHLORIDE INJECTION 100 MG/ML
INJECTION, SOLUTION INTRAVENOUS AS NEEDED
Status: DISCONTINUED | OUTPATIENT
Start: 2024-12-04 | End: 2024-12-04

## 2024-12-04 RX ORDER — ACETAMINOPHEN 325 MG/1
650 TABLET ORAL EVERY 6 HOURS
Status: DISCONTINUED | OUTPATIENT
Start: 2024-12-04 | End: 2024-12-07

## 2024-12-04 RX ORDER — LIDOCAINE HYDROCHLORIDE 10 MG/ML
INJECTION, SOLUTION INFILTRATION; PERINEURAL AS NEEDED
Status: DISCONTINUED | OUTPATIENT
Start: 2024-12-04 | End: 2024-12-04

## 2024-12-04 RX ORDER — SODIUM CHLORIDE, SODIUM LACTATE, POTASSIUM CHLORIDE, CALCIUM CHLORIDE 600; 310; 30; 20 MG/100ML; MG/100ML; MG/100ML; MG/100ML
30 INJECTION, SOLUTION INTRAVENOUS CONTINUOUS
Status: DISCONTINUED | OUTPATIENT
Start: 2024-12-04 | End: 2024-12-05

## 2024-12-04 RX ORDER — TRANEXAMIC ACID 10 MG/ML
INJECTION, SOLUTION INTRAVENOUS AS NEEDED
Status: DISCONTINUED | OUTPATIENT
Start: 2024-12-04 | End: 2024-12-04

## 2024-12-04 RX ORDER — CALCIUM GLUCONATE 20 MG/ML
1 INJECTION, SOLUTION INTRAVENOUS EVERY 6 HOURS PRN
Status: DISCONTINUED | OUTPATIENT
Start: 2024-12-04 | End: 2024-12-13

## 2024-12-04 RX ORDER — ALBUMIN HUMAN 50 G/1000ML
12.5 SOLUTION INTRAVENOUS ONCE
Status: COMPLETED | OUTPATIENT
Start: 2024-12-05 | End: 2024-12-05

## 2024-12-04 RX ORDER — METHADONE IN SOD CHLOR,ISO-OSM 10 MG/ML
SYRINGE (ML) INTRAVENOUS AS NEEDED
Status: DISCONTINUED | OUTPATIENT
Start: 2024-12-04 | End: 2024-12-04

## 2024-12-04 RX ORDER — NITROGLYCERIN 40 MG/100ML
INJECTION INTRAVENOUS AS NEEDED
Status: DISCONTINUED | OUTPATIENT
Start: 2024-12-04 | End: 2024-12-04

## 2024-12-04 RX ORDER — PROPOFOL 10 MG/ML
0-50 INJECTION, EMULSION INTRAVENOUS CONTINUOUS
Status: DISCONTINUED | OUTPATIENT
Start: 2024-12-04 | End: 2024-12-06

## 2024-12-04 RX ORDER — SODIUM CHLORIDE 0.9 G/100ML
IRRIGANT IRRIGATION AS NEEDED
Status: DISCONTINUED | OUTPATIENT
Start: 2024-12-04 | End: 2024-12-04 | Stop reason: HOSPADM

## 2024-12-04 RX ORDER — SODIUM CHLORIDE 9 MG/ML
INJECTION, SOLUTION INTRAVENOUS CONTINUOUS PRN
Status: DISCONTINUED | OUTPATIENT
Start: 2024-12-04 | End: 2024-12-04

## 2024-12-04 RX ORDER — HYDRALAZINE HYDROCHLORIDE 50 MG/1
50 TABLET, FILM COATED ORAL ONCE
Status: COMPLETED | OUTPATIENT
Start: 2024-12-04 | End: 2024-12-04

## 2024-12-04 RX ORDER — ROCURONIUM BROMIDE 10 MG/ML
INJECTION, SOLUTION INTRAVENOUS AS NEEDED
Status: DISCONTINUED | OUTPATIENT
Start: 2024-12-04 | End: 2024-12-04

## 2024-12-04 RX ORDER — CEFAZOLIN SODIUM 2 G/100ML
2 INJECTION, SOLUTION INTRAVENOUS EVERY 8 HOURS
Status: DISCONTINUED | OUTPATIENT
Start: 2024-12-05 | End: 2024-12-05

## 2024-12-04 RX ORDER — PAPAVERINE HYDROCHLORIDE 30 MG/ML
INJECTION INTRAMUSCULAR; INTRAVENOUS AS NEEDED
Status: DISCONTINUED | OUTPATIENT
Start: 2024-12-04 | End: 2024-12-04 | Stop reason: HOSPADM

## 2024-12-04 RX ORDER — MIDAZOLAM HYDROCHLORIDE 1 MG/ML
INJECTION INTRAMUSCULAR; INTRAVENOUS AS NEEDED
Status: DISCONTINUED | OUTPATIENT
Start: 2024-12-04 | End: 2024-12-04

## 2024-12-04 RX ORDER — EPINEPHRINE IN 0.9 % SOD CHLOR 4MG/250ML
0-2 PLASTIC BAG, INJECTION (ML) INTRAVENOUS CONTINUOUS
Status: DISCONTINUED | OUTPATIENT
Start: 2024-12-04 | End: 2024-12-10

## 2024-12-04 RX ORDER — LIDOCAINE HYDROCHLORIDE 20 MG/ML
INJECTION, SOLUTION INFILTRATION; PERINEURAL AS NEEDED
Status: DISCONTINUED | OUTPATIENT
Start: 2024-12-04 | End: 2024-12-04

## 2024-12-04 RX ORDER — VANCOMYCIN HYDROCHLORIDE 1 G/20ML
INJECTION, POWDER, LYOPHILIZED, FOR SOLUTION INTRAVENOUS AS NEEDED
Status: DISCONTINUED | OUTPATIENT
Start: 2024-12-04 | End: 2024-12-04 | Stop reason: HOSPADM

## 2024-12-04 RX ORDER — PROPOFOL 10 MG/ML
INJECTION, EMULSION INTRAVENOUS AS NEEDED
Status: DISCONTINUED | OUTPATIENT
Start: 2024-12-04 | End: 2024-12-04

## 2024-12-04 RX ORDER — ALBUMIN HUMAN 50 G/1000ML
SOLUTION INTRAVENOUS AS NEEDED
Status: DISCONTINUED | OUTPATIENT
Start: 2024-12-04 | End: 2024-12-04

## 2024-12-04 RX ORDER — MAGNESIUM SULFATE HEPTAHYDRATE 40 MG/ML
4 INJECTION, SOLUTION INTRAVENOUS EVERY 6 HOURS PRN
Status: DISCONTINUED | OUTPATIENT
Start: 2024-12-04 | End: 2024-12-13

## 2024-12-04 RX ORDER — HEPARIN SODIUM 1000 [USP'U]/ML
INJECTION, SOLUTION INTRAVENOUS; SUBCUTANEOUS AS NEEDED
Status: DISCONTINUED | OUTPATIENT
Start: 2024-12-04 | End: 2024-12-04 | Stop reason: HOSPADM

## 2024-12-04 RX ORDER — HYDROMORPHONE HYDROCHLORIDE 0.2 MG/ML
0.2 INJECTION INTRAMUSCULAR; INTRAVENOUS; SUBCUTANEOUS
Status: DISCONTINUED | OUTPATIENT
Start: 2024-12-04 | End: 2024-12-06

## 2024-12-04 RX ORDER — PANTOPRAZOLE SODIUM 40 MG/1
40 TABLET, DELAYED RELEASE ORAL
Status: DISCONTINUED | OUTPATIENT
Start: 2024-12-05 | End: 2024-12-06

## 2024-12-04 RX ORDER — NOREPINEPHRINE BITARTRATE 0.03 MG/ML
INJECTION, SOLUTION INTRAVENOUS CONTINUOUS PRN
Status: DISCONTINUED | OUTPATIENT
Start: 2024-12-04 | End: 2024-12-04

## 2024-12-04 RX ORDER — SODIUM CHLORIDE, SODIUM GLUCONATE, SODIUM ACETATE, POTASSIUM CHLORIDE AND MAGNESIUM CHLORIDE 30; 37; 368; 526; 502 MG/100ML; MG/100ML; MG/100ML; MG/100ML; MG/100ML
INJECTION, SOLUTION INTRAVENOUS CONTINUOUS PRN
Status: COMPLETED | OUTPATIENT
Start: 2024-12-04 | End: 2024-12-04

## 2024-12-04 RX ORDER — PROTAMINE SULFATE 10 MG/ML
INJECTION, SOLUTION INTRAVENOUS AS NEEDED
Status: DISCONTINUED | OUTPATIENT
Start: 2024-12-04 | End: 2024-12-04

## 2024-12-04 SDOH — HEALTH STABILITY: MENTAL HEALTH: CURRENT SMOKER: 0

## 2024-12-04 ASSESSMENT — PAIN - FUNCTIONAL ASSESSMENT
PAIN_FUNCTIONAL_ASSESSMENT: CPOT (CRITICAL CARE PAIN OBSERVATION TOOL)
PAIN_FUNCTIONAL_ASSESSMENT: 0-10
PAIN_FUNCTIONAL_ASSESSMENT: 0-10

## 2024-12-04 ASSESSMENT — PAIN SCALES - GENERAL
PAIN_LEVEL: 0
PAINLEVEL_OUTOF10: 0 - NO PAIN
PAINLEVEL_OUTOF10: 0 - NO PAIN

## 2024-12-04 NOTE — H&P
"History Of Present Illness  Edu Echavarria is a 74 y.o. male presenting with NSTEMI.     Past Medical History  HTN, GERD, GOUT    Surgical History  Past Surgical History:   Procedure Laterality Date    CARDIAC CATHETERIZATION N/A 11/29/2024    Procedure: IABP Insertion;  Surgeon: Mari Banuelos MD;  Location: [place] [address] Cardiac Cath Lab;  Service: Cardiovascular;  Laterality: N/A;        Social History  He reports that he quit smoking about 52 years ago. His smoking use included cigarettes. He started smoking about 39 years ago. He has never used smokeless tobacco. He reports current drug use. Drug: Marijuana. No history on file for alcohol use.    Family History  No family history on file.     Allergies  Patient has no known allergies.    Review of Systems     Physical Exam   NCAT  RRR  CTA B  IABP in place.     Last Recorded Vitals  Blood pressure 158/53, pulse 85, temperature 36.9 °C (98.4 °F), temperature source Temporal, resp. rate 17, height 1.702 m (5' 7\"), weight 89 kg (196 lb 3.2 oz), SpO2 99%.    Relevant Results           Assessment/Plan   Plan for CABG x 4.       Sunil Villa MD    "

## 2024-12-04 NOTE — H&P
CTICU History & Physical    Subjective    HPI:  Edu Echavarria is a 74 y.o. male with PMH significant for HTN, GERD, and gout who initially presented to [facility] ED on 11/17pm with 2d hx of intermittent palpitations and chest pain.      [facility] hospital course  At OSH initial EKG flutter w/ RVR and ST elevations in II/III/avF & questionable in V1-V3 w/o reciprocal changes, and labs remarkable for HS trop >80256, BNP 1100, WBC 5, CBC/RFP otherwise unremarkable. Repeat EKG aflutter w/ similar ST elevations as prior. STEMI call activated, loaded with aspirin 324mg, plavix 300mg, and started on heparin gtt 11/17pm. LHC showed triple vessel disease w/ no stenting and IABP was placed for coronary perf. Started on integrillin (which has now been stopped) and continued on heparin gtt.      Hospital course  He was transferred to [facility] for CABG evaluation. CT surgery on board for CABG evaluation, tentative plan for OR 11/25. Hospital course c/b MARYBETH and slowly dropping plts/Hb, continuing further workup. Pt transferred to floor 11/21 for further care pre-op. Hospital course was complicated with worsening MARYBETH, most likely in the setting of dec PO intake, recent contrast administration during cath, and BP drop, resolved with fluids. Surgery was cancelled on 11/25 due to worsening hyponatremia, likely iso hypovolemia 2/2 decreased PO intake and diarrhea. He was resuscitated w/ 1L fluids, nephrology on board. HTN reg changed from coreg isordil and hydral to Imdur 30 and Metop XL 50 to reduce pill burden. HypoNa improving s/p continuous IV NS 80cc/hr over 24 hr. OR was initially planned for 12/4.     RR and CICU transfer  Rapid response called around 15:30 11/29 for SOB and increased WOB. On arrival, he was afebrile, tachycardic (>110), hypertensive (up to 190s systolic), tachypneic, hypoxic (satting 85% on RA), and was found to be in respiratory distress with increased WOB. On exam, b/l diffuse crackles appreciated with no  murmurs. Denied chest pain but noted new, sudden-onset SOB and cough with yellow-sputum (was not on O2 prior to this). Started on 6L NC with mild increase in SpO2 to low 90s. ABG showed 7.23/63/90/lactate 2.9, found to be in acute hypoxemic hypercapnic respiratory failure. Given worsening AHRF refractory to NIV he was intubated and transferred to the CICU.      CICU Admission  In the CICU, patient was Initially started on nitroglycerin gtt which was then discontinued given drop in BP; started on norepinephrine and then epinephrine. Also given 80 mg Lasix. Dahlen Yesenia was placed showing high filling pressures, started on a Bumex drip. Shock code called, decision made to insert IABP. Norepi weaned off, pt extubated, lasix gtt stopped, milrinone weaned off over the course of 11/29 to 12/4.    He is admitted to the CTICU s/p CABGx4 with LAAL with Dr. Villa.    Cardiac Testing:   TTE Limited: 12/2/24  CONCLUSIONS:   1. Multiple segmental abnormalities exist. See findings.   2. Poorly visualized anatomical structures due to suboptimal image quality.   3. Left ventricular ejection fraction is severely decreased, by visual estimate at 25-30%.   4. There are multiple left ventricular wall motion abnormalities.   5. Left ventricular cavity size is moderate to severely dilated.   6. No left ventricular thrombus visualized.   7. There is normal right ventricular global systolic function.   8. Compared with the prior exam from 11/29/2024, the LV systoic function does appear to be somewhat more dynamic, though still severely reduced. The LV cavity size could not be measured on the prior exam, and today's indexed end disatolic volume is consistent with moderate to severely dilated cavity.    TTE Complete: 11/18/24  CONCLUSIONS:   1. Poorly visualized anatomical structures due to suboptimal image quality.   2. Left ventricular ejection fraction is severely decreased, calculated by Regalaod's biplane at 23%.   3. There is global  hypokinesis of the left ventricle with minor regional variations.   4. Spectral Doppler shows a Grade III (restrictive pattern) of left ventricular diastolic filling with an elevated left atrial pressure.   5. There is normal right ventricular global systolic function.   6. Mildly enlarged right ventricle.   7. The left atrium is mildly dilated.    Cardiac Catheterization: 11/18 (Alfredo)  - Left dominant System  - Left Main: Significant disease, bifurcating to the LAD and circumflex arteries  - LAD: Large vessel which is extensively and diffusely diseased. Proximal/ostial LAD has evidence of a hazy eccentric stenosis in the range of 70%.  LAD continues as a large vessel with evidence of extensive disease with stenosis in its proximal part of approximately 65 to 70%, followed by aneurysmatic dilatation just proximal to the first septal , which is followed by his stenosis in the range of 70%.  Mid LAD has evidence of a stenosis in the range of 90%..  Distal LAD is severely diseased with evidence of a long lesion in the range of 90%.  D1: Small  D2: Moderate-sized, mildly diffusely diseased  - LCx: Large, anatomically dominant vessel which gives off 2 obtuse marginals, moderate to severe diffuse disease.  Stenosis Blanco 1.1.1 at the bifurcation/takeoff of the third OM.  OM1: Moderately sized vessel with evidence of severe stenosis in the range of 80%.  OM2: Small  OM 3: Large vessel with evidence of severe ostial stenosis/bifurcation Blanco 1.1.1, as mentioned above.  More distally, the vessel is totally occluded.  Distal OM's are visualized through the intra systemic collaterals  Ramus Intermedius: Absent  - RCA: Nondominant with evidence of moderate disease in its mid segment  - LVEDP: 46 mmHg   - LV EJECTION FRACTION and WALL MOTION: Estimated EF around 20 to 25% with evidence of extensive global hypokinesis.       Vascular US Carotid Artery Duplex Bilateral: 11/19/24  CONCLUSIONS:  Right Carotid:  Findings are consistent with less than 50% stenosis of the right proximal internal carotid artery. There are elevated velocities in the right ECA that are suggestive of disease. The right vertebral artery is patent with antegrade flow. No evidence of hemodynamically significant stenosis in the right subclavian artery.  Left Carotid: Findings are consistent with less than 50% stenosis of the left proximal internal carotid artery. Left external carotid artery appears patent with no evidence of stenosis. The left vertebral artery is patent with antegrade flow. No evidence of hemodynamically significant stenosis in the left subclavian artery.    Vascular US Lower Extremity Vein Mappin24  CONCLUSIONS:  Right Lower Venous: The right groin was limited due to recent balloon pump removal and bandaging.  Right Lower Vein Mapping: Right lower extremity vein: The right great saphenous vein appears widely patent with no evidence of thrombosis or fibrosis.  Left Lower Vein Mapping: Left lower extremity vein: The left great saphenous vein appears widely patent with no evidence of thrombosis or fibrosis.    Vascular US Ankle Brachial Index: 24  CONCLUSIONS:  Right Lower PVR: Evidence of mild arterial occlusive disease in the right lower extremity at rest. Decreased digital perfusion noted. Unable to obtain right arm brachial pressure due to line placement and unable to obtain right CFA waveform due balloon pump placement. Waveforms may be unreliable/inadequate due to cardiac device placement.  Left Lower PVR: Evidence of mild arterial occlusive disease in the left lower extremity at rest. Normal digital perfusion noted. Waveforms may be unreliable/inadequate due to cardiac device placement.    Surgery  Procedure/Surgeon: CABG x4, LIO/ Dr. Villa  Frontliner/Anesthesia: Dr. Bone, Dr. Schroeder  Out of OR Time (document on ventilator card): 11:00pm     OR Course/Issues: Per surgery team, patient noted to have scarring of  left ventricular wall.     CPB time: 142 min  Cross clamp time: 101 min  Circ arrest time: None  Echo Pre/Post: Pre EF 30%, Post EF 30%,   Chest Tubes/Drains: L pleural, R pleural, mediastinal   Temporary wires location/setting: A/V wires set DDD@ 80 on arrival      Fluids  Crystalloid: 1.5 L  Colloid: 250 mL  Cellsaver: 430 mL  Products: 2 FFP. 1 platelets, 1 Riastap  EBL: 250 mL  UOP: 200     Anesthesia  Intubation: Difficult mask but easy intubation with Glidescope 4.  Intravenous Access: RIJ MAC and PAC, PIVs   AICD: None  PPM: None  Regional anesthesia: None  Benzodiazepine dose/last administration: 2mg midazolam total  Opioid dose/last administration: 300mcg fentanyl total  NMB dose/last administration: 120mg rocuronium total  TOF/ reversal given: No  Antibiotic time: Ancef at 2000  Temperature on admission to ICU: 36.5      No past medical history on file.     Past Surgical History:   Procedure Laterality Date    CARDIAC CATHETERIZATION N/A 2024    Procedure: IABP Insertion;  Surgeon: Mari Banuelos MD;  Location: [place] [address] Cardiac Cath Lab;  Service: Cardiovascular;  Laterality: N/A;       Medications Prior to Admission   Medication Sig Dispense Refill Last Dose/Taking    allopurinol (Zyloprim) 100 mg tablet Take 1 tablet (100 mg) by mouth once daily.       metoprolol succinate XL (Kapspargo Sprinkle) 100 mg 24 hr capsule Take 1 capsule (100 mg) by mouth once daily. Capsules must be opened and contents sprinkled on a small amount of soft food or liquid (non-warmed) and administered within 15 minutes of preparation. Do not crush or chew granules.          Allergies: Patient has no known allergies.    Social History     Tobacco Use    Smoking status: Former     Types: Cigarettes     Start date:      Quit date: 1972     Years since quittin.9    Smokeless tobacco: Never   Vaping Use    Vaping status: Never Used   Substance Use Topics    Drug use: Yes     Types: Marijuana     Comment:  occasional     No family history on file.    Review of Systems:  Unable to obtain as patient is intubated and sedated      Objective    Vitals:  Vitals:    12/04/24 1300   BP:    Pulse: 85   Resp: 17   Temp:    SpO2: 99%       24hr Min/Max:  Temp  Min: 36.1 °C (97 °F)  Max: 37.2 °C (99 °F)  Pulse  Min: 69  Max: 86  BP  Min: 131/57  Max: 165/53  Resp  Min: 14  Max: 20  SpO2  Min: 92 %  Max: 99 %     I/O:  I/O last 2 completed shifts:  In: 589.2 (6.6 mL/kg) [I.V.:589.2 (6.6 mL/kg)]  Out: 3045 (34.2 mL/kg) [Urine:3045 (1.4 mL/kg/hr)]  Weight: 89 kg      LDA:  Introducer 11/29/24 Internal jugular Right (Active)   Placement Date/Time: 11/29/24 1800   Hand Hygiene Completed: Yes  Location: Internal jugular  Orientation: Right  Placement Verified: Transduced waveform;X-ray   Number of days: 4       Arterial Line 11/29/24 Left Radial (Active)   Placement Date/Time: 11/29/24 1800   Orientation: Left  Location: Radial  Site Prep: Chlorhexidine   Local Anesthetic: Injectable  Technique: Ultrasound guidance  Placed by: Antione Rojas  Insertion attempts: 1  Securement Method: Transparent dressing;Sut...   Number of days: 4       Intra Aortic Balloon Pump 8.0 Fr. (Active)   Placement Date/Time: 11/29/24 2122   Insertion Site: Right femoral  Catheter Size: 8.0 Fr.   Number of days: 4       Arterial Sheath 11/29/24 2122 8 Fr. Right Femoral (Active)   Placement Date/Time: 11/29/24 2122   Sheath Size: 8 Fr.  Line Orientation: Right  Sheath Insertion Site: Femoral  Sutured: Yes  Site Prep: Chlorhexidine   Free Flowing Blood Return: Yes   Number of days: 4       Pulmonary Artery Catheter Internal jugular (Active)   Placement Date/Time: 11/29/24 1818   Hand Hygiene Performed Prior to CVC Insertion: Yes  Site Prep: Usual sterile procedure followed;Chlorhexidine   Site Prep Agent has Completely Dried Before Insertion: Yes  All 5 Sterile Barriers Used (Gloves, Gown,...   Number of days: 4       Urethral Catheter (Active)   Placement Date/Time:  11/29/24 1815   Placed by: adela perez, rn  Urine Returned: Yes   Number of days: 4       Physical Exam:   - Constitutional: Sedated, intubated  - Neuro: Sedated  - CV: Paced rhythm, MAP 70s  - Pulm: Intubated, CTAB  - GI: OG in place. Soft, nondistended  - : Logan in place draining clear yellow urine   - Extremities: Palpable pulses x4  - Skin: Warm, dry  - Psych: Unable to assess    Lab Review:  Results from last 7 days   Lab Units 12/04/24  0516   SODIUM mmol/L 132*   POTASSIUM mmol/L 3.8   CHLORIDE mmol/L 93*   CO2 mmol/L 27   BUN mg/dL 17   CREATININE mg/dL 1.35*   CALCIUM mg/dL 8.7   PROTEIN TOTAL g/dL 6.0*   BILIRUBIN TOTAL mg/dL 1.0   ALK PHOS U/L 67   ALT U/L 14   AST U/L 18   GLUCOSE mg/dL 152*        Most recent labs and imaging reviewed.    Post OP MARYBETH Risk Assessment:  1. Age:   < 40?        No  41-60?        No  61-80?        Yes (2 points)  > 81?        No  2. History of CKD?       No  3. NYHA >2?        Yes (1 Point)  4. Previous cardiac surgery?      No  5. Valve and CABG?      No   6. Erythrocyte transfusion (per unit)       No  7. Each additional unit?        No  8. Each additional unit?        No    Total - 3 Points - 13.2% Risk     Daily Risk Screen  Intubated: Post operative from cardiac surgery  Central line: Vasoactive medications  Logan: Critically ill patient      Assessment/Plan    Edu Echavarria is a 74 y.o. male with PMH of HTN, HFrEF, CAD, GERD, and gout, initially presented to OSH with STEMI. Transferred to [facility] for CABG evaluation, hospital course c/b MARYBETH, worsening hyponatremia causing delay of surgery case, then further complicated by respiratory failure following fluid resuscitation for correction of hyponatremia, and cardiogenic shock necessitating IABP. Now presents to CTICU s/p CABGx4, LAAL with Dr. Villa.      NEURO:  History of gout. Patient is sedated on propofol infusion. Acute post operative pain.  - Serial neuro and pain assessments   - Continue propofol until NMB  reversal, then daily sedation vacation at minimum   - NMB reversal when normothermic and hemodynamically stable  - Scheduled Tylenol   - PRN oxycodone  - PRN dilaudid for pain   - PT Consult, OOB to chair as tolerated, chair position if not tolerated   - CAM ICU score Qshift  - Sleep/wake cycle hygiene  - Holding home allopurinol (gout)     CV:  PMH of HTN, HFrEF, CAD, STEMI c/b cardiogenic shock with IABP placement. Now s/p CABG x4 with LAAL. Pre/Post EF: 30/30% Arrived to CTICU on epi 0.06, norepi 0.03, vasopressin 0.03. A/V epicardial wires set DDD @ 80.  - Currently on epi, norepi, vasopressin- wean as tolerated  - IABP 1:1  - Maintain goal MAP 65-90  - Mixed venous and CI Q4H  - Volume resuscitate as clinically indicated  - Maintain epicardial wires set DDD @ 80  - Aspirin within 6 hrs post-op  - Plan for to receive Plavix POD2  - Start high-intensity statin  - Hold home metoprolol succinate     PULM:  No significant pulmonary PMH. Developed AHRF in CICU 2/2 cardiogenic shock. Currently intubated on ventilator. Chest tubes: L pleural, R pleural, mediastinal.  - F/u post-op CXR  - Once reversed, wean ventilator settings towards CPAP & extubation   - Wean FiO2 maintaining SpO2 >92%.   - IS Q1H and OOB to chair when extubated  - Chest tubes to wall suction    GI:  PMH of GERD. OG in place.   - Continue PPI until extubated  - NPO, will perform bedside swallow eval post extubation   - Miralax and senna-docusate BID     /Renal:  No /renal PMH. Baseline Cr of 0.7-1.0. Has had MARYBETH and acute on chronic hyponatremia likely 2/2 hypovolemia 2/2 decreased PO intake and diarrhea. CSA-MARYBETH Risk Score 3. Creatinine stable post-op. Hawthorne in place and making adequate UOP.  - Continue hawthorne catheter for strict I/Os  - Goal UOP 0.5ml/kg/hr  - RFP as clinically indicated  - Replete electrolytes per CTICU protocol     ENDO:  No significant PMH. Last A1c: 6.3 (11/18/24).  - Maintain BG <180, insulin per CTICU protocol     HEME:   No significant PMH. Acute blood loss anemia and thrombocytopenia.  - Monitor drain output volume and characteristics  - CBC, coags, and fibrinogen post op and as clinically indicated  - Start ASA 6hrs post-op for CABG  - Plan for to receive Plavix POD2  - SQH tomorrow   - SCDs for DVT prophylaxis  - Type & Screen: Q72H     ID: Afebrile, no current indications of infection. MRSA negative. Broad infectious work-up for leukocytosis completed and negative, s/p Vanc/Zosyn (11/29-12/2).  - Trend temp Q4H  - Periop cefazolin x 48hrs until 12/6     Skin: No active skin issues.  - Preventative Mepilex dressings in place on sacrum and heels  - Change preventative Mepilex weekly or more frequently as indicated (when moist/soiled)   - Every shift skin assessment per nursing and weekly ICU skin rounds  - Moisture barrier to be applied with shirley care  - Active skin problems addressed with nursing on daily rounds     PPx:  SCDs  PPI     G: Line  Right IJ MAC w Minimac placed 12/4/24    Left brachial A-line placed 12/4/24    F: Family:  Will update at bedside postoperatively.    Restraints:  The indications and risks/benefits of non-violent/non self-destructive restraints were discussed and are indicated for the safety of the patient.     A,B,C,D,E,F,G: reviewed     Dispo: CTICU care for now.     Code status: Full Code   Extended Emergency Contact Information  Primary Emergency Contact: DenisGuillermo  Mobile Phone: [phone]  Relation: Relative  Preferred language: English   needed? No  Secondary Emergency Contact: COLIN ROMERO  Mobile Phone: [phone]  Relation: Friend  Preferred language: English   needed? No     Patient staffed with Dr. Pinto    CTICU TEAM PHONE [phone]    Reviewed and approved by ROXANNA MAHONEY on 12/5/24 at 3:50 AM.

## 2024-12-04 NOTE — PROGRESS NOTES
Subjective Data:  IABP with stable positioning and augmenting well.  Plan for CABG today    Overnight Events:    None     Objective Data:  Last Recorded Vitals:  Vitals:    12/04/24 0700 12/04/24 0800 12/04/24 0900 12/04/24 1000   BP:       Pulse: 78 69 78 82   Resp: 15 14 16 17   Temp:  36.2 °C (97.2 °F)     TempSrc:  Temporal     SpO2:  92% 95% 96%   Weight:       Height:           Last Labs:  CBC - 12/4/2024:  5:16 AM  11.6 13.6 101    39.4      CMP - 12/4/2024:  5:16 AM  8.7 6.0 18 --- 1.0   3.5 3.5; 3.5 14 67      PTT - 12/4/2024:  5:16 AM  1.2   13.2 58     TROPHS   Date/Time Value Ref Range Status   11/18/2024 02:54 PM 69,947 0 - 53 ng/L Final     Comment:     Previous result verified on 11/18/2024 1233 on specimen/case [id number] called with component [facility] for procedure Troponin I, High Sensitivity with value 74,662 ng/L.   11/18/2024 11:22 AM 74,662 0 - 53 ng/L Final     BNP   Date/Time Value Ref Range Status   11/29/2024 04:39 PM 2,290 0 - 99 pg/mL Final   11/18/2024 11:22 AM 1,470 0 - 99 pg/mL Final     HGBA1C   Date/Time Value Ref Range Status   11/18/2024 11:22 AM 6.3 See comment % Final     LDLCALC   Date/Time Value Ref Range Status   11/18/2024 11:22 AM 85 <=99 mg/dL Final     Comment:                                 Near   Borderline      AGE      Desirable  Optimal    High     High     Very High     0-19 Y     0 - 109     ---    110-129   >/= 130     ----    20-24 Y     0 - 119     ---    120-159   >/= 160     ----      >24 Y     0 -  99   100-129  130-159   160-189     >/=190       VLDL   Date/Time Value Ref Range Status   11/18/2024 11:22 AM 27 0 - 40 mg/dL Final      Last I/O:  I/O last 3 completed shifts:  In: 961.4 (10.8 mL/kg) [I.V.:961.4 (10.8 mL/kg)]  Out: 4020 (45.2 mL/kg) [Urine:4020 (1.3 mL/kg/hr)]  Weight: 89 kg     Ejection Fractions:  EF   Date/Time Value Ref Range Status   12/03/2024 08:23 AM 28 %    11/29/2024 05:23 PM 28 %    11/18/2024 03:44 PM 23 %      Inpatient  Medications:  Scheduled medications   Medication Dose Route Frequency    aspirin  81 mg oral Daily    atorvastatin  80 mg oral Nightly    bisacodyl  10 mg rectal Daily    chlorhexidine  15 mL Mouth/Throat BID    hydrALAZINE  50 mg oral TID    insulin lispro  0-10 Units subcutaneous TID AC    isosorbide dinitrate  40 mg oral TID    mupirocin  0.5 Application Topical BID    oxygen   inhalation Continuous - Inhalation    pantoprazole  40 mg oral Daily before breakfast    Or    pantoprazole  40 mg intravenous Daily before breakfast    polyethylene glycol  17 g oral Daily     PRN medications   Medication    acetaminophen    alum-mag hydroxide-simeth    dextrose    dextrose    docusate sodium    glucagon    glucagon    [Held by provider] heparin    hydrOXYzine HCL    lubricating eye drops    midazolam    midazolam    oxygen    simethicone     Continuous Medications   Medication Dose Last Rate    amiodarone  0.5 mg/min 0.5 mg/min (12/04/24 1123)    [Held by provider] furosemide  10 mg/hr Stopped (11/30/24 7224)       Physical Exam:  General: NAD, lying in bed  Skin: warm and dry   Head/ neck: no JVD seen at 90 degrees  Cardiac: RRR, S1, S2   Pulm: CTAB, room air   GI: soft, nontender   Extremities: no LE edema, right groin IABP soft and nontender, RLE warm with +doppler pulse  Neuro: no focal neuro deficits   Psych: appropriate mood and behavior       Assessment/Plan   74-year-old male with a past medical of hypertension, GERD and gout who initially presented to [facility] on 11/17/2024 with 2 days of intermittent palpitations and chest pain.  He ruled in for ACS NSTEMI with a peak troponin of >27,000 and was also found to be atrial flutter.  He underwent coronary angiography that demonstrated severe multivessel coronary artery disease.  He was then transferred to [facility] for CABG evaluation and was planned for surgical revascularization 11/25/2024.  Although this was canceled due to worsening hyponatremia secondary to  hypovolemia.  He was given IV fluids and then developed acute hypoxic respiratory failure requiring intubation and transferred to CICU.  Intra-aortic balloon pump was placed and right heart cath demonstrated preserved cardiac output and index.  Transthoracic echocardiogram in 11/29/2024 demonstrated an LVEF of 25 to 30%.    12/1 IABP repositioned by 2cm by IC fellow    12/2 IABP stable, pending [facility] meeting tonight regarding revasc options     12/3-4 IABP stable and position ok on CXR, plan for pursue surgical revasc following [facility]IP meeting; plan for CABG today    Recommendations:  - daily CXR with IABP, 12/4 reviewed and stable   - please maintain strict bedrest while IABP in place  - closely monitor groin insertion site and distal pulses  - May elevate HOB no greater than 30 degrees  - May log roll patient side to side without flexing involved extremity    Interventional Cardiology will follow.    CICU Fellow Pager: [phone] anytime  Endovascular /Limb Salvage Team Pager: [phone] for day coverage (8am-5pm)  Interventional Cardiology Fellow Pager: [phone] (7AM-5PM) Night coverage: [facility] Cross Cover [id number]  Night coverage: [facility] [id number]       Code Status:  Full Code    I spent 30 minutes in the professional and overall care of this patient.    Nidia Lao MD

## 2024-12-04 NOTE — ANESTHESIA PROCEDURE NOTES
Airway  Date/Time: 12/4/2024 3:36 PM  Urgency: elective    Airway not difficult    Staffing  Performed: resident   Authorized by: Julius Toney MD    Performed by: Delvin Bishop MD  Patient location during procedure: OR    Indications and Patient Condition  Indications for airway management: anesthesia  Spontaneous Ventilation: absent  Sedation level: deep  Preoxygenated: yes  Patient position: sniffing  MILS not maintained throughout  Mask difficulty assessment: 3 - difficult mask (inadequate, unstable or two providers) +/- NMBA  Planned trial extubation    Final Airway Details  Final airway type: endotracheal airway      Successful airway: ETT  Cuffed: yes   Successful intubation technique: video laryngoscopy  Facilitating devices/methods: intubating stylet  Endotracheal tube insertion site: oral  Blade type: Glidescope.  Blade size: Glidescope S4.  ETT size (mm): 7.5  Cormack-Lehane Classification: grade I - full view of glottis  Placement verified by: chest auscultation and capnometry   Inital cuff pressure (cm H2O): 10  Measured from: lips  ETT to lips (cm): 23  Number of attempts at approach: 1

## 2024-12-04 NOTE — ANESTHESIA PREPROCEDURE EVALUATION
"Patient: Edu Echavarria    Procedure Information       Date/Time: 12/04/24 0930    Procedure: CABG x 3 LIMA and veins - first case    Location: [facility] OR 21 / [facility] OR    Surgeons: Sunil Villa MD         Per notes:  \"74-year-old male with a past medical of hypertension, GERD and gout who initially presented to [facility] on 11/17/2024 with 2 days of intermittent palpitations and chest pain.  He ruled in for ACS NSTEMI with a peak troponin of >27,000 and was also found to be atrial flutter.  He underwent coronary angiography that demonstrated severe multivessel coronary artery disease.  He was then transferred to [facility] for CABG evaluation and was planned for surgical revascularization 11/25/2024.  Although this was canceled due to worsening hyponatremia secondary to hypovolemia.  He was given IV fluids and then developed acute hypoxic respiratory failure requiring intubation and transferred to CICU.  Intra-aortic balloon pump was placed and right heart cath demonstrated preserved cardiac output and index.  Transthoracic echocardiogram in 11/29/2024 demonstrated an LVEF of 25 to 30%.     12/1 IABP repositioned by 2cm by IC fellow     12/2 IABP stable, pending [facility] meeting tonight regarding revasc options      Recommendations:  - daily CXR with IABP, 12/2 reviewed and stable   - 12/2 IABP currently 1:1, assisted /57 (121), aug 167  - please maintain strict bedrest while IABP in place  - closely monitor groin insertion site and distal pulses  - May elevate HOB no greater than 30 degrees  - May log roll patient side to side without flexing involved extremity\"    Surgeon discussed risks, benefits and alternatives of procedure with patient and documented patient consent to proceed with FULL CODE STATUS PERIOPERATIVELY (change from DNR now updated in EPIC)     Relevant Problems   Cardiac   (+) Abnormal EKG   (+) CAD (coronary artery disease)   (+) Chest pain   (+) Hyperlipidemia   (+) Primary " hypertension   (+) STEMI (ST elevation myocardial infarction) (Multi)      Pulmonary   (+) Mild asthma (HHS-HCC)      GI   (+) Gastroesophageal reflux disease       Clinical information reviewed:    Allergies  Meds               NPO Detail:  No data recorded     PHYSICAL EXAM    Anesthesia Plan    History of general anesthesia?: yes  History of complications of general anesthesia?: no    ASA 4     general and regional   (In situ invasive monitoring such as arterial, CVC, PAC  GA ETT Glidescope  ANNA MARIE  Regional nerve blocks with ultrasound guidance for chest wall analgesia  Risk of transfusions  Planned postop mechanical ventilation and ICU care    Of note, discussed platelet trends with ICU fellow last night who reviewed case with her attending. Note in chart. Low risk based on clinical history and low T4 score)  The patient is not a current smoker.  Patient did not smoke on day of procedure.    intravenous induction   Postoperative administration of opioids is intended.  Anesthetic plan and risks discussed with patient.  Use of blood products discussed with patient who consented to blood products.    Plan discussed with attending and resident.

## 2024-12-04 NOTE — CARE PLAN
The patient's goals for the shift include      The clinical goals for the shift include patient will remain hds throughout shift    Over the shift, the patient did not make progress toward the following goals.  Recommendations to address these barriers include   Problem: Pain - Adult  Goal: Verbalizes/displays adequate comfort level or baseline comfort level  12/4/2024 1042 by Huong Rush RN  Outcome: Progressing  12/4/2024 1041 by Huong Rush RN  Outcome: Progressing     Problem: Safety - Adult  Goal: Free from fall injury  12/4/2024 1042 by Huong Rush RN  Outcome: Progressing  12/4/2024 1041 by Huong Rush RN  Outcome: Progressing     Problem: Discharge Planning  Goal: Discharge to home or other facility with appropriate resources  12/4/2024 1042 by Huong Rush RN  Outcome: Progressing  12/4/2024 1041 by Huong Rush RN  Outcome: Progressing     Problem: Chronic Conditions and Co-morbidities  Goal: Patient's chronic conditions and co-morbidity symptoms are monitored and maintained or improved  12/4/2024 1042 by Huong Rush RN  Outcome: Progressing  12/4/2024 1041 by Huong Rush RN  Outcome: Progressing     Problem: Skin  Goal: Participates in plan/prevention/treatment measures  12/4/2024 1042 by Huong Rush RN  Outcome: Progressing  12/4/2024 1041 by Huong Rush RN  Outcome: Progressing  Goal: Prevent/manage excess moisture  12/4/2024 1042 by Huong Rush RN  Outcome: Progressing  12/4/2024 1041 by Huong Rush RN  Outcome: Progressing  Goal: Prevent/minimize sheer/friction injuries  12/4/2024 1042 by Huong Rush RN  Outcome: Progressing  12/4/2024 1041 by Huong Rush RN  Outcome: Progressing

## 2024-12-04 NOTE — PROGRESS NOTES
Cardiac ICU Progress Note, 12/4/2024    Admit Date: 11/18/2024   Hospital Length of Stay: 16   ICU Length of Stay: 4d 15h     History of Present Illness  Edu Echavarria is a 74 y.o. male on day 4d 15h of admission for STEMI (ST elevation myocardial infarction) (Multi).    Interval History  Given hydralazine 10mg for elevated BP  Pt to undergo CABG today. Pt agreeable to being temporarily Full Code for revascularization     Subjective  Patient seen and evaluated at bedside.     This morning, pt c/o feeling constipation and overall feels ill but otherwise no other specific complaints.     Denied fever/chills, HA, chest pain, chest tightness, shortness of breath, orthopnea, pain, ABD pain, N/V, LE edema    Denies any epistaxis, bleeding from gums or elsewhere       Objective    Vitals  Visit Vitals  /53   Pulse 69   Temp 36.1 °C (97 °F) (Temporal)   Resp 14        Vent settings    Most Recent Range Past 24hrs   Mode Pressure support    FiO2 (S) 40 % No data recorded   Rate 20 No data recorded   Vt 450 mL  No data recorded   PEEP 5 cm H20 No data recorded       Invasive Hemodynamics   Most Recent Range Past 24hrs   BP (Art) 141/34 Arterial Line BP 1  Min: 141/34  Max: 187/70   MAP(Art) 77 mmHg Arterial Line MAP 1 (mmHg)   Min: 77 mmHg  Max: 122 mmHg   RA/CVP   No data recorded   PA 31/12 PAP  Min: 31/12  Max: 70/23   PA(mean) 20 mmHg PAP (Mean)  Min: 20 mmHg  Max: 42 mmHg   PCWP 20 mmHg PCWP (mmHg)  Min: 14 mmHg  Max: 20 mmHg   CO 6.8 L/min CO (L/min)  Min: 6.5 L/min  Max: 7.1 L/min   CI 3.4 L/min/m2 CI (L/min/m2)  Min: 3.2 L/min/m2  Max: 3.5 L/min/m2   Mixed Venous 77 % SVO2 (%)  Min: 77 %  Max: 77 %   SVR  1164 (dyne*sec)/cm5 SVR (dyne*sec)/cm5  Min: 1023 (dyne*sec)/cm5  Max: 1171 (dyne*sec)/cm5    (dyne*sec)/cm5 PVR (dyne*sec)/cm5  Min: 185 (dyne*sec)/cm5  Max: 188 (dyne*sec)/cm5     I/O    Intake/Output Summary (Last 24 hours) at 12/4/2024 0814  Last data filed at 12/4/2024 0600  Gross per 24 hour    Intake 424.71 ml   Output 2985 ml   Net -2560.29 ml        Physical Exam  Physical Exam  Vitals reviewed.   Constitutional:       General: He is awake. He is not in acute distress.     Appearance: He is ill-appearing. He is not toxic-appearing.      Interventions: Nasal cannula in place.      Comments: On 2L O2 via NC, satting 94%    HENT:      Head: Normocephalic and atraumatic.   Neck:      Comments: [place] Yesenia catheter in place without evidence of erythema, drainage or edema   Cardiovascular:      Rate and Rhythm: Normal rate and regular rhythm.      Pulses:           Radial pulses are 2+ on the right side and 2+ on the left side.      Heart sounds: Heart sounds not distant.      Comments: IABP pumping sound audible     Trace pitting edema to mid-shins b/l   Pulmonary:      Effort: Pulmonary effort is normal.      Breath sounds: Normal breath sounds.   Abdominal:      General: Bowel sounds are normal.      Palpations: Abdomen is soft.      Tenderness: There is no abdominal tenderness. There is no guarding or rebound.   Genitourinary:     Comments: [place] catheter in place, draining clear yellow urine   Musculoskeletal:      Right lower le+ Pitting Edema present.      Left lower le+ Pitting Edema present.   Neurological:      Mental Status: He is alert.   Psychiatric:         Behavior: Behavior is cooperative.         Labs    CBC:  Results from last 7 days   Lab Units 24  0516 24  0517 24  2351   WBC AUTO x10*3/uL 11.6* 11.7* 13.5*   HEMOGLOBIN g/dL 13.6 13.0* 12.8*   PLATELETS AUTO x10*3/uL 101* 118* 126*     BMP:  Results from last 7 days   Lab Units 24  0516 24  1859 24  0517 24  1730 24  0148   SODIUM mmol/L 132* 130* 133*   < > 133*   POTASSIUM mmol/L 3.8 3.9 3.5   < > 3.6   CHLORIDE mmol/L 93* 92* 93*   < > 92*   CO2 mmol/L 27 28 27   < > 29   ANION GAP mmol/L 16 14 17   < > 16   BUN mg/dL 17 17 18   < > 17   CREATININE mg/dL 1.35* 1.43* 1.53*   < >  1.46*   EGFR mL/min/1.73m*2 55* 52* 48*   < > 50*   CALCIUM mg/dL 8.7 8.5* 8.2*   < > 8.2*   PHOSPHORUS mg/dL 3.5 3.0 3.7   < > 3.1   MAGNESIUM mg/dL 1.87  --  1.92  --  2.16   GLUCOSE mg/dL 152* 172* 150*   < > 155*    < > = values in this interval not displayed.     LFT:  Results from last 7 days   Lab Units 12/04/24  0516 12/03/24  0517 12/02/24  0148   AST U/L 18 11 15   ALT U/L 14 11 12   ALK PHOS U/L 67 63 67   BILIRUBIN TOTAL mg/dL 1.0 1.1 1.1   BILIRUBIN DIRECT mg/dL 0.3 0.4* 0.4*     Cardiac:  Results from last 7 days   Lab Units 11/29/24  1639   BNP pg/mL 2,290*     Coag:  Results from last 7 days   Lab Units 12/04/24  0516 12/03/24  0517 12/02/24  0148   PROTIME seconds 13.2* 14.4* 16.0*   APTT seconds 58* 124* 71*   INR  1.2* 1.3* 1.4*     UA:   Results from last 7 days   Lab Units 11/29/24  2230   COLOR U  Light-Yellow   PH U  7.0   SPEC GRAV UR  1.009   PROTEIN U mg/dL 30 (1+)*   BLOOD UR  0.03 (TRACE)*   NITRITE U  NEGATIVE   WBC UR /HPF 1-5     ABG:  Results from last 7 days   Lab Units 12/04/24  0528 12/03/24  2354 12/03/24  0821 11/30/24  1714 11/30/24  1416 11/30/24  1130 11/30/24  1025 11/30/24  0628 11/30/24  0505   POCT PH, ARTERIAL pH  --   --   --   --  7.52*  --  7.54*  --  7.56*   POCT PO2, ARTERIAL mm Hg  --   --   --   --  70*  --  84*  --  116*   POCT PCO2, ARTERIAL mm Hg  --   --   --   --  36*  --  33*  --  28*   FIO2 % 25 25 28   < > 40   < > 40   < > 50    < > = values in this interval not displayed.     VBG:  Results from last 7 days   Lab Units 11/30/24  1130 11/29/24  1807   POCT PH, VENOUS pH 7.51* 7.10*   POCT PCO2, VENOUS mm Hg 36* 81*       Cardiac Data    EKG  Encounter Date: 11/18/24   Electrocardiogram, 12-lead PRN ACS symptoms   Result Value    Ventricular Rate 74    Atrial Rate 74    RI Interval 132    QRS Duration 96    QT Interval 402    QTC Calculation(Bazett) 446    P Axis 50    R Axis 81    T Axis 125    QRS Count 12    Q Onset 219    P Onset 153    P Offset 213    T  Offset 420    QTC Fredericia 431    Narrative    Normal sinus rhythm  Possible Left atrial enlargement  Anterior infarct , age undetermined  Abnormal ECG  When compared with ECG of 29-NOV-2024 17:24,  Incomplete left bundle branch block is no longer Present      Transthoracic Echo (TTE) Limited 11/29/24    1. Poorly visualized anatomical structures due to suboptimal image quality.   2. Left ventricular ejection fraction is severely decreased, by visual estimate at 25-30%.   3. There is global hypokinesis of the left ventricle with minor regional variations.   4. Left ventricular cavity size is mild to moderately dilated.   5. There is mildly reduced right ventricular systolic function.   6. Mildly enlarged right ventricle.   7. The left atrium is enlarged.   8. Severely elevated right ventricular systolic pressure.     Transthoracic Echo (TTE) Complete 11/18/24   1. Poorly visualized anatomical structures due to suboptimal image quality.   2. Left ventricular ejection fraction is severely decreased, calculated by Regalado's biplane at 23%.   3. There is global hypokinesis of the left ventricle with minor regional variations.   4. Spectral Doppler shows a Grade III (restrictive pattern) of left ventricular diastolic filling with an elevated left atrial pressure.   5. There is normal right ventricular global systolic function.   6. Mildly enlarged right ventricle.   7. The left atrium is mildly dilated.      Imaging    XR chest 1 view    Result Date: 11/30/2024  1. IABP has been retracted with radiopaque marker now projecting over upper descending thoracic aorta, just cranial to the level of left principal bronchus. Rest of medical devices as above. 2. Interval improvement in diffuse pulmonary edema. Suggestion of trace/small bilateral pleural effusions.   I personally reviewed the images/study and I agree with the findings as stated by Kevin Guidry DO, PGY-3. This study was interpreted at [facility]  [place], [place].   MACRO: None   Signed by: Jovi Alonso 11/30/2024 8:57 AM Dictation workstation:   [id number]    XR chest 1 view    Result Date: 11/30/2024  1. Medical devices as above. IABP radiopaque marker overlies upper most descending thoracic aorta. 2. Similar diffuse pulmonary edema and layering bilateral pleural effusions.   I personally reviewed the images/study and I agree with the findings as stated by Dr. Abhijeet Chavira. This study was interpreted at [place], [place].   MACRO: None   Signed by: Jovi Alonso 11/30/2024 8:52 AM Dictation workstation:   [id number]    XR chest 1 view    Result Date: 11/30/2024  1. Redemonstrated severe bilateral pulmonary edema and bilateral pleural effusions. Overall lung aeration appears slightly worsened as compared to prior radiograph from earlier same day, likely due to layering of pleural effusions on present supine radiograph. 2. Mild gaseous prominence of bowel loops within included upper to mid abdomen and correlate with concern for ileus. Otherwise, nonobstructive bowel gas pattern. 3. Enteric tube projects 7.1 cm from the catrachito. Other medical support devices are as detailed above.   I personally reviewed the images/study and I agree with the findings as stated by Resident John Yuan MD.   MACRO: NONE.   Signed by: Jovi Alonso 11/30/2024 8:50 AM Dictation workstation:   [id number]    XR abdomen 1 view    Result Date: 11/30/2024  1. Redemonstrated severe bilateral pulmonary edema and bilateral pleural effusions. Overall lung aeration appears slightly worsened as compared to prior radiograph from earlier same day, likely due to layering of pleural effusions on present supine radiograph. 2. Mild gaseous prominence of bowel loops within included upper to mid abdomen and correlate with concern for ileus. Otherwise, nonobstructive bowel gas pattern. 3. Enteric tube projects 7.1 cm from the  catrachito. Other medical support devices are as detailed above.   I personally reviewed the images/study and I agree with the findings as stated by Resident John Yuan MD.   MACRO: NONE.   Signed by: Jovi Alonso 11/30/2024 8:50 AM Dictation workstation:   [id number]    XR abdomen 1 view    Result Date: 11/25/2024  1.  No evidence of bowel obstruction or perforation.   MACRO: None   Signed by: Familia Segura 11/25/2024 10:09 AM Dictation workstation:   [id number]    CT chest wo IV contrast    Result Date: 11/21/2024  1.  Thoracic aorta is normal in course and caliber. There is mild calcified plaque involving the arch and branch vessels. There is bovine arch configuration with common origin of the innominate and left common carotid artery. 2. Severe coronary artery calcifications. 3. Bibasilar atelectasis left-greater-than-right. 4. Several small, pulmonary nodules within both lungs, measuring less than 6 mm, which are likely benign. Instructions:  Consider follow-up noncontrast CT scan chest in 12 months. 5. Multiple enlarged retrocrural lymph nodes measuring up to 1.3 cm which are nonspecific and may be reactive. Recommend follow-up CT of the abdomen in 3 months to evaluate for interval change.     I personally reviewed the images/study and I agree with the findings as stated by Tate Rodriguez DO PGY-2. This study was interpreted at [place], [place].   MACRO: Critical Finding:  See findings. Notification was initiated on 11/21/2024 at 9:38 am by  Keanu Rene.  (**-YCF-**) Instructions:   Signed by: Keanu Rene 11/21/2024 9:39 AM Dictation workstation:   [id number]    XR chest 1 view    Result Date: 11/20/2024  1.  Unchanged positioning of the intra-aortic balloon pump catheter with the radiopaque marker projecting over the descending thoracic aorta just cranial to the level of the left mainstem bronchus. 2. No focal consolidation, pleural effusion, or  pneumothorax.   I personally reviewed the image(s)/study and resident interpretation. I agree with the findings as stated by resident Oscar Covington. Data analyzed and images interpreted at [place], [place].   MACRO: None   Signed by: Vamsi Pavon 11/20/2024 9:11 AM Dictation workstation:   [id number]    XR chest 1 view    Result Date: 11/19/2024  1.  Interval adjustment of the intra-aortic balloon pump catheter, with the radiopaque marker projecting over upper descending thoracic aorta, just cranial to the level of left principal bronchus. 2. No evidence of acute cardiopulmonary process.   I personally reviewed the image(s)/study and resident interpretation. I agree with the findings as stated by resident Oscar Covingotn. Data analyzed and images interpreted at [place], [place].   MACRO: None   Signed by: Jovi Alonso 11/19/2024 10:42 AM Dictation workstation:   [id number]    XR chest 1 view    Result Date: 11/18/2024  1. No focal infiltrate, pleural effusion, edema or pneumothorax. 2. Intra-aortic balloon pump radiopaque marker as described above.       Signed by: Tory Jean 11/18/2024 12:19 PM Dictation workstation:   [id number]         Inpatient Medications    Continuous medications  amiodarone, 0.5 mg/min, Last Rate: 0.5 mg/min (12/03/24 1911)  [Held by provider] furosemide, 10 mg/hr, Last Rate: Stopped (11/30/24 6905)      Scheduled medications  aspirin, 81 mg, oral, Daily  atorvastatin, 80 mg, oral, Nightly  bisacodyl, 10 mg, rectal, Daily  chlorhexidine, 15 mL, Mouth/Throat, BID  hydrALAZINE, 50 mg, oral, TID  insulin lispro, 0-10 Units, subcutaneous, TID AC  isosorbide dinitrate, 40 mg, oral, TID  mupirocin, 0.5 Application, Topical, BID  oxygen, , inhalation, Continuous - Inhalation  pantoprazole, 40 mg, oral, Daily before breakfast   Or  pantoprazole, 40 mg, intravenous, Daily before  breakfast  polyethylene glycol, 17 g, oral, Daily      PRN medications  PRN medications: acetaminophen, alum-mag hydroxide-simeth, dextrose, dextrose, docusate sodium, glucagon, glucagon, [Held by provider] heparin, hydrOXYzine HCL, lubricating eye drops, midazolam, midazolam, oxygen, simethicone      Assessment & Plan  Edu Echavarria is a 74 y.o. male with a PMHx of HTN, GERD, and gout who presented to [facility] ED on 11/17pm with 2d hx of intermittent palpitations and chest pain. At OSH initial EKG flutter w/ RVR and ST elevtions in II/III/avF & questionable in V1-V3 w/o reciprocal changes, and labs remarkable for HS trop >07246, BNP 1100, WBC 5, CBC/RFP otherwise unremarkable. Repeat EKG aflutter w/ similar ST elevations as prior. STEMI call activated, loaded with aspirin 324mg, plavix 300mg, and started on heparin gtt 11/17pm. LHC showed triple vessel disease w/ no stenting and IABP was placed for coronary perf. Started on integrillin (which has now been stopped) and continued on heparin gtt. Transferred to [facility] for CABG evaluation. CT surgery on board for CABG evaluation, tentative plan for OR 11/25. Hospital course c/b MARYBETH and slowly dropping plts/Hb, continuing further workup. Pt transferred to floor 11/21 for further care pre-op.      Surgery cancelled on 11/25 due to worsening hyponatremia, likely iso hypovolemia 2/2 decreased PO intake and diarrhea. Resuscitated w/ 1L fluids, nephrology on board. KUB -ve, stool PCR pending. HTN reg changed from coreg isordil and hydral to Imdur 30 and Metop XL 50 to reduce pill burden. HypoNa improving s/p continuous IV NS 80cc/hr over 24 hr.     Rapid response called 11/29 for worsening AHRF and hypertensive emergency, eventually requiring intubation and CICU transfer. Initially started on nitroglycerin gtt which was then discontinued given drop in BP; started on norepinephrine and then milrinone. Also given 80 mg Lasix. [place] Yesenia showing high filling pressures,  started on a Bumex drip, switched to Lasix. Shock code called, decision made to insert IABP.     Updates 12/4/24:   -Updated [place] Numbers (24h off milrinone): CVP (RA) 2, CO 6.8, CI 3.4, PAWP 20, SVR 1164, MVO2 74.7%   -This AM satting appropriately on 2L NC following extubation   -On hydral 50mg TID and Isordil 40mg TID for afterload reduction. If SBP maintaining >90s-100s, can consider increasing dose of isordil or hydral as needed to avoid further complications from HTN, given episode of flash pulm edema earlier this admission  -Pt to undergo CABG with CTS today       Neurologic  MAVERICK   - Patient extubated 11/30, now on NC      Cardiovascular  #Cardiogenic Shock (resolving)  #STEMI  #Triple Vessel CAD  - HS trop >33054 at OSH, HS trop at : 73312 > 14710  - Arrived on integrillin and heparin gtt, aspirin and plavix loaded 11/17 ~2100  - Utox + cannavinoid, fent (received w/ EMS), benzos, cocaine  - [facility] 11/18: Triple vessel disease (LAD prox 70%, mid 65-70%, dist 90%), Lcx OM1 80%, OM2 small, OM3 occluded with collaterals, RCA mid moderate disease.   - LVEF 20-25%, evidence of extensive global hypokinesis  - Initially admitted to CICU with IABP in place, pulled out on 11/20, and transferred to regular floor on 11/21  - Rapid response called 11/29 for worsening AHRF and hypertensive emergency, requiring intubation and CICU transfer.   - BNP 2290 11/29 (from 1470 on admission)  Plan:  IABP placed 11/29  Milrinone DC'd this AM. F/u [place] numbers consistent with numbers prior when pt was on milrinone previously   Stopped Lasix gtt, now spot dosing.   On PO afterload reduction Hydralazine 50mg TID and Isosorbide dinitrate 40mg TID. Will plan to re-eval tonight and possibly increase dose for better afterload reduction. Dc'd nitroprusside gtt  Holding home anti-hypertensives  Overall will plan for pt to undergo CABG  Will plan to leave [place] Yesenia catheter in place.   Counseled pt on participating in PT sessions given long  hospital stay and to avoid de-conditioning, to which pt understood and agreed.     Respiratory/ Infectious Disease  #AHRF (improving)   #Leukocytosis   :: In the setting of cardiogenic shock/flash pulmonary edema   :: WBC downtrending to 11.6 today  :: Flu/Covid/RSV/Leg/Strep negative  :: MRSA PCR negative  :: Cultures no growth to date  Plan:  DC'd Vanc/Zosyn (11/29-12/2) given infectious workup (-) to date   Continuing 2L O2 via NC    Renal  #MARYBETH (improving)  #Hyponatremia (resolving)   - Patient with acute on chronic hyponatremia though improving and stable   - Nephrology following, appreciate recs  - Treated for hypotonic hypovolemic hyponatremia with fluids  - Na stable at 132 this AM, for >36h   - On CICU arrival, patient with AHRF and pulmonary edema  :: Was initially started on diuresis with 80 mg Lasix, then Bumex gtt --> Lasix gtt + boluses --> Lasix boluses only, no longer needing to maintain adequate UO  :: Net negative ~2400 over last 24h   ::  Cr downtrending to 1.46 today (<1.64<1.56)   Plan:  Stopped Lasix gtt, now spot dosing PRN   Monitor urine output and RFPs      #Hx Gout  :: Currently asx   :: Baseline on Allopuriol. Holding I/s/o MARYBETH    Gastrointestinal  #C/f Ileus  - KUB: Mild gaseous prominence of bowel loops within included upper to mid abdomen and correlate with concern for ileus  Plan:  Bowel regimen: miralax daily and bisacodyl suppository   Tolerating enteral feeding     Hematology  #Thrombocytopenia  :: Likely 2/2 IABP as well as some contribution from heparin use starting on 11/29  :: However pt otherwise asx without symptomatic thrombocytopenia without S/S mucosal bleeding. Therefore low suspicion for HIT   Plan:   -Can continue to monitor  -Daily CBCs       Fluids: PRN  Electrolytes: PRN  Nutrition: Cardiac   Lines: pIVs, A-line, [place] Yesenia cath  DVT prophylaxis: Heparin gtt-to start tonight   GI prophylaxis: PPI    Code Status: Full Code (Confirmed on admission) (with exception of  temporary reversal of code status for CABG or PCI)   Emergency Contact / NOK: Extended Emergency Contact Information  Primary Emergency Contact: RobinGuillermo duffy  Mobile Phone: [phone]  Relation: Relative  Preferred language: English   needed? No  Secondary Emergency Contact: COLIN ROMERO  Mobile Phone: [phone]  Relation: Friend  Preferred language: English   needed? No        Samantha Maldonado MD  Internal Medicine PGY-1    12/4/2024

## 2024-12-04 NOTE — SIGNIFICANT EVENT
Patient's platelet noted to be 118 today. Patient was initially on heparin gtt from 11/8 to 11/20 and restarted on 11/30. 4T score consistent with low probability of HIT (<5%).  No current indication to pursue further workup at this time.     Recommendation preliminary until co-signed by the attending, Dr Horton.     Elias Corrigan MD  Cardiology, PGY-5

## 2024-12-04 NOTE — CARE PLAN
The patient's goals for the shift include  rest before surgery    The clinical goals for the shift include patient will remain hds throughout shift      Problem: Pain - Adult  Goal: Verbalizes/displays adequate comfort level or baseline comfort level  Outcome: Progressing     Problem: Safety - Adult  Goal: Free from fall injury  Outcome: Progressing     Problem: Discharge Planning  Goal: Discharge to home or other facility with appropriate resources  Outcome: Progressing     Problem: Chronic Conditions and Co-morbidities  Goal: Patient's chronic conditions and co-morbidity symptoms are monitored and maintained or improved  Outcome: Progressing     Problem: Skin  Goal: Participates in plan/prevention/treatment measures  Outcome: Progressing  Flowsheets (Taken 12/4/2024 0439)  Participates in plan/prevention/treatment measures: Elevate heels  Goal: Prevent/manage excess moisture  Outcome: Progressing  Flowsheets (Taken 12/4/2024 0439)  Prevent/manage excess moisture: Cleanse incontinence/protect with barrier cream  Goal: Prevent/minimize sheer/friction injuries  Outcome: Progressing  Flowsheets (Taken 12/4/2024 0439)  Prevent/minimize sheer/friction injuries: Turn/reposition every 2 hours/use positioning/transfer devices     Problem: Safety - Medical Restraint  Goal: Remains free of injury from restraints (Restraint for Interference with Medical Device)  Outcome: Progressing  Goal: Free from restraint(s) (Restraint for Interference with Medical Device)  Outcome: Progressing

## 2024-12-05 ENCOUNTER — APPOINTMENT (OUTPATIENT)
Dept: RADIOLOGY | Facility: HOSPITAL | Age: 74
DRG: 235 | End: 2024-12-05
Payer: MEDICARE

## 2024-12-05 ENCOUNTER — APPOINTMENT (OUTPATIENT)
Dept: CARDIOLOGY | Facility: HOSPITAL | Age: 74
DRG: 235 | End: 2024-12-05
Payer: MEDICARE

## 2024-12-05 LAB
ABO GROUP (TYPE) IN BLOOD: NORMAL
ALBUMIN SERPL BCP-MCNC: 3.4 G/DL (ref 3.4–5)
ALBUMIN SERPL BCP-MCNC: 3.4 G/DL (ref 3.4–5)
ALBUMIN SERPL BCP-MCNC: 3.6 G/DL (ref 3.4–5)
ALBUMIN SERPL BCP-MCNC: 4 G/DL (ref 3.4–5)
ALP SERPL-CCNC: 47 U/L (ref 33–136)
ALT SERPL W P-5'-P-CCNC: 16 U/L (ref 10–52)
ANION GAP BLDA CALCULATED.4IONS-SCNC: 13 MMO/L (ref 10–25)
ANION GAP BLDA CALCULATED.4IONS-SCNC: 13 MMO/L (ref 10–25)
ANION GAP BLDA CALCULATED.4IONS-SCNC: 14 MMO/L (ref 10–25)
ANION GAP BLDA CALCULATED.4IONS-SCNC: 15 MMO/L (ref 10–25)
ANION GAP BLDA CALCULATED.4IONS-SCNC: 15 MMO/L (ref 10–25)
ANION GAP BLDA CALCULATED.4IONS-SCNC: 16 MMO/L (ref 10–25)
ANION GAP BLDA CALCULATED.4IONS-SCNC: 16 MMO/L (ref 10–25)
ANION GAP BLDMV CALCULATED.4IONS-SCNC: 10 MMO/L (ref 10–25)
ANION GAP BLDMV CALCULATED.4IONS-SCNC: 12 MMO/L (ref 10–25)
ANION GAP BLDMV CALCULATED.4IONS-SCNC: 12 MMO/L (ref 10–25)
ANION GAP BLDMV CALCULATED.4IONS-SCNC: 14 MMO/L (ref 10–25)
ANION GAP BLDMV CALCULATED.4IONS-SCNC: 14 MMO/L (ref 10–25)
ANION GAP BLDMV CALCULATED.4IONS-SCNC: 15 MMO/L (ref 10–25)
ANION GAP BLDMV CALCULATED.4IONS-SCNC: 15 MMO/L (ref 10–25)
ANION GAP SERPL CALC-SCNC: 17 MMOL/L (ref 10–20)
ANION GAP SERPL CALC-SCNC: 17 MMOL/L (ref 10–20)
ANION GAP SERPL CALC-SCNC: 19 MMOL/L (ref 10–20)
ANTIBODY SCREEN: NORMAL
APTT PPP: 32 SECONDS (ref 27–38)
AST SERPL W P-5'-P-CCNC: 39 U/L (ref 9–39)
ATRIAL RATE: 66 BPM
BASE EXCESS BLDA CALC-SCNC: -3.1 MMOL/L (ref -2–3)
BASE EXCESS BLDA CALC-SCNC: 0.1 MMOL/L (ref -2–3)
BASE EXCESS BLDA CALC-SCNC: 0.3 MMOL/L (ref -2–3)
BASE EXCESS BLDA CALC-SCNC: 0.4 MMOL/L (ref -2–3)
BASE EXCESS BLDA CALC-SCNC: 0.4 MMOL/L (ref -2–3)
BASE EXCESS BLDA CALC-SCNC: 0.9 MMOL/L (ref -2–3)
BASE EXCESS BLDA CALC-SCNC: 1.6 MMOL/L (ref -2–3)
BASE EXCESS BLDMV CALC-SCNC: -0.5 MMOL/L (ref -2–3)
BASE EXCESS BLDMV CALC-SCNC: -0.5 MMOL/L (ref -2–3)
BASE EXCESS BLDMV CALC-SCNC: -1.9 MMOL/L (ref -2–3)
BASE EXCESS BLDMV CALC-SCNC: -2 MMOL/L (ref -2–3)
BASE EXCESS BLDMV CALC-SCNC: 1.6 MMOL/L (ref -2–3)
BASE EXCESS BLDMV CALC-SCNC: 1.6 MMOL/L (ref -2–3)
BASE EXCESS BLDMV CALC-SCNC: 1.9 MMOL/L (ref -2–3)
BILIRUB DIRECT SERPL-MCNC: 0.5 MG/DL (ref 0–0.3)
BILIRUB SERPL-MCNC: 1 MG/DL (ref 0–1.2)
BLOOD EXPIRATION DATE: NORMAL
BODY TEMPERATURE: 37 DEGREES CELSIUS
BUN SERPL-MCNC: 22 MG/DL (ref 6–23)
BUN SERPL-MCNC: 25 MG/DL (ref 6–23)
BUN SERPL-MCNC: 28 MG/DL (ref 6–23)
CA-I BLD-SCNC: 1.13 MMOL/L (ref 1.1–1.33)
CA-I BLD-SCNC: 1.14 MMOL/L (ref 1.1–1.33)
CA-I BLD-SCNC: 1.17 MMOL/L (ref 1.1–1.33)
CA-I BLDA-SCNC: 1.16 MMOL/L (ref 1.1–1.33)
CA-I BLDA-SCNC: 1.16 MMOL/L (ref 1.1–1.33)
CA-I BLDA-SCNC: 1.17 MMOL/L (ref 1.1–1.33)
CA-I BLDA-SCNC: 1.2 MMOL/L (ref 1.1–1.33)
CA-I BLDMV-SCNC: 1.07 MMOL/L (ref 1.1–1.33)
CA-I BLDMV-SCNC: 1.08 MMOL/L (ref 1.1–1.33)
CA-I BLDMV-SCNC: 1.09 MMOL/L (ref 1.1–1.33)
CA-I BLDMV-SCNC: 1.1 MMOL/L (ref 1.1–1.33)
CA-I BLDMV-SCNC: 1.13 MMOL/L (ref 1.1–1.33)
CA-I BLDMV-SCNC: 1.18 MMOL/L (ref 1.1–1.33)
CA-I BLDMV-SCNC: 1.22 MMOL/L (ref 1.1–1.33)
CALCIUM SERPL-MCNC: 8.4 MG/DL (ref 8.6–10.6)
CALCIUM SERPL-MCNC: 8.5 MG/DL (ref 8.6–10.6)
CALCIUM SERPL-MCNC: 8.8 MG/DL (ref 8.6–10.6)
CHLORIDE BLD-SCNC: 100 MMOL/L (ref 98–107)
CHLORIDE BLD-SCNC: 100 MMOL/L (ref 98–107)
CHLORIDE BLD-SCNC: 101 MMOL/L (ref 98–107)
CHLORIDE BLD-SCNC: 102 MMOL/L (ref 98–107)
CHLORIDE BLD-SCNC: 102 MMOL/L (ref 98–107)
CHLORIDE BLD-SCNC: 94 MMOL/L (ref 98–107)
CHLORIDE BLD-SCNC: 98 MMOL/L (ref 98–107)
CHLORIDE BLDA-SCNC: 101 MMOL/L (ref 98–107)
CHLORIDE BLDA-SCNC: 97 MMOL/L (ref 98–107)
CHLORIDE BLDA-SCNC: 98 MMOL/L (ref 98–107)
CHLORIDE BLDA-SCNC: 99 MMOL/L (ref 98–107)
CHLORIDE SERPL-SCNC: 96 MMOL/L (ref 98–107)
CHLORIDE SERPL-SCNC: 96 MMOL/L (ref 98–107)
CHLORIDE SERPL-SCNC: 97 MMOL/L (ref 98–107)
CO2 SERPL-SCNC: 25 MMOL/L (ref 21–32)
CO2 SERPL-SCNC: 26 MMOL/L (ref 21–32)
CO2 SERPL-SCNC: 27 MMOL/L (ref 21–32)
CREAT SERPL-MCNC: 2 MG/DL (ref 0.5–1.3)
CREAT SERPL-MCNC: 2 MG/DL (ref 0.5–1.3)
CREAT SERPL-MCNC: 2.07 MG/DL (ref 0.5–1.3)
DISPENSE STATUS: NORMAL
EGFRCR SERPLBLD CKD-EPI 2021: 33 ML/MIN/1.73M*2
EGFRCR SERPLBLD CKD-EPI 2021: 35 ML/MIN/1.73M*2
EGFRCR SERPLBLD CKD-EPI 2021: 35 ML/MIN/1.73M*2
ERYTHROCYTE [DISTWIDTH] IN BLOOD BY AUTOMATED COUNT: 13.8 % (ref 11.5–14.5)
ERYTHROCYTE [DISTWIDTH] IN BLOOD BY AUTOMATED COUNT: 14.1 % (ref 11.5–14.5)
FIBRINOGEN PPP-MCNC: 379 MG/DL (ref 200–400)
GLUCOSE BLD MANUAL STRIP-MCNC: 171 MG/DL (ref 74–99)
GLUCOSE BLD MANUAL STRIP-MCNC: 216 MG/DL (ref 74–99)
GLUCOSE BLD-MCNC: 144 MG/DL (ref 74–99)
GLUCOSE BLD-MCNC: 161 MG/DL (ref 74–99)
GLUCOSE BLD-MCNC: 166 MG/DL (ref 74–99)
GLUCOSE BLD-MCNC: 167 MG/DL (ref 74–99)
GLUCOSE BLD-MCNC: 186 MG/DL (ref 74–99)
GLUCOSE BLD-MCNC: 203 MG/DL (ref 74–99)
GLUCOSE BLD-MCNC: 211 MG/DL (ref 74–99)
GLUCOSE BLDA-MCNC: 154 MG/DL (ref 74–99)
GLUCOSE BLDA-MCNC: 162 MG/DL (ref 74–99)
GLUCOSE BLDA-MCNC: 166 MG/DL (ref 74–99)
GLUCOSE BLDA-MCNC: 176 MG/DL (ref 74–99)
GLUCOSE BLDA-MCNC: 188 MG/DL (ref 74–99)
GLUCOSE BLDA-MCNC: 223 MG/DL (ref 74–99)
GLUCOSE BLDA-MCNC: 239 MG/DL (ref 74–99)
GLUCOSE SERPL-MCNC: 161 MG/DL (ref 74–99)
GLUCOSE SERPL-MCNC: 167 MG/DL (ref 74–99)
GLUCOSE SERPL-MCNC: 217 MG/DL (ref 74–99)
HCO3 BLDA-SCNC: 22.7 MMOL/L (ref 22–26)
HCO3 BLDA-SCNC: 24.3 MMOL/L (ref 22–26)
HCO3 BLDA-SCNC: 24.6 MMOL/L (ref 22–26)
HCO3 BLDA-SCNC: 24.6 MMOL/L (ref 22–26)
HCO3 BLDA-SCNC: 24.7 MMOL/L (ref 22–26)
HCO3 BLDA-SCNC: 25.2 MMOL/L (ref 22–26)
HCO3 BLDA-SCNC: 25.8 MMOL/L (ref 22–26)
HCO3 BLDMV-SCNC: 23.1 MMOL/L (ref 22–26)
HCO3 BLDMV-SCNC: 24.1 MMOL/L (ref 22–26)
HCO3 BLDMV-SCNC: 24.2 MMOL/L (ref 22–26)
HCO3 BLDMV-SCNC: 24.2 MMOL/L (ref 22–26)
HCO3 BLDMV-SCNC: 26.6 MMOL/L (ref 22–26)
HCO3 BLDMV-SCNC: 26.6 MMOL/L (ref 22–26)
HCO3 BLDMV-SCNC: 27.3 MMOL/L (ref 22–26)
HCT VFR BLD AUTO: 22.4 % (ref 41–52)
HCT VFR BLD AUTO: 23.4 % (ref 41–52)
HCT VFR BLD EST: 22 % (ref 41–52)
HCT VFR BLD EST: 23 % (ref 41–52)
HCT VFR BLD EST: 24 % (ref 41–52)
HCT VFR BLD EST: 25 % (ref 41–52)
HCT VFR BLD EST: 26 % (ref 41–52)
HCT VFR BLD EST: 29 % (ref 41–52)
HCT VFR BLD EST: 32 % (ref 41–52)
HCT VFR BLD EST: 55 % (ref 41–52)
HGB BLD-MCNC: 7.6 G/DL (ref 13.5–17.5)
HGB BLD-MCNC: 8 G/DL (ref 13.5–17.5)
HGB BLDA-MCNC: 10.8 G/DL (ref 13.5–17.5)
HGB BLDA-MCNC: 8 G/DL (ref 13.5–17.5)
HGB BLDA-MCNC: 8.2 G/DL (ref 13.5–17.5)
HGB BLDA-MCNC: 8.2 G/DL (ref 13.5–17.5)
HGB BLDA-MCNC: 8.6 G/DL (ref 13.5–17.5)
HGB BLDA-MCNC: 8.6 G/DL (ref 13.5–17.5)
HGB BLDA-MCNC: 8.7 G/DL (ref 13.5–17.5)
HGB BLDMV-MCNC: 18.4 G/DL (ref 13.5–17.5)
HGB BLDMV-MCNC: 7.3 G/DL (ref 13.5–17.5)
HGB BLDMV-MCNC: 7.6 G/DL (ref 13.5–17.5)
HGB BLDMV-MCNC: 7.9 G/DL (ref 13.5–17.5)
HGB BLDMV-MCNC: 8.1 G/DL (ref 13.5–17.5)
HGB BLDMV-MCNC: 8.3 G/DL (ref 13.5–17.5)
HGB BLDMV-MCNC: 9.7 G/DL (ref 13.5–17.5)
INHALED O2 CONCENTRATION: 38 %
INHALED O2 CONCENTRATION: 40 %
INHALED O2 CONCENTRATION: 50 %
INHALED O2 CONCENTRATION: 50 %
INR PPP: 1.3 (ref 0.9–1.1)
LACTATE BLDA-SCNC: 1.3 MMOL/L (ref 0.4–2)
LACTATE BLDA-SCNC: 1.4 MMOL/L (ref 0.4–2)
LACTATE BLDA-SCNC: 1.5 MMOL/L (ref 0.4–2)
LACTATE BLDA-SCNC: 2.1 MMOL/L (ref 0.4–2)
LACTATE BLDA-SCNC: 2.7 MMOL/L (ref 0.4–2)
LACTATE BLDA-SCNC: 3 MMOL/L (ref 0.4–2)
LACTATE BLDA-SCNC: 3.6 MMOL/L (ref 0.4–2)
LACTATE BLDMV-SCNC: 1.2 MMOL/L (ref 0.4–2)
LACTATE BLDMV-SCNC: 1.4 MMOL/L (ref 0.4–2)
LACTATE BLDMV-SCNC: 1.6 MMOL/L (ref 0.4–2)
LACTATE BLDMV-SCNC: 1.8 MMOL/L (ref 0.4–2)
LACTATE BLDMV-SCNC: 2.3 MMOL/L (ref 0.4–2)
LACTATE BLDMV-SCNC: 2.9 MMOL/L (ref 0.4–2)
LACTATE BLDMV-SCNC: 2.9 MMOL/L (ref 0.4–2)
MAGNESIUM SERPL-MCNC: 3.12 MG/DL (ref 1.6–2.4)
MAGNESIUM SERPL-MCNC: 3.14 MG/DL (ref 1.6–2.4)
MAGNESIUM SERPL-MCNC: 3.56 MG/DL (ref 1.6–2.4)
MCH RBC QN AUTO: 28.3 PG (ref 26–34)
MCH RBC QN AUTO: 28.6 PG (ref 26–34)
MCHC RBC AUTO-ENTMCNC: 33.9 G/DL (ref 32–36)
MCHC RBC AUTO-ENTMCNC: 34.2 G/DL (ref 32–36)
MCV RBC AUTO: 83 FL (ref 80–100)
MCV RBC AUTO: 84 FL (ref 80–100)
NRBC BLD-RTO: 0 /100 WBCS (ref 0–0)
NRBC BLD-RTO: 0 /100 WBCS (ref 0–0)
OXYHGB MFR BLDA: 96.1 % (ref 94–98)
OXYHGB MFR BLDA: 96.2 % (ref 94–98)
OXYHGB MFR BLDA: 96.7 % (ref 94–98)
OXYHGB MFR BLDA: 97 % (ref 94–98)
OXYHGB MFR BLDA: 97.1 % (ref 94–98)
OXYHGB MFR BLDA: 97.2 % (ref 94–98)
OXYHGB MFR BLDA: 97.2 % (ref 94–98)
OXYHGB MFR BLDMV: 57.1 % (ref 45–75)
OXYHGB MFR BLDMV: 58.8 % (ref 45–75)
OXYHGB MFR BLDMV: 60.5 % (ref 45–75)
OXYHGB MFR BLDMV: 61.4 % (ref 45–75)
OXYHGB MFR BLDMV: 62.5 % (ref 45–75)
OXYHGB MFR BLDMV: 64.3 % (ref 45–75)
OXYHGB MFR BLDMV: 64.8 % (ref 45–75)
P AXIS: -19 DEGREES
P OFFSET: 195 MS
P ONSET: 136 MS
PCO2 BLDA: 35 MM HG (ref 38–42)
PCO2 BLDA: 37 MM HG (ref 38–42)
PCO2 BLDA: 37 MM HG (ref 38–42)
PCO2 BLDA: 38 MM HG (ref 38–42)
PCO2 BLDA: 38 MM HG (ref 38–42)
PCO2 BLDA: 39 MM HG (ref 38–42)
PCO2 BLDA: 43 MM HG (ref 38–42)
PCO2 BLDMV: 38 MM HG (ref 41–51)
PCO2 BLDMV: 39 MM HG (ref 41–51)
PCO2 BLDMV: 40 MM HG (ref 41–51)
PCO2 BLDMV: 43 MM HG (ref 41–51)
PCO2 BLDMV: 43 MM HG (ref 41–51)
PCO2 BLDMV: 44 MM HG (ref 41–51)
PCO2 BLDMV: 47 MM HG (ref 41–51)
PH BLDA: 7.33 PH (ref 7.38–7.42)
PH BLDA: 7.41 PH (ref 7.38–7.42)
PH BLDA: 7.43 PH (ref 7.38–7.42)
PH BLDA: 7.44 PH (ref 7.38–7.42)
PH BLDA: 7.45 PH (ref 7.38–7.42)
PH BLDMV: 7.32 PH (ref 7.33–7.43)
PH BLDMV: 7.37 PH (ref 7.33–7.43)
PH BLDMV: 7.4 PH (ref 7.33–7.43)
PH BLDMV: 7.41 PH (ref 7.33–7.43)
PHOSPHATE SERPL-MCNC: 3.4 MG/DL (ref 2.5–4.9)
PHOSPHATE SERPL-MCNC: 4.2 MG/DL (ref 2.5–4.9)
PHOSPHATE SERPL-MCNC: 4.4 MG/DL (ref 2.5–4.9)
PLATELET # BLD AUTO: 112 X10*3/UL (ref 150–450)
PLATELET # BLD AUTO: 116 X10*3/UL (ref 150–450)
PO2 BLDA: 135 MM HG (ref 85–95)
PO2 BLDA: 156 MM HG (ref 85–95)
PO2 BLDA: 156 MM HG (ref 85–95)
PO2 BLDA: 159 MM HG (ref 85–95)
PO2 BLDA: 160 MM HG (ref 85–95)
PO2 BLDA: 163 MM HG (ref 85–95)
PO2 BLDA: 193 MM HG (ref 85–95)
PO2 BLDMV: 34 MM HG (ref 35–45)
PO2 BLDMV: 35 MM HG (ref 35–45)
PO2 BLDMV: 36 MM HG (ref 35–45)
PO2 BLDMV: 38 MM HG (ref 35–45)
PO2 BLDMV: 38 MM HG (ref 35–45)
POTASSIUM BLDA-SCNC: 4 MMOL/L (ref 3.5–5.3)
POTASSIUM BLDA-SCNC: 4.1 MMOL/L (ref 3.5–5.3)
POTASSIUM BLDA-SCNC: 4.2 MMOL/L (ref 3.5–5.3)
POTASSIUM BLDA-SCNC: 4.4 MMOL/L (ref 3.5–5.3)
POTASSIUM BLDA-SCNC: 4.5 MMOL/L (ref 3.5–5.3)
POTASSIUM BLDMV-SCNC: 3.6 MMOL/L (ref 3.5–5.3)
POTASSIUM BLDMV-SCNC: 3.7 MMOL/L (ref 3.5–5.3)
POTASSIUM BLDMV-SCNC: 4 MMOL/L (ref 3.5–5.3)
POTASSIUM BLDMV-SCNC: 4 MMOL/L (ref 3.5–5.3)
POTASSIUM BLDMV-SCNC: 4.1 MMOL/L (ref 3.5–5.3)
POTASSIUM SERPL-SCNC: 4 MMOL/L (ref 3.5–5.3)
POTASSIUM SERPL-SCNC: 4.1 MMOL/L (ref 3.5–5.3)
POTASSIUM SERPL-SCNC: 4.1 MMOL/L (ref 3.5–5.3)
PR INTERVAL: 156 MS
PRODUCT BLOOD TYPE: 6200
PRODUCT CODE: NORMAL
PROT SERPL-MCNC: 5 G/DL (ref 6.4–8.2)
PROTHROMBIN TIME: 15 SECONDS (ref 9.8–12.8)
Q ONSET: 214 MS
QRS COUNT: 11 BEATS
QRS DURATION: 92 MS
QT INTERVAL: 526 MS
QTC CALCULATION(BAZETT): 551 MS
QTC FREDERICIA: 543 MS
R AXIS: -30 DEGREES
RBC # BLD AUTO: 2.66 X10*6/UL (ref 4.5–5.9)
RBC # BLD AUTO: 2.83 X10*6/UL (ref 4.5–5.9)
RH FACTOR (ANTIGEN D): NORMAL
SAO2 % BLDA: 100 % (ref 94–100)
SAO2 % BLDA: 99 % (ref 94–100)
SAO2 % BLDMV: 59 % (ref 45–75)
SAO2 % BLDMV: 61 % (ref 45–75)
SAO2 % BLDMV: 62 % (ref 45–75)
SAO2 % BLDMV: 63 % (ref 45–75)
SAO2 % BLDMV: 64 % (ref 45–75)
SAO2 % BLDMV: 66 % (ref 45–75)
SAO2 % BLDMV: 67 % (ref 45–75)
SODIUM BLDA-SCNC: 132 MMOL/L (ref 136–145)
SODIUM BLDA-SCNC: 133 MMOL/L (ref 136–145)
SODIUM BLDA-SCNC: 133 MMOL/L (ref 136–145)
SODIUM BLDA-SCNC: 134 MMOL/L (ref 136–145)
SODIUM BLDMV-SCNC: 128 MMOL/L (ref 136–145)
SODIUM BLDMV-SCNC: 134 MMOL/L (ref 136–145)
SODIUM BLDMV-SCNC: 134 MMOL/L (ref 136–145)
SODIUM BLDMV-SCNC: 135 MMOL/L (ref 136–145)
SODIUM BLDMV-SCNC: 136 MMOL/L (ref 136–145)
SODIUM SERPL-SCNC: 136 MMOL/L (ref 136–145)
T AXIS: 226 DEGREES
T OFFSET: 477 MS
UNIT ABO: NORMAL
UNIT NUMBER: NORMAL
UNIT RH: NORMAL
UNIT VOLUME: 290
UNIT VOLUME: 311
UNIT VOLUME: 350
VENTRICULAR RATE: 66 BPM
WBC # BLD AUTO: 21.1 X10*3/UL (ref 4.4–11.3)
WBC # BLD AUTO: 23.6 X10*3/UL (ref 4.4–11.3)
XM INTEP: NORMAL

## 2024-12-05 PROCEDURE — 2500000004 HC RX 250 GENERAL PHARMACY W/ HCPCS (ALT 636 FOR OP/ED): Performed by: STUDENT IN AN ORGANIZED HEALTH CARE EDUCATION/TRAINING PROGRAM

## 2024-12-05 PROCEDURE — 84132 ASSAY OF SERUM POTASSIUM: CPT

## 2024-12-05 PROCEDURE — 71045 X-RAY EXAM CHEST 1 VIEW: CPT

## 2024-12-05 PROCEDURE — 83735 ASSAY OF MAGNESIUM: CPT

## 2024-12-05 PROCEDURE — 2500000004 HC RX 250 GENERAL PHARMACY W/ HCPCS (ALT 636 FOR OP/ED)

## 2024-12-05 PROCEDURE — 87081 CULTURE SCREEN ONLY: CPT

## 2024-12-05 PROCEDURE — 86923 COMPATIBILITY TEST ELECTRIC: CPT

## 2024-12-05 PROCEDURE — 2500000005 HC RX 250 GENERAL PHARMACY W/O HCPCS

## 2024-12-05 PROCEDURE — 93010 ELECTROCARDIOGRAM REPORT: CPT | Performed by: INTERNAL MEDICINE

## 2024-12-05 PROCEDURE — 94762 N-INVAS EAR/PLS OXIMTRY CONT: CPT

## 2024-12-05 PROCEDURE — 93005 ELECTROCARDIOGRAM TRACING: CPT

## 2024-12-05 PROCEDURE — 2500000001 HC RX 250 WO HCPCS SELF ADMINISTERED DRUGS (ALT 637 FOR MEDICARE OP)

## 2024-12-05 PROCEDURE — P9045 ALBUMIN (HUMAN), 5%, 250 ML: HCPCS | Mod: JZ

## 2024-12-05 PROCEDURE — 82330 ASSAY OF CALCIUM: CPT

## 2024-12-05 PROCEDURE — 2020000001 HC ICU ROOM DAILY

## 2024-12-05 PROCEDURE — 99291 CRITICAL CARE FIRST HOUR: CPT

## 2024-12-05 PROCEDURE — P9045 ALBUMIN (HUMAN), 5%, 250 ML: HCPCS | Mod: JZ | Performed by: STUDENT IN AN ORGANIZED HEALTH CARE EDUCATION/TRAINING PROGRAM

## 2024-12-05 PROCEDURE — 85027 COMPLETE CBC AUTOMATED: CPT

## 2024-12-05 PROCEDURE — 71045 X-RAY EXAM CHEST 1 VIEW: CPT | Performed by: RADIOLOGY

## 2024-12-05 PROCEDURE — 37799 UNLISTED PX VASCULAR SURGERY: CPT

## 2024-12-05 PROCEDURE — 94003 VENT MGMT INPAT SUBQ DAY: CPT

## 2024-12-05 PROCEDURE — 86901 BLOOD TYPING SEROLOGIC RH(D): CPT

## 2024-12-05 PROCEDURE — 2500000002 HC RX 250 W HCPCS SELF ADMINISTERED DRUGS (ALT 637 FOR MEDICARE OP, ALT 636 FOR OP/ED)

## 2024-12-05 PROCEDURE — 82947 ASSAY GLUCOSE BLOOD QUANT: CPT

## 2024-12-05 RX ORDER — LIDOCAINE 560 MG/1
1 PATCH PERCUTANEOUS; TOPICAL; TRANSDERMAL DAILY
Status: DISCONTINUED | OUTPATIENT
Start: 2024-12-05 | End: 2024-12-18 | Stop reason: HOSPADM

## 2024-12-05 RX ORDER — HYDRALAZINE HYDROCHLORIDE 20 MG/ML
10 INJECTION INTRAMUSCULAR; INTRAVENOUS ONCE
Status: COMPLETED | OUTPATIENT
Start: 2024-12-05 | End: 2024-12-05

## 2024-12-05 RX ORDER — ALBUMIN HUMAN 50 G/1000ML
12.5 SOLUTION INTRAVENOUS ONCE
Status: COMPLETED | OUTPATIENT
Start: 2024-12-05 | End: 2024-12-05

## 2024-12-05 RX ORDER — HYDRALAZINE HYDROCHLORIDE 20 MG/ML
10 INJECTION INTRAMUSCULAR; INTRAVENOUS EVERY 6 HOURS PRN
Status: DISCONTINUED | OUTPATIENT
Start: 2024-12-05 | End: 2024-12-05

## 2024-12-05 RX ORDER — MILRINONE LACTATE 0.2 MG/ML
.1-.75 INJECTION, SOLUTION INTRAVENOUS CONTINUOUS
Status: DISCONTINUED | OUTPATIENT
Start: 2024-12-05 | End: 2024-12-07

## 2024-12-05 RX ORDER — GLYCOPYRROLATE 0.2 MG/ML
0.8 INJECTION INTRAMUSCULAR; INTRAVENOUS ONCE
Status: COMPLETED | OUTPATIENT
Start: 2024-12-05 | End: 2024-12-05

## 2024-12-05 RX ORDER — INSULIN LISPRO 100 [IU]/ML
0-15 INJECTION, SOLUTION INTRAVENOUS; SUBCUTANEOUS EVERY 4 HOURS
Status: DISCONTINUED | OUTPATIENT
Start: 2024-12-05 | End: 2024-12-06

## 2024-12-05 RX ORDER — HYDRALAZINE HYDROCHLORIDE 20 MG/ML
10 INJECTION INTRAMUSCULAR; INTRAVENOUS EVERY 4 HOURS PRN
Status: DISCONTINUED | OUTPATIENT
Start: 2024-12-05 | End: 2024-12-07

## 2024-12-05 RX ORDER — NAPROXEN SODIUM 220 MG/1
81 TABLET, FILM COATED ORAL DAILY
Status: DISCONTINUED | OUTPATIENT
Start: 2024-12-05 | End: 2024-12-09

## 2024-12-05 RX ORDER — HEPARIN SODIUM 5000 [USP'U]/ML
5000 INJECTION, SOLUTION INTRAVENOUS; SUBCUTANEOUS EVERY 8 HOURS
Status: DISCONTINUED | OUTPATIENT
Start: 2024-12-05 | End: 2024-12-17

## 2024-12-05 RX ORDER — NEOSTIGMINE METHYLSULFATE 1 MG/ML
4 INJECTION INTRAVENOUS ONCE
Status: COMPLETED | OUTPATIENT
Start: 2024-12-05 | End: 2024-12-05

## 2024-12-05 RX ORDER — ATORVASTATIN CALCIUM 80 MG/1
80 TABLET, FILM COATED ORAL NIGHTLY
Status: DISCONTINUED | OUTPATIENT
Start: 2024-12-05 | End: 2024-12-18 | Stop reason: HOSPADM

## 2024-12-05 ASSESSMENT — PAIN SCALES - GENERAL
PAINLEVEL_OUTOF10: 8
PAINLEVEL_OUTOF10: 0 - NO PAIN
PAINLEVEL_OUTOF10: 3
PAINLEVEL_OUTOF10: 0 - NO PAIN

## 2024-12-05 ASSESSMENT — PAIN - FUNCTIONAL ASSESSMENT
PAIN_FUNCTIONAL_ASSESSMENT: 0-10
PAIN_FUNCTIONAL_ASSESSMENT: CPOT (CRITICAL CARE PAIN OBSERVATION TOOL)
PAIN_FUNCTIONAL_ASSESSMENT: 0-10
PAIN_FUNCTIONAL_ASSESSMENT: CPOT (CRITICAL CARE PAIN OBSERVATION TOOL)
PAIN_FUNCTIONAL_ASSESSMENT: CPOT (CRITICAL CARE PAIN OBSERVATION TOOL)
PAIN_FUNCTIONAL_ASSESSMENT: 0-10
PAIN_FUNCTIONAL_ASSESSMENT: 0-10
PAIN_FUNCTIONAL_ASSESSMENT: CPOT (CRITICAL CARE PAIN OBSERVATION TOOL)
PAIN_FUNCTIONAL_ASSESSMENT: CPOT (CRITICAL CARE PAIN OBSERVATION TOOL)
PAIN_FUNCTIONAL_ASSESSMENT: 0-10
PAIN_FUNCTIONAL_ASSESSMENT: CPOT (CRITICAL CARE PAIN OBSERVATION TOOL)
PAIN_FUNCTIONAL_ASSESSMENT: 0-10

## 2024-12-05 NOTE — BRIEF OP NOTE
Date:   OR Location: * No surgery found *    Name: Edu Echavarria, : 1950, Age: 74 y.o., MRN: [id number], Sex: male    Diagnosis  * No surgery found * * No surgery found *       Chest Tubes/Drains: x 3 - L/R Pleural, Mediastinal       Temporary Pacing Wires: AV Pacing wires    -Settings: 90-20-10   -Underlying Rhythm: 0    Permanent pacer/ICD: No   -Preoperative settings:    -Intra-op/ Postoperative settings:    Sternotomy performed by: Demian Silva    Conduit Harvested by: BAO- Demian Silva; RSVG-Irineo    Sternal Wires placed by: Demian Silva    Arm/Leg/Groin Closure/Cutdown performed by: Yassine    Cardio Pulmonary Bypass Time: 142   Cross-clamp Time: 101  Circulatory Arrest: No Time:     Is patient candidate for Emergency Re-sternotomy? Yes   -If yes, POD #10 is -  /  Procedures  * Cannot find OR case *  * No surgery found *  Surgeons   * Surgery not found *    Resident/Fellow/Other Assistant:  Yassine Cabello    Staff:       Anesthesia Staff: Anesthesia staff cannot be found from this context.    Procedure Summary  Anesthesia: Anesthesia type cannot be found on the log.  ASA: ASA status cannot be found on the log.  Estimated Blood Loss: 250mL  Intra-op Medications: * Intraprocedure medication information is unavailable because the case start and end events have not been set *        Specimen: * Cannot find log *               Findings: CAD    Complications:  None; patient tolerated the procedure well.     Disposition: ICU - intubated and hemodynamically stable.  Condition: stable  Specimens Collected: * Cannot find log *  Attending Attestation:     *No primary surgeon found*

## 2024-12-05 NOTE — PROGRESS NOTES
"CTICU Progress Note  Edu Echavarria/[id number]    Admit Date: 11/18/2024  Hospital Length of Stay: 17   ICU Length of Stay: 8h   CT SURGEON: Dr. Allen LOCKWOOD. Weaned off norepi and vaso overnight, but remained on epi @ 0.04 with a CI of 2.2. MAPS were high so hydral was given. He is reversed and now CPAPing. This morning, he endorses chest pain, pointing at his sternotomy site. No abdominal pain or dysuria.    MEDICATIONS  Infusions:  EPINEPHrine, Last Rate: 0.04 mcg/kg/min (12/05/24 0700)  norepinephrine, Last Rate: Stopped (12/05/24 0445)  propofol, Last Rate: Stopped (12/05/24 0540)  vasopressin, Last Rate: Stopped (12/05/24 0045)      Scheduled:  acetaminophen, 650 mg, q6h  aspirin, 81 mg, Daily  atorvastatin, 80 mg, Nightly  ceFAZolin, 2 g, q8h  insulin lispro, 0-15 Units, q4h  pantoprazole, 40 mg, Daily before breakfast   Or  pantoprazole, 40 mg, Daily before breakfast  sennosides-docusate sodium, 2 tablet, BID      PRN:  alteplase, 2 mg, PRN  calcium gluconate, 1 g, q6h PRN  calcium gluconate, 2 g, q6h PRN  HYDROmorphone, 0.2 mg, q15 min PRN  magnesium sulfate, 2 g, q6h PRN  magnesium sulfate, 4 g, q6h PRN  naloxone, 0.2 mg, q5 min PRN  oxyCODONE, 10 mg, q4h PRN  oxyCODONE, 5 mg, q4h PRN  oxygen, , Continuous PRN - O2/gases  potassium chloride, 40 mEq, q6h PRN          Objective    Visit Vitals  BP (!) 127/28   Pulse 75   Temp 36.3 °C (97.3 °F) (Core)   Resp 26   Ht 1.702 m (5' 7\")   Wt 89 kg (196 lb 3.2 oz)   SpO2 100%   BMI 30.73 kg/m²   Smoking Status Former   BSA 2.05 m²     Wt Readings from Last 5 Encounters:   12/04/24 89 kg (196 lb 3.2 oz)   11/18/24 82 kg (180 lb 12.4 oz)       Intake & Output:  I/O last 3 completed shifts:  In: 4189.8 (47.1 mL/kg) [I.V.:1599.8 (18 mL/kg); Blood:1530; NG/GT:60; IV Piggyback:1000]  Out: 3087 (34.7 mL/kg) [Urine:2562 (0.8 mL/kg/hr); Blood:250; Chest Tube:275]  Weight: 89 kg        Vent settings:  Vent Mode: Pressure support  FiO2 (%):  [40 %-50 %] 40 %  S " RR:  [16] 16  S VT:  [500 mL] 500 mL  PEEP/CPAP (cm H2O):  [5 cm H20-8 cm H20] 5 cm H20  OK SUP:  [5 cm H20] 5 cm H20  MAP (cm H2O):  [8.5-12] 8.5    Physical Exam:   - Constitutional: Critically ill male lying in bed  - Neuro: CAM negative. Nods heads to questions, moves extremities spontaneously and to commands  - CV: RRR, NSR, normotensive, IABP in place @ 1:1  - Pulm: CTAB, intubated on spontaneous CPAP, 5/5 @ 40%, pleural and mediastinal chest tubes in place draining sanguinous output, no airleak  - GI: Soft, non-tender, no ndistended  - : Logan in place draining orange urine  - Extremities: WWP, 2+ pulses throughout, no peripheral edema  - Skin: No jaundiced, no obvious rashes or deformities, sternal incision closed, well aproximated, and without signs of infection  - Psych: VASU    Daily Risk Screen  Intubated: Yes - for airway protection  Central line: Yes - for invasive hemodynamic monitoring  Logan: Yes - for accurate I&Os    Images: All relevant images reviewed.    Invasive Hemodynamics:    Most Recent Range Past 24hrs   BP (Art) 154/29 Arterial Line BP 1  Min: 113/42  Max: 178/65   MAP(Art) 82 mmHg Arterial Line MAP 1 (mmHg)   Min: 66 mmHg  Max: 300 mmHg   RA/CVP   No data recorded   PA 61/21 PAP  Min: 31/12  Max: 65/29   PA(mean) 35 mmHg PAP (Mean)  Min: 20 mmHg  Max: 42 mmHg   CO 4.4 L/min CO (L/min)  Min: 4.4 L/min  Max: 5.1 L/min   CI 2.2 L/min/m2 CI (L/min/m2)  Min: 2.2 L/min/m2  Max: 2.6 L/min/m2   Mixed Venous 77 % No data recorded   SVR  1230 (dyne*sec)/cm5 SVR (dyne*sec)/cm5  Min: 1013 (dyne*sec)/cm5  Max: 1230 (dyne*sec)/cm5       Assessment/Plan    Edu Echavarria is a 74 y.o. male with PMH of HTN, HFrEF, CAD, GERD, and gout, initially presented to OSH with STEMI. Transferred to [facility] for CABG evaluation, hospital course c/b MARYBETH, worsening hyponatremia causing delay of surgery case, then further complicated by respiratory failure following fluid resuscitation for correction of hyponatremia, and  cardiogenic shock necessitating IABP. Now presents to CTICU s/p CABGx4, LAAL with Dr. Villa. Working on weaning gtts and discontinuing IABP, especially as it is slightly mispositioned. Weaning parameters fair this morning, but he did not cooperate in obtaining NIF and Vc. Can try again later this morning or this afternoon and hopefully will be able to extubate. Cr rising, possibly due to IABP being inferiorly displaced. Will monitor I&Os and Cr, hopefully plan to take out IABP today.      NEURO:  History of gout. Pt. is off sedation. Acute post operative pain.  - Serial neuro and pain assessments   - Scheduled Tylenol  - Start lidocaine patch  - PRN oxycodone  - PRN dilaudid for breakthrough pain   - PT Consult, OOB to chair as tolerated, chair position if not tolerated   - CAM ICU score Qshift  - Sleep/wake cycle hygiene  - Holding home allopurinol (gout)     CV:  PMH of HTN, HFrEF, CAD, STEMI c/b cardiogenic shock with IABP placement. Now s/p CABG x4 with LAAL. Pre/Post EF: 30/30% Arrived to CTICU on epi 0.06, norepi 0.03, vasopressin 0.03. A/V epicardial wires set DDD @ 80. IABP slightly posterior displaced in descending thoracic aorta.  - Decrease epi to 0.03 and shoot numbers 1 hour afterwards  - IABP 1:1  - Maintain goal MAP 65-90  - Mixed venous and CI Q4H  - Volume resuscitate as clinically indicated  - Maintain epicardial wires set VVI backup @ 60  - Continue [id number]  - Plan for to receive Plavix POD2  - Continue atorvastatin 80mg  - Hold home metoprolol succinate     PULM:  No significant pulmonary PMH. Developed AHRF in CICU 2/2 cardiogenic shock. Currently intubated on ventilator. Chest tubes: L pleural, R pleural, mediastinal.  - Daily CXR  - Extubate today  - Wean FiO2 maintaining SpO2 >92%.   - IS Q1H and OOB to chair when extubated  - Chest tubes to wall suction     GI:  PMH of GERD. OG in place.   - Continue PPI until extubated  - NPO, will perform bedside swallow eval post extubation   - Miralax  and senna-docusate BID     /Renal:  No /renal PMH. Baseline Cr of 0.7-1.0. Has had MARYBETH and acute on chronic hyponatremia likely 2/2 hypovolemia 2/2 decreased PO intake and diarrhea. CSA-MARYBETH Risk Score 3. Creatinine rising post-op, possibly due to inferior displacement of IABP. Hawthorne in place and making adequate UOP.  - Continue hawthorne catheter for strict I/Os  - Goal UOP 0.5ml/kg/hr  - RFP as clinically indicated  - Replete electrolytes per CTICU protocol     ENDO:  No significant PMH. Last A1c: 6.3 (11/18/24).  - Maintain BG <180, insulin p/er CTICU protocol     HEME:  No significant PMH. Acute blood loss anemia and thrombocytopenia.  - Monitor drain output volume and characteristics  - CBC, coags, and fibrinogen post op and as clinically indicated  - Continue ASA  - Plan for to receive Plavix POD2  - Restart SQH  - SCDs for DVT prophylaxis  - Type & Screen: Q72H     ID: Afebrile, no current indications of infection. MRSA negative. Broad infectious work-up for leukocytosis completed and negative, s/p Vanc/Zosyn (11/29-12/2).  - Trend temp Q4H  - Periop cefazolin x 48hrs until 12/6     Skin: No active skin issues.  - Preventative Mepilex dressings in place on sacrum and heels  - Change preventative Mepilex weekly or more frequently as indicated (when moist/soiled)   - Every shift skin assessment per nursing and weekly ICU skin rounds  - Moisture barrier to be applied with shirley care  - Active skin problems addressed with nursing on daily rounds     PPx:  SCDs  SQH  PPI     G: Line  Right IJ with PAC placed 11/29/24  Left radial A-line placed 11/29/24  Right femoral IABP placed 11/29/24    F: Family:  Will update at bedside postoperatively.    Restraints:  The indications and risks/benefits of non-violent/non self-destructive restraints were discussed.      A,B,C,D,E,F,G: reviewed     Dispo: CTICU care for now.     Code status: Full Code   Extended Emergency Contact Information  Primary Emergency Contact:  Guillermo Barrios  Mobile Phone: [phone]  Relation: Relative  Preferred language: English   needed? No  Secondary Emergency Contact: COLIN ROMERO  Mobile Phone: [phone]  Relation: Friend  Preferred language: English   needed? No     Patient staffed with Dr. Vega    CTICU TEAM PHONE [phone]      Noe Aviles MD  PGY-1 Resident Physician  Department of Anesthesiology & Perioperative Medicine

## 2024-12-05 NOTE — SIGNIFICANT EVENT
IABP in right groin removed @ 1605. Pulse palpable, pressure held by resident Dr. Aviles for 30 minutes. Hemostasis, Minor hematoma appreciated - Sand bag placed on groin, distal pulse palpable. Will lay flat until 2230.

## 2024-12-05 NOTE — CARE PLAN
Patient intubated and sedated     Problem: Safety - Medical Restraint  Goal: Remains free of injury from restraints (Restraint for Interference with Medical Device)  Outcome: Not Progressing  Flowsheets (Taken 12/5/2024 0543)  Remains free of injury from restraints (restraint for interference with medical device):   Every 2 hours: Monitor safety, psychosocial status, comfort, nutrition and hydration   Inform patient/family regarding the reason for restraint  Goal: Free from restraint(s) (Restraint for Interference with Medical Device)  Outcome: Not Progressing  Flowsheets (Taken 12/5/2024 0543)  Free from restraint(s) (restraint for interference with medical device):   ONCE/SHIFT or MINIMUM Every 12 hours: Assess and document the continuing need for restraints   Every 24 hours: Continued use of restraint requires Licensed Independent Practitioner to perform face to face examination and written order

## 2024-12-05 NOTE — OP NOTE
CABG x 4 LIMA and veins, YESY ligation, left posterior window Operative Note     Date: 2024 - 2024  OR Location: [facility] OR    Name: Edu Echavarria : 1950, Age: 74 y.o., MRN: [id number], Sex: male    Diagnosis  Pre-op Diagnosis      * STEMI (ST elevation myocardial infarction) (Multi) [I21.3] Post-op Diagnosis     * STEMI (ST elevation myocardial infarction) (Multi) [I21.3]     Procedures  CABG x 4 LIMA and veins, YESY ligation, left posterior window  52328 - MI CORONARY ARTERY BYP W/VEIN & ARTERY GRAFT 2 VEIN  1. Coronary artery bypass grafting x 4:    In situ left internal mammary artery to left anterior descending coronary artery bypass.    Aorto to right posterior descending saphenous vein coronary artery bypass.       Aorto to first marginal circumflex saphenous vein coronary artery bypass.    Abarca of vein graft to first diagonal saphenous vein coronary artery bypass.  2. Left atrial appendage ligation with AtriCure ACHV 35 mm clip ([id number]).   3. Ligation, Ligament of Mau.  4. Left posterior pericardial window creation.  5. Cannulation and initiation of cardiopulmonary oxygenator.   6. Medistim flow probe analysis of bypass grafts.   7. Endoscopic harvest of right saphenous vein.    Surgeons      * Sunil Villa - Primary    Resident/Fellow/Other Assistant:  Surgeons and Role:     * Ranulfo Silva MD - Assisting; Ben Cabello SA; ROBERAT Meredith Dr. should be considered an assisting surgeon.  I asked for his help as there was no resident available for this case.  He performed sternotomy, harvest of mammary conduit, cannulation and initiation of cardiopulmonary oxygenator, and assisted with all grafting and weaning from cardiopulmonary oxygenator.    Staff:   Circulator: Chayito  Scrub Person: Natividad  Circulator: Kellie Nelsonub Person: Dori  Surgical Assistant: Del  Surgical Assistant: Arnold  Surgical Assistant: Ben Waite Scrub: Deepti Waite  Circulator: Jolly    Anesthesia Staff: Anesthesiologist: Emily Mcfarlane MD; Jagjit Schroeder MD; Julius Toney MD  C-AA: BERNADINE Ayala  Anesthesia Resident: Silvia Bone MD; Delvin Bishop MD  Perfusionist: Luisa Enriquez    Procedure Summary  Anesthesia: Regional, General  ASA: IV  Estimated Blood Loss: 250 mL  Intra-op Medications:   Administrations occurring from 0930 to 1715 on 12/04/24:   Medication Name Total Dose   heparin 1,000 unit/mL injection 50,000 Units   sodium chloride 0.9 % irrigation solution 3,000 mL   papaverine injection 180 mg   vancomycin (Vancocin) vial for injection 4 g   electrolyte-A (Plasmalyte-A) solution 1,000 mL   acetaminophen (Tylenol) tablet 975 mg Cannot be calculated   alum-mag hydroxide-simeth (Mylanta) 200-200-20 mg/5 mL oral suspension 10 mL 10 mL   aspirin EC tablet 81 mg Cannot be calculated   atorvastatin (Lipitor) tablet 80 mg Cannot be calculated   bisacodyl (Dulcolax) suppository 10 mg Cannot be calculated   ceFAZolin (Ancef) 1 g 2 g   dextrose 50 % injection 12.5 g Cannot be calculated   dextrose 50 % injection 25 g Cannot be calculated   docusate sodium (Colace) oral liquid 100 mg Cannot be calculated   esmolol 10 mg/mL 60 mg   fentaNYL (Sublimaze) injection 50 mcg/mL 150 mcg   glucagon (Glucagen) injection 1 mg Cannot be calculated   glucagon (Glucagen) injection 1 mg Cannot be calculated   hydrALAZINE (Apresoline) tablet 50 mg Cannot be calculated   hydrOXYzine HCL (Atarax) tablet 25 mg Cannot be calculated   insulin lispro injection 0-10 Units Cannot be calculated   isosorbide dinitrate (Isordil) tablet 40 mg 40 mg   lidocaine (Xylocaine) injection 2 % 100 mg   lubricating eye drops ophthalmic solution 1 drop Cannot be calculated   methadone (Dolophine) injection 150 mg   midazolam (Versed) bolus from bag 1 mg Cannot be calculated   midazolam (Versed) bolus from bag 1 mg Cannot be calculated   midazolam PF (Versed) injection 1 mg/mL 2 mg    mupirocin (Bactroban) 2 % ointment 0.5 Application Cannot be calculated   nitroglycerin 100 mcg/mL D5W intracoronary syringe - compounded 100 mcg   norepinephrine (Levophed) 8 mg in sodium chloride 0.9% 250 mL (0.032 mg/mL) infusion (premix) 0.01 mg   norepinephrine (Levophed) 16 mcg/mL syringe for Anesthesia 40 mcg   phenylephrine 100 mcg/mL syringe 10 mL (prefilled) 400 mcg   polyethylene glycol (Glycolax, Miralax) packet 17 g Cannot be calculated   propofol (Diprivan) injection 10 mg/mL 120 mg   rocuronium (ZeMuron) 50 mg/5 mL injection 90 mg   simethicone (Mylicon) chewable tablet 40 mg Cannot be calculated   NaCl 0.9 % infusion 61.75 mL   tranexamic acid (Cyklokapron) 6,250 mg in sodium chloride 0.9% 312.5 mL (20 mg/mL) infusion 311.5 mg   tranexamic acid 1,000 mg/100 mL NS (premix) 1,335 mg              Anesthesia Record               Intraprocedure I/O Totals          Intake    Norepinephrine Drip 0.00 mL    The total shown is the total volume documented since Anesthesia Start was filed.    Tranexamic Acid 0.00 mL    The total shown is the total volume documented since Anesthesia Start was filed.    Insulin Drip 0.00 mL    The total shown is the total volume documented since Anesthesia Start was filed.    Epinephrine Drip 0.00 mL    The total shown is the total volume documented since Anesthesia Start was filed.    Amiodarone Drip 0.00 mL    The total shown is the total volume documented since Anesthesia Start was filed.    Vasopressin Drip 0.00 mL    The total shown is the total volume documented since Anesthesia Start was filed.    Cell Saver 430 mL    Total Intake 430 mL       Output    Urine 72 mL    Total Output 72 mL       Net    Net Volume 358 mL          Specimen: No specimens collected      Drains and/or Catheters:   Urethral Catheter Non-latex;Temperature probe 14 Fr. (Active)   Implants: 35 mm AtriClip LOT #[id number]    Findings: The mammary artery was a good conduit, approximately 2 mm in diameter  and had very little calcification in the wall.  The saphenous vein conduit was adequate quality, and was approximately 3 mm in diameter.      The LAD was 2 mm with severe diffuse calcification in the walls, I grafted in the middle third.  The right distal coronary artery was 2 mm in diameter and had severe diffuse calcification in the walls.     The first diagonal coronary artery was 1.5 mm in diameter, and had minimal calcification.  The first marginal circumflex coronary artery was 2 mm in diameter, this was severe and diffusely diseased with what was noted to be chronic occlusion.  The LV and RV function were reduced.  RV was moderately reduced, the LV was severely reduced with an ejection fraction estimated at 30%, with apical akinesis. There was no significant valvular disease, before and after cardiopulmonary bypass.  There was some trivial to mild mitral regurgitation. the heart  from bypass with moder inotrope and moderate vasopressor support.  Epinephrine 0.04 mcg/kg/min, norepinephrine 0.05 mcg/kg/min, intra-aortic balloon pump which was placed before the case when the patient was admitted to the hospital, was placed one-to-one.    Bypass times:   CPB: 142 min   XCT:  101 min    Medistem flows:   LIIMA to LAD:  79 ml/min with a PI of 3.8  SVG to RCA: 20 ml/min with a PI of 5.5  SVG to D1: 72 ml/min with a PI of 7.5  SVG to OM1: 47 ml/min with a PI of 6.5      Indications: Edu Echavarria is an 74 y.o. male who is having surgery for STEMI (ST elevation myocardial infarction) (Multi) [I21.3]. 2  Mr. Echavarria is an 74 y.o. man who presents with chest pain, found to have an inferior wall STEMI.  He demonstrated multi-vessel disease on his [facility].  He has diffuse disease, with multiple blockages in the LAD.  I am concerned that even though he has anatomic surgical disease, given the extent of his CAD, he will require future interventions even if using a surgical technique.   I had a detailed conversation with  the patient and his cousin.  Given the CHIP meeting results last evening, he would like to pursue surgical revascularization.  His initial booking was cancelled due to profound hyponatremia with a Na2+ of 120 mmol/L.  He took several days to correct his hyponatremia and hypochloremia.  During this time he had an respiratory arrest, requiring re-intubation and also re-placement of his IABP.  We had another CHIP meeting and agreed that a surgical approach would be in his best interest.     I discussed with him his code status and we agreed:  he will be a full code from surgery until discharge.  We discussed that sometimes after surgery the breathing tube has to go back in, he is in agreement to do whatever is necessary to recover for surgery.  He also stated, that if he were to be in a persistent vegetative state, he would not want to be on a prolonged vent status with no meaningful chance of recovery.   He cousin, who will be making decisions afterwards was at the bedside and agreed.       For the purposes of this hospital stay, until his is discharged after surgery, he will be a full code.     The patient was seen in the preoperative area. The risks, benefits, complications, treatment options, non-operative alternatives, expected recovery and outcomes were discussed with the patient. The possibilities of reaction to medication, pulmonary aspiration, injury to surrounding structures, bleeding, recurrent infection, the need for additional procedures, failure to diagnose a condition, and creating a complication requiring transfusion or operation were discussed with the patient. The patient concurred with the proposed plan, giving informed consent.  The site of surgery was properly noted/marked if necessary per policy. The patient has been actively warmed in preoperative area. Preoperative antibiotics have been ordered and given within 1 hours of incision. Venous thrombosis prophylaxis have been ordered including  chemical prophylaxis    Procedure Details: After informed consent, and positive identification, the patient was brought to the operative theatre. They were placed on the operating room table in the supine position and an arterial monitoring line was placed. They subsequently  underwent induction of anesthesia, and rosendo-tracheal intubation.  A ANNA MARIE probe was placed in the esophagus, and central intravenous access obtained. They were then prepped and draped in a sterile surgical fashion.      A surgical time-out was performed with the correct patient, correct procedure, laterality, timing and dosage of antibiotics, availability of blood, administration of beta blocker, fire risk, and sterility of  instruments were all verified, before beginning the case.     CANNULATION AND INITIATION OF CARDIOPULMONARY OXYGENATOR:  A midline incision was made on the chest, Bovie electrocautery was used to dissect down to the anterior table of the sternum.  A reciprocating saw was used to make a sternotomy.  The left internal mammary was harvested in a skeletonized fashion.  5000 Unit(s) of heparin was administered prior to division.  Concomitantly, the right greater saphenous vein was harvested using endoscopic techniques.  The pericardium was divided and reflected laterally creating a cradle.  The patient was systemically heparinized to an ACT of > 400 sec.  The distal ascending aorta and right atrium were cannulated. The patient was started on cardiopulmonary bypass at 2.2 L/min/m2.  A root needle vent was placed in the proximal ascending aorta.  A retrograde cardioplegia catheter was placed in the coronary sinus.  The left mammary artery was prepared for anastomosis.       An aortic cross-clamp was placed, and the heart was arrested in diastole with antegrade cold cardioplegia, non-selectively.  The heart received cardioplegia at 15 min intervals, in a retrograde, as well as antegrade fashion down the conduit grafts, as well as the  coronary sinus.  The IABP was placed in automatic mode.     LIGAMENT OF AKUA DIVISION, AND LEFT ATRIAL APPENDAGE LIGATION:  The ligament of akua was divided with electrocautery.  The left atrial appendage was evaluated and measured.  Epicardial fat adhesions were dissected off of the base of the left atrial appendage with electrocautery.  The appropriately sized clip was placed flush at the base of the appendage.     LEFT POSTERIOR PERICARDIAL WINDOW:  The pericardial well was evaluated.  The left phrenic nerve was identified.  A 3 cm elliptical excision of pericardium was performed using electrocautery.  This was ensured to be below the phrenic nerve, and into the left pleural space.     SURGICAL CORONARY GRAFTING:  The distal right coronary artery was isolated.  An arteriotomy was performed and elongated with Jackson scissors.  The reversed saphenous vein was then anastomosed, end-to-side, using a 7-0 polypropylene suture in a running fashion.  This was tested and found to be hemostatic.   The first marginal circumflex coronary artery was isolated, an arteriotomy was performed and elongated with Jackson scissors.  The reversed saphenous vein was anastomosed end-to-side with a 7-0 polypropylene suture in a running fashion.  This was tested, and found to be hemostatic.  The first diagonal coronary artery was isolated, an arteriotomy was performed and elongated with Jackson scissors.  The reverse saphenous vein was anastomosed in an end-2-side with a 7-0 polypropylene suture in a running fashion.  This was tested, and found to be hemostatic.  Next attention was placed to the left anterior descending coronary artery. The LAD was isolated, an arteriotomy was performed, and elongated with Jackson scissors. The in situ left internal mammary artery was anastomosed to the left anterior descending coronary artery, end-to-side, using a 7-0 polypropylene suture, in a running fashion.  Two arteriotomies were made in the  ascending aorta, and enlarged with a 4.0 mm punch.  The reverse saphenous vein grafts were proximally anastomosed to the ascending aorta, end to side, with a 6-0 polypropylene suture in a running fashion.  The proximal portion of the vein graft to the first diagonal was anastomosed to the chatman of the vein graft to the first marginal circumflex after the cross-clamp was removed by placing proximal and distal bulldog clamps on the vein and performing a vein to vein anastomosis and an end to side fashion with a 7-0 polypropylene suture    Warm blood cardioplegia was given retrograde, via the coronary sinus.      The patient was placed in Trendelenburg position, the heart was de-aired, and the aortic cross-clamp was removed with the root vent on.  The patient was weaned from cardiopulmonary bypass, the venous cannula was removed from the right atrium.  The coronary sinus catheter was removed and the right atrium repaired with a 4-0 polypropylene suture.  Right atrial unipolar and ventricular bipolar epicardial pacing wires were placed. The Medistim flow probe was used to verify the flow in the bypass grafts.  The root vent was removed, and the aorta repaired with a 4-0 polypropylene suture.  Protamine was administered, and the aortic cannula was removed and the aorta repaired with two 4-0 polypropylene suture purse-strings. After full protamine reversal, the right atrium was repaired with a 4-0 polypropylene purse-string suture.  Hemostasis was achieved throughout the mediastinum.    Three 28 Fr chest tubes were placed, one in the left  pleural space, through the left posterior pericardial window, one in the right pleural space, and one in the anterior mediastinum.  The pericardium was loosely approximated over the right ventricle and aorta with 2-0 silk suture in an interrupted fashion.    The sternum was re-approximated using #6 stainless steel wires, in an interrupted fashion.  The fascia was closed with 0 polysorb  suture, in a running fashion.  The subcutaneous layer was closed with 2-0 polysorb suture, in a running fashion.  The subcuticular layer was closed in a running fashion with a 3-0 polysorb suture.     The needle, instrument and sponge count were correct x 2.  The patient was taken to the CTICU in stable condition.   Complications:  None; patient tolerated the procedure well.    Disposition: ICU - intubated and critically ill.  Condition: stable         Task Performed by RNFA or Surgical Assistant:  Surgical prep and drape of the patient  Saphenous vein conduit harvest using endoscopic technique  Assisting with cannulation and initiation of cardiopulmonary oxygenator  Assisting with all grafting  Assisting with sternal closure  Monitoring and transport of patient to intensive care unit      Attending Attestation: I was present and scrubbed for the key portions of the procedure.    Sunil Villa  Phone Number: [phone]

## 2024-12-05 NOTE — ANESTHESIA POSTPROCEDURE EVALUATION
Patient: Edu Echavarria    Procedure Summary       Date: 12/04/24 Room / Location: [facility] OR 21 / [facility] OR    Anesthesia Start: 1445 Anesthesia Stop: 2315    Procedure: CABG x 4 LIMA and veins, YESY ligation, left posterior window Diagnosis:       STEMI (ST elevation myocardial infarction) (Multi)      (STEMI (ST elevation myocardial infarction) (Multi) [I21.3])    Surgeons: Sunil Villa MD Responsible Provider: Jagjit Schroeder MD    Anesthesia Type: general, regional ASA Status: 4            Anesthesia Type: general, regional    Vitals Value Taken Time   /52 12/04/24 2315   Temp 36.2 12/04/24 2315   Pulse 80 12/04/24 2314   Resp 16 12/04/24 2314   SpO2 100 % 12/04/24 2314   Vitals shown include unfiled device data.    Anesthesia Post Evaluation    Patient location during evaluation: ICU  Patient participation: complete - patient participated  Level of consciousness: awake  Pain score: 0  Pain management: adequate  Airway patency: patent  Cardiovascular status: acceptable  Respiratory status: acceptable  Hydration status: acceptable  Postoperative Nausea and Vomiting: none        No notable events documented.

## 2024-12-06 ENCOUNTER — APPOINTMENT (OUTPATIENT)
Dept: RADIOLOGY | Facility: HOSPITAL | Age: 74
DRG: 235 | End: 2024-12-06
Payer: MEDICARE

## 2024-12-06 ENCOUNTER — APPOINTMENT (OUTPATIENT)
Dept: CARDIOLOGY | Facility: HOSPITAL | Age: 74
DRG: 235 | End: 2024-12-06
Payer: MEDICARE

## 2024-12-06 LAB
ALBUMIN SERPL BCP-MCNC: 3.7 G/DL (ref 3.4–5)
ALBUMIN SERPL BCP-MCNC: 3.8 G/DL (ref 3.4–5)
ANION GAP BLDA CALCULATED.4IONS-SCNC: 13 MMO/L (ref 10–25)
ANION GAP BLDA CALCULATED.4IONS-SCNC: 15 MMO/L (ref 10–25)
ANION GAP BLDA CALCULATED.4IONS-SCNC: 16 MMO/L (ref 10–25)
ANION GAP BLDV CALCULATED.4IONS-SCNC: 12 MMOL/L (ref 10–25)
ANION GAP SERPL CALC-SCNC: 17 MMOL/L (ref 10–20)
ANION GAP SERPL CALC-SCNC: 19 MMOL/L (ref 10–20)
ATRIAL RATE: 119 BPM
ATRIAL RATE: 120 BPM
ATRIAL RATE: 87 BPM
BASE EXCESS BLDA CALC-SCNC: -0.4 MMOL/L (ref -2–3)
BASE EXCESS BLDA CALC-SCNC: -0.5 MMOL/L (ref -2–3)
BASE EXCESS BLDA CALC-SCNC: 0.4 MMOL/L (ref -2–3)
BASE EXCESS BLDV CALC-SCNC: 0.2 MMOL/L (ref -2–3)
BODY TEMPERATURE: 37 DEGREES CELSIUS
BUN SERPL-MCNC: 33 MG/DL (ref 6–23)
BUN SERPL-MCNC: 37 MG/DL (ref 6–23)
CA-I BLD-SCNC: 1.13 MMOL/L (ref 1.1–1.33)
CA-I BLD-SCNC: 1.13 MMOL/L (ref 1.1–1.33)
CA-I BLDA-SCNC: 1.17 MMOL/L (ref 1.1–1.33)
CA-I BLDA-SCNC: 1.18 MMOL/L (ref 1.1–1.33)
CA-I BLDA-SCNC: 1.21 MMOL/L (ref 1.1–1.33)
CA-I BLDV-SCNC: 1.19 MMOL/L (ref 1.1–1.33)
CALCIUM SERPL-MCNC: 8.6 MG/DL (ref 8.6–10.6)
CALCIUM SERPL-MCNC: 8.8 MG/DL (ref 8.6–10.6)
CHLORIDE BLDA-SCNC: 94 MMOL/L (ref 98–107)
CHLORIDE BLDA-SCNC: 97 MMOL/L (ref 98–107)
CHLORIDE BLDA-SCNC: 98 MMOL/L (ref 98–107)
CHLORIDE BLDV-SCNC: 98 MMOL/L (ref 98–107)
CHLORIDE SERPL-SCNC: 95 MMOL/L (ref 98–107)
CHLORIDE SERPL-SCNC: 96 MMOL/L (ref 98–107)
CO2 SERPL-SCNC: 24 MMOL/L (ref 21–32)
CO2 SERPL-SCNC: 25 MMOL/L (ref 21–32)
CREAT SERPL-MCNC: 2.17 MG/DL (ref 0.5–1.3)
CREAT SERPL-MCNC: 2.51 MG/DL (ref 0.5–1.3)
EGFRCR SERPLBLD CKD-EPI 2021: 26 ML/MIN/1.73M*2
EGFRCR SERPLBLD CKD-EPI 2021: 31 ML/MIN/1.73M*2
EJECTION FRACTION: 30 %
ERYTHROCYTE [DISTWIDTH] IN BLOOD BY AUTOMATED COUNT: 14.2 % (ref 11.5–14.5)
ERYTHROCYTE [DISTWIDTH] IN BLOOD BY AUTOMATED COUNT: 14.5 % (ref 11.5–14.5)
GLUCOSE BLD MANUAL STRIP-MCNC: 114 MG/DL (ref 74–99)
GLUCOSE BLD MANUAL STRIP-MCNC: 153 MG/DL (ref 74–99)
GLUCOSE BLD MANUAL STRIP-MCNC: 179 MG/DL (ref 74–99)
GLUCOSE BLD MANUAL STRIP-MCNC: 186 MG/DL (ref 74–99)
GLUCOSE BLD MANUAL STRIP-MCNC: 200 MG/DL (ref 74–99)
GLUCOSE BLD MANUAL STRIP-MCNC: 210 MG/DL (ref 74–99)
GLUCOSE BLDA-MCNC: 217 MG/DL (ref 74–99)
GLUCOSE BLDA-MCNC: 232 MG/DL (ref 74–99)
GLUCOSE BLDA-MCNC: 253 MG/DL (ref 74–99)
GLUCOSE BLDV-MCNC: 202 MG/DL (ref 74–99)
GLUCOSE SERPL-MCNC: 207 MG/DL (ref 74–99)
GLUCOSE SERPL-MCNC: 234 MG/DL (ref 74–99)
HCO3 BLDA-SCNC: 24.1 MMOL/L (ref 22–26)
HCO3 BLDA-SCNC: 24.2 MMOL/L (ref 22–26)
HCO3 BLDA-SCNC: 24.3 MMOL/L (ref 22–26)
HCO3 BLDV-SCNC: 25.9 MMOL/L (ref 22–26)
HCT VFR BLD AUTO: 21.3 % (ref 41–52)
HCT VFR BLD AUTO: 21.9 % (ref 41–52)
HCT VFR BLD EST: 21 % (ref 41–52)
HCT VFR BLD EST: 23 % (ref 41–52)
HCT VFR BLD EST: 23 % (ref 41–52)
HCT VFR BLD EST: 26 % (ref 41–52)
HGB BLD-MCNC: 7.4 G/DL (ref 13.5–17.5)
HGB BLD-MCNC: 7.4 G/DL (ref 13.5–17.5)
HGB BLDA-MCNC: 6.9 G/DL (ref 13.5–17.5)
HGB BLDA-MCNC: 7.8 G/DL (ref 13.5–17.5)
HGB BLDA-MCNC: 8.7 G/DL (ref 13.5–17.5)
HGB BLDV-MCNC: 7.5 G/DL (ref 13.5–17.5)
INHALED O2 CONCENTRATION: 28 %
INHALED O2 CONCENTRATION: 36 %
INHALED O2 CONCENTRATION: 36 %
INHALED O2 CONCENTRATION: 40 %
LACTATE BLDA-SCNC: 1.5 MMOL/L (ref 0.4–2)
LACTATE BLDA-SCNC: 1.9 MMOL/L (ref 0.4–2)
LACTATE BLDA-SCNC: 2 MMOL/L (ref 0.4–2)
LACTATE BLDV-SCNC: 2.3 MMOL/L (ref 0.4–2)
MAGNESIUM SERPL-MCNC: 2.94 MG/DL (ref 1.6–2.4)
MAGNESIUM SERPL-MCNC: 3.03 MG/DL (ref 1.6–2.4)
MCH RBC QN AUTO: 28.8 PG (ref 26–34)
MCH RBC QN AUTO: 29.1 PG (ref 26–34)
MCHC RBC AUTO-ENTMCNC: 33.8 G/DL (ref 32–36)
MCHC RBC AUTO-ENTMCNC: 34.7 G/DL (ref 32–36)
MCV RBC AUTO: 84 FL (ref 80–100)
MCV RBC AUTO: 85 FL (ref 80–100)
NRBC BLD-RTO: 0 /100 WBCS (ref 0–0)
NRBC BLD-RTO: 0 /100 WBCS (ref 0–0)
OXYHGB MFR BLDA: 96.5 % (ref 94–98)
OXYHGB MFR BLDA: 97.1 % (ref 94–98)
OXYHGB MFR BLDA: 97.6 % (ref 94–98)
OXYHGB MFR BLDV: 42.7 % (ref 45–75)
P AXIS: 56 DEGREES
P AXIS: 69 DEGREES
P OFFSET: 176 MS
P OFFSET: 204 MS
P OFFSET: 207 MS
P ONSET: 128 MS
P ONSET: 142 MS
P ONSET: 145 MS
PCO2 BLDA: 35 MM HG (ref 38–42)
PCO2 BLDA: 38 MM HG (ref 38–42)
PCO2 BLDA: 39 MM HG (ref 38–42)
PCO2 BLDV: 47 MM HG (ref 41–51)
PH BLDA: 7.4 PH (ref 7.38–7.42)
PH BLDA: 7.41 PH (ref 7.38–7.42)
PH BLDA: 7.45 PH (ref 7.38–7.42)
PH BLDV: 7.35 PH (ref 7.33–7.43)
PHOSPHATE SERPL-MCNC: 4.8 MG/DL (ref 2.5–4.9)
PHOSPHATE SERPL-MCNC: 5 MG/DL (ref 2.5–4.9)
PLATELET # BLD AUTO: 129 X10*3/UL (ref 150–450)
PLATELET # BLD AUTO: 147 X10*3/UL (ref 150–450)
PO2 BLDA: 125 MM HG (ref 85–95)
PO2 BLDA: 132 MM HG (ref 85–95)
PO2 BLDA: 85 MM HG (ref 85–95)
PO2 BLDV: 27 MM HG (ref 35–45)
POTASSIUM BLDA-SCNC: 3.8 MMOL/L (ref 3.5–5.3)
POTASSIUM BLDA-SCNC: 3.8 MMOL/L (ref 3.5–5.3)
POTASSIUM BLDA-SCNC: 4.1 MMOL/L (ref 3.5–5.3)
POTASSIUM BLDV-SCNC: 4.3 MMOL/L (ref 3.5–5.3)
POTASSIUM SERPL-SCNC: 3.5 MMOL/L (ref 3.5–5.3)
POTASSIUM SERPL-SCNC: 3.9 MMOL/L (ref 3.5–5.3)
PR INTERVAL: 126 MS
PR INTERVAL: 138 MS
PR INTERVAL: 146 MS
Q ONSET: 191 MS
Q ONSET: 214 MS
Q ONSET: 215 MS
QRS COUNT: 14 BEATS
QRS COUNT: 20 BEATS
QRS COUNT: 20 BEATS
QRS DURATION: 106 MS
QRS DURATION: 146 MS
QRS DURATION: 98 MS
QT INTERVAL: 308 MS
QT INTERVAL: 352 MS
QT INTERVAL: 396 MS
QTC CALCULATION(BAZETT): 433 MS
QTC CALCULATION(BAZETT): 476 MS
QTC CALCULATION(BAZETT): 497 MS
QTC FREDERICIA: 387 MS
QTC FREDERICIA: 443 MS
QTC FREDERICIA: 448 MS
R AXIS: 77 DEGREES
R AXIS: 89 DEGREES
R AXIS: 89 DEGREES
RBC # BLD AUTO: 2.54 X10*6/UL (ref 4.5–5.9)
RBC # BLD AUTO: 2.57 X10*6/UL (ref 4.5–5.9)
SAO2 % BLDA: 100 % (ref 94–100)
SAO2 % BLDA: 100 % (ref 94–100)
SAO2 % BLDA: 99 % (ref 94–100)
SAO2 % BLDV: 44 % (ref 45–75)
SODIUM BLDA-SCNC: 130 MMOL/L (ref 136–145)
SODIUM BLDA-SCNC: 131 MMOL/L (ref 136–145)
SODIUM BLDA-SCNC: 132 MMOL/L (ref 136–145)
SODIUM BLDV-SCNC: 132 MMOL/L (ref 136–145)
SODIUM SERPL-SCNC: 134 MMOL/L (ref 136–145)
SODIUM SERPL-SCNC: 134 MMOL/L (ref 136–145)
STAPHYLOCOCCUS SPEC CULT: NORMAL
T AXIS: 111 DEGREES
T AXIS: 132 DEGREES
T AXIS: 198 DEGREES
T OFFSET: 367 MS
T OFFSET: 368 MS
T OFFSET: 413 MS
VENTRICULAR RATE: 119 BPM
VENTRICULAR RATE: 120 BPM
VENTRICULAR RATE: 87 BPM
WBC # BLD AUTO: 24.1 X10*3/UL (ref 4.4–11.3)
WBC # BLD AUTO: 26.3 X10*3/UL (ref 4.4–11.3)

## 2024-12-06 PROCEDURE — 2500000004 HC RX 250 GENERAL PHARMACY W/ HCPCS (ALT 636 FOR OP/ED)

## 2024-12-06 PROCEDURE — 71045 X-RAY EXAM CHEST 1 VIEW: CPT

## 2024-12-06 PROCEDURE — 37799 UNLISTED PX VASCULAR SURGERY: CPT

## 2024-12-06 PROCEDURE — 2500000005 HC RX 250 GENERAL PHARMACY W/O HCPCS: Performed by: STUDENT IN AN ORGANIZED HEALTH CARE EDUCATION/TRAINING PROGRAM

## 2024-12-06 PROCEDURE — 84132 ASSAY OF SERUM POTASSIUM: CPT

## 2024-12-06 PROCEDURE — 85027 COMPLETE CBC AUTOMATED: CPT

## 2024-12-06 PROCEDURE — 99291 CRITICAL CARE FIRST HOUR: CPT

## 2024-12-06 PROCEDURE — 71045 X-RAY EXAM CHEST 1 VIEW: CPT | Performed by: RADIOLOGY

## 2024-12-06 PROCEDURE — 82947 ASSAY GLUCOSE BLOOD QUANT: CPT

## 2024-12-06 PROCEDURE — 93010 ELECTROCARDIOGRAM REPORT: CPT | Performed by: INTERNAL MEDICINE

## 2024-12-06 PROCEDURE — 2500000005 HC RX 250 GENERAL PHARMACY W/O HCPCS

## 2024-12-06 PROCEDURE — 2500000002 HC RX 250 W HCPCS SELF ADMINISTERED DRUGS (ALT 637 FOR MEDICARE OP, ALT 636 FOR OP/ED)

## 2024-12-06 PROCEDURE — 2500000001 HC RX 250 WO HCPCS SELF ADMINISTERED DRUGS (ALT 637 FOR MEDICARE OP): Performed by: NURSE PRACTITIONER

## 2024-12-06 PROCEDURE — 93005 ELECTROCARDIOGRAM TRACING: CPT

## 2024-12-06 PROCEDURE — 2500000001 HC RX 250 WO HCPCS SELF ADMINISTERED DRUGS (ALT 637 FOR MEDICARE OP): Performed by: STUDENT IN AN ORGANIZED HEALTH CARE EDUCATION/TRAINING PROGRAM

## 2024-12-06 PROCEDURE — 2500000004 HC RX 250 GENERAL PHARMACY W/ HCPCS (ALT 636 FOR OP/ED): Performed by: STUDENT IN AN ORGANIZED HEALTH CARE EDUCATION/TRAINING PROGRAM

## 2024-12-06 PROCEDURE — 97164 PT RE-EVAL EST PLAN CARE: CPT | Mod: GP

## 2024-12-06 PROCEDURE — 83735 ASSAY OF MAGNESIUM: CPT

## 2024-12-06 PROCEDURE — 2500000002 HC RX 250 W HCPCS SELF ADMINISTERED DRUGS (ALT 637 FOR MEDICARE OP, ALT 636 FOR OP/ED): Performed by: NURSE PRACTITIONER

## 2024-12-06 PROCEDURE — 2500000004 HC RX 250 GENERAL PHARMACY W/ HCPCS (ALT 636 FOR OP/ED): Performed by: NURSE PRACTITIONER

## 2024-12-06 PROCEDURE — 82330 ASSAY OF CALCIUM: CPT

## 2024-12-06 PROCEDURE — 85018 HEMOGLOBIN: CPT

## 2024-12-06 PROCEDURE — 97530 THERAPEUTIC ACTIVITIES: CPT | Mod: GP

## 2024-12-06 PROCEDURE — 2020000001 HC ICU ROOM DAILY

## 2024-12-06 RX ORDER — CHLOROTHIAZIDE SODIUM 500 MG/1
500 INJECTION INTRAVENOUS ONCE
Status: COMPLETED | OUTPATIENT
Start: 2024-12-06 | End: 2024-12-06

## 2024-12-06 RX ORDER — ONDANSETRON HYDROCHLORIDE 2 MG/ML
INJECTION, SOLUTION INTRAVENOUS
Status: COMPLETED
Start: 2024-12-06 | End: 2024-12-06

## 2024-12-06 RX ORDER — POTASSIUM CHLORIDE 1.5 G/1.58G
20 POWDER, FOR SOLUTION ORAL EVERY 6 HOURS PRN
Status: DISCONTINUED | OUTPATIENT
Start: 2024-12-06 | End: 2024-12-13

## 2024-12-06 RX ORDER — HYDROMORPHONE HYDROCHLORIDE 0.2 MG/ML
0.2 INJECTION INTRAMUSCULAR; INTRAVENOUS; SUBCUTANEOUS
Status: DISCONTINUED | OUTPATIENT
Start: 2024-12-06 | End: 2024-12-06

## 2024-12-06 RX ORDER — BISACODYL 10 MG/1
10 SUPPOSITORY RECTAL DAILY PRN
Status: DISCONTINUED | OUTPATIENT
Start: 2024-12-06 | End: 2024-12-07

## 2024-12-06 RX ORDER — ONDANSETRON HYDROCHLORIDE 2 MG/ML
4 INJECTION, SOLUTION INTRAVENOUS EVERY 4 HOURS PRN
Status: DISCONTINUED | OUTPATIENT
Start: 2024-12-06 | End: 2024-12-13

## 2024-12-06 RX ORDER — INSULIN LISPRO 100 [IU]/ML
0-15 INJECTION, SOLUTION INTRAVENOUS; SUBCUTANEOUS
Status: DISCONTINUED | OUTPATIENT
Start: 2024-12-06 | End: 2024-12-06

## 2024-12-06 RX ORDER — BUMETANIDE 0.25 MG/ML
4 INJECTION, SOLUTION INTRAMUSCULAR; INTRAVENOUS ONCE
Status: COMPLETED | OUTPATIENT
Start: 2024-12-06 | End: 2024-12-06

## 2024-12-06 RX ORDER — BUMETANIDE 0.25 MG/ML
2 INJECTION, SOLUTION INTRAMUSCULAR; INTRAVENOUS ONCE
Status: DISCONTINUED | OUTPATIENT
Start: 2024-12-06 | End: 2024-12-06

## 2024-12-06 RX ORDER — ACETAMINOPHEN 10 MG/ML
1000 INJECTION, SOLUTION INTRAVENOUS EVERY 6 HOURS SCHEDULED
Status: COMPLETED | OUTPATIENT
Start: 2024-12-06 | End: 2024-12-07

## 2024-12-06 RX ORDER — POTASSIUM CHLORIDE 1.5 G/1.58G
40 POWDER, FOR SOLUTION ORAL EVERY 6 HOURS PRN
Status: DISCONTINUED | OUTPATIENT
Start: 2024-12-06 | End: 2024-12-13

## 2024-12-06 RX ORDER — POTASSIUM CHLORIDE 20 MEQ/1
20 TABLET, EXTENDED RELEASE ORAL EVERY 6 HOURS PRN
Status: DISCONTINUED | OUTPATIENT
Start: 2024-12-06 | End: 2024-12-13

## 2024-12-06 RX ORDER — POTASSIUM CHLORIDE 20 MEQ/1
40 TABLET, EXTENDED RELEASE ORAL EVERY 6 HOURS PRN
Status: DISCONTINUED | OUTPATIENT
Start: 2024-12-06 | End: 2024-12-13

## 2024-12-06 RX ORDER — INSULIN LISPRO 100 [IU]/ML
0-20 INJECTION, SOLUTION INTRAVENOUS; SUBCUTANEOUS
Status: DISCONTINUED | OUTPATIENT
Start: 2024-12-06 | End: 2024-12-09

## 2024-12-06 RX ORDER — FUROSEMIDE 10 MG/ML
60 INJECTION INTRAMUSCULAR; INTRAVENOUS ONCE
Status: COMPLETED | OUTPATIENT
Start: 2024-12-06 | End: 2024-12-06

## 2024-12-06 RX ORDER — HYDRALAZINE HYDROCHLORIDE 10 MG/1
10 TABLET, FILM COATED ORAL EVERY 8 HOURS
Status: DISCONTINUED | OUTPATIENT
Start: 2024-12-06 | End: 2024-12-09

## 2024-12-06 RX ORDER — POLYETHYLENE GLYCOL 3350 17 G/17G
17 POWDER, FOR SOLUTION ORAL 2 TIMES DAILY
Status: DISCONTINUED | OUTPATIENT
Start: 2024-12-06 | End: 2024-12-09

## 2024-12-06 RX ORDER — POTASSIUM CHLORIDE 14.9 MG/ML
20 INJECTION INTRAVENOUS EVERY 6 HOURS PRN
Status: DISCONTINUED | OUTPATIENT
Start: 2024-12-06 | End: 2024-12-13

## 2024-12-06 RX ORDER — ALUMINUM HYDROXIDE, MAGNESIUM HYDROXIDE, AND SIMETHICONE 1200; 120; 1200 MG/30ML; MG/30ML; MG/30ML
10 SUSPENSION ORAL EVERY 4 HOURS PRN
Status: COMPLETED | OUTPATIENT
Start: 2024-12-06 | End: 2024-12-07

## 2024-12-06 RX ORDER — HYDROMORPHONE HYDROCHLORIDE 0.2 MG/ML
0.2 INJECTION INTRAMUSCULAR; INTRAVENOUS; SUBCUTANEOUS EVERY 2 HOUR PRN
Status: DISCONTINUED | OUTPATIENT
Start: 2024-12-06 | End: 2024-12-07

## 2024-12-06 RX ORDER — FUROSEMIDE 10 MG/ML
20 INJECTION INTRAMUSCULAR; INTRAVENOUS ONCE
Status: COMPLETED | OUTPATIENT
Start: 2024-12-06 | End: 2024-12-06

## 2024-12-06 ASSESSMENT — PAIN SCALES - GENERAL
PAINLEVEL_OUTOF10: 0 - NO PAIN
PAINLEVEL_OUTOF10: 9
PAINLEVEL_OUTOF10: 2
PAINLEVEL_OUTOF10: 0 - NO PAIN
PAINLEVEL_OUTOF10: 0 - NO PAIN
PAINLEVEL_OUTOF10: 7
PAINLEVEL_OUTOF10: 0 - NO PAIN
PAINLEVEL_OUTOF10: 0 - NO PAIN
PAINLEVEL_OUTOF10: 8
PAINLEVEL_OUTOF10: 0 - NO PAIN
PAINLEVEL_OUTOF10: 0 - NO PAIN
PAINLEVEL_OUTOF10: 9
PAINLEVEL_OUTOF10: 0 - NO PAIN

## 2024-12-06 ASSESSMENT — COGNITIVE AND FUNCTIONAL STATUS - GENERAL
MOVING FROM LYING ON BACK TO SITTING ON SIDE OF FLAT BED WITH BEDRAILS: A LITTLE
TURNING FROM BACK TO SIDE WHILE IN FLAT BAD: A LITTLE
WALKING IN HOSPITAL ROOM: A LOT
MOBILITY SCORE: 14
STANDING UP FROM CHAIR USING ARMS: A LITTLE
MOVING TO AND FROM BED TO CHAIR: A LOT
CLIMB 3 TO 5 STEPS WITH RAILING: TOTAL

## 2024-12-06 ASSESSMENT — PAIN DESCRIPTION - ORIENTATION: ORIENTATION: MID

## 2024-12-06 ASSESSMENT — PAIN DESCRIPTION - LOCATION: LOCATION: STERNUM

## 2024-12-06 ASSESSMENT — ACTIVITIES OF DAILY LIVING (ADL): ADL_ASSISTANCE: INDEPENDENT

## 2024-12-06 ASSESSMENT — PAIN DESCRIPTION - DESCRIPTORS: DESCRIPTORS: ACHING

## 2024-12-06 NOTE — PROGRESS NOTES
Physical Therapy    Physical Therapy Re-Evaluation & Treatment    Patient Name: Edu Echavarria  MRN: [id number]  Department: [facility] [place] [facility] CTU  Room: 04/04-A  Today's Date: 12/6/2024   Time Calculation  Start Time: 0942  Stop Time: 1017  Time Calculation (min): 35 min    Assessment/Plan   PT Assessment  PT Assessment Results: Decreased strength, Decreased endurance, Impaired balance, Decreased mobility, Pain  Rehab Prognosis: Good  Barriers to Discharge: medical acuity  Evaluation/Treatment Tolerance: Patient tolerated treatment well  Medical Staff Made Aware: Yes  End of Session Communication: Bedside nurse  Assessment Comment: Pt performed bed mobility with Min A, functional transfers with Min-Mod A, and took side steps with FWW with Min A. Pt will continue to benefit from skilled PT to improve functional mobility.  End of Session Patient Position: Bed, 3 rail up, Alarm off, not on at start of session   IP OR SWING BED PT PLAN  Inpatient or Swing Bed: Inpatient  PT Plan  Treatment/Interventions: Bed mobility, Gait training, Transfer training, Stair training, Balance training, Strengthening, Endurance training, Range of motion, Therapeutic exercise, Therapeutic activity, Home exercise program  PT Plan: Ongoing PT  PT Frequency: 3 times per week  PT Discharge Recommendations: Moderate intensity level of continued care  Equipment Recommended upon Discharge: Wheeled walker  PT Recommended Transfer Status: Assist x1, Assist x2  PT - OK to Discharge: Yes      Subjective     General Visit Information:  General  Reason for Referral: initially presented to OSH with STEMI. Transferred to [facility] for CABG evaluation, hospital course c/b MARYBETH, worsening hyponatremia causing delay of surgery case, then further complicated by respiratory failure following fluid resuscitation for correction of hyponatremia, and cardiogenic shock necessitating IABP. Now presents to CTICU s/p CABGx4, LAAL with Dr. Villa. s/p IABP removal 12/5.  Past Medical  History Relevant to Rehab: 74 y.o. male with PMH of HTN, HFrEF, CAD, GERD, and gout  Prior to Session Communication: Bedside nurse  Patient Position Received: Up in chair, Alarm off, not on at start of session  Preferred Learning Style: auditory, verbal, visual  General Comment: Pt awake and willing to participate in PT re-evaluation. (Lines: A line, tele, 2 chest tubes, hawthorne, epi 0.03, Mil 0.125, Amio 1; 4L O2 NC, [place])  Home Living:  Home Living  Type of Home: House  Lives With: Alone  Home Adaptive Equipment: None  Home Layout: One level  Home Access: Stairs to enter with rails  Entrance Stairs-Number of Steps: 2  Bathroom Shower/Tub: Tub/shower unit  Bathroom Equipment: None  Home Living Comments: Pt reported that he will be discharging to his cousin's house- cousin is a RN; one floor, 2 MITCHELL at cousin's home  Prior Level of Function:  Prior Function Per Pt/Caregiver Report  Level of Starr: Independent with ADLs and functional transfers  ADL Assistance: Independent  Homemaking Assistance: Independent  Ambulatory Assistance: Independent  Vocational: Retired  Leisure: Keeps busy around the house  Prior Function Comments: Drives  Precautions:  Precautions  Medical Precautions: Cardiac precautions, Fall precautions, Chest tube  Precautions Comment: MAP 65-90; VVI@60, SpO2>92%    Vital Signs (Past 2hrs)        Date/Time Vitals Session Patient Position Pulse Resp SpO2 BP MAP (mmHg)    12/06/24 0942 Pre PT  Sitting  82  --  99 %  151/57  81     12/06/24 1000 --  --  92  32  90 %  --  --     12/06/24 1017 Post PT  Lying  97  --  96 %  131/54  78     12/06/24 1100 --  --  86  12  100 %  --  --                        Objective   Pain:  Pain Assessment  Pain Assessment: 0-10  0-10 (Numeric) Pain Score: 9  Pain Type: Acute pain  Pain Location: Buttocks  Pain Interventions: Repositioned  Response to Interventions: Resting quietly  Cognition:  Cognition  Overall Cognitive Status: Within Functional Limits  Orientation  Level: Oriented X4    General Assessments:    Activity Tolerance  Endurance: Tolerates 10 - 20 min exercise with multiple rests  Early Mobility/Exercise Safety Screen: Proceed with mobilization - No exclusion criteria met  Activity Tolerance Comments: BP dropped upon initial sit<>stand; pt reporting dizziness; SpO2 unstable during mobility- pt cued to breath through his nose which improved O2    Sensation  Light Touch: No apparent deficits    Perception  Inattention/Neglect: Appears intact  Initiation: Appears intact  Motor Planning: Appears intact  Perseveration: Not present      Coordination  Movements are Fluid and Coordinated: Yes         Static Sitting Balance  Static Sitting-Balance Support: Feet supported, Bilateral upper extremity supported  Static Sitting-Level of Assistance: Contact guard    Static Standing Balance  Static Standing-Balance Support: Bilateral upper extremity supported  Static Standing-Level of Assistance: Minimum assistance, Moderate assistance  Static Standing-Comment/Number of Minutes: Min-Mod A in standing due to balance deficits  Functional Assessments:  Bed Mobility  Bed Mobility: Yes  Bed Mobility 1  Bed Mobility 1: Sitting to supine  Level of Assistance 1: Minimum assistance  Bed Mobility Comments 1: Min A for LE management to supine    Transfers  Transfer: Yes  Transfer 1  Transfer From 1: Sit to, Stand to  Transfer to 1: Stand, Sit  Technique 1: Sit to stand, Stand to sit  Transfer Device 1: Walker  Transfer Level of Assistance 1: Minimum assistance  Trials/Comments 1: Min A for hip elevation to stand; verbal cues for hand placement on FWW; x3 reps during session  Transfers 2  Transfer From 2: Chair with arms to  Transfer to 2: Bed  Technique 2: Stand pivot  Transfer Device 2: Walker  Transfer Level of Assistance 2: Moderate assistance  Trials/Comments 2: Mod A for balance in standing and initiating steps to bed; pt with poor FWW management    Ambulation/Gait  Training  Ambulation/Gait Training Performed: Yes  Ambulation/Gait Training 1  Surface 1: Level tile  Device 1: Rolling walker  Assistance 1: Minimum assistance  Quality of Gait 1: Decreased step length, Forward flexed posture  Comments/Distance (ft) 1: x3' via side steps to the R; Min A for balance with FWW; cues for upright posture throughout  Extremity/Trunk Assessments:      Strength RLE  R Knee Extension: 4/5  Strength LLE  L Knee Extension: 4/5  Treatments:  Therapeutic Activity  Therapeutic Activity Performed: Yes  Therapeutic Activity 1: Increased time for advanced ICU line management required for functional mobility  Therapeutic Activity 2: Pt stood for ~25s and performed 10 standing marches with Min A; bouts of Mod A due to posterior lean/balance instability  Therapeutic Activity 3: Education provided on MITT precautions via verbal and visual explanation  Therapeutic Activity 4: Pt sat EOB for 3 minutes prior to laying supine; CGA for safety  Therapeutic Activity 5: Vitals monitored throughout session for safe mobilization  Outcome Measures:  [facility] Basic Mobility  Turning from your back to your side while in a flat bed without using bedrails: A little  Moving from lying on your back to sitting on the side of a flat bed without using bedrails: A little  Moving to and from bed to chair (including a wheelchair): A lot  Standing up from a chair using your arms (e.g. wheelchair or bedside chair): A little  To walk in hospital room: A lot  Climbing 3-5 steps with railing: Total  Basic Mobility - Total Score: 14    FSS-ICU  Ambulation: Walks <50 feet with any assistance x1 or walks any distance with assistance x2 people  Rolling: Minimal assistance (performs 75% or more of task)  Sitting: Minimal assistance (performs 75% or more of task)  Transfer Sit-to-Stand: Minimal assistance (performs 75% or more of task)  Transfer Supine-to-Sit: Minimal assistance (performs 75% or more of task)  Total Score: 17      Early  Mobility/Exercise Safety Screen: Proceed with mobilization - No exclusion criteria met  ICU Mobility Scale: Marching on spot (at bedside) [6]    Encounter Problems       Encounter Problems (Active)       Balance       Pt will demonstrate improved sitting/standing static/dynamic balance activities without LOB via score of 24/28 on the Tinetti for increase in safety prior to DC.  (Progressing)       Start:  12/06/24    Expected End:  12/20/24               Mobility       Pt will ambulate >25ft with appropriate form, Min A or less, LRAD, and no LOB for safe DC.  (Progressing)       Start:  12/06/24    Expected End:  12/20/24            Pt will tolerate >15 minutes of upright standing activity without seated rest breaks with no changes in vital signs for improved functional mobility.  (Progressing)       Start:  12/06/24    Expected End:  12/20/24               PT Transfers       Pt will perform bed mobility with SBA or less and use of LRAD to safely DC.  (Progressing)       Start:  12/06/24    Expected End:  12/20/24            Pt will perform sit<>stand transfers with CGA or less and use of LRAD to safely DC.  (Progressing)       Start:  12/06/24    Expected End:  12/20/24                   Education Documentation  Precautions, taught by Aby Sinha PT at 12/6/2024 11:34 AM.  Learner: Patient  Readiness: Acceptance  Method: Explanation  Response: Needs Reinforcement    Body Mechanics, taught by Aby Sinha PT at 12/6/2024 11:34 AM.  Learner: Patient  Readiness: Acceptance  Method: Explanation  Response: Needs Reinforcement    Mobility Training, taught by Aby Sinha PT at 12/6/2024 11:34 AM.  Learner: Patient  Readiness: Acceptance  Method: Explanation  Response: Needs Reinforcement    Education Comments  No comments found.    ABY SINHA PT

## 2024-12-06 NOTE — PROGRESS NOTES
CT SURGERY  12/04 CABGx4, LAAL with Dr. Villa     SIGNIFICANT EVENTS  12/05 - IABP removed     DC PLAN  TBD - Care Transitions is following to develop a safe & supportive discharge plan in collaboration with multidisciplinary team (&) patient/family/significant others.      PAYOR   Medicare A/B     PT/OT   ( ) Awaiting     COMPLETED  (X) Daily ongoing review of patient via chart and/or (M-F) IDT rounds    Sophie Ibrahim (LSW, MSW)

## 2024-12-06 NOTE — PROGRESS NOTES
"CTICU Progress Note  Edu Echavarria/[id number]    Admit Date: 11/18/2024  Hospital Length of Stay: 18   ICU Length of Stay: 1d 9h   CT SURGEON: Dr. Villa    Subjective    Went into Afib with RVR with rates in 130s overnight so amio bolus x2 given and amio gtt started. Remained on epi and milrinone. PAC also having issues, not reading PAP correct. This morning he reports he is doing poorly, describing 9/10 pain localized to his bilateral chest wall and worst with inspiration. Minimal abdominal pain, no BM, no dysuria.    MEDICATIONS  Infusions:  amiodarone, Last Rate: 1 mg/min (12/06/24 0801)  EPINEPHrine, Last Rate: 0.03 mcg/kg/min (12/06/24 0800)  milrinone, Last Rate: 0.125 mcg/kg/min (12/06/24 0800)      Scheduled:  acetaminophen, 650 mg, q6h  aspirin, 81 mg, Daily  atorvastatin, 80 mg, Nightly  ceFAZolin, 2 g, q8h  heparin, 5,000 Units, q8h  insulin lispro, 0-15 Units, q4h  lidocaine, 1 patch, Daily  pantoprazole, 40 mg, Daily before breakfast   Or  pantoprazole, 40 mg, Daily before breakfast  polyethylene glycol, 17 g, BID  sennosides-docusate sodium, 2 tablet, BID      PRN:  alteplase, 2 mg, PRN  bisacodyl, 10 mg, Daily PRN  calcium gluconate, 1 g, q6h PRN  calcium gluconate, 2 g, q6h PRN  hydrALAZINE, 10 mg, q4h PRN  HYDROmorphone, 0.2 mg, q15 min PRN  magnesium sulfate, 2 g, q6h PRN  magnesium sulfate, 4 g, q6h PRN  naloxone, 0.2 mg, q5 min PRN  ondansetron, 4 mg, q4h PRN  oxyCODONE, 10 mg, q4h PRN  oxyCODONE, 5 mg, q4h PRN  oxygen, , Continuous PRN - O2/gases  potassium chloride, 40 mEq, q6h PRN          Objective    Visit Vitals  BP (!) 127/28   Pulse 76   Temp 36.1 °C (97 °F) (Temporal)   Resp (!) 27   Ht 1.702 m (5' 7\")   Wt 89 kg (196 lb 3.2 oz)   SpO2 98%   BMI 30.73 kg/m²   Smoking Status Former   BSA 2.05 m²     Wt Readings from Last 5 Encounters:   12/04/24 89 kg (196 lb 3.2 oz)   11/18/24 82 kg (180 lb 12.4 oz)     Intake & Output:  I/O last 3 completed shifts:  In: 5112.6 (57.4 mL/kg) [I.V.:2112.6 " (23.7 mL/kg); Blood:1530; NG/GT:170; IV Piggyback:1300]  Out: 2625 (29.5 mL/kg) [Urine:1720 (0.5 mL/kg/hr); Chest Tube:905]  Weight: 89 kg        Vent settings:       Physical Exam:   - Constitutional: Critically ill male sitting in chair  - Neuro: A&Ox4, CAM negative. Moves all four extremities spontaneously and to commands. No FND  - CV: RRR, NSR, hypertensive with SBP in 130s  - Pulm: Not in respiratory distress, breathing easily on 4L NC. Pleural and mediastinal chest tubes in place draining sanguinous output, no airleak  - GI: Soft, non-tender, no ndistended  - : Logan in place draining orange urine, no hematoma appreciated in the right groin  - Extremities: WWP, 2+ pulses throughout, no peripheral edema  - Skin: No jaundiced, no obvious rashes or deformities, sternal incision closed, well aproximated, and without signs of infection  - Psych: Calm and cooperative, normal mood and behavior    Daily Risk Screen  Intubated: No  Central line: Yes - for invasive hemodynamic monitoring  Logan: Yes - for accurate I&Os    Images: All relevant images reviewed.    Invasive Hemodynamics:    Most Recent Range Past 24hrs   BP (Art) 144/54 Arterial Line BP 1  Min: 119/51  Max: 171/58   MAP(Art) 77 mmHg Arterial Line MAP 1 (mmHg)   Min: 70 mmHg  Max: 105 mmHg   RA/CVP   No data recorded   PA 65/26 PAP  Min: 47/16  Max: 65/26   PA(mean) 159 mmHg PAP (Mean)  Min: 27 mmHg  Max: 273 mmHg   CO 5.5 L/min CO (L/min)  Min: 4.45 L/min  Max: 6.1 L/min   CI 2.8 L/min/m2 CI (L/min/m2)  Min: 2.23 L/min/m2  Max: 3.03 L/min/m2   Mixed Venous 77 % No data recorded   SVR  1010 (dyne*sec)/cm5 SVR (dyne*sec)/cm5  Min: 842 (dyne*sec)/cm5  Max: 1455 (dyne*sec)/cm5       Assessment/Plan    Edu Echavarria is a 74 y.o. male with PMH of HTN, HFrEF, CAD, GERD, and gout, initially presented to OSH with STEMI. Transferred to [facility] for CABG evaluation, hospital course c/b MARYBETH, worsening hyponatremia causing delay of surgery case, then further  complicated by respiratory failure following fluid resuscitation for correction of hyponatremia, and cardiogenic shock necessitating IABP.    Admitted to CTICU s/p CABGx4, LAAL with Dr. Villa. S/p R fem IABP. Working on weaning gtts. Extubated, breathing easily on minimal supplemental O2. Cr continuing to rise despite IABP removal, will trial diuresis to offload some volume.      NEURO:  History of gout. Acute post operative pain.  - Serial neuro and pain assessments   - Scheduled Tylenol, lidocaine patch  - PRN oxycodone  - PRN dilaudid for breakthrough pain   - PT Consult, OOB to chair as tolerated, chair position if not tolerated   - CAM ICU score Qshift  - Sleep/wake cycle hygiene  - Holding home allopurinol (gout)     CV:  PMH of HTN, HFrEF, CAD, STEMI c/b cardiogenic shock with IABP placement. Now s/p CABG x4 with LAAL. Pre/Post EF: 30/30% Arrived to CTICU on epi 0.06, norepi 0.03, vasopressin 0.03. A/V epicardial wires set DDD @ 80. R fem IABP removed 12/5.  - Wean epi & milrinone as able  - Maintain goal MAP 65-90  - Mixed venous and CI Q4H  - Volume resuscitate as clinically indicated  - Maintain epicardial wires set VVI backup @ 60  - Continue ASA 81mg  - Start Plavix 75mg  - Continue atorvastatin 80mg  - Start PO hydralazine 10mg TID  - Hold home metoprolol succinate  - Continue amio gtt     PULM:  No significant pulmonary PMH. Developed AHRF in CICU 2/2 cardiogenic shock. Extubated 12/5. Chest tubes: L pleural, R pleural, mediastinal.  - Daily CXR  - Wean FiO2 maintaining SpO2 >92%.   - IS Q1H and OOB to chair when extubated  - Chest tubes to wall suction, can pull once output <100/8 hr     GI:  PMH of GERD.  - Continue cardiac-diabetic diet  - Miralax and senna-docusate BID     /Renal:  No /renal PMH. Baseline Cr of 0.7-1.0. Has had MARYBETH and acute on chronic hyponatremia likely 2/2 hypovolemia 2/2 decreased PO intake and diarrhea. CSA-MARYBETH Risk Score 3. Creatinine rising post-op, possibly due to  inferior displacement of IABP. Hawthorne in place and making adequate UOP.  - Continue hawthorne catheter for strict I/Os  - Goal UOP 0.5ml/kg/hr  - RFP as clinically indicated  - Replete electrolytes per CTICU protocol  - Lasix 20mg once     ENDO:  No significant PMH. Last A1c: 6.3 (11/18/24).  - Maintain BG <180, insulin p/er CTICU protocol  - Increase SSI from #3 to #4     HEME:  No significant PMH. Acute blood loss anemia and thrombocytopenia.  - Monitor drain output volume and characteristics  - CBC, coags, and fibrinogen post op and as clinically indicated  - Continue ASA  - Plan for to receive Plavix POD2  - Restart SQH  - SCDs for DVT prophylaxis  - Type & Screen: Q72H     ID: Afebrile, no current indications of infection. MRSA negative. Broad infectious work-up for leukocytosis completed and negative, s/p Vanc/Zosyn (11/29-12/2).  - Trend temp Q4H  - Periop cefazolin x 48hrs until 12/6     Skin: No active skin issues.  - Preventative Mepilex dressings in place on sacrum and heels  - Change preventative Mepilex weekly or more frequently as indicated (when moist/soiled)   - Every shift skin assessment per nursing and weekly ICU skin rounds  - Moisture barrier to be applied with shirley care  - Active skin problems addressed with nursing on daily rounds     PPx:  SCDs  SQH  PPI     G: Line  Right IJ with PAC placed 11/29/24  Left radial A-line placed 11/29/24  Right femoral IABP placed 11/29/24    F: Family:  Will update at bedside postoperatively.    Restraints:  The indications and risks/benefits of non-violent/non self-destructive restraints were discussed.      A,B,C,D,E,F,G: reviewed     Dispo: CTICU care for now.     Code status: Full Code   Extended Emergency Contact Information  Primary Emergency Contact: Guillermo Barrios  Mobile Phone: [phone]  Relation: Relative  Preferred language: English   needed? No  Secondary Emergency Contact: COLIN ROMERO  Mobile Phone: [phone]  Relation: Friend  Preferred  language: English   needed? No     Patient staffed with Dr. Vega    CTICU TEAM PHONE [phone]      Noe Aviles MD  PGY-1 Resident Physician  Department of Anesthesiology & Perioperative Medicine

## 2024-12-07 ENCOUNTER — APPOINTMENT (OUTPATIENT)
Dept: CARDIOLOGY | Facility: HOSPITAL | Age: 74
DRG: 235 | End: 2024-12-07
Payer: MEDICARE

## 2024-12-07 ENCOUNTER — APPOINTMENT (OUTPATIENT)
Dept: RADIOLOGY | Facility: HOSPITAL | Age: 74
DRG: 235 | End: 2024-12-07
Payer: MEDICARE

## 2024-12-07 LAB
ALBUMIN SERPL BCP-MCNC: 3.4 G/DL (ref 3.4–5)
ALBUMIN SERPL BCP-MCNC: 3.6 G/DL (ref 3.4–5)
ANION GAP BLDV CALCULATED.4IONS-SCNC: 11 MMOL/L (ref 10–25)
ANION GAP BLDV CALCULATED.4IONS-SCNC: 13 MMOL/L (ref 10–25)
ANION GAP BLDV CALCULATED.4IONS-SCNC: 14 MMOL/L (ref 10–25)
ANION GAP BLDV CALCULATED.4IONS-SCNC: 14 MMOL/L (ref 10–25)
ANION GAP SERPL CALC-SCNC: 20 MMOL/L (ref 10–20)
ANION GAP SERPL CALC-SCNC: 21 MMOL/L (ref 10–20)
BASE EXCESS BLDV CALC-SCNC: -1 MMOL/L (ref -2–3)
BASE EXCESS BLDV CALC-SCNC: -1.5 MMOL/L (ref -2–3)
BASE EXCESS BLDV CALC-SCNC: -1.7 MMOL/L (ref -2–3)
BASE EXCESS BLDV CALC-SCNC: -7.2 MMOL/L (ref -2–3)
BLOOD EXPIRATION DATE: NORMAL
BODY TEMPERATURE: 37 DEGREES CELSIUS
BUN SERPL-MCNC: 43 MG/DL (ref 6–23)
BUN SERPL-MCNC: 50 MG/DL (ref 6–23)
CA-I BLD-SCNC: 1.07 MMOL/L (ref 1.1–1.33)
CA-I BLD-SCNC: 1.1 MMOL/L (ref 1.1–1.33)
CA-I BLDV-SCNC: 0.95 MMOL/L (ref 1.1–1.33)
CA-I BLDV-SCNC: 1.12 MMOL/L (ref 1.1–1.33)
CA-I BLDV-SCNC: 1.13 MMOL/L (ref 1.1–1.33)
CA-I BLDV-SCNC: 1.15 MMOL/L (ref 1.1–1.33)
CALCIUM SERPL-MCNC: 8.2 MG/DL (ref 8.6–10.6)
CALCIUM SERPL-MCNC: 8.4 MG/DL (ref 8.6–10.6)
CHLORIDE BLDV-SCNC: 107 MMOL/L (ref 98–107)
CHLORIDE BLDV-SCNC: 95 MMOL/L (ref 98–107)
CHLORIDE SERPL-SCNC: 92 MMOL/L (ref 98–107)
CHLORIDE SERPL-SCNC: 93 MMOL/L (ref 98–107)
CO2 SERPL-SCNC: 23 MMOL/L (ref 21–32)
CO2 SERPL-SCNC: 24 MMOL/L (ref 21–32)
CREAT SERPL-MCNC: 2.83 MG/DL (ref 0.5–1.3)
CREAT SERPL-MCNC: 3.26 MG/DL (ref 0.5–1.3)
DISPENSE STATUS: NORMAL
EGFRCR SERPLBLD CKD-EPI 2021: 19 ML/MIN/1.73M*2
EGFRCR SERPLBLD CKD-EPI 2021: 23 ML/MIN/1.73M*2
ERYTHROCYTE [DISTWIDTH] IN BLOOD BY AUTOMATED COUNT: 14.6 % (ref 11.5–14.5)
GLUCOSE BLD MANUAL STRIP-MCNC: 148 MG/DL (ref 74–99)
GLUCOSE BLD MANUAL STRIP-MCNC: 192 MG/DL (ref 74–99)
GLUCOSE BLD MANUAL STRIP-MCNC: 210 MG/DL (ref 74–99)
GLUCOSE BLD MANUAL STRIP-MCNC: 221 MG/DL (ref 74–99)
GLUCOSE BLD MANUAL STRIP-MCNC: 223 MG/DL (ref 74–99)
GLUCOSE BLDV-MCNC: 142 MG/DL (ref 74–99)
GLUCOSE BLDV-MCNC: 185 MG/DL (ref 74–99)
GLUCOSE BLDV-MCNC: 206 MG/DL (ref 74–99)
GLUCOSE BLDV-MCNC: 233 MG/DL (ref 74–99)
GLUCOSE SERPL-MCNC: 185 MG/DL (ref 74–99)
GLUCOSE SERPL-MCNC: 202 MG/DL (ref 74–99)
HCO3 BLDV-SCNC: 17.5 MMOL/L (ref 22–26)
HCO3 BLDV-SCNC: 23.7 MMOL/L (ref 22–26)
HCO3 BLDV-SCNC: 23.7 MMOL/L (ref 22–26)
HCO3 BLDV-SCNC: 24.3 MMOL/L (ref 22–26)
HCT VFR BLD AUTO: 19.8 % (ref 41–52)
HCT VFR BLD AUTO: 22.9 % (ref 41–52)
HCT VFR BLD AUTO: 23.6 % (ref 41–52)
HCT VFR BLD EST: 20 % (ref 41–52)
HCT VFR BLD EST: 24 % (ref 41–52)
HCT VFR BLD EST: 25 % (ref 41–52)
HCT VFR BLD EST: 29 % (ref 41–52)
HGB BLD-MCNC: 6.6 G/DL (ref 13.5–17.5)
HGB BLD-MCNC: 7.8 G/DL (ref 13.5–17.5)
HGB BLD-MCNC: 7.8 G/DL (ref 13.5–17.5)
HGB BLDV-MCNC: 6.7 G/DL (ref 13.5–17.5)
HGB BLDV-MCNC: 8 G/DL (ref 13.5–17.5)
HGB BLDV-MCNC: 8.4 G/DL (ref 13.5–17.5)
HGB BLDV-MCNC: 9.7 G/DL (ref 13.5–17.5)
INHALED O2 CONCENTRATION: 28 %
INHALED O2 CONCENTRATION: 30 %
LACTATE BLDV-SCNC: 1.2 MMOL/L (ref 0.4–2)
LACTATE BLDV-SCNC: 1.8 MMOL/L (ref 0.4–2)
LACTATE BLDV-SCNC: 2.1 MMOL/L (ref 0.4–2)
LACTATE BLDV-SCNC: 2.4 MMOL/L (ref 0.4–2)
MAGNESIUM SERPL-MCNC: 2.77 MG/DL (ref 1.6–2.4)
MAGNESIUM SERPL-MCNC: 2.79 MG/DL (ref 1.6–2.4)
MCH RBC QN AUTO: 28.3 PG (ref 26–34)
MCH RBC QN AUTO: 29 PG (ref 26–34)
MCH RBC QN AUTO: 29.5 PG (ref 26–34)
MCHC RBC AUTO-ENTMCNC: 33.1 G/DL (ref 32–36)
MCHC RBC AUTO-ENTMCNC: 33.3 G/DL (ref 32–36)
MCHC RBC AUTO-ENTMCNC: 34.1 G/DL (ref 32–36)
MCV RBC AUTO: 85 FL (ref 80–100)
MCV RBC AUTO: 87 FL (ref 80–100)
MCV RBC AUTO: 88 FL (ref 80–100)
NRBC BLD-RTO: 0 /100 WBCS (ref 0–0)
NRBC BLD-RTO: 0 /100 WBCS (ref 0–0)
NRBC BLD-RTO: 0.1 /100 WBCS (ref 0–0)
OXYHGB MFR BLDV: 56.4 % (ref 45–75)
OXYHGB MFR BLDV: 57.8 % (ref 45–75)
OXYHGB MFR BLDV: 60.6 % (ref 45–75)
OXYHGB MFR BLDV: 64 % (ref 45–75)
PCO2 BLDV: 31 MM HG (ref 41–51)
PCO2 BLDV: 41 MM HG (ref 41–51)
PCO2 BLDV: 42 MM HG (ref 41–51)
PCO2 BLDV: 42 MM HG (ref 41–51)
PH BLDV: 7.36 PH (ref 7.33–7.43)
PH BLDV: 7.36 PH (ref 7.33–7.43)
PH BLDV: 7.37 PH (ref 7.33–7.43)
PH BLDV: 7.37 PH (ref 7.33–7.43)
PHOSPHATE SERPL-MCNC: 4.9 MG/DL (ref 2.5–4.9)
PHOSPHATE SERPL-MCNC: 5.3 MG/DL (ref 2.5–4.9)
PLATELET # BLD AUTO: 150 X10*3/UL (ref 150–450)
PLATELET # BLD AUTO: 172 X10*3/UL (ref 150–450)
PLATELET # BLD AUTO: 207 X10*3/UL (ref 150–450)
PO2 BLDV: 35 MM HG (ref 35–45)
PO2 BLDV: 36 MM HG (ref 35–45)
PO2 BLDV: 37 MM HG (ref 35–45)
PO2 BLDV: 40 MM HG (ref 35–45)
POTASSIUM BLDV-SCNC: 2.9 MMOL/L (ref 3.5–5.3)
POTASSIUM BLDV-SCNC: 3.6 MMOL/L (ref 3.5–5.3)
POTASSIUM BLDV-SCNC: 4.1 MMOL/L (ref 3.5–5.3)
POTASSIUM BLDV-SCNC: 4.1 MMOL/L (ref 3.5–5.3)
POTASSIUM SERPL-SCNC: 3.6 MMOL/L (ref 3.5–5.3)
POTASSIUM SERPL-SCNC: 4 MMOL/L (ref 3.5–5.3)
PRODUCT BLOOD TYPE: 6200
PRODUCT CODE: NORMAL
RBC # BLD AUTO: 2.33 X10*6/UL (ref 4.5–5.9)
RBC # BLD AUTO: 2.64 X10*6/UL (ref 4.5–5.9)
RBC # BLD AUTO: 2.69 X10*6/UL (ref 4.5–5.9)
SAO2 % BLDV: 58 % (ref 45–75)
SAO2 % BLDV: 60 % (ref 45–75)
SAO2 % BLDV: 63 % (ref 45–75)
SAO2 % BLDV: 65 % (ref 45–75)
SODIUM BLDV-SCNC: 128 MMOL/L (ref 136–145)
SODIUM BLDV-SCNC: 129 MMOL/L (ref 136–145)
SODIUM BLDV-SCNC: 129 MMOL/L (ref 136–145)
SODIUM BLDV-SCNC: 133 MMOL/L (ref 136–145)
SODIUM SERPL-SCNC: 132 MMOL/L (ref 136–145)
SODIUM SERPL-SCNC: 133 MMOL/L (ref 136–145)
UNIT ABO: NORMAL
UNIT NUMBER: NORMAL
UNIT RH: NORMAL
UNIT VOLUME: 350
WBC # BLD AUTO: 21.3 X10*3/UL (ref 4.4–11.3)
WBC # BLD AUTO: 22.4 X10*3/UL (ref 4.4–11.3)
WBC # BLD AUTO: 23.5 X10*3/UL (ref 4.4–11.3)
XM INTEP: NORMAL

## 2024-12-07 PROCEDURE — 71045 X-RAY EXAM CHEST 1 VIEW: CPT | Performed by: RADIOLOGY

## 2024-12-07 PROCEDURE — 2500000001 HC RX 250 WO HCPCS SELF ADMINISTERED DRUGS (ALT 637 FOR MEDICARE OP)

## 2024-12-07 PROCEDURE — 2020000001 HC ICU ROOM DAILY

## 2024-12-07 PROCEDURE — 97530 THERAPEUTIC ACTIVITIES: CPT | Mod: GP

## 2024-12-07 PROCEDURE — 2500000004 HC RX 250 GENERAL PHARMACY W/ HCPCS (ALT 636 FOR OP/ED)

## 2024-12-07 PROCEDURE — 84132 ASSAY OF SERUM POTASSIUM: CPT

## 2024-12-07 PROCEDURE — 2500000004 HC RX 250 GENERAL PHARMACY W/ HCPCS (ALT 636 FOR OP/ED): Mod: JZ

## 2024-12-07 PROCEDURE — 2500000001 HC RX 250 WO HCPCS SELF ADMINISTERED DRUGS (ALT 637 FOR MEDICARE OP): Performed by: STUDENT IN AN ORGANIZED HEALTH CARE EDUCATION/TRAINING PROGRAM

## 2024-12-07 PROCEDURE — 82330 ASSAY OF CALCIUM: CPT

## 2024-12-07 PROCEDURE — 82947 ASSAY GLUCOSE BLOOD QUANT: CPT

## 2024-12-07 PROCEDURE — 85027 COMPLETE CBC AUTOMATED: CPT

## 2024-12-07 PROCEDURE — 36430 TRANSFUSION BLD/BLD COMPNT: CPT

## 2024-12-07 PROCEDURE — 83735 ASSAY OF MAGNESIUM: CPT

## 2024-12-07 PROCEDURE — 2500000002 HC RX 250 W HCPCS SELF ADMINISTERED DRUGS (ALT 637 FOR MEDICARE OP, ALT 636 FOR OP/ED): Performed by: NURSE PRACTITIONER

## 2024-12-07 PROCEDURE — 37799 UNLISTED PX VASCULAR SURGERY: CPT

## 2024-12-07 PROCEDURE — 2500000004 HC RX 250 GENERAL PHARMACY W/ HCPCS (ALT 636 FOR OP/ED): Performed by: STUDENT IN AN ORGANIZED HEALTH CARE EDUCATION/TRAINING PROGRAM

## 2024-12-07 PROCEDURE — 2500000005 HC RX 250 GENERAL PHARMACY W/O HCPCS

## 2024-12-07 PROCEDURE — 2500000004 HC RX 250 GENERAL PHARMACY W/ HCPCS (ALT 636 FOR OP/ED): Performed by: NURSE PRACTITIONER

## 2024-12-07 PROCEDURE — 93010 ELECTROCARDIOGRAM REPORT: CPT | Performed by: INTERNAL MEDICINE

## 2024-12-07 PROCEDURE — P9016 RBC LEUKOCYTES REDUCED: HCPCS

## 2024-12-07 PROCEDURE — 99291 CRITICAL CARE FIRST HOUR: CPT | Performed by: STUDENT IN AN ORGANIZED HEALTH CARE EDUCATION/TRAINING PROGRAM

## 2024-12-07 PROCEDURE — 84132 ASSAY OF SERUM POTASSIUM: CPT | Performed by: STUDENT IN AN ORGANIZED HEALTH CARE EDUCATION/TRAINING PROGRAM

## 2024-12-07 PROCEDURE — 71045 X-RAY EXAM CHEST 1 VIEW: CPT

## 2024-12-07 PROCEDURE — 37799 UNLISTED PX VASCULAR SURGERY: CPT | Performed by: STUDENT IN AN ORGANIZED HEALTH CARE EDUCATION/TRAINING PROGRAM

## 2024-12-07 PROCEDURE — 93005 ELECTROCARDIOGRAM TRACING: CPT

## 2024-12-07 PROCEDURE — 2500000005 HC RX 250 GENERAL PHARMACY W/O HCPCS: Performed by: STUDENT IN AN ORGANIZED HEALTH CARE EDUCATION/TRAINING PROGRAM

## 2024-12-07 RX ORDER — ADHESIVE BANDAGE
30 BANDAGE TOPICAL DAILY
Status: DISCONTINUED | OUTPATIENT
Start: 2024-12-07 | End: 2024-12-18 | Stop reason: HOSPADM

## 2024-12-07 RX ORDER — CHLOROTHIAZIDE SODIUM 500 MG/1
500 INJECTION INTRAVENOUS ONCE
Status: COMPLETED | OUTPATIENT
Start: 2024-12-07 | End: 2024-12-07

## 2024-12-07 RX ORDER — CHLOROTHIAZIDE SODIUM 500 MG/1
500 INJECTION INTRAVENOUS ONCE
Status: DISCONTINUED | OUTPATIENT
Start: 2024-12-07 | End: 2024-12-07

## 2024-12-07 RX ORDER — CLOPIDOGREL BISULFATE 75 MG/1
75 TABLET ORAL DAILY
Status: DISCONTINUED | OUTPATIENT
Start: 2024-12-07 | End: 2024-12-17

## 2024-12-07 RX ORDER — HYDRALAZINE HYDROCHLORIDE 20 MG/ML
10 INJECTION INTRAMUSCULAR; INTRAVENOUS EVERY 4 HOURS
Status: DISCONTINUED | OUTPATIENT
Start: 2024-12-07 | End: 2024-12-09

## 2024-12-07 RX ORDER — HYDRALAZINE HYDROCHLORIDE 20 MG/ML
5 INJECTION INTRAMUSCULAR; INTRAVENOUS EVERY 8 HOURS
Status: DISCONTINUED | OUTPATIENT
Start: 2024-12-07 | End: 2024-12-07

## 2024-12-07 RX ORDER — HYDRALAZINE HYDROCHLORIDE 20 MG/ML
10 INJECTION INTRAMUSCULAR; INTRAVENOUS EVERY 4 HOURS PRN
Status: DISCONTINUED | OUTPATIENT
Start: 2024-12-07 | End: 2024-12-13

## 2024-12-07 RX ORDER — METHOCARBAMOL 100 MG/ML
1000 INJECTION, SOLUTION INTRAMUSCULAR; INTRAVENOUS EVERY 8 HOURS
Status: DISCONTINUED | OUTPATIENT
Start: 2024-12-07 | End: 2024-12-09

## 2024-12-07 RX ORDER — ACETAMINOPHEN 10 MG/ML
1000 INJECTION, SOLUTION INTRAVENOUS EVERY 6 HOURS SCHEDULED
Status: COMPLETED | OUTPATIENT
Start: 2024-12-07 | End: 2024-12-08

## 2024-12-07 RX ORDER — BUMETANIDE 0.25 MG/ML
4 INJECTION, SOLUTION INTRAMUSCULAR; INTRAVENOUS ONCE
Status: COMPLETED | OUTPATIENT
Start: 2024-12-07 | End: 2024-12-07

## 2024-12-07 RX ORDER — MILRINONE LACTATE 0.2 MG/ML
0-.75 INJECTION, SOLUTION INTRAVENOUS CONTINUOUS
Status: DISCONTINUED | OUTPATIENT
Start: 2024-12-07 | End: 2024-12-09

## 2024-12-07 RX ORDER — HYDROMORPHONE HYDROCHLORIDE 1 MG/ML
0.2 INJECTION, SOLUTION INTRAMUSCULAR; INTRAVENOUS; SUBCUTANEOUS EVERY 2 HOUR PRN
Status: DISCONTINUED | OUTPATIENT
Start: 2024-12-07 | End: 2024-12-09

## 2024-12-07 RX ORDER — BUMETANIDE 0.25 MG/ML
4 INJECTION, SOLUTION INTRAMUSCULAR; INTRAVENOUS ONCE
Status: DISCONTINUED | OUTPATIENT
Start: 2024-12-08 | End: 2024-12-09

## 2024-12-07 RX ORDER — BISACODYL 10 MG/1
10 SUPPOSITORY RECTAL ONCE
Status: COMPLETED | OUTPATIENT
Start: 2024-12-07 | End: 2024-12-07

## 2024-12-07 RX ORDER — PANTOPRAZOLE SODIUM 40 MG/10ML
40 INJECTION, POWDER, LYOPHILIZED, FOR SOLUTION INTRAVENOUS DAILY
Status: DISCONTINUED | OUTPATIENT
Start: 2024-12-07 | End: 2024-12-08

## 2024-12-07 RX ORDER — ACETAMINOPHEN 325 MG/1
975 TABLET ORAL EVERY 8 HOURS
Status: DISCONTINUED | OUTPATIENT
Start: 2024-12-07 | End: 2024-12-12

## 2024-12-07 ASSESSMENT — PAIN SCALES - GENERAL
PAINLEVEL_OUTOF10: 10 - WORST POSSIBLE PAIN
PAINLEVEL_OUTOF10: 9
PAINLEVEL_OUTOF10: 10 - WORST POSSIBLE PAIN
PAINLEVEL_OUTOF10: 0 - NO PAIN
PAINLEVEL_OUTOF10: 10 - WORST POSSIBLE PAIN

## 2024-12-07 ASSESSMENT — PAIN - FUNCTIONAL ASSESSMENT
PAIN_FUNCTIONAL_ASSESSMENT: 0-10

## 2024-12-07 ASSESSMENT — COGNITIVE AND FUNCTIONAL STATUS - GENERAL
MOVING TO AND FROM BED TO CHAIR: A LOT
STANDING UP FROM CHAIR USING ARMS: A LOT
TURNING FROM BACK TO SIDE WHILE IN FLAT BAD: A LOT
MOBILITY SCORE: 12
MOVING FROM LYING ON BACK TO SITTING ON SIDE OF FLAT BED WITH BEDRAILS: A LITTLE
WALKING IN HOSPITAL ROOM: A LOT
CLIMB 3 TO 5 STEPS WITH RAILING: TOTAL

## 2024-12-07 ASSESSMENT — PAIN DESCRIPTION - LOCATION: LOCATION: STERNUM

## 2024-12-07 NOTE — PROGRESS NOTES
"CTICU Progress Note  Edu Echavarria/[id number]    Admit Date: 11/18/2024  Hospital Length of Stay: 19   ICU Length of Stay: 2d 8h   CT SURGEON: Dr. Allen LOCKWOOD. He continued on his epi and milrinone with unchanged doses. He was HTN requiring hydral. CBC came back with Hgb of 6.8, so 1u pRBCs was transfused. Bumex was re-dosed to meet volume goals. This morning, he continues to endorse 9/10 sternal pain and nausea causing him to be unable to take PO meds. He slept fair overnight. Incentive spirometry was encouraged.    MEDICATIONS  Infusions:  amiodarone, Last Rate: 1 mg/min (12/07/24 0650)  EPINEPHrine, Last Rate: 0.03 mcg/kg/min (12/07/24 0500)  milrinone, Last Rate: 0.25 mcg/kg/min (12/07/24 0502)      Scheduled:  acetaminophen, 975 mg, q8h  aspirin, 81 mg, Daily  atorvastatin, 80 mg, Nightly  heparin, 5,000 Units, q8h  hydrALAZINE, 10 mg, q8h  insulin lispro, 0-20 Units, Before meals & nightly  lidocaine, 1 patch, Daily  polyethylene glycol, 17 g, BID  sennosides-docusate sodium, 2 tablet, BID      PRN:  alteplase, 2 mg, PRN  bisacodyl, 10 mg, Daily PRN  calcium gluconate, 1 g, q6h PRN  calcium gluconate, 2 g, q6h PRN  hydrALAZINE, 10 mg, q4h PRN  HYDROmorphone, 0.2 mg, q2h PRN  lubricating eye drops, 1 drop, TID PRN  magnesium sulfate, 2 g, q6h PRN  magnesium sulfate, 4 g, q6h PRN  naloxone, 0.2 mg, q5 min PRN  ondansetron, 4 mg, q4h PRN  oxyCODONE, 10 mg, q4h PRN  oxyCODONE, 5 mg, q4h PRN  oxygen, , Continuous PRN - O2/gases  potassium chloride CR, 20 mEq, q6h PRN   Or  potassium chloride, 20 mEq, q6h PRN  potassium chloride CR, 40 mEq, q6h PRN   Or  potassium chloride, 40 mEq, q6h PRN  potassium chloride, 20 mEq, q6h PRN  potassium chloride, 40 mEq, q6h PRN          Objective    Visit Vitals  BP (!) 121/48 (BP Location: Left arm, Patient Position: Lying)   Pulse 90   Temp 36.8 °C (98.2 °F) (Core)   Resp 15   Ht 1.702 m (5' 7\")   Wt 90.6 kg (199 lb 11.8 oz)   SpO2 98%   BMI 31.28 kg/m²   Smoking " Status Former   BSA 2.07 m²     Wt Readings from Last 5 Encounters:   12/07/24 90.6 kg (199 lb 11.8 oz)   11/18/24 82 kg (180 lb 12.4 oz)     Intake & Output:  I/O last 3 completed shifts:  In: 3324.8 (36.7 mL/kg) [P.O.:360; I.V.:1614.8 (17.8 mL/kg); Blood:350; IV Piggyback:1000]  Out: 2260 (24.9 mL/kg) [Urine:1610 (0.5 mL/kg/hr); Chest Tube:650]  Weight: 90.6 kg        Vent settings:       Physical Exam:   - Constitutional: Critically ill male lying in bed  - Neuro: A&Ox4, CAM negative. Moves all four extremities spontaneously and to commands. No FND  - CV: RRR, NSR, hypertensive with SBP in 130s  - Pulm: Not in respiratory distress, breathing easily on 4L NC. Pleural and mediastinal chest tubes in place draining sanguinous output, no airleak  - GI: Soft, non-tender, no ndistended  - : Logan in place draining orange urine, no hematoma appreciated in the right groin  - Extremities: WWP, 2+ pulses throughout, no peripheral edema  - Skin: No jaundiced, no obvious rashes or deformities, sternal incision closed, well aproximated, and without signs of infection  - Psych: Calm and cooperative, normal mood and behavior    Daily Risk Screen  Intubated: No  Central line: Yes - for invasive hemodynamic monitoring  Logan: Yes - for accurate I&Os    Images: All relevant images reviewed.    Invasive Hemodynamics:    Most Recent Range Past 24hrs   BP (Art) 166/61 Arterial Line BP 1  Min: 120/38  Max: 166/61   MAP(Art) 89 mmHg Arterial Line MAP 1 (mmHg)   Min: 58 mmHg  Max: 89 mmHg   RA/CVP   No data recorded   PA 65/26 No data recorded   PA(mean) 159 mmHg No data recorded   CO 5.9 L/min CO (L/min)  Min: 5.1 L/min  Max: 6.8 L/min   CI 3 L/min/m2 CI (L/min/m2)  Min: 2.5 L/min/m2  Max: 3.4 L/min/m2   Mixed Venous 77 % No data recorded   SVR  660 (dyne*sec)/cm5 SVR (dyne*sec)/cm5  Min: 580 (dyne*sec)/cm5  Max: 1045 (dyne*sec)/cm5       Assessment/Plan    Edu Echavarria is a 74 y.o. male with PMH of HTN, HFrEF, CAD, GERD, and gout,  initially presented to OSH with STEMI. Transferred to [facility] for CABG evaluation, hospital course c/b MARYBETH, worsening hyponatremia causing delay of surgery case, then further complicated by respiratory failure following fluid resuscitation for correction of hyponatremia, and cardiogenic shock necessitating IABP.    Admitted to CTICU s/p CABGx4, LAAL with Dr. Villa. S/p R fem IABP.   He continues to need inotropic support, will wean as able. Holding afterload reduction to assist with maintaining good pressures and end organ perfusion. He is up volume wise, so will continue to diurese with goal net negative. Nausea continues to be bothersome, so will give some Emend since options are limited with prolonged QTc. Will try to get patient mobilized to remove chest tubes and reduce pain and increase ability to respirate better.      NEURO:  History of gout. Acute post operative pain.  - Serial neuro and pain assessments   - Scheduled Tylenol, lidocaine patch  - Starting IV Tylenol due to unable to take PO Tylenol  - Starting IV methocarbamol 1000mg Q8H  - PRN oxycodone  - PRN dilaudid for breakthrough pain   - PT Consult, OOB to chair as tolerated, chair position if not tolerated   - CAM ICU score Qshift  - Sleep/wake cycle hygiene  - Holding home allopurinol (gout)     CV:  PMH of HTN, HFrEF, CAD, STEMI c/b cardiogenic shock with IABP placement. Now s/p CABG x4 with LAAL. Pre/Post EF: 30/30% Arrived to CTICU on epi 0.06, norepi 0.03, vasopressin 0.03. A/V epicardial wires set DDD @ 80. R fem IABP removed 12/5.  - Maintain goal MAP 65-90  - Mixed venous and CI Q4H  - Volume resuscitate as clinically indicated  - Maintain epicardial wires set VVI backup @ 60  - Continue ASA 81mg  - Start Plavix 75mg  - Continue atorvastatin 80mg  - Start PO hydralazine 10mg TID  - Hold home metoprolol succinate  - Continue amio gtt     PULM:  No significant pulmonary PMH. Developed AHRF in CICU 2/2 cardiogenic shock. Extubated 12/5. Chest  tubes: L pleural, R pleural, mediastinal.  - Daily CXR  - Wean FiO2 maintaining SpO2 >92%.   - IS Q1H and OOB to chair when extubated  - Chest tubes to wall suction  - Pull med CT  - Can pull pleural once output <100/8 hr     GI:  PMH of GERD.  - Continue cardiac-diabetic diet  - Miralax and senna-docusate BID  - Start dulcolax suppository  - Consider Milk of Mag if hasn't had a BM this afternoon  - Holding PRN antiemetics due to QTc>500  - Give 1 dose of Emend     /Renal:  No /renal PMH. Baseline Cr of 0.7-1.0. Has had MARYBETH and acute on chronic hyponatremia likely 2/2 hypovolemia 2/2 decreased PO intake and diarrhea. CSA-MARYBETH Risk Score 3. Creatinine rising post-op, possibly due to inferior displacement of IABP. Hawthorne in place and making adequate UOP.  - Continue hawthorne catheter for strict I/Os  - Goal UOP 0.5ml/kg/hr  - RFP as clinically indicated  - Replete electrolytes per CTICU protocol  - Goal: net -1.5-2.0L  - Bumex 4mg and Diuril 500mg, likely will need redosing to hit volume goal     ENDO:  No significant PMH. Last A1c: 6.3 (11/18/24).  - Maintain BG <180, insulin p/er CTICU protocol     HEME:  No significant PMH. Acute blood loss anemia and thrombocytopenia.  - Monitor drain output volume and characteristics  - CBC, coags, and fibrinogen post op and as clinically indicated  - Continue ASA  - Start Plavix  - SCDs & SQH for DVT prophylaxis  - Type & Screen: Q72H     ID: Afebrile, no current indications of infection. MRSA negative. Broad infectious work-up for leukocytosis completed and negative, s/p Vanc/Zosyn (11/29-12/2).  - Trend temp Q4H  - Periop cefazolin x 48hrs until 12/6     Skin: No active skin issues.  - Preventative Mepilex dressings in place on sacrum and heels  - Change preventative Mepilex weekly or more frequently as indicated (when moist/soiled)   - Every shift skin assessment per nursing and weekly ICU skin rounds  - Moisture barrier to be applied with shirley care  - Active skin problems  addressed with nursing on daily rounds     PPx:  SCDs  SQH  PPI     G: Line  Right IJ with PAC placed 11/29/24  Left radial A-line placed 11/29/24  Right femoral IABP placed 11/29/24    F: Family:  Will update at bedside postoperatively.    Restraints:  The indications and risks/benefits of non-violent/non self-destructive restraints were discussed.      A,B,C,D,E,F,G: reviewed     Dispo: CTICU care for now.     Code status: Full Code   Extended Emergency Contact Information  Primary Emergency Contact: Guillermo Barrios  Mobile Phone: [phone]  Relation: Relative  Preferred language: English   needed? No  Secondary Emergency Contact: COLIN ROMERO  Mobile Phone: [phone]  Relation: Friend  Preferred language: English   needed? No     Patient staffed with Dr. Lal    CTICU TEAM PHONE [phone]      Noe Aviles MD  PGY-1 Resident Physician  Department of Anesthesiology & Perioperative Medicine

## 2024-12-07 NOTE — PROGRESS NOTES
Physical Therapy    Physical Therapy Treatment    Patient Name: Edu Echavarria  MRN: [id number]  Department: [facility] [place] [place] CTU  Room: 04/04-A  Today's Date: 12/7/2024  Time Calculation  Start Time: 1238  Stop Time: 1301  Time Calculation (min): 23 min         Assessment/Plan   PT Assessment  PT Assessment Results: Decreased strength, Decreased endurance, Impaired balance, Decreased mobility, Decreased safety awareness, Impaired judgement, Pain  Rehab Prognosis: Good  Barriers to Discharge: medical acuity  Evaluation/Treatment Tolerance: Patient tolerated treatment well  Medical Staff Made Aware: Yes  End of Session Communication: Bedside nurse  Assessment Comment: Pt performed bed mobility with Mod A and functional transfer to chair with Mod A. Pt will continue to benefit from skilled PT to improve functional mobility.  End of Session Patient Position: Up in chair, Alarm off, caregiver present  PT Plan  Inpatient/Swing Bed or Outpatient: Inpatient  PT Plan  Treatment/Interventions: Bed mobility, Transfer training, Gait training, Stair training, Balance training, Strengthening, Endurance training, Range of motion, Therapeutic exercise, Therapeutic activity, Home exercise program, Postural re-education  PT Plan: Ongoing PT  PT Frequency: 3 times per week  PT Discharge Recommendations: Moderate intensity level of continued care  Equipment Recommended upon Discharge: Wheeled walker  PT Recommended Transfer Status: Assist x1, Assist x2  PT - OK to Discharge: Yes      General Visit Information:   PT  Visit  PT Received On: 12/07/24  Response to Previous Treatment: Patient with no complaints from previous session.  General  Prior to Session Communication: Bedside nurse  Patient Position Received: Bed, 3 rail up, Alarm off, not on at start of session  Preferred Learning Style: auditory, verbal, visual  General Comment: Pt awake and willing to participate in PT treatment session (Lines: DORA Arreguin line, tele, O2 NC, hawthorne, chest  tubes)    Subjective   Precautions:  Precautions  Hearing/Visual Limitations: WFL  Medical Precautions: Cardiac precautions, Fall precautions, Chest tube  Post-Surgical Precautions: Move in the Tube  Precautions Comment: MAP 65-90; VVI@60, SpO2>92%    Vital Signs (Past 2hrs)        Date/Time Vitals Session Patient Position Pulse Resp SpO2 BP MAP (mmHg)    12/07/24 1130 --  --  90  13  91 %  --  --     12/07/24 1145 --  --  89  10  90 %  --  --     12/07/24 1200 --  --  86  10  90 %  --  --     12/07/24 1238 Pre PT  Lying  92  --  98 %  139/52  72     12/07/24 1300 --  --  93  24  96 %  --  --     12/07/24 1301 Post PT  Sitting  93  --  96 %  138/57  81                         Objective   Pain:  Pain Assessment  Pain Assessment: 0-10  0-10 (Numeric) Pain Score:  (did not rank pain during session; reported surgical-related chest pain during transfer)  Pain Type: Surgical pain  Pain Location: Chest  Pain Interventions: Repositioned  Response to Interventions: Resting quietly  Cognition:  Cognition  Overall Cognitive Status: Within Functional Limits  Orientation Level: Oriented X4  Cognition Comments: A&Ox4 however requiring max cueing this session for appropriate and safe performance of transfers  Safety/Judgement: Exceptions to WFL  Insight: Mild  Impulsive: Mildly    Activity Tolerance:  Activity Tolerance  Endurance: Tolerates 10 - 20 min exercise with multiple rests  Early Mobility/Exercise Safety Screen: Proceed with mobilization - No exclusion criteria met  Activity Tolerance Comments: Vitals stable throughout session  Treatments:  Therapeutic Exercise  Therapeutic Exercise Performed: Yes  Therapeutic Exercise Activity 1: Seated LAQ x 10 bilat LE    Therapeutic Activity  Therapeutic Activity Performed: Yes  Therapeutic Activity 1: Increased time for advanced ICU line management required for functional mobility  Therapeutic Activity 2: Pt sat EOB for 5 min with SBA-CGA for safety  Therapeutic Activity 3: Increased  time for positioning of recliner at end of session for pt comfort  Therapeutic Activity 4: Vitals monitored throughout session for safe mobility    Bed Mobility  Bed Mobility: Yes  Bed Mobility 1  Bed Mobility 1: Supine to sitting  Level of Assistance 1: Moderate assistance  Bed Mobility Comments 1: Mod A for LE management and hip elevation to seated    Transfers  Transfer: Yes  Transfer 1  Transfer From 1: Sit to, Stand to  Transfer to 1: Stand, Sit  Technique 1: Sit to stand, Stand to sit  Trials/Comments 1: Pt attempted initial STS however aborted due to pt kicking R LE out off of ground; education provided on foot placement prior to stand pivot transfer  Transfers 2  Transfer From 2: Bed to  Transfer to 2: Chair with drop arm  Technique 2: Stand pivot  Transfer Level of Assistance 2: Arm in arm assistance, Moderate assistance  Trials/Comments 2: Mod A for hip elevation to standing + 3 side steps to the L; Mod A for balance in standing    Outcome Measures:  [facility] Basic Mobility  Turning from your back to your side while in a flat bed without using bedrails: A little  Moving from lying on your back to sitting on the side of a flat bed without using bedrails: A lot  Moving to and from bed to chair (including a wheelchair): A lot  Standing up from a chair using your arms (e.g. wheelchair or bedside chair): A lot  To walk in hospital room: A lot  Climbing 3-5 steps with railing: Total  Basic Mobility - Total Score: 12    FSS-ICU  Ambulation: Unable to attempt due to weakness  Rolling: Minimal assistance (performs 75% or more of task)  Sitting: Minimal assistance (performs 75% or more of task)  Transfer Sit-to-Stand: Moderate assistance (performs 50 - 74% of task)  Transfer Supine-to-Sit: Moderate assistance (performs 50 - 74% of task)  Total Score: 14      Early Mobility/Exercise Safety Screen: Proceed with mobilization - No exclusion criteria met  ICU Mobility Scale: Transferring bed to chair [5]    Education  Documentation  Precautions, taught by Aby Sinha PT at 12/7/2024  1:18 PM.  Learner: Patient  Readiness: Acceptance  Method: Explanation  Response: Needs Reinforcement    Body Mechanics, taught by Aby Sinha PT at 12/7/2024  1:18 PM.  Learner: Patient  Readiness: Acceptance  Method: Explanation  Response: Needs Reinforcement    Mobility Training, taught by Aby Sinha PT at 12/7/2024  1:18 PM.  Learner: Patient  Readiness: Acceptance  Method: Explanation  Response: Needs Reinforcement    Education Comments  No comments found.           Encounter Problems       Encounter Problems (Active)       Balance       Pt will demonstrate improved sitting/standing static/dynamic balance activities without LOB via score of 24/28 on the Tinetti for increase in safety prior to DC.  (Progressing)       Start:  12/06/24    Expected End:  12/20/24               Mobility       Pt will ambulate >25ft with appropriate form, Min A or less, LRAD, and no LOB for safe DC.  (Progressing)       Start:  12/06/24    Expected End:  12/20/24            Pt will tolerate >15 minutes of upright standing activity without seated rest breaks with no changes in vital signs for improved functional mobility.  (Progressing)       Start:  12/06/24    Expected End:  12/20/24               PT Transfers       Pt will perform bed mobility with SBA or less and use of LRAD to safely DC.  (Progressing)       Start:  12/06/24    Expected End:  12/20/24            Pt will perform sit<>stand transfers with CGA or less and use of LRAD to safely DC.  (Progressing)       Start:  12/06/24    Expected End:  12/20/24                 ABY SINHA PT

## 2024-12-07 NOTE — PROGRESS NOTES
Edu Echavarria is a 73 yo M initially presenting with STEMI now s/p CABGx4 with Dr. Villa 12/4.    Preoperatively patient treated for MARYBETH, hyponatremia, and acute respiratory failure. IABP placed preoperatively.    OR course:  12/4 CABGx4  EF pre/post: 30%/30%  A,V wires    ICU course:  12/5 IABP removed    PLAN    N: Acute postoperative pain - PRNs, scheduled IV tylenol, robaxin  Importance of OOBTC discussed with patient    CV: HTN, CAD, STEMI, HFrEF s/p CABGx4 12/4.   ASA, Plavix.   Inotropic support - wean as tolerated, trend ScVO2. Today will focus on afterload reduction and diuresis, then wean inotropes. Anticipate continued slow inotrope wean.  Afib - amiodarone infusion; plan to transition to PO when nausea improves    P: Encourage IS. Removal chest tubes when criteria met.  : Uptrending MARYBETH. Hyponatremia - stable. Hypervolemia - diuresis for goal net negative 2L. F/U PM RFP.  GI: Diet, bowel regimen. Suppository for BM. Nauesa - ordered IV emend.  Endo: SSI  Heme: SCDs, SQH for DVT PPX. ASA, Plavix in setting of CAD/STEMI/CABG. Acute blood loss anemia - stable.  ID: No acute concerns.    Continue ICU care  Patient updated at bedside  Discussed with resident      I have reviewed and evaluated the most recent data and results, personally examined the patient, and formulated the plan of care as presented above. This patient was critically ill and required continued critical care treatment. Teaching and any separately billable procedures are not included in the time calculation.    Billing Provider Critical Care Time: 36 minutes    Nancy Lal MD

## 2024-12-08 ENCOUNTER — APPOINTMENT (OUTPATIENT)
Dept: RADIOLOGY | Facility: HOSPITAL | Age: 74
DRG: 235 | End: 2024-12-08
Payer: MEDICARE

## 2024-12-08 ENCOUNTER — APPOINTMENT (OUTPATIENT)
Dept: CARDIOLOGY | Facility: HOSPITAL | Age: 74
DRG: 235 | End: 2024-12-08
Payer: MEDICARE

## 2024-12-08 LAB
ABO GROUP (TYPE) IN BLOOD: NORMAL
ALBUMIN SERPL BCP-MCNC: 3.4 G/DL (ref 3.4–5)
ALBUMIN SERPL BCP-MCNC: 3.5 G/DL (ref 3.4–5)
ALBUMIN SERPL BCP-MCNC: 3.6 G/DL (ref 3.4–5)
ANION GAP BLDA CALCULATED.4IONS-SCNC: 14 MMO/L (ref 10–25)
ANION GAP BLDV CALCULATED.4IONS-SCNC: 11 MMOL/L (ref 10–25)
ANION GAP BLDV CALCULATED.4IONS-SCNC: 18 MMOL/L (ref 10–25)
ANION GAP SERPL CALC-SCNC: 20 MMOL/L (ref 10–20)
ANION GAP SERPL CALC-SCNC: 22 MMOL/L (ref 10–20)
ANION GAP SERPL CALC-SCNC: 22 MMOL/L (ref 10–20)
ANTIBODY SCREEN: NORMAL
BASE EXCESS BLDA CALC-SCNC: 0.4 MMOL/L (ref -2–3)
BASE EXCESS BLDV CALC-SCNC: -0.5 MMOL/L (ref -2–3)
BASE EXCESS BLDV CALC-SCNC: -1.1 MMOL/L (ref -2–3)
BODY TEMPERATURE: 37 DEGREES CELSIUS
BUN SERPL-MCNC: 55 MG/DL (ref 6–23)
BUN SERPL-MCNC: 59 MG/DL (ref 6–23)
BUN SERPL-MCNC: 59 MG/DL (ref 6–23)
CA-I BLD-SCNC: 1.08 MMOL/L (ref 1.1–1.33)
CA-I BLDA-SCNC: 1.15 MMOL/L (ref 1.1–1.33)
CA-I BLDV-SCNC: 1.09 MMOL/L (ref 1.1–1.33)
CA-I BLDV-SCNC: 1.15 MMOL/L (ref 1.1–1.33)
CALCIUM SERPL-MCNC: 8.3 MG/DL (ref 8.6–10.6)
CALCIUM SERPL-MCNC: 8.5 MG/DL (ref 8.6–10.6)
CALCIUM SERPL-MCNC: 8.7 MG/DL (ref 8.6–10.6)
CHLORIDE BLDA-SCNC: 94 MMOL/L (ref 98–107)
CHLORIDE BLDV-SCNC: 93 MMOL/L (ref 98–107)
CHLORIDE BLDV-SCNC: 97 MMOL/L (ref 98–107)
CHLORIDE SERPL-SCNC: 92 MMOL/L (ref 98–107)
CHLORIDE SERPL-SCNC: 92 MMOL/L (ref 98–107)
CHLORIDE SERPL-SCNC: 94 MMOL/L (ref 98–107)
CO2 SERPL-SCNC: 22 MMOL/L (ref 21–32)
CO2 SERPL-SCNC: 24 MMOL/L (ref 21–32)
CO2 SERPL-SCNC: 24 MMOL/L (ref 21–32)
CREAT SERPL-MCNC: 3.57 MG/DL (ref 0.5–1.3)
CREAT SERPL-MCNC: 3.73 MG/DL (ref 0.5–1.3)
CREAT SERPL-MCNC: 3.77 MG/DL (ref 0.5–1.3)
EGFRCR SERPLBLD CKD-EPI 2021: 16 ML/MIN/1.73M*2
EGFRCR SERPLBLD CKD-EPI 2021: 16 ML/MIN/1.73M*2
EGFRCR SERPLBLD CKD-EPI 2021: 17 ML/MIN/1.73M*2
ERYTHROCYTE [DISTWIDTH] IN BLOOD BY AUTOMATED COUNT: 15.1 % (ref 11.5–14.5)
ERYTHROCYTE [DISTWIDTH] IN BLOOD BY AUTOMATED COUNT: 15.2 % (ref 11.5–14.5)
GLUCOSE BLD MANUAL STRIP-MCNC: 132 MG/DL (ref 74–99)
GLUCOSE BLD MANUAL STRIP-MCNC: 160 MG/DL (ref 74–99)
GLUCOSE BLD MANUAL STRIP-MCNC: 167 MG/DL (ref 74–99)
GLUCOSE BLD MANUAL STRIP-MCNC: 181 MG/DL (ref 74–99)
GLUCOSE BLD MANUAL STRIP-MCNC: 185 MG/DL (ref 74–99)
GLUCOSE BLD MANUAL STRIP-MCNC: 195 MG/DL (ref 74–99)
GLUCOSE BLDA-MCNC: 173 MG/DL (ref 74–99)
GLUCOSE BLDV-MCNC: 142 MG/DL (ref 74–99)
GLUCOSE BLDV-MCNC: 166 MG/DL (ref 74–99)
GLUCOSE SERPL-MCNC: 152 MG/DL (ref 74–99)
GLUCOSE SERPL-MCNC: 169 MG/DL (ref 74–99)
GLUCOSE SERPL-MCNC: 198 MG/DL (ref 74–99)
HCO3 BLDA-SCNC: 24.6 MMOL/L (ref 22–26)
HCO3 BLDV-SCNC: 24.2 MMOL/L (ref 22–26)
HCO3 BLDV-SCNC: 24.3 MMOL/L (ref 22–26)
HCT VFR BLD AUTO: 23.2 % (ref 41–52)
HCT VFR BLD AUTO: 24.4 % (ref 41–52)
HCT VFR BLD EST: 23 % (ref 41–52)
HCT VFR BLD EST: 25 % (ref 41–52)
HCT VFR BLD EST: 26 % (ref 41–52)
HGB BLD-MCNC: 7.8 G/DL (ref 13.5–17.5)
HGB BLD-MCNC: 7.9 G/DL (ref 13.5–17.5)
HGB BLDA-MCNC: 8.7 G/DL (ref 13.5–17.5)
HGB BLDV-MCNC: 7.8 G/DL (ref 13.5–17.5)
HGB BLDV-MCNC: 8.4 G/DL (ref 13.5–17.5)
INHALED O2 CONCENTRATION: 21 %
INHALED O2 CONCENTRATION: 21 %
INHALED O2 CONCENTRATION: 28 %
LACTATE BLDA-SCNC: 1.1 MMOL/L (ref 0.4–2)
LACTATE BLDV-SCNC: 1.3 MMOL/L (ref 0.4–2)
LACTATE BLDV-SCNC: 1.4 MMOL/L (ref 0.4–2)
MAGNESIUM SERPL-MCNC: 2.61 MG/DL (ref 1.6–2.4)
MAGNESIUM SERPL-MCNC: 2.8 MG/DL (ref 1.6–2.4)
MAGNESIUM SERPL-MCNC: 2.8 MG/DL (ref 1.6–2.4)
MCH RBC QN AUTO: 28.9 PG (ref 26–34)
MCH RBC QN AUTO: 29 PG (ref 26–34)
MCHC RBC AUTO-ENTMCNC: 32.4 G/DL (ref 32–36)
MCHC RBC AUTO-ENTMCNC: 33.6 G/DL (ref 32–36)
MCV RBC AUTO: 86 FL (ref 80–100)
MCV RBC AUTO: 89 FL (ref 80–100)
NRBC BLD-RTO: 0.2 /100 WBCS (ref 0–0)
NRBC BLD-RTO: 0.3 /100 WBCS (ref 0–0)
OXYHGB MFR BLDA: 96.7 % (ref 94–98)
OXYHGB MFR BLDV: 53.1 % (ref 45–75)
OXYHGB MFR BLDV: 58.4 % (ref 45–75)
PCO2 BLDA: 37 MM HG (ref 38–42)
PCO2 BLDV: 39 MM HG (ref 41–51)
PCO2 BLDV: 43 MM HG (ref 41–51)
PH BLDA: 7.43 PH (ref 7.38–7.42)
PH BLDV: 7.36 PH (ref 7.33–7.43)
PH BLDV: 7.4 PH (ref 7.33–7.43)
PHOSPHATE SERPL-MCNC: 4.7 MG/DL (ref 2.5–4.9)
PHOSPHATE SERPL-MCNC: 5.3 MG/DL (ref 2.5–4.9)
PHOSPHATE SERPL-MCNC: 5.5 MG/DL (ref 2.5–4.9)
PLATELET # BLD AUTO: 216 X10*3/UL (ref 150–450)
PLATELET # BLD AUTO: 243 X10*3/UL (ref 150–450)
PO2 BLDA: 95 MM HG (ref 85–95)
PO2 BLDV: 32 MM HG (ref 35–45)
PO2 BLDV: 34 MM HG (ref 35–45)
POTASSIUM BLDA-SCNC: 3.6 MMOL/L (ref 3.5–5.3)
POTASSIUM BLDV-SCNC: 3.7 MMOL/L (ref 3.5–5.3)
POTASSIUM BLDV-SCNC: 4.2 MMOL/L (ref 3.5–5.3)
POTASSIUM SERPL-SCNC: 3.4 MMOL/L (ref 3.5–5.3)
POTASSIUM SERPL-SCNC: 3.5 MMOL/L (ref 3.5–5.3)
POTASSIUM SERPL-SCNC: 3.5 MMOL/L (ref 3.5–5.3)
RBC # BLD AUTO: 2.69 X10*6/UL (ref 4.5–5.9)
RBC # BLD AUTO: 2.73 X10*6/UL (ref 4.5–5.9)
RH FACTOR (ANTIGEN D): NORMAL
SAO2 % BLDA: 100 % (ref 94–100)
SAO2 % BLDV: 55 % (ref 45–75)
SAO2 % BLDV: 60 % (ref 45–75)
SODIUM BLDA-SCNC: 129 MMOL/L (ref 136–145)
SODIUM BLDV-SCNC: 128 MMOL/L (ref 136–145)
SODIUM BLDV-SCNC: 132 MMOL/L (ref 136–145)
SODIUM SERPL-SCNC: 133 MMOL/L (ref 136–145)
SODIUM SERPL-SCNC: 134 MMOL/L (ref 136–145)
SODIUM SERPL-SCNC: 134 MMOL/L (ref 136–145)
WBC # BLD AUTO: 19.1 X10*3/UL (ref 4.4–11.3)
WBC # BLD AUTO: 20.2 X10*3/UL (ref 4.4–11.3)

## 2024-12-08 PROCEDURE — 2500000004 HC RX 250 GENERAL PHARMACY W/ HCPCS (ALT 636 FOR OP/ED)

## 2024-12-08 PROCEDURE — 2500000001 HC RX 250 WO HCPCS SELF ADMINISTERED DRUGS (ALT 637 FOR MEDICARE OP)

## 2024-12-08 PROCEDURE — 93010 ELECTROCARDIOGRAM REPORT: CPT | Performed by: INTERNAL MEDICINE

## 2024-12-08 PROCEDURE — 2500000002 HC RX 250 W HCPCS SELF ADMINISTERED DRUGS (ALT 637 FOR MEDICARE OP, ALT 636 FOR OP/ED): Performed by: NURSE PRACTITIONER

## 2024-12-08 PROCEDURE — 74018 RADEX ABDOMEN 1 VIEW: CPT

## 2024-12-08 PROCEDURE — 2500000004 HC RX 250 GENERAL PHARMACY W/ HCPCS (ALT 636 FOR OP/ED): Performed by: STUDENT IN AN ORGANIZED HEALTH CARE EDUCATION/TRAINING PROGRAM

## 2024-12-08 PROCEDURE — 2500000005 HC RX 250 GENERAL PHARMACY W/O HCPCS

## 2024-12-08 PROCEDURE — 71045 X-RAY EXAM CHEST 1 VIEW: CPT | Performed by: RADIOLOGY

## 2024-12-08 PROCEDURE — 84132 ASSAY OF SERUM POTASSIUM: CPT

## 2024-12-08 PROCEDURE — 86901 BLOOD TYPING SEROLOGIC RH(D): CPT

## 2024-12-08 PROCEDURE — 2500000004 HC RX 250 GENERAL PHARMACY W/ HCPCS (ALT 636 FOR OP/ED): Performed by: NURSE PRACTITIONER

## 2024-12-08 PROCEDURE — 37799 UNLISTED PX VASCULAR SURGERY: CPT | Performed by: STUDENT IN AN ORGANIZED HEALTH CARE EDUCATION/TRAINING PROGRAM

## 2024-12-08 PROCEDURE — 83735 ASSAY OF MAGNESIUM: CPT

## 2024-12-08 PROCEDURE — 2020000001 HC ICU ROOM DAILY

## 2024-12-08 PROCEDURE — P9047 ALBUMIN (HUMAN), 25%, 50ML: HCPCS | Mod: JZ

## 2024-12-08 PROCEDURE — 99291 CRITICAL CARE FIRST HOUR: CPT

## 2024-12-08 PROCEDURE — 85027 COMPLETE CBC AUTOMATED: CPT

## 2024-12-08 PROCEDURE — 2500000001 HC RX 250 WO HCPCS SELF ADMINISTERED DRUGS (ALT 637 FOR MEDICARE OP): Performed by: STUDENT IN AN ORGANIZED HEALTH CARE EDUCATION/TRAINING PROGRAM

## 2024-12-08 PROCEDURE — 82947 ASSAY GLUCOSE BLOOD QUANT: CPT

## 2024-12-08 PROCEDURE — 84132 ASSAY OF SERUM POTASSIUM: CPT | Performed by: STUDENT IN AN ORGANIZED HEALTH CARE EDUCATION/TRAINING PROGRAM

## 2024-12-08 PROCEDURE — 71045 X-RAY EXAM CHEST 1 VIEW: CPT

## 2024-12-08 PROCEDURE — 93005 ELECTROCARDIOGRAM TRACING: CPT

## 2024-12-08 RX ORDER — ASPIRIN 300 MG/1
150 SUPPOSITORY RECTAL DAILY
Status: DISCONTINUED | OUTPATIENT
Start: 2024-12-08 | End: 2024-12-13

## 2024-12-08 RX ORDER — POTASSIUM CHLORIDE 29.8 MG/ML
40 INJECTION INTRAVENOUS ONCE
Status: COMPLETED | OUTPATIENT
Start: 2024-12-08 | End: 2024-12-08

## 2024-12-08 RX ORDER — ACETAMINOPHEN 10 MG/ML
1000 INJECTION, SOLUTION INTRAVENOUS EVERY 6 HOURS SCHEDULED
Status: DISCONTINUED | OUTPATIENT
Start: 2024-12-08 | End: 2024-12-12

## 2024-12-08 RX ORDER — ALUMINUM HYDROXIDE, MAGNESIUM HYDROXIDE, AND SIMETHICONE 1200; 120; 1200 MG/30ML; MG/30ML; MG/30ML
20 SUSPENSION ORAL 4 TIMES DAILY PRN
Status: DISCONTINUED | OUTPATIENT
Start: 2024-12-08 | End: 2024-12-13

## 2024-12-08 RX ORDER — NOREPINEPHRINE BITARTRATE/D5W 8 MG/250ML
PLASTIC BAG, INJECTION (ML) INTRAVENOUS
Status: COMPLETED
Start: 2024-12-08 | End: 2024-12-09

## 2024-12-08 RX ORDER — ALBUMIN HUMAN 250 G/1000ML
SOLUTION INTRAVENOUS
Status: COMPLETED
Start: 2024-12-08 | End: 2024-12-08

## 2024-12-08 RX ORDER — LIDOCAINE HCL/PF 100 MG/5ML
100 SYRINGE (ML) INTRAVENOUS ONCE
Status: COMPLETED | OUTPATIENT
Start: 2024-12-08 | End: 2024-12-08

## 2024-12-08 RX ORDER — LIDOCAINE HYDROCHLORIDE ANHYDROUS AND DEXTROSE MONOHYDRATE .8; 5 G/100ML; G/100ML
0.5 INJECTION, SOLUTION INTRAVENOUS CONTINUOUS
Status: DISCONTINUED | OUTPATIENT
Start: 2024-12-08 | End: 2024-12-10

## 2024-12-08 RX ORDER — NOREPINEPHRINE BITARTRATE/D5W 8 MG/250ML
0-.5 PLASTIC BAG, INJECTION (ML) INTRAVENOUS CONTINUOUS
Status: DISCONTINUED | OUTPATIENT
Start: 2024-12-08 | End: 2024-12-10

## 2024-12-08 RX ORDER — HYDRALAZINE HYDROCHLORIDE 20 MG/ML
10 INJECTION INTRAMUSCULAR; INTRAVENOUS ONCE
Status: COMPLETED | OUTPATIENT
Start: 2024-12-08 | End: 2024-12-08

## 2024-12-08 RX ORDER — BUMETANIDE 0.25 MG/ML
2 INJECTION, SOLUTION INTRAMUSCULAR; INTRAVENOUS ONCE
Status: COMPLETED | OUTPATIENT
Start: 2024-12-08 | End: 2024-12-08

## 2024-12-08 RX ORDER — LIDOCAINE HCL/PF 100 MG/5ML
SYRINGE (ML) INTRAVENOUS
Status: COMPLETED
Start: 2024-12-08 | End: 2024-12-08

## 2024-12-08 RX ORDER — PANTOPRAZOLE SODIUM 40 MG/10ML
40 INJECTION, POWDER, LYOPHILIZED, FOR SOLUTION INTRAVENOUS 2 TIMES DAILY
Status: DISCONTINUED | OUTPATIENT
Start: 2024-12-08 | End: 2024-12-18

## 2024-12-08 RX ORDER — ALBUMIN HUMAN 250 G/1000ML
25 SOLUTION INTRAVENOUS ONCE
Status: COMPLETED | OUTPATIENT
Start: 2024-12-08 | End: 2024-12-08

## 2024-12-08 ASSESSMENT — PAIN - FUNCTIONAL ASSESSMENT
PAIN_FUNCTIONAL_ASSESSMENT: 0-10

## 2024-12-08 ASSESSMENT — PAIN SCALES - GENERAL
PAINLEVEL_OUTOF10: 10 - WORST POSSIBLE PAIN

## 2024-12-08 ASSESSMENT — PAIN DESCRIPTION - DESCRIPTORS: DESCRIPTORS: ACHING

## 2024-12-08 NOTE — PROGRESS NOTES
CTICU Progress Note  Edu Echavarria/[id number]    Admit Date: 11/18/2024  Hospital Length of Stay: 20   ICU Length of Stay: 3d 8h   CT SURGEON: Dr. Allen LOCKWOOD. Bumex gtt increased from 1 to 2. Milrinone dose adjusted for weight. Still complaining off whole body pain, 11/10. Not sleeping well. Feels weaker than yesterday. Nausea slightly improved. Had a BM this morning. He continues to refuse most PO meds, Q2H turns, getting up in the chair today.    MEDICATIONS  Infusions:  amiodarone, Last Rate: 1 mg/min (12/08/24 0700)  bumetanide, Last Rate: 2 mg/hr (12/08/24 0700)  EPINEPHrine, Last Rate: 0.01 mcg/kg/min (12/08/24 0700)  milrinone, Last Rate: 0.25 mcg/kg/min (12/08/24 0700)      Scheduled:  [Held by provider] acetaminophen, 975 mg, q8h  aspirin, 81 mg, Daily  atorvastatin, 80 mg, Nightly  bumetanide, 4 mg, Once  clopidogrel, 75 mg, Daily  heparin, 5,000 Units, q8h  [Held by provider] hydrALAZINE, 10 mg, q4h  hydrALAZINE, 10 mg, q8h  insulin lispro, 0-20 Units, Before meals & nightly  lidocaine, 1 patch, Daily  magnesium hydroxide, 30 mL, Daily  methocarbamol, 1,000 mg, q8h  pantoprazole, 40 mg, Daily  polyethylene glycol, 17 g, BID  sennosides-docusate sodium, 2 tablet, BID      PRN:  alteplase, 2 mg, PRN  calcium gluconate, 1 g, q6h PRN  calcium gluconate, 2 g, q6h PRN  [Held by provider] hydrALAZINE, 10 mg, q4h PRN  HYDROmorphone, 0.2 mg, q2h PRN  lubricating eye drops, 1 drop, TID PRN  magnesium sulfate, 2 g, q6h PRN  magnesium sulfate, 4 g, q6h PRN  naloxone, 0.2 mg, q5 min PRN  [Held by provider] ondansetron, 4 mg, q4h PRN  oxyCODONE, 10 mg, q4h PRN  oxyCODONE, 5 mg, q4h PRN  oxygen, , Continuous PRN - O2/gases  potassium chloride CR, 20 mEq, q6h PRN   Or  potassium chloride, 20 mEq, q6h PRN  potassium chloride CR, 40 mEq, q6h PRN   Or  potassium chloride, 40 mEq, q6h PRN  potassium chloride, 20 mEq, q6h PRN  potassium chloride, 40 mEq, q6h PRN          Objective    Visit Vitals  /54  "  Pulse 84   Temp 36.2 °C (97.2 °F) (Core)   Resp 13   Ht 1.702 m (5' 7\")   Wt 91.5 kg (201 lb 11.5 oz)   SpO2 97%   BMI 31.59 kg/m²   Smoking Status Former   BSA 2.08 m²     Wt Readings from Last 5 Encounters:   12/08/24 91.5 kg (201 lb 11.5 oz)   11/18/24 82 kg (180 lb 12.4 oz)     Intake & Output:  I/O last 3 completed shifts:  In: 2916.6 (31.9 mL/kg) [P.O.:120; I.V.:1696.6 (18.5 mL/kg); Blood:350; IV Piggyback:750]  Out: 3120 (34.1 mL/kg) [Urine:2330 (0.7 mL/kg/hr); Chest Tube:790]  Weight: 91.5 kg        Vent settings:       Physical Exam:   - Constitutional: Critically ill male lying in bed  - Neuro: A&Ox4, CAM negative. Moves all four extremities spontaneously and to commands. No FND  - CV: RRR, NSR, hypertensive with SBP in 130s  - Pulm: Not in respiratory distress, breathing easily on 1L NC. Pleural chest tube in place draining sanguinous output, no airleak  - GI: Soft, tender to palpation, slightly distended  - : Logan in place draining orange urine, no hematoma appreciated in the right groin  - Extremities: WWP, 2+ pulses throughout, no peripheral edema  - Skin: No jaundiced, no obvious rashes or deformities, sternal incision closed, well aproximated, and without signs of infection  - Psych: Calm and cooperative, normal mood and behavior    Daily Risk Screen  Intubated: No  Central line: Yes - for invasive hemodynamic monitoring  Logan: Yes - for accurate I&Os    Images: All relevant images reviewed.    Invasive Hemodynamics:    Most Recent Range Past 24hrs   BP (Art) 140/51 Arterial Line BP 1  Min: 112/44  Max: 162/66   MAP(Art) 72 mmHg Arterial Line MAP 1 (mmHg)   Min: 0 mmHg  Max: 92 mmHg   RA/CVP   No data recorded   PA 65/26 No data recorded   PA(mean) 159 mmHg No data recorded   CO 5.7 L/min CO (L/min)  Min: 4.9 L/min  Max: 6.3 L/min   CI 2.8 L/min/m2 CI (L/min/m2)  Min: 2.5 L/min/m2  Max: 3.1 L/min/m2   Mixed Venous 77 % No data recorded   SVR  652 (dyne*sec)/cm5 SVR (dyne*sec)/cm5  Min: 652 " (dyne*sec)/cm5  Max: 839 (dyne*sec)/cm5       Assessment/Plan    Edu Echavarria is a 74 y.o. male with PMH of HTN, HFrEF, CAD, GERD, and gout, initially presented to OSH with STEMI. Transferred to [facility] for CABG evaluation, hospital course c/b MARYBETH, worsening hyponatremia causing delay of surgery case, then further complicated by respiratory failure following fluid resuscitation for correction of hyponatremia, and cardiogenic shock necessitating IABP.    Admitted to CTICU s/p CABGx4, LAAL with Dr. Villa. S/p R fem IABP.   He continues to need inotropic support, will wean as able. Is hypervolemic and not getting good UOP with spot dosing diuretics so started on Bumex gtt. Nausea continues to be bothersome, but should improve with now having a bowel movement and decreasing pill burden. Encouraging good PO intake. Will continue to try to mobilize pt to chair and follow CT output so we can try to pull, which will improve pain and ability to ventilate. Increasing PPI and adding Mylanta to alleviate abdominal discomfort. Discussed with pt today the necessity to take PO meds, mobilize, and get good PO intake; he was understanding and agreeable to plan.      NEURO:  History of gout. Acute post operative pain.  - Serial neuro and pain assessments   - Scheduled Tylenol, lidocaine patch  - Continue IV Tylenol due to inability to tolerate PO Tylenol  - Continue IV methocarbamol 1000mg Q8H  - PRN oxycodone  - PRN dilaudid for breakthrough pain   - PT Consult, OOB to chair as tolerated, chair position if not tolerated   - CAM ICU score Qshift  - Sleep/wake cycle hygiene  - Holding home allopurinol (gout)     CV:  PMH of HTN, HFrEF, CAD, STEMI c/b cardiogenic shock with IABP placement. Now s/p CABG x4 with LAAL. Pre/Post EF: 30/30% Arrived to CTICU on epi 0.06, norepi 0.03, vasopressin 0.03. A/V epicardial wires set DDD @ 80. R fem IABP removed 12/5.  - Maintain goal MAP 65-90  - Mixed venous and CI Q4H  - Volume resuscitate as  clinically indicated  - Maintain epicardial wires set VVI backup @ 60  - Continue ASA 81mg (change to rectal to decrease pill burden)  - Continue Plavix 75mg  - Continue atorvastatin 80mg  - Continue PO hydralazine 10mg TID  - Hold home metoprolol succinate  - Decrease amio gtt to 0.5     PULM:  No significant pulmonary PMH. Developed AHRF in CICU 2/2 cardiogenic shock. Extubated 12/5. Chest tubes: L pleural, R pleural, mediastinal.  - Daily CXR  - Wean FiO2 maintaining SpO2 >92%.   - IS Q1H and OOB to chair when extubated  - Chest tubes to wall suction  - Can pull pleural once output <100/8 hr     GI:  PMH of GERD. Last BM 12/8.  - Continue cardiac-diabetic diet  - Miralax, senna-docusate, and Milk of Mag BID  - Holding PRN antiemetics due to QTc>500  - Start Mylanta Q4H PRN  - Increase home PPI to BID     /Renal:  No /renal PMH. Baseline Cr of 0.7-1.0. Has had MARYBETH and acute on chronic hyponatremia likely 2/2 hypovolemia 2/2 decreased PO intake and diarrhea. CSA-MARYBETH Risk Score 3. Creatinine rising post-op, possibly due to inferior displacement of IABP. Hawthorne in place and making adequate UOP.  - Continue hawthorne catheter for strict I/Os  - Goal UOP 0.5ml/kg/hr  - RFP as clinically indicated  - Replete electrolytes per CTICU protocol  - Goal: net -1L  - Continue Bumex gtt @2    ENDO:  No significant PMH. Last A1c: 6.3 (11/18/24). Euglycemic.  - SSI #4  - Maintain BG <180, insulin p/er CTICU protocol     HEME:  No significant PMH. Acute blood loss anemia and thrombocytopenia.  - Monitor drain output volume and characteristics  - CBC, coags, and fibrinogen post op and as clinically indicated  - Continue ASA and Plavix  - SCDs & SQH for DVT prophylaxis  - Type & Screen: Q72H     ID: Afebrile, no current indications of infection. MRSA negative. Broad infectious work-up for leukocytosis completed and negative, s/p Vanc/Zosyn (11/29-12/2).  - Trend temp Q4H  - Periop cefazolin x 48hrs until 12/6     Skin: No active skin  issues.  - Preventative Mepilex dressings in place on sacrum and heels  - Change preventative Mepilex weekly or more frequently as indicated (when moist/soiled)   - Every shift skin assessment per nursing and weekly ICU skin rounds  - Moisture barrier to be applied with shirley care  - Active skin problems addressed with nursing on daily rounds     PPx:  SCDs  SQH  PPI     G: Line  Right IJ with PAC placed 11/29/24  Left radial A-line placed 11/29/24  Right femoral IABP placed 11/29/24    F: Family:  Will update at bedside postoperatively.    Restraints:  The indications and risks/benefits of non-violent/non self-destructive restraints were discussed.      A,B,C,D,E,F,G: reviewed     Dispo: CTICU care for now.     Code status: Full Code   Extended Emergency Contact Information  Primary Emergency Contact: Guillermo Barrios  Mobile Phone: [phone]  Relation: Relative  Preferred language: English   needed? No  Secondary Emergency Contact: COLIN ROMERO  Mobile Phone: [phone]  Relation: Friend  Preferred language: English   needed? No     Patient staffed with Dr. Lal    CTICU TEAM PHONE [phone]      Noe Aviles MD  PGY-1 Resident Physician  Department of Anesthesiology & Perioperative Medicine

## 2024-12-09 ENCOUNTER — APPOINTMENT (OUTPATIENT)
Dept: RADIOLOGY | Facility: HOSPITAL | Age: 74
DRG: 235 | End: 2024-12-09
Payer: MEDICARE

## 2024-12-09 ENCOUNTER — APPOINTMENT (OUTPATIENT)
Dept: CARDIOLOGY | Facility: HOSPITAL | Age: 74
DRG: 235 | End: 2024-12-09
Payer: MEDICARE

## 2024-12-09 VITALS
BODY MASS INDEX: 31.66 KG/M2 | HEIGHT: 67 IN | HEART RATE: 77 BPM | OXYGEN SATURATION: 93 % | SYSTOLIC BLOOD PRESSURE: 126 MMHG | DIASTOLIC BLOOD PRESSURE: 54 MMHG | RESPIRATION RATE: 29 BRPM | TEMPERATURE: 97.9 F | WEIGHT: 201.72 LBS

## 2024-12-09 LAB
ALBUMIN SERPL BCP-MCNC: 3.6 G/DL (ref 3.4–5)
ALBUMIN SERPL BCP-MCNC: 3.7 G/DL (ref 3.4–5)
ALBUMIN SERPL BCP-MCNC: 3.8 G/DL (ref 3.4–5)
ANION GAP BLDA CALCULATED.4IONS-SCNC: 18 MMO/L (ref 10–25)
ANION GAP BLDA CALCULATED.4IONS-SCNC: 20 MMO/L (ref 10–25)
ANION GAP BLDV CALCULATED.4IONS-SCNC: 17 MMOL/L (ref 10–25)
ANION GAP SERPL CALC-SCNC: 23 MMOL/L (ref 10–20)
ANION GAP SERPL CALC-SCNC: 25 MMOL/L (ref 10–20)
ANION GAP SERPL CALC-SCNC: 25 MMOL/L (ref 10–20)
AORTIC VALVE MEAN GRADIENT: 8 MMHG
AORTIC VALVE PEAK VELOCITY: 2.06 M/S
APPEARANCE UR: ABNORMAL
ATRIAL RATE: 150 BPM
ATRIAL RATE: 66 BPM
ATRIAL RATE: 76 BPM
ATRIAL RATE: 90 BPM
AV PEAK GRADIENT: 17 MMHG
AVA (PEAK VEL): 1.75 CM2
AVA (VTI): 1.75 CM2
BACTERIA SPEC RESP CULT: ABNORMAL
BASE EXCESS BLDA CALC-SCNC: -3.5 MMOL/L (ref -2–3)
BASE EXCESS BLDA CALC-SCNC: -3.9 MMOL/L (ref -2–3)
BASE EXCESS BLDV CALC-SCNC: -3.6 MMOL/L (ref -2–3)
BILIRUB UR STRIP.AUTO-MCNC: NEGATIVE MG/DL
BODY TEMPERATURE: 37 DEGREES CELSIUS
BUN SERPL-MCNC: 68 MG/DL (ref 6–23)
BUN SERPL-MCNC: 69 MG/DL (ref 6–23)
BUN SERPL-MCNC: 70 MG/DL (ref 6–23)
CA-I BLD-SCNC: 1.08 MMOL/L (ref 1.1–1.33)
CA-I BLD-SCNC: 1.1 MMOL/L (ref 1.1–1.33)
CA-I BLDA-SCNC: 1.14 MMOL/L (ref 1.1–1.33)
CA-I BLDA-SCNC: 1.14 MMOL/L (ref 1.1–1.33)
CA-I BLDV-SCNC: 1.15 MMOL/L (ref 1.1–1.33)
CALCIUM SERPL-MCNC: 8.5 MG/DL (ref 8.6–10.6)
CALCIUM SERPL-MCNC: 8.6 MG/DL (ref 8.6–10.6)
CALCIUM SERPL-MCNC: 8.8 MG/DL (ref 8.6–10.6)
CHLORIDE BLDA-SCNC: 91 MMOL/L (ref 98–107)
CHLORIDE BLDA-SCNC: 93 MMOL/L (ref 98–107)
CHLORIDE BLDV-SCNC: 92 MMOL/L (ref 98–107)
CHLORIDE SERPL-SCNC: 90 MMOL/L (ref 98–107)
CHLORIDE SERPL-SCNC: 91 MMOL/L (ref 98–107)
CHLORIDE SERPL-SCNC: 92 MMOL/L (ref 98–107)
CO2 SERPL-SCNC: 20 MMOL/L (ref 21–32)
CO2 SERPL-SCNC: 21 MMOL/L (ref 21–32)
CO2 SERPL-SCNC: 22 MMOL/L (ref 21–32)
COLOR UR: ABNORMAL
CREAT SERPL-MCNC: 4.31 MG/DL (ref 0.5–1.3)
CREAT SERPL-MCNC: 4.62 MG/DL (ref 0.5–1.3)
CREAT SERPL-MCNC: 4.82 MG/DL (ref 0.5–1.3)
EGFRCR SERPLBLD CKD-EPI 2021: 12 ML/MIN/1.73M*2
EGFRCR SERPLBLD CKD-EPI 2021: 13 ML/MIN/1.73M*2
EGFRCR SERPLBLD CKD-EPI 2021: 14 ML/MIN/1.73M*2
EJECTION FRACTION APICAL 4 CHAMBER: 32.5
EJECTION FRACTION: 33 %
ERYTHROCYTE [DISTWIDTH] IN BLOOD BY AUTOMATED COUNT: 15.1 % (ref 11.5–14.5)
ERYTHROCYTE [DISTWIDTH] IN BLOOD BY AUTOMATED COUNT: 15.3 % (ref 11.5–14.5)
GLUCOSE BLD MANUAL STRIP-MCNC: 120 MG/DL (ref 74–99)
GLUCOSE BLD MANUAL STRIP-MCNC: 164 MG/DL (ref 74–99)
GLUCOSE BLD MANUAL STRIP-MCNC: 171 MG/DL (ref 74–99)
GLUCOSE BLD MANUAL STRIP-MCNC: 188 MG/DL (ref 74–99)
GLUCOSE BLD MANUAL STRIP-MCNC: 193 MG/DL (ref 74–99)
GLUCOSE BLDA-MCNC: 174 MG/DL (ref 74–99)
GLUCOSE BLDA-MCNC: 190 MG/DL (ref 74–99)
GLUCOSE BLDV-MCNC: 189 MG/DL (ref 74–99)
GLUCOSE SERPL-MCNC: 175 MG/DL (ref 74–99)
GLUCOSE SERPL-MCNC: 188 MG/DL (ref 74–99)
GLUCOSE SERPL-MCNC: 196 MG/DL (ref 74–99)
GLUCOSE UR STRIP.AUTO-MCNC: NORMAL MG/DL
GRAM STN SPEC: ABNORMAL
HCO3 BLDA-SCNC: 20.6 MMOL/L (ref 22–26)
HCO3 BLDA-SCNC: 21.4 MMOL/L (ref 22–26)
HCO3 BLDV-SCNC: 21.5 MMOL/L (ref 22–26)
HCT VFR BLD AUTO: 23.2 % (ref 41–52)
HCT VFR BLD AUTO: 23.9 % (ref 41–52)
HCT VFR BLD EST: 25 % (ref 41–52)
HCT VFR BLD EST: 26 % (ref 41–52)
HCT VFR BLD EST: 27 % (ref 41–52)
HGB BLD-MCNC: 7.7 G/DL (ref 13.5–17.5)
HGB BLD-MCNC: 7.7 G/DL (ref 13.5–17.5)
HGB BLDA-MCNC: 8.8 G/DL (ref 13.5–17.5)
HGB BLDA-MCNC: 9.1 G/DL (ref 13.5–17.5)
HGB BLDV-MCNC: 8.3 G/DL (ref 13.5–17.5)
HOLD SPECIMEN: NORMAL
HYALINE CASTS #/AREA URNS AUTO: ABNORMAL /LPF
INHALED O2 CONCENTRATION: 21 %
KETONES UR STRIP.AUTO-MCNC: NEGATIVE MG/DL
LACTATE BLDA-SCNC: 1.1 MMOL/L (ref 0.4–2)
LACTATE BLDA-SCNC: 1.1 MMOL/L (ref 0.4–2)
LACTATE BLDV-SCNC: 1.3 MMOL/L (ref 0.4–2)
LEFT ATRIUM VOLUME AREA LENGTH INDEX BSA: 44.1 ML/M2
LEFT VENTRICLE INTERNAL DIMENSION DIASTOLE: 4.6 CM (ref 3.5–6)
LEFT VENTRICULAR OUTFLOW TRACT DIAMETER: 2 CM
LEUKOCYTE ESTERASE UR QL STRIP.AUTO: ABNORMAL
LIDOCAIN SERPL-MCNC: 2.7 UG/ML (ref 1–5)
MAGNESIUM SERPL-MCNC: 3.26 MG/DL (ref 1.6–2.4)
MAGNESIUM SERPL-MCNC: 3.29 MG/DL (ref 1.6–2.4)
MAGNESIUM SERPL-MCNC: 3.41 MG/DL (ref 1.6–2.4)
MCH RBC QN AUTO: 28.4 PG (ref 26–34)
MCH RBC QN AUTO: 28.8 PG (ref 26–34)
MCHC RBC AUTO-ENTMCNC: 32.2 G/DL (ref 32–36)
MCHC RBC AUTO-ENTMCNC: 33.2 G/DL (ref 32–36)
MCV RBC AUTO: 87 FL (ref 80–100)
MCV RBC AUTO: 88 FL (ref 80–100)
MITRAL VALVE E/A RATIO: 2.67
MUCOUS THREADS #/AREA URNS AUTO: ABNORMAL /LPF
NITRITE UR QL STRIP.AUTO: NEGATIVE
NRBC BLD-RTO: 0.4 /100 WBCS (ref 0–0)
NRBC BLD-RTO: 0.6 /100 WBCS (ref 0–0)
OXYHGB MFR BLDA: 90.5 % (ref 94–98)
OXYHGB MFR BLDA: 92.5 % (ref 94–98)
OXYHGB MFR BLDV: 60.4 % (ref 45–75)
P AXIS: -19 DEGREES
P AXIS: 64 DEGREES
P AXIS: 67 DEGREES
P OFFSET: 195 MS
P OFFSET: 196 MS
P OFFSET: 199 MS
P ONSET: 136 MS
P ONSET: 148 MS
P ONSET: 160 MS
PCO2 BLDA: 34 MM HG (ref 38–42)
PCO2 BLDA: 37 MM HG (ref 38–42)
PCO2 BLDV: 38 MM HG (ref 41–51)
PH BLDA: 7.37 PH (ref 7.38–7.42)
PH BLDA: 7.39 PH (ref 7.38–7.42)
PH BLDV: 7.36 PH (ref 7.33–7.43)
PH UR STRIP.AUTO: 6 [PH]
PHOSPHATE SERPL-MCNC: 5.2 MG/DL (ref 2.5–4.9)
PHOSPHATE SERPL-MCNC: 5.5 MG/DL (ref 2.5–4.9)
PHOSPHATE SERPL-MCNC: 5.7 MG/DL (ref 2.5–4.9)
PLATELET # BLD AUTO: 285 X10*3/UL (ref 150–450)
PLATELET # BLD AUTO: 307 X10*3/UL (ref 150–450)
PO2 BLDA: 62 MM HG (ref 85–95)
PO2 BLDA: 70 MM HG (ref 85–95)
PO2 BLDV: 38 MM HG (ref 35–45)
POTASSIUM BLDA-SCNC: 3.9 MMOL/L (ref 3.5–5.3)
POTASSIUM BLDA-SCNC: 4.1 MMOL/L (ref 3.5–5.3)
POTASSIUM BLDV-SCNC: 4.1 MMOL/L (ref 3.5–5.3)
POTASSIUM SERPL-SCNC: 3.7 MMOL/L (ref 3.5–5.3)
POTASSIUM SERPL-SCNC: 4.1 MMOL/L (ref 3.5–5.3)
POTASSIUM SERPL-SCNC: 4.3 MMOL/L (ref 3.5–5.3)
PR INTERVAL: 110 MS
PR INTERVAL: 132 MS
PR INTERVAL: 156 MS
PROT UR STRIP.AUTO-MCNC: ABNORMAL MG/DL
Q ONSET: 213 MS
Q ONSET: 214 MS
Q ONSET: 214 MS
Q ONSET: 215 MS
QRS COUNT: 11 BEATS
QRS COUNT: 12 BEATS
QRS COUNT: 15 BEATS
QRS COUNT: 21 BEATS
QRS DURATION: 102 MS
QRS DURATION: 102 MS
QRS DURATION: 106 MS
QRS DURATION: 92 MS
QT INTERVAL: 382 MS
QT INTERVAL: 446 MS
QT INTERVAL: 470 MS
QT INTERVAL: 526 MS
QTC CALCULATION(BAZETT): 528 MS
QTC CALCULATION(BAZETT): 545 MS
QTC CALCULATION(BAZETT): 551 MS
QTC CALCULATION(BAZETT): 557 MS
QTC FREDERICIA: 492 MS
QTC FREDERICIA: 508 MS
QTC FREDERICIA: 510 MS
QTC FREDERICIA: 543 MS
R AXIS: -30 DEGREES
R AXIS: 111 DEGREES
R AXIS: 88 DEGREES
R AXIS: 94 DEGREES
RBC # BLD AUTO: 2.67 X10*6/UL (ref 4.5–5.9)
RBC # BLD AUTO: 2.71 X10*6/UL (ref 4.5–5.9)
RBC # UR STRIP.AUTO: ABNORMAL /UL
RBC #/AREA URNS AUTO: >20 /HPF
RIGHT VENTRICLE FREE WALL PEAK S': 10 CM/S
RIGHT VENTRICLE PEAK SYSTOLIC PRESSURE: 44.7 MMHG
SAO2 % BLDA: 93 % (ref 94–100)
SAO2 % BLDA: 96 % (ref 94–100)
SAO2 % BLDV: 62 % (ref 45–75)
SODIUM BLDA-SCNC: 128 MMOL/L (ref 136–145)
SODIUM BLDA-SCNC: 128 MMOL/L (ref 136–145)
SODIUM BLDV-SCNC: 126 MMOL/L (ref 136–145)
SODIUM SERPL-SCNC: 132 MMOL/L (ref 136–145)
SODIUM SERPL-SCNC: 132 MMOL/L (ref 136–145)
SODIUM SERPL-SCNC: 133 MMOL/L (ref 136–145)
SP GR UR STRIP.AUTO: 1.02
T AXIS: -39 DEGREES
T AXIS: -4 DEGREES
T AXIS: 11 DEGREES
T AXIS: 226 DEGREES
T OFFSET: 404 MS
T OFFSET: 437 MS
T OFFSET: 450 MS
T OFFSET: 477 MS
TRICUSPID ANNULAR PLANE SYSTOLIC EXCURSION: 1.5 CM
UROBILINOGEN UR STRIP.AUTO-MCNC: NORMAL MG/DL
VENTRICULAR RATE: 128 BPM
VENTRICULAR RATE: 66 BPM
VENTRICULAR RATE: 76 BPM
VENTRICULAR RATE: 90 BPM
WBC # BLD AUTO: 15.7 X10*3/UL (ref 4.4–11.3)
WBC # BLD AUTO: 16.3 X10*3/UL (ref 4.4–11.3)
WBC #/AREA URNS AUTO: >50 /HPF

## 2024-12-09 PROCEDURE — 93306 TTE W/DOPPLER COMPLETE: CPT | Performed by: INTERNAL MEDICINE

## 2024-12-09 PROCEDURE — 2500000005 HC RX 250 GENERAL PHARMACY W/O HCPCS: Performed by: STUDENT IN AN ORGANIZED HEALTH CARE EDUCATION/TRAINING PROGRAM

## 2024-12-09 PROCEDURE — 97530 THERAPEUTIC ACTIVITIES: CPT | Mod: GO

## 2024-12-09 PROCEDURE — 2500000002 HC RX 250 W HCPCS SELF ADMINISTERED DRUGS (ALT 637 FOR MEDICARE OP, ALT 636 FOR OP/ED)

## 2024-12-09 PROCEDURE — 85027 COMPLETE CBC AUTOMATED: CPT

## 2024-12-09 PROCEDURE — 82805 BLOOD GASES W/O2 SATURATION: CPT | Performed by: STUDENT IN AN ORGANIZED HEALTH CARE EDUCATION/TRAINING PROGRAM

## 2024-12-09 PROCEDURE — 2500000005 HC RX 250 GENERAL PHARMACY W/O HCPCS

## 2024-12-09 PROCEDURE — 2500000002 HC RX 250 W HCPCS SELF ADMINISTERED DRUGS (ALT 637 FOR MEDICARE OP, ALT 636 FOR OP/ED): Performed by: STUDENT IN AN ORGANIZED HEALTH CARE EDUCATION/TRAINING PROGRAM

## 2024-12-09 PROCEDURE — 99223 1ST HOSP IP/OBS HIGH 75: CPT

## 2024-12-09 PROCEDURE — 84156 ASSAY OF PROTEIN URINE: CPT

## 2024-12-09 PROCEDURE — 81001 URINALYSIS AUTO W/SCOPE: CPT | Performed by: STUDENT IN AN ORGANIZED HEALTH CARE EDUCATION/TRAINING PROGRAM

## 2024-12-09 PROCEDURE — 84132 ASSAY OF SERUM POTASSIUM: CPT

## 2024-12-09 PROCEDURE — 87205 SMEAR GRAM STAIN: CPT | Performed by: STUDENT IN AN ORGANIZED HEALTH CARE EDUCATION/TRAINING PROGRAM

## 2024-12-09 PROCEDURE — 71045 X-RAY EXAM CHEST 1 VIEW: CPT | Performed by: RADIOLOGY

## 2024-12-09 PROCEDURE — 97168 OT RE-EVAL EST PLAN CARE: CPT | Mod: GO

## 2024-12-09 PROCEDURE — 51702 INSERT TEMP BLADDER CATH: CPT

## 2024-12-09 PROCEDURE — 82947 ASSAY GLUCOSE BLOOD QUANT: CPT

## 2024-12-09 PROCEDURE — 2500000002 HC RX 250 W HCPCS SELF ADMINISTERED DRUGS (ALT 637 FOR MEDICARE OP, ALT 636 FOR OP/ED): Performed by: NURSE PRACTITIONER

## 2024-12-09 PROCEDURE — 87086 URINE CULTURE/COLONY COUNT: CPT | Performed by: STUDENT IN AN ORGANIZED HEALTH CARE EDUCATION/TRAINING PROGRAM

## 2024-12-09 PROCEDURE — 82330 ASSAY OF CALCIUM: CPT

## 2024-12-09 PROCEDURE — P9045 ALBUMIN (HUMAN), 5%, 250 ML: HCPCS | Mod: JZ | Performed by: STUDENT IN AN ORGANIZED HEALTH CARE EDUCATION/TRAINING PROGRAM

## 2024-12-09 PROCEDURE — 2500000004 HC RX 250 GENERAL PHARMACY W/ HCPCS (ALT 636 FOR OP/ED)

## 2024-12-09 PROCEDURE — 87040 BLOOD CULTURE FOR BACTERIA: CPT | Performed by: STUDENT IN AN ORGANIZED HEALTH CARE EDUCATION/TRAINING PROGRAM

## 2024-12-09 PROCEDURE — C8929 TTE W OR WO FOL WCON,DOPPLER: HCPCS

## 2024-12-09 PROCEDURE — 37799 UNLISTED PX VASCULAR SURGERY: CPT

## 2024-12-09 PROCEDURE — 2500000001 HC RX 250 WO HCPCS SELF ADMINISTERED DRUGS (ALT 637 FOR MEDICARE OP)

## 2024-12-09 PROCEDURE — 99291 CRITICAL CARE FIRST HOUR: CPT

## 2024-12-09 PROCEDURE — 71045 X-RAY EXAM CHEST 1 VIEW: CPT

## 2024-12-09 PROCEDURE — 2020000001 HC ICU ROOM DAILY

## 2024-12-09 PROCEDURE — 83735 ASSAY OF MAGNESIUM: CPT

## 2024-12-09 PROCEDURE — 84300 ASSAY OF URINE SODIUM: CPT

## 2024-12-09 PROCEDURE — 80176 ASSAY OF LIDOCAINE: CPT

## 2024-12-09 PROCEDURE — 76937 US GUIDE VASCULAR ACCESS: CPT

## 2024-12-09 PROCEDURE — 36415 COLL VENOUS BLD VENIPUNCTURE: CPT | Performed by: STUDENT IN AN ORGANIZED HEALTH CARE EDUCATION/TRAINING PROGRAM

## 2024-12-09 PROCEDURE — 84540 ASSAY OF URINE/UREA-N: CPT

## 2024-12-09 PROCEDURE — 2500000004 HC RX 250 GENERAL PHARMACY W/ HCPCS (ALT 636 FOR OP/ED): Performed by: STUDENT IN AN ORGANIZED HEALTH CARE EDUCATION/TRAINING PROGRAM

## 2024-12-09 RX ORDER — BUMETANIDE 0.25 MG/ML
4 INJECTION, SOLUTION INTRAMUSCULAR; INTRAVENOUS ONCE
Status: COMPLETED | OUTPATIENT
Start: 2024-12-09 | End: 2024-12-09

## 2024-12-09 RX ORDER — INSULIN LISPRO 100 [IU]/ML
4 INJECTION, SOLUTION INTRAVENOUS; SUBCUTANEOUS ONCE
Status: COMPLETED | OUTPATIENT
Start: 2024-12-09 | End: 2024-12-09

## 2024-12-09 RX ORDER — DEXTROSE 50 % IN WATER (D50W) INTRAVENOUS SYRINGE
12.5
Status: DISCONTINUED | OUTPATIENT
Start: 2024-12-09 | End: 2024-12-13

## 2024-12-09 RX ORDER — ALBUMIN HUMAN 50 G/1000ML
12.5 SOLUTION INTRAVENOUS ONCE
Status: COMPLETED | OUTPATIENT
Start: 2024-12-09 | End: 2024-12-09

## 2024-12-09 RX ORDER — DEXTROSE 50 % IN WATER (D50W) INTRAVENOUS SYRINGE
25
Status: DISCONTINUED | OUTPATIENT
Start: 2024-12-09 | End: 2024-12-13

## 2024-12-09 RX ORDER — SODIUM CHLORIDE 0.9 % (FLUSH) 0.9 %
20 SYRINGE (ML) INJECTION EVERY 4 HOURS PRN
Status: DISCONTINUED | OUTPATIENT
Start: 2024-12-09 | End: 2024-12-12

## 2024-12-09 RX ORDER — HEPARIN SODIUM 10000 [USP'U]/100ML
300 INJECTION, SOLUTION INTRAVENOUS CONTINUOUS
Status: DISCONTINUED | OUTPATIENT
Start: 2024-12-09 | End: 2024-12-11

## 2024-12-09 RX ORDER — INSULIN LISPRO 100 [IU]/ML
0-10 INJECTION, SOLUTION INTRAVENOUS; SUBCUTANEOUS
Status: DISCONTINUED | OUTPATIENT
Start: 2024-12-09 | End: 2024-12-18 | Stop reason: HOSPADM

## 2024-12-09 ASSESSMENT — PAIN - FUNCTIONAL ASSESSMENT
PAIN_FUNCTIONAL_ASSESSMENT: WONG-BAKER FACES
PAIN_FUNCTIONAL_ASSESSMENT: 0-10

## 2024-12-09 ASSESSMENT — COGNITIVE AND FUNCTIONAL STATUS - GENERAL
EATING MEALS: A LITTLE
DAILY ACTIVITIY SCORE: 15
DRESSING REGULAR LOWER BODY CLOTHING: A LOT
DRESSING REGULAR UPPER BODY CLOTHING: A LITTLE
HELP NEEDED FOR BATHING: A LOT
PERSONAL GROOMING: A LITTLE
TOILETING: A LOT

## 2024-12-09 ASSESSMENT — PAIN SCALES - GENERAL
PAINLEVEL_OUTOF10: 10 - WORST POSSIBLE PAIN
PAINLEVEL_OUTOF10: 10 - WORST POSSIBLE PAIN
PAINLEVEL_OUTOF10: 0 - NO PAIN
PAINLEVEL_OUTOF10: 10 - WORST POSSIBLE PAIN
PAINLEVEL_OUTOF10: 10 - WORST POSSIBLE PAIN

## 2024-12-09 ASSESSMENT — ACTIVITIES OF DAILY LIVING (ADL)
ADL_ASSISTANCE: INDEPENDENT
BATHING_ASSISTANCE: MODERATE

## 2024-12-09 ASSESSMENT — PAIN SCALES - WONG BAKER: WONGBAKER_NUMERICALRESPONSE: HURTS LITTLE MORE

## 2024-12-09 ASSESSMENT — PAIN SCALES - PAIN ASSESSMENT IN ADVANCED DEMENTIA (PAINAD): TOTALSCORE: MEDICATION (SEE MAR)

## 2024-12-09 NOTE — PROGRESS NOTES
Occupational Therapy    Re Evaluation and Treatment    Patient Name: Edu Echavarria  MRN: [id number]  Today's Date: 12/11/2024  Room: 04/04  Time Calculation  Start Time: 0852  Stop Time: 0932  Time Calculation (min): 40 min    Assessment  IP OT Assessment  OT Assessment: Pt demo's decreased strength, endurance, and functional mobility. Would benefit from continued skilled services to maximize level of independence and safety.  Prognosis: Good  Evaluation/Treatment Tolerance: Patient tolerated treatment well  Medical Staff Made Aware: Yes  End of Session Communication: Bedside nurse  End of Session Patient Position: Up in chair, Alarm off, not on at start of session  Plan:  Inpatient Plan  Treatment Interventions: ADL retraining, Functional transfer training, Endurance training, Neuromuscular reeducation, Compensatory technique education  OT Frequency: 3 times per week  OT Discharge Recommendations: Moderate intensity level of continued care  Equipment Recommended upon Discharge: Wheeled walker  OT Recommended Transfer Status: Moderate assist  OT - OK to Discharge: Yes  OT Assessment  OT Assessment Results: Decreased ADL status, Decreased sensation, Decreased endurance, Decreased fine motor control, Decreased functional mobility, Decreased IADLs, Decreased trunk control for functional activities  Prognosis: Good  Barriers to Discharge: None  Evaluation/Treatment Tolerance: Patient tolerated treatment well  Medical Staff Made Aware: Yes  Strengths: Ability to acquire knowledge, Attitude of self, Premorbid level of function  Barriers to Participation: Comorbidities    Subjective   Current Problem:  1. STEMI (ST elevation myocardial infarction) (Multi)  Transthoracic Echo (TTE) Complete    Transthoracic Echo (TTE) Complete    Vascular US Lower Extremity Vein Mapping Bilateral    Vascular US Ankle Brachial Index (ELODIA) Without Exercise    Vascular US Carotid Artery Duplex Bilateral    Vascular US Lower Extremity Vein  Mapping Bilateral    Vascular US Ankle Brachial Index (ELODIA) Without Exercise    Vascular US Carotid Artery Duplex Bilateral    Case Request Operating Room: CABG x 3 LIMA and veins    Case Request Operating Room: CABG x 3 LIMA and veins    Transthoracic Echo (TTE) Limited    Transthoracic Echo (TTE) Limited    Intubation    Airway    Transthoracic Echo (TTE) Limited    Transthoracic Echo (TTE) Limited    Anesthesia Intraoperative Transesophageal Echocardiogram    Anesthesia Intraoperative Transesophageal Echocardiogram    Transthoracic Echo (TTE) Complete    Transthoracic Echo (TTE) Complete    CANCELED: Case Request Operating Room: CABG x 3 LIMA and veins    CANCELED: Case Request Operating Room: CABG x 3 LIMA and veins      2. Chest pain, unspecified  Vascular US Lower Extremity Vein Mapping Bilateral    Vascular US Lower Extremity Vein Mapping Bilateral      3. Other disorders of arteries, arterioles and capillaries in diseases classified elsewhere  Vascular US Ankle Brachial Index (ELODIA) Without Exercise    Vascular US Carotid Artery Duplex Bilateral    Vascular US Ankle Brachial Index (ELODIA) Without Exercise    Vascular US Carotid Artery Duplex Bilateral      4. Encounter for other preprocedural examination  Vascular US Lower Extremity Vein Mapping Bilateral    Vascular US Ankle Brachial Index (ELODIA) Without Exercise      5. Peripheral vascular disease, unspecified (CMS-HCC)  Vascular US Ankle Brachial Index (ELODIA) Without Exercise      6. Other specified symptoms and signs involving the circulatory and respiratory systems  Vascular US Carotid Artery Duplex Bilateral      7. Shock (Multi)  Central Line    Central Line    Case Request Cath Lab: IABP Insertion    Case Request Cath Lab: IABP Insertion    Cardiac Catheterization Procedure    Cardiac Catheterization Procedure      8. Cardiogenic shock (Multi)  Cardiac Catheterization Procedure      9. ST elevation myocardial infarction involving left anterior descending  (LAD) coronary artery (Multi)        10. ST elevation myocardial infarction involving left circumflex coronary artery (Multi)        11. Cardiac tamponade  Anesthesia Intraoperative Transesophageal Echocardiogram      12. Acute kidney injury (MARYBETH) with acute tubular necrosis (ATN) (CMS-[facility])        13. Acute kidney injury (CMS-[facility])  Central Line    Central Line        General:  Reason for Referral: s/p CABGx4, LAAL with Dr. Villa  Past Medical History Relevant to Rehab: HTN, HFrEF, CAD, GERD, and gout, initially presented to OSH with STEMI. Transferred to [facility] for CABG evaluation, hospital course c/b MARYBETH, worsening hyponatremia causing delay of surgery case, then further complicated by respiratory failure following fluid resuscitation for correction of hyponatremia, and cardiogenic shock necessitating R fem IABP, removed 12/5  Prior to Session Communication: Bedside nurse  Patient Position Received: Bed, 3 rail up, Alarm off, not on at start of session  Family/Caregiver Present: No  General Comment: Pt awake and willing to participate in OT reevaluation with continued encouragement, education, and increased time. Engaged in sustained sitting EOB and transfer to chair with arms. (Amio 1, epi 0.01, mil 0.25, levo 0.03)   Precautions:  Hearing/Visual Limitations: Difficulty seeing out of L eye  Medical Precautions: Cardiac precautions, Fall precautions, Chest tube (x1 CT)  Post-Surgical Precautions: Move in the Tube  Precautions Comment: MAP 65-90; VVI@50, SpO2>92%  Vital Signs:     12/09/24 0852 12/09/24 0900 12/09/24 0932   Vital Signs   Vitals Session Pre OT  --  Post OT   Heart Rate 82 83 82   Resp 11 24 22   SpO2 92 % (!) 89 % 94 %   /52 (!) 116/97 124/50   MAP (mmHg) 78 105 71   BP Method Arterial line  --  Arterial line   Patient Position Lying  --  Sitting   Vital Signs Comment Vitals stable with mobility  --   --      Pain:  Pain Assessment  Pain Assessment: Franks-Baker FACES  Franks-Belcher FACES Pain  Rating: Hurts little more  Pain Type: Surgical pain  Pain Location: Sternum  Pain Interventions: Repositioned, Ambulation/increased activity  Response to Interventions: Resting quietly  Lines/Tubes/Drains:  Introducer 11/29/24 Internal jugular Right (Active)   Number of days: 9       Arterial Line 11/29/24 Left Radial (Active)   Number of days: 9       Pulmonary Artery Catheter Internal jugular (Active)   Number of days: 9       Urethral Catheter Straight-tip 16 Fr. (Active)   Number of days: 0         Objective   Cognition:  Overall Cognitive Status: Within Functional Limits  Orientation Level: Oriented X4  Cognition Comments: Pt educated on MITT precautions, however, pt demo'd decreased arousal throughout session, would benefit from reinforcement and continued education           Home Living:  Type of Home: House  Lives With: Alone  Home Adaptive Equipment: None  Home Layout: One level  Home Access: Stairs to enter with rails  Entrance Stairs-Number of Steps: 2  Bathroom Shower/Tub: Tub/shower unit  Bathroom Equipment: None  Home Living Comments: Pt reported discharging to cousin's house, who's wife is an RN and will be staying on the first floor. 2 MITCHELL   Prior Function:  Level of [place]: Independent with ADLs and functional transfers  ADL Assistance: Independent  Homemaking Assistance: Independent  Ambulatory Assistance: Independent  Vocational: Retired (automechanic)  Leisure: auto work  Prior Function Comments: Drives  IADL History:     ADL:  Eating Assistance: Minimal  Eating Deficit: Supervision/safety, Beverage management  Grooming Assistance: Stand by  Grooming Deficit: Steadying  Bathing Assistance: Moderate  Bathing Deficit: Steadying, Supervision/safety, Increased time to complete , Right lower leg including foot, Left lower leg including foot  UE Dressing Assistance: Minimal  UE Dressing Deficit: Steadying  LE Dressing Assistance: Moderate  LE Dressing Deficit: Steadying, Don/doff R sock, Don/doff  L sock, Tie shoes, Thread RLE into pants, Thread LLE into pants, Thread RLE into underwear, Pull up over hips, Thread LLE into underwear, Don/doff R shoe, Don/doff L shoe  Toileting Assistance with Device: Moderate  Toileting Deficit: Supervison/safety, Steadying, Increased time to complete, Clothing management up, Clothing management down  Activity Tolerance:  Endurance: Tolerates 10 - 20 min exercise with multiple rests  Early Mobility/Exercise Safety Screen: Proceed with mobilization - No exclusion criteria met  Balance:  Static Sitting Balance  Static Sitting-Balance Support: No upper extremity supported  Static Sitting-Level of Assistance: Contact guard  Static Sitting-Comment/Number of Minutes: Pt largely SBA with instances of mod A for retro lean with fatigue  Static Standing Balance  Static Standing-Balance Support: Bilateral upper extremity supported  Static Standing-Level of Assistance: Minimum assistance  Static Standing-Comment/Number of Minutes: Use of walker. Retro lean observed, requiring min A  Bed Mobility/Transfers: Bed Mobility/Transfers: Bed Mobility  Bed Mobility: Yes  Bed Mobility 1  Bed Mobility 1: Supine to sitting  Level of Assistance 1: Moderate assistance  Bed Mobility Comments 1: Mod A for LE management, trunk elevation. Use of draw sheet   and Transfers  Transfer: Yes  Transfer 1  Transfer From 1: Sit to, Stand to  Transfer to 1: Stand, Sit  Technique 1: Sit to stand, Stand to sit  Transfer Device 1: Walker  Transfer Level of Assistance 1: Moderate assistance, +2  Trials/Comments 1: mod A for initiation, stability. Pt required cues to attain full stand, foot placement for JOSÉ MIGUEL  IADL's:      Vision: Vision - Basic Assessment  Current Vision: Wears glasses all the time   and Vision - Complex Assessment  Vision Comments: Pt report cataracts, recently had surgery to clear R eye, still has limited vision in L. Plans for future surgery  Sensation:  Light Touch: Partial deficits in the  RLE  Sensation Comment: Pt reports hypersensitivity at sole of RLE, decreased sensation on dorsal side of R foot.  Strength:  Strength Comments: BUE at least 3/5 as observed through use in functional transfers, bed mobility  Perception:     Coordination:  Movements are Fluid and Coordinated: No  Coordination Comment: Mild tremor noted in BUE, head. some deficits in fine motor coordination   Hand Function:  Hand Function  Gross Grasp: Functional  Coordination: Functional  Extremities:   RUE   RUE : Within Functional Limits, LUE   LUE: Within Functional Limits,  , and        Outcome Measures: [facility] Daily Activity  Putting on and taking off regular lower body clothing: A lot  Bathing (including washing, rinsing, drying): A lot  Putting on and taking off regular upper body clothing: A little  Toileting, which includes using toilet, bedpan or urinal: A lot  Taking care of personal grooming such as brushing teeth: A little  Eating Meals: A little  Daily Activity - Total Score: 15        ICU Mobility Screen  Early Mobility/Exercise Safety Screen: Proceed with mobilization - No exclusion criteria met,          Education Documentation  No documentation found.  Education Comments  No comments found.        Goals:   Encounter Problems       Encounter Problems (Active)       ADLs       Patient will complete LB dressing with MOD I.   (Progressing)       Start:  11/21/24    Expected End:  12/23/24            Patient will complete toileting with MOD I.   (Progressing)       Start:  11/21/24    Expected End:  12/23/24               BALANCE       Patient will demo standing balance with MOD I for at least 5 min in prep for standing ADLs.  (Progressing)       Start:  11/21/24    Expected End:  12/05/24               MOBILITY       Patient will complete bed mobility with MOD I.    (Progressing)       Start:  11/21/24    Expected End:  12/23/24            Pt. Will demo household distance functional mobility with MOD I using LRAD.    (Progressing)       Start:  24    Expected End:  24               TRANSFERS       Pt. Will complete stand pivot transfer with MOD I using LRAD.   (Progressing)       Start:  24    Expected End:  24                   Treatment Completed on Evaluation  Cognitive Skill Development:       Activities of Daily Living:                        IADL's:               Therapy/Activity:     Therapeutic Activity  Therapeutic Activity Performed: Yes  Therapeutic Activity 1: Pt sustained EOB sit for ~5 minutes with initial SBA with increase in intermittent mod A for posterior lean. Pt noted not feeling when not at midline when L lean observed.  Therapeutic Activity 2: Pt engaged in stand pivot transfer from bed to chair with arms, requiring mod A x2 for stability, cues for LE coordination and initiation. Use of FWW.  Therapeutic Activity 3: Increased time for skilled line management prior to mobility  Therapeutic Activity 4: Pt engaged in log roll in bed for removal of bed pan with max A x2 for initiation, sustaining side-lying          Splintin/11/24 at 8:40 AM   THEE HERCULES   Rehab Office: [phone]

## 2024-12-09 NOTE — PROGRESS NOTES
CTICU Progress Note  Edu Echavarria/[id number]    Admit Date: 11/18/2024  Hospital Length of Stay: 21   ICU Length of Stay: 4d 8h   CT SURGEON: Dr. Villa    Subjective    Overnight, went into NSVT so amio and lidocaine were bolused and started on gtts at night. He was hypotensive with the episode so Bumex gtt was stopped (he was having low UOP) and levo was started. Concentrated albumin was also given. This morning, he reports continued total body pain and nausea. He continues to refuse to move with nursing staff and decline PO medications. Overnight RN reported he slept a majority of the night.    MEDICATIONS  Infusions:  amiodarone, Last Rate: 1 mg/min (12/09/24 0700)  [Held by provider] bumetanide, Last Rate: Stopped (12/09/24 0242)  EPINEPHrine, Last Rate: 0.01 mcg/kg/min (12/09/24 0700)  lidocaine, Last Rate: 1 mg/min (12/09/24 0700)  milrinone, Last Rate: 0.25 mcg/kg/min (12/09/24 0700)  norepinephrine, Last Rate: 0.03 mcg/kg/min (12/09/24 0700)      Scheduled:  acetaminophen, 1,000 mg, q6h SEKOU  [Held by provider] acetaminophen, 975 mg, q8h  [Held by provider] aspirin, 81 mg, Daily  aspirin, 150 mg, Daily  atorvastatin, 80 mg, Nightly  bumetanide, 4 mg, Once  clopidogrel, 75 mg, Daily  heparin, 5,000 Units, q8h  [Held by provider] hydrALAZINE, 10 mg, q4h  [Held by provider] hydrALAZINE, 10 mg, q8h  insulin lispro, 0-20 Units, Before meals & nightly  [Held by provider] lidocaine, 1 patch, Daily  magnesium hydroxide, 30 mL, Daily  pantoprazole, 40 mg, BID  polyethylene glycol, 17 g, BID  sennosides-docusate sodium, 2 tablet, BID      PRN:  alteplase, 2 mg, PRN  alum-mag hydroxide-simeth, 20 mL, 4x daily PRN  calcium gluconate, 1 g, q6h PRN  calcium gluconate, 2 g, q6h PRN  [Held by provider] hydrALAZINE, 10 mg, q4h PRN  HYDROmorphone, 0.2 mg, q2h PRN  lubricating eye drops, 1 drop, TID PRN  magnesium sulfate, 2 g, q6h PRN  magnesium sulfate, 4 g, q6h PRN  naloxone, 0.2 mg, q5 min PRN  [Held by provider]  "ondansetron, 4 mg, q4h PRN  oxyCODONE, 10 mg, q4h PRN  oxyCODONE, 5 mg, q4h PRN  oxygen, , Continuous PRN - O2/gases  potassium chloride CR, 20 mEq, q6h PRN   Or  potassium chloride, 20 mEq, q6h PRN  potassium chloride CR, 40 mEq, q6h PRN   Or  potassium chloride, 40 mEq, q6h PRN  potassium chloride, 20 mEq, q6h PRN  potassium chloride, 40 mEq, q6h PRN          Objective    Visit Vitals  /55   Pulse 80   Temp 36.7 °C (98.1 °F) (Core)   Resp 11   Ht 1.702 m (5' 7\")   Wt 90 kg (198 lb 6.6 oz)   SpO2 92%   BMI 31.08 kg/m²   Smoking Status Former   BSA 2.06 m²     Wt Readings from Last 5 Encounters:   12/09/24 90 kg (198 lb 6.6 oz)   11/18/24 82 kg (180 lb 12.4 oz)     Intake & Output:  I/O last 3 completed shifts:  In: 3649.3 (40.5 mL/kg) [P.O.:400; I.V.:2049.3 (22.8 mL/kg); Blood:100; IV Piggyback:1100]  Out: 3310 (36.8 mL/kg) [Urine:2880 (0.9 mL/kg/hr); Chest Tube:430]  Weight: 90 kg        Vent settings:       Physical Exam:   - Constitutional: Critically ill male lying in bed  - Neuro: A&Ox4, CAM negative. Moves all four extremities spontaneously and to commands. No FND  - CV: RRR, NSR, normotensive in the 120s, defib patches on chest  - Pulm: Not in respiratory distress, breathing easily on RA. Pleural chest tube in place draining sanguinous output, no airleak  - GI: Soft, nonpalpation, slightly distended  - : Logan in place draining orange urine, no hematoma appreciated in the right groin  - Extremities: WWP, 2+ pulses throughout, 1-2+ pitting edema  - Skin: No jaundiced, no obvious rashes or deformities, sternal incision closed, well aproximated, and without signs of infection  - Psych: Calm and intermittently cooperative, normal mood and behavior    Daily Risk Screen  Intubated: No  Central line: Yes - for invasive hemodynamic monitoring  Logan: Yes - for accurate I&Os    Images: All relevant images reviewed.    Invasive Hemodynamics:    Most Recent Range Past 24hrs   BP (Art) 131/44 Arterial Line BP 1  " Min: 79/76  Max: 181/61   MAP(Art) 66 mmHg Arterial Line MAP 1 (mmHg)   Min: 61 mmHg  Max: 98 mmHg   RA/CVP   No data recorded   PA 62/54 PAP  Min: 62/54  Max: 69/61   PA(mean) 58 mmHg PAP (Mean)  Min: 58 mmHg  Max: 65 mmHg   CO 4.8 L/min CO (L/min)  Min: 4.8 L/min  Max: 6.1 L/min   CI 2.4 L/min/m2 CI (L/min/m2)  Min: 2.4 L/min/m2  Max: 3 L/min/m2   Mixed Venous 77 % No data recorded   SVR  715 (dyne*sec)/cm5 SVR (dyne*sec)/cm5  Min: 671 (dyne*sec)/cm5  Max: 715 (dyne*sec)/cm5       Assessment/Plan    Edu Echavarria is a 74 y.o. male with PMH of HTN, HFrEF, CAD, GERD, and gout, initially presented to OSH with STEMI. Transferred to [facility] for CABG evaluation, hospital course c/b MARYBETH, worsening hyponatremia causing delay of surgery case, then further complicated by respiratory failure following fluid resuscitation for correction of hyponatremia, and cardiogenic shock necessitating IABP.    Admitted to CTICU s/p CABGx4, LAAL with Dr. Villa. S/p R fem IABP.   He continues to need inotropic support, will wean as able. Will wean levo as able now that pressures are better with him in NSR. Continuing amio and lidocaine gtts for today, can consider weaning lidocaine tomorrow. Is hypervolemic, not getting sufficient UOP with Bumex gtt, and has worsening Cr, so nephrology consulted for possible need for HD. Nausea and generalized pain somewhat alleviated with multiple bowel movement and flatus, BID PPI, and Mylanta. PO intake is improving. Will continue to try to mobilize pt to chair and follow CT output so we can try to pull, which will improve pain and ability to ventilate. Current ICU indications       NEURO:  History of gout. Acute post operative pain.  - Serial neuro and pain assessments   - Scheduled Tylenol, lidocaine patch  - Continue IV Tylenol due to inability to tolerate PO Tylenol  - PRN oxycodone  - D/C as IV dilaudid as tolerating PO oxycodone well  - PT Consult, OOB to chair as tolerated, chair position if not  tolerated   - CAM ICU score Qshift  - Sleep/wake cycle hygiene  - Holding home allopurinol (gout)     CV:  PMH of HTN, HFrEF, CAD, STEMI c/b cardiogenic shock with IABP placement. Now s/p CABG x4 with LAAL. Pre/Post EF: 30/30% Arrived to CTICU on epi 0.06, norepi 0.03, vasopressin 0.03. A/V epicardial wires set DDD @ 80. R fem IABP removed 12/5.  - Maintain goal MAP 65-90  - Mixed venous and CI Q4H  - Volume resuscitate as clinically indicated  - Maintain epicardial wires set VVI backup @ 60  - Continue ASA 81mg (change to rectal to decrease pill burden)  - Continue Plavix 75mg  - Continue atorvastatin 80mg  - Continue PO hydralazine 10mg TID  - Hold home metoprolol succinate  - Continue amio gtt @ 1  - Continue lidocaine gtt @ 1  - TTE today, then wean milrinone to 0.125     PULM:  No significant pulmonary PMH. Developed AHRF in CICU 2/2 cardiogenic shock. Extubated 12/5. Chest tubes: L pleural, R pleural, mediastinal.  - Daily CXR  - Wean FiO2 maintaining SpO2 >92%.   - IS Q1H and OOB to chair when extubated  - Chest tubes to wall suction  - Can pull pleural once output <100/8 hr     GI:  PMH of GERD. Last BM 12/8.  - Continue cardiac-diabetic diet  - Miralax, senna-docusate, and Milk of Mag BID  - Holding PRN antiemetics due to QTc>500  - Continue PPI BID and Mylanta Q4H PRN     /Renal:  No /renal PMH. Baseline Cr of 0.7-1.0. Has had MARYBETH and acute on chronic hyponatremia likely 2/2 hypovolemia 2/2 decreased PO intake and diarrhea. CSA-MARYBETH Risk Score 3. Creatinine rising post-op, possibly due to inferior displacement of IABP. Hawthorne in place and making adequate UOP.  - Continue hawthorne catheter for strict I/Os  - Goal UOP 0.5ml/kg/hr  - RFP as clinically indicated  - Replete electrolytes per CTICU protocol  - Consult nephrology for possible necessity of HD    ENDO:  No significant PMH. Last A1c: 6.3 (11/18/24). Euglycemic.  - SSI #4  - Maintain BG <180, insulin p/er CTICU protocol     HEME:  No significant PMH.  Acute blood loss anemia and thrombocytopenia.  - Monitor drain output volume and characteristics  - CBC, coags, and fibrinogen post op and as clinically indicated  - Continue ASA and Plavix  - SCDs & SQH for DVT prophylaxis  - Type & Screen: Q72H     ID: Afebrile, no current indications of infection. MRSA negative. Broad infectious work-up for leukocytosis completed and negative, s/p Vanc/Zosyn (11/29-12/2).  - Trend temp Q4H  - Periop cefazolin x 48hrs completed  - Blood, respiratory, and urine cultures obtained 12/8 over c/f sepsis-mediated NSVT   - Blood cultures: pending   - Respiratory cultures: pending   - Urine cultures pending (positive UA with leuks)     Skin: No active skin issues.  - Preventative Mepilex dressings in place on sacrum and heels  - Change preventative Mepilex weekly or more frequently as indicated (when moist/soiled)   - Every shift skin assessment per nursing and weekly ICU skin rounds  - Moisture barrier to be applied with shirley care  - Active skin problems addressed with nursing on daily rounds     PPx:  SCDs  SQH  PPI     G: Line  Right IJ with PAC placed 11/29/24  Left radial A-line placed 11/29/24    F: Family:  Will update at bedside postoperatively.    Restraints:  The indications and risks/benefits of non-violent/non self-destructive restraints were discussed.      A,B,C,D,E,F,G: reviewed     Dispo: CTICU care for now.     Code status: Full Code   Extended Emergency Contact Information  Primary Emergency Contact: Guillermo Barrios  Mobile Phone: [phone]  Relation: Relative  Preferred language: English   needed? No  Secondary Emergency Contact: COLIN ROMERO  Mobile Phone: [phone]  Relation: Friend  Preferred language: English   needed? No     Patient staffed with Dr. Benjamin    CTICU TEAM PHONE [phone]      Noe Aviles MD  PGY-1 Resident Physician  Department of Anesthesiology & Perioperative Medicine

## 2024-12-09 NOTE — CONSULTS
Edu Echavarria   74 y.o.    @WT@  MRN/Room: [id number]  DOA: 11/18/2024    REASON FOR CONSULT: MARYBETH    REQUESTING PHYSICIAN: Sunil Villa MD  PRIMARY CARE PHYSICIAN: No primary care provider on file.    ADMISSION DIAGNOSIS:   1. STEMI (ST elevation myocardial infarction) (Multi)    2. Chest pain, unspecified    3. Other disorders of arteries, arterioles and capillaries in diseases classified elsewhere    4. Encounter for other preprocedural examination    5. Peripheral vascular disease, unspecified (CMS-HCC)    6. Other specified symptoms and signs involving the circulatory and respiratory systems    7. Shock (Multi)    8. Cardiogenic shock (Multi)    9. ST elevation myocardial infarction involving left anterior descending (LAD) coronary artery (Multi)    10. ST elevation myocardial infarction involving left circumflex coronary artery (Multi)    11. Cardiac tamponade          P/C: intermittent palpitations and chest pain       HPI:  Edu Echavarria is a 74 y.o. male with PMH of HTN, HFrEF, CAD, GERD, and gout, initially presented to OSH with STEMI. Transferred to [facility] for CABG evaluation, hospital course c/b MARYBETH, worsening hyponatremia causing delay of surgery case, then further complicated by respiratory failure following fluid resuscitation for correction of hyponatremia, and cardiogenic shock necessitating IABP now s/p s/p CABGx4, LAAL 12/4 with Dr. Villa. Nephrology consulted for MARYBETH to assist with management.    Renal history: Baseline creatinine is 0.7-0.8 with eGFR of >90. Cr started to trend up on 11/30 from 1.38, remianed stable till 12/4 and then uptrended to 2 on 12/4. It remained stable till 12/6 followed by uptrend again to 4.6 on 12/9. Uop is decent >1500ml/24 hrs in last couple of days. For initial hike, it could be attributed to soft pressures in setting of A.fib, CRS with CVP around ~18.     ROS: Other than noted in the HPI 12 point review of system was negative.      In The ER: BP (!)  "116/97   Pulse 83   Temp 36.6 °C (97.9 °F) (Temporal)   Resp 24   Ht 1.702 m (5' 7\")   Wt 90 kg (198 lb 6.6 oz)   SpO2 (!) 89%   BMI 31.08 kg/m²      Past Medical History  He has no past medical history on file.    Surgical History  He has a past surgical history that includes Cardiac catheterization (N/A, 11/29/2024).     Social History  He reports that he quit smoking about 52 years ago. His smoking use included cigarettes. He started smoking about 39 years ago. He has never used smokeless tobacco. He reports current drug use. Drug: Marijuana. No history on file for alcohol use.    Family History  No family history on file.    Meds:   acetaminophen, 1,000 mg, q6h SEKOU  [Held by provider] acetaminophen, 975 mg, q8h  [Held by provider] aspirin, 81 mg, Daily  aspirin, 150 mg, Daily  atorvastatin, 80 mg, Nightly  bumetanide, 4 mg, Once  clopidogrel, 75 mg, Daily  heparin, 5,000 Units, q8h  [Held by provider] hydrALAZINE, 10 mg, q4h  [Held by provider] hydrALAZINE, 10 mg, q8h  insulin lispro, 0-20 Units, Before meals & nightly  [Held by provider] lidocaine, 1 patch, Daily  magnesium hydroxide, 30 mL, Daily  pantoprazole, 40 mg, BID  polyethylene glycol, 17 g, BID  sennosides-docusate sodium, 2 tablet, BID      amiodarone, Last Rate: 1 mg/min (12/09/24 0900)  [Held by provider] bumetanide, Last Rate: Stopped (12/09/24 0242)  EPINEPHrine, Last Rate: 0.01 mcg/kg/min (12/09/24 0900)  lidocaine, Last Rate: 1 mg/min (12/09/24 0900)  milrinone, Last Rate: 0.25 mcg/kg/min (12/09/24 0900)  norepinephrine, Last Rate: 0.03 mcg/kg/min (12/09/24 0900)      alteplase, 2 mg, PRN  alum-mag hydroxide-simeth, 20 mL, 4x daily PRN  calcium gluconate, 1 g, q6h PRN  calcium gluconate, 2 g, q6h PRN  [Held by provider] hydrALAZINE, 10 mg, q4h PRN  HYDROmorphone, 0.2 mg, q2h PRN  lubricating eye drops, 1 drop, TID PRN  magnesium sulfate, 2 g, q6h PRN  magnesium sulfate, 4 g, q6h PRN  naloxone, 0.2 mg, q5 min PRN  [Held by provider] " ondansetron, 4 mg, q4h PRN  oxyCODONE, 10 mg, q4h PRN  oxyCODONE, 5 mg, q4h PRN  oxygen, , Continuous PRN - O2/gases  potassium chloride CR, 20 mEq, q6h PRN   Or  potassium chloride, 20 mEq, q6h PRN  potassium chloride CR, 40 mEq, q6h PRN   Or  potassium chloride, 40 mEq, q6h PRN  potassium chloride, 20 mEq, q6h PRN  potassium chloride, 40 mEq, q6h PRN      Current Outpatient Medications   Medication Instructions    allopurinol (ZYLOPRIM) 100 mg, oral, Daily    metoprolol succinate XL (KAPSPARGO SPRINKLE) 100 mg, oral, Daily, Capsules must be opened and contents sprinkled on a small amount of soft food or liquid (non-warmed) and administered within 15 minutes of preparation. Do not crush or chew granules.         VITALS:  Temp:  [36.6 °C (97.9 °F)-36.9 °C (98.4 °F)] 36.6 °C (97.9 °F)  Heart Rate:  [] 83  Resp:  [10-35] 24  BP: (116-142)/(51-97) 116/97  Arterial Line BP 1: ()/(42-86) 103/59     Intake/Output Summary (Last 24 hours) at 12/9/2024 0949  Last data filed at 12/9/2024 0900  Gross per 24 hour   Intake 2825.33 ml   Output 1845 ml   Net 980.33 ml      I/O last 3 completed shifts:  In: 3649.3 (40.5 mL/kg) [P.O.:400; I.V.:2049.3 (22.8 mL/kg); Blood:100; IV Piggyback:1100]  Out: 3310 (36.8 mL/kg) [Urine:2880 (0.9 mL/kg/hr); Chest Tube:430]  Weight: 90 kg   PAP: (53-69)/(45-62) 53/45  CVP:  [12 mmHg-230 mmHg] 12 mmHg  CO:  [4.8 L/min-5.2 L/min] 5.2 L/min  CI:  [2.4 L/min/m2-2.6 L/min/m2] 2.6 L/min/m2    PHYSICAL EXAMINATION:  General appearance: no distress  Eyes: non-icteric  Skin: no apparent rash  Heart: regular  Lungs: NVB B/L with crackles  Abdomen: soft, nt/nd  Extremities: edema B/L      INVESTIGATIONS:  Results from last 7 days   Lab Units 12/09/24  0205   WBC AUTO x10*3/uL 16.3*   RBC AUTO x10*6/uL 2.71*   HEMOGLOBIN g/dL 7.7*   HEMATOCRIT % 23.9*     Results from last 7 days   Lab Units 12/09/24  0652 12/05/24  0516 12/04/24  2318   SODIUM mmol/L 132*   < > 136   POTASSIUM mmol/L 4.3   < >  "4.0   CHLORIDE mmol/L 91*   < > 97*   CO2 mmol/L 20*   < > 26   BUN mg/dL 70*   < > 22   CREATININE mg/dL 4.62*   < > 2.00*   CALCIUM mg/dL 8.5*   < > 8.4*   PHOSPHORUS mg/dL 5.7*   < > 4.4   MAGNESIUM mg/dL 3.26*   < > 3.56*   BILIRUBIN TOTAL mg/dL  --   --  1.0   ALT U/L  --   --  16   AST U/L  --   --  39    < > = values in this interval not displayed.         No results found for: \"ALBUR\", \"[id number]\"   No results found for the last 90 days.      IMAGING:  Electrocardiogram 12-lead PRN for arrhythmia    Result Date: 12/9/2024  Sinus rhythm with short AZ Anterolateral infarct (cited on or before 06-DEC-2024) Prolonged QT ** ** ACUTE MI / STEMI ** ** Abnormal ECG When compared with ECG of 08-DEC-2024 23:36, AZ interval has decreased Serial changes of Anterior infarct Present    XR abdomen 1 view    Result Date: 12/8/2024  Interpreted By:  Tory Whyte, STUDY: XR ABDOMEN 1 VIEW; 12/8/2024 9:31 am   INDICATION: Signs/Symptoms:Tender to palpation, some distension on exam.   COMPARISON: Radiograph dated 11/29/2024   ACCESSION NUMBER(S): [id number]   ORDERING CLINICIAN: LUZ MARIA COUCH   FINDINGS: Two views of the abdomen and pelvis. Multiple distended/slightly dilated loops of large bowel in the abdomen. Limited evaluation of pneumoperitoneum on supine imaging, however no gross evidence of free air is noted.   Partially imaged sternotomy wires, chest tubes and bibasilar atelectasis.   Osseous structures demonstrate no acute bony changes.       1.  Multiple distended/slightly dilated loops of colon in the abdomen and pelvis. Correlate with ileus.   Signed by: Tory Jean 12/8/2024 9:50 AM Dictation workstation:   [id number]    XR chest 1 view    Result Date: 12/8/2024  Interpreted By:  Tory Whyte, STUDY: XR CHEST 1 VIEW; 12/8/2024 3:10 am   INDICATION: Signs/Symptoms:Assessment of pulmonary congestion.   COMPARISON: Radiograph dated 12/07/2024   ACCESSION NUMBER(S): [id number]   " ORDERING CLINICIAN: LUZ MARIA VILLA   FINDINGS: Right IJ Cleveland-Yesenia catheter is in place with the tip projecting over the right main pulmonary artery. Right basilar chest tube and left apical chest tube are unchanged in position.   Status post median sternotomy. Left atrial appendage closure device is unchanged in position.   There is bibasilar atelectasis and small bibasilar pleural effusion. No jamil edema or sizable pneumothorax.   No acute osseous abnormality.       1. Bibasilar atelectasis and trace/small bilateral pleural effusion. 2. Medical devices and postsurgical changes as described above.       Signed by: Tory Jean 12/8/2024 9:48 AM Dictation workstation:   [id number]       ASSESSMENT:  Edu Echavarria is a 74 y.o. male with PMH of HTN, HFrEF, CAD, GERD, and gout, initially presented to OSH with STEMI. Transferred to [facility] for CABG evaluation, hospital course c/b MARYBETH, worsening hyponatremia causing delay of surgery case, then further complicated by respiratory failure following fluid resuscitation for correction of hyponatremia, and cardiogenic shock necessitating IABP now s/p s/p CABGx4, LAAL 12/4 with Dr. Villa. Nephrology consulted for MARYBETH to assist with management.      #Non Oliguric MARYBETH  - Baseline creatinine is 0.7-0.8 with eGFR of >90. Cr started to trend up on 11/30 from 1.38, remianed stable till 12/4 and then uptrended to 2 on 12/4. It remained stable till 12/6 followed by uptrend again to 4.6 on 12/9. Uop is decent >1500ml/24 hrs in last couple of days.    - UA from 11/29 before MARYBETH, hematuria and proteinuria  - Urine lytes: FeNA/FeUrea: not new after MARYBETH  - Renal US/CT abdomen: no recent imaging for renals    Etiology of MARYBETH: For initial hike, it could be attributed to soft pressures in setting of A.fib, cardiorenal hemodynamics with CVP around ~18. Later abrupt bump in Cr after 12/4 is likely from hemodynamic instability/cardiogenic shock requiring pressors.    #Electrolytes  -  WNL    #Acid-Base  - bicarb 20 with HAGMA, ph 7.39    #Hemodynamics  - soft pressures requiring 3 pressors, at RA  - TTE from 11/29 showed EF 25-30% with global hypokinesis of the left ventricle, RVSP 72       RECOMMENDATIONS:  - Check UA, microscopy, urine lytes, renal US, spot urine protein creatinine ratio  - Keep MAP >70 or SBP >100-120, avoid nephrotoxic medications, radiocontrast if possible, follow medication trough levels as appropriate and titrate the nephrotoxic medications according to GFR.  - Strict I/O monitoring, daily weights, daily BMP  - No absolute indication for emergent dialysis for now  - Will continue to follow      Patient is discussed with the attending.      Mercedez Quick MD  Nephrology Fellow   Daytime / Weekend Renal Pager [phone]  After 7 pm Emergencies [phone] Pager [phone]

## 2024-12-10 ENCOUNTER — APPOINTMENT (OUTPATIENT)
Dept: RADIOLOGY | Facility: HOSPITAL | Age: 74
DRG: 235 | End: 2024-12-10
Payer: MEDICARE

## 2024-12-10 LAB
ALBUMIN SERPL BCP-MCNC: 3.4 G/DL (ref 3.4–5)
ALBUMIN SERPL BCP-MCNC: 3.4 G/DL (ref 3.4–5)
ANION GAP SERPL CALC-SCNC: 22 MMOL/L (ref 10–20)
ANION GAP SERPL CALC-SCNC: 24 MMOL/L (ref 10–20)
BACTERIA UR CULT: NORMAL
BASE EXCESS BLDA CALC-SCNC: -6.6 MMOL/L (ref -2–3)
BODY TEMPERATURE: 37 DEGREES CELSIUS
BUN SERPL-MCNC: 73 MG/DL (ref 6–23)
BUN SERPL-MCNC: 77 MG/DL (ref 6–23)
CA-I BLD-SCNC: 1.07 MMOL/L (ref 1.1–1.33)
CALCIUM SERPL-MCNC: 8.2 MG/DL (ref 8.6–10.6)
CALCIUM SERPL-MCNC: 8.4 MG/DL (ref 8.6–10.6)
CHLORIDE SERPL-SCNC: 90 MMOL/L (ref 98–107)
CHLORIDE SERPL-SCNC: 91 MMOL/L (ref 98–107)
CHLORIDE UR-SCNC: 49 MMOL/L
CHLORIDE/CREATININE (MMOL/G) IN URINE: 106 MMOL/G CREAT (ref 23–275)
CO2 SERPL-SCNC: 21 MMOL/L (ref 21–32)
CO2 SERPL-SCNC: 23 MMOL/L (ref 21–32)
CREAT SERPL-MCNC: 4.58 MG/DL (ref 0.5–1.3)
CREAT SERPL-MCNC: 4.91 MG/DL (ref 0.5–1.3)
CREAT UR-MCNC: 46.1 MG/DL (ref 20–370)
EGFRCR SERPLBLD CKD-EPI 2021: 12 ML/MIN/1.73M*2
EGFRCR SERPLBLD CKD-EPI 2021: 13 ML/MIN/1.73M*2
ERYTHROCYTE [DISTWIDTH] IN BLOOD BY AUTOMATED COUNT: 15.2 % (ref 11.5–14.5)
GLUCOSE BLD MANUAL STRIP-MCNC: 134 MG/DL (ref 74–99)
GLUCOSE BLD MANUAL STRIP-MCNC: 137 MG/DL (ref 74–99)
GLUCOSE BLD MANUAL STRIP-MCNC: 142 MG/DL (ref 74–99)
GLUCOSE BLD MANUAL STRIP-MCNC: 176 MG/DL (ref 74–99)
GLUCOSE SERPL-MCNC: 125 MG/DL (ref 74–99)
GLUCOSE SERPL-MCNC: 166 MG/DL (ref 74–99)
HCO3 BLDA-SCNC: 18.1 MMOL/L (ref 22–26)
HCT VFR BLD AUTO: 23.9 % (ref 41–52)
HGB BLD-MCNC: 8 G/DL (ref 13.5–17.5)
INHALED O2 CONCENTRATION: 21 %
LIDOCAIN SERPL-MCNC: 3.8 UG/ML (ref 1–5)
MAGNESIUM SERPL-MCNC: 3.01 MG/DL (ref 1.6–2.4)
MCH RBC QN AUTO: 28.9 PG (ref 26–34)
MCHC RBC AUTO-ENTMCNC: 33.5 G/DL (ref 32–36)
MCV RBC AUTO: 86 FL (ref 80–100)
NRBC BLD-RTO: 0.2 /100 WBCS (ref 0–0)
OXYHGB MFR BLDA: 92 % (ref 94–98)
PCO2 BLDA: 32 MM HG (ref 38–42)
PH BLDA: 7.36 PH (ref 7.38–7.42)
PHOSPHATE SERPL-MCNC: 6.6 MG/DL (ref 2.5–4.9)
PHOSPHATE SERPL-MCNC: 7 MG/DL (ref 2.5–4.9)
PLATELET # BLD AUTO: 293 X10*3/UL (ref 150–450)
PO2 BLDA: 70 MM HG (ref 85–95)
POTASSIUM SERPL-SCNC: 3.3 MMOL/L (ref 3.5–5.3)
POTASSIUM SERPL-SCNC: 3.8 MMOL/L (ref 3.5–5.3)
POTASSIUM UR-SCNC: 68 MMOL/L
POTASSIUM/CREAT UR-RTO: 148 MMOL/G CREAT
PROT UR-ACNC: 47 MG/DL (ref 5–25)
PROT/CREAT UR: 1.02 MG/MG CREAT
RBC # BLD AUTO: 2.77 X10*6/UL (ref 4.5–5.9)
SAO2 % BLDA: 95 % (ref 94–100)
SODIUM SERPL-SCNC: 132 MMOL/L (ref 136–145)
SODIUM SERPL-SCNC: 132 MMOL/L (ref 136–145)
SODIUM UR-SCNC: 19 MMOL/L
SODIUM/CREAT UR-RTO: 41 MMOL/G CREAT
UREA/CREAT UR-SRTO: 5.4 G/G CREAT
UUN UR-MCNC: 247 MG/DL
WBC # BLD AUTO: 14.1 X10*3/UL (ref 4.4–11.3)

## 2024-12-10 PROCEDURE — 2020000001 HC ICU ROOM DAILY

## 2024-12-10 PROCEDURE — 99232 SBSQ HOSP IP/OBS MODERATE 35: CPT

## 2024-12-10 PROCEDURE — 82330 ASSAY OF CALCIUM: CPT

## 2024-12-10 PROCEDURE — 71045 X-RAY EXAM CHEST 1 VIEW: CPT | Performed by: RADIOLOGY

## 2024-12-10 PROCEDURE — 0692T THERAPEUTIC ULTRAFILTRATION: CPT

## 2024-12-10 PROCEDURE — 36556 INSERT NON-TUNNEL CV CATH: CPT | Performed by: STUDENT IN AN ORGANIZED HEALTH CARE EDUCATION/TRAINING PROGRAM

## 2024-12-10 PROCEDURE — 36555 INSERT NON-TUNNEL CV CATH: CPT | Performed by: STUDENT IN AN ORGANIZED HEALTH CARE EDUCATION/TRAINING PROGRAM

## 2024-12-10 PROCEDURE — 85027 COMPLETE CBC AUTOMATED: CPT

## 2024-12-10 PROCEDURE — 2500000001 HC RX 250 WO HCPCS SELF ADMINISTERED DRUGS (ALT 637 FOR MEDICARE OP)

## 2024-12-10 PROCEDURE — 2500000004 HC RX 250 GENERAL PHARMACY W/ HCPCS (ALT 636 FOR OP/ED)

## 2024-12-10 PROCEDURE — 99291 CRITICAL CARE FIRST HOUR: CPT

## 2024-12-10 PROCEDURE — 71045 X-RAY EXAM CHEST 1 VIEW: CPT

## 2024-12-10 PROCEDURE — 80176 ASSAY OF LIDOCAINE: CPT

## 2024-12-10 PROCEDURE — 83735 ASSAY OF MAGNESIUM: CPT

## 2024-12-10 PROCEDURE — 2500000004 HC RX 250 GENERAL PHARMACY W/ HCPCS (ALT 636 FOR OP/ED): Performed by: STUDENT IN AN ORGANIZED HEALTH CARE EDUCATION/TRAINING PROGRAM

## 2024-12-10 PROCEDURE — 2500000002 HC RX 250 W HCPCS SELF ADMINISTERED DRUGS (ALT 637 FOR MEDICARE OP, ALT 636 FOR OP/ED)

## 2024-12-10 PROCEDURE — 82947 ASSAY GLUCOSE BLOOD QUANT: CPT

## 2024-12-10 PROCEDURE — 82810 BLOOD GASES O2 SAT ONLY: CPT

## 2024-12-10 PROCEDURE — 02HV33Z INSERTION OF INFUSION DEVICE INTO SUPERIOR VENA CAVA, PERCUTANEOUS APPROACH: ICD-10-PCS | Performed by: STUDENT IN AN ORGANIZED HEALTH CARE EDUCATION/TRAINING PROGRAM

## 2024-12-10 PROCEDURE — 80069 RENAL FUNCTION PANEL: CPT

## 2024-12-10 PROCEDURE — 37799 UNLISTED PX VASCULAR SURGERY: CPT

## 2024-12-10 RX ORDER — POTASSIUM CHLORIDE 14.9 MG/ML
20 INJECTION INTRAVENOUS ONCE
Status: COMPLETED | OUTPATIENT
Start: 2024-12-10 | End: 2024-12-10

## 2024-12-10 RX ORDER — SEVELAMER CARBONATE 800 MG/1
800 TABLET, FILM COATED ORAL
Status: DISCONTINUED | OUTPATIENT
Start: 2024-12-10 | End: 2024-12-18 | Stop reason: HOSPADM

## 2024-12-10 RX ORDER — MAGNESIUM SULFATE HEPTAHYDRATE 40 MG/ML
2 INJECTION, SOLUTION INTRAVENOUS ONCE
Status: COMPLETED | OUTPATIENT
Start: 2024-12-10 | End: 2024-12-10

## 2024-12-10 ASSESSMENT — PAIN SCALES - GENERAL
PAINLEVEL_OUTOF10: 10 - WORST POSSIBLE PAIN
PAINLEVEL_OUTOF10: 3
PAINLEVEL_OUTOF10: 0 - NO PAIN
PAINLEVEL_OUTOF10: 6
PAINLEVEL_OUTOF10: 7
PAINLEVEL_OUTOF10: 5 - MODERATE PAIN
PAINLEVEL_OUTOF10: 5 - MODERATE PAIN
PAINLEVEL_OUTOF10: 6
PAINLEVEL_OUTOF10: 10 - WORST POSSIBLE PAIN
PAINLEVEL_OUTOF10: 3

## 2024-12-10 ASSESSMENT — PAIN - FUNCTIONAL ASSESSMENT
PAIN_FUNCTIONAL_ASSESSMENT: 0-10

## 2024-12-10 ASSESSMENT — PAIN DESCRIPTION - LOCATION
LOCATION: CHEST
LOCATION: CHEST

## 2024-12-10 ASSESSMENT — PAIN DESCRIPTION - ORIENTATION
ORIENTATION: MID
ORIENTATION: MID

## 2024-12-10 NOTE — PROGRESS NOTES
CT SURGERY  12/04 CABGx4, LAAL with Dr. Villa      SIGNIFICANT EVENTS  12/05 - IABP removed. 12/09 - C: Nephrology for MARYBETH.        DC PLAN  TBD - Care Transitions is following to develop a safe & supportive discharge plan in collaboration with multidisciplinary team (&) patient/family/significant others.       PAYOR   Medicare A/B      PT/OT   12/09 OT = High   12/06 & 07 PT = Mod     SUPPORT  CousinGuillermo [phone]   Friend, Brandon Lehman      COMPLETED  (X) Daily ongoing review of patient via chart and/or (M-F) IDT rounds     Sophie Ibrahim (LSW, MSW)

## 2024-12-10 NOTE — PROGRESS NOTES
CTICU Progress Note  Edu Echavarria/[id number]    Admit Date: 11/18/2024  Hospital Length of Stay: 22   ICU Length of Stay: 5d 7h   CT SURGEON: Dr. Allen LOCKWOOD. Net negative 200mL. Nursing staff continues to have issues with the PAC, now it no longer can draw back at the proximal or distal ports, cannot do thermodilution, nor measures PAP accurately. This morning, he reports he didn't sleep much overnight, Pain is similar to previous. He is eating small amounts. He is having BMs. He continues to intermittently refuse PO medications.    MEDICATIONS  Infusions:  amiodarone, Last Rate: 1 mg/min (12/10/24 0500)  bumetanide, Last Rate: 2 mg/hr (12/10/24 0500)  EPINEPHrine, Last Rate: 0.01 mcg/kg/min (12/10/24 0500)  heparin  lidocaine, Last Rate: 1 mg/min (12/10/24 0500)  norepinephrine, Last Rate: Stopped (12/09/24 2130)      Scheduled:  acetaminophen, 1,000 mg, q6h SEKOU  [Held by provider] acetaminophen, 975 mg, q8h  aspirin, 150 mg, Daily  atorvastatin, 80 mg, Nightly  clopidogrel, 75 mg, Daily  heparin, 5,000 Units, q8h  insulin lispro, 0-10 Units, TID AC  [Held by provider] lidocaine, 1 patch, Daily  magnesium hydroxide, 30 mL, Daily  pantoprazole, 40 mg, BID      PRN:  alteplase, 2 mg, PRN  alum-mag hydroxide-simeth, 20 mL, 4x daily PRN  calcium gluconate, 1 g, q6h PRN  calcium gluconate, 2 g, q6h PRN  dextrose, 12.5 g, q15 min PRN  dextrose, 25 g, q15 min PRN  glucagon, 1 mg, q15 min PRN  glucagon, 1 mg, q15 min PRN  [Held by provider] hydrALAZINE, 10 mg, q4h PRN  lubricating eye drops, 1 drop, TID PRN  magnesium sulfate, 2 g, q6h PRN  magnesium sulfate, 4 g, q6h PRN  naloxone, 0.2 mg, q5 min PRN  [Held by provider] ondansetron, 4 mg, q4h PRN  oxyCODONE, 10 mg, q4h PRN  oxyCODONE, 5 mg, q4h PRN  oxygen, , Continuous PRN - O2/gases  [Held by provider] potassium chloride CR, 20 mEq, q6h PRN   Or  [Held by provider] potassium chloride, 20 mEq, q6h PRN  [Held by provider] potassium chloride CR, 40 mEq,  "q6h PRN   Or  [Held by provider] potassium chloride, 40 mEq, q6h PRN  [Held by provider] potassium chloride, 20 mEq, q6h PRN  [Held by provider] potassium chloride, 40 mEq, q6h PRN  sodium chloride, 1,000 mL, PRN  sodium chloride 0.9%, 20 mL, q4h PRN          Objective    Visit Vitals  /67   Pulse 68   Temp 36.8 °C (98.2 °F)   Resp 19   Ht 1.702 m (5' 7\")   Wt 90 kg (198 lb 6.6 oz)   SpO2 93%   BMI 31.08 kg/m²   Smoking Status Former   BSA 2.06 m²     Wt Readings from Last 5 Encounters:   12/09/24 90 kg (198 lb 6.6 oz)   11/18/24 82 kg (180 lb 12.4 oz)     Intake & Output:  I/O last 3 completed shifts:  In: 3485.4 (38.7 mL/kg) [P.O.:280; I.V.:2155.4 (23.9 mL/kg); Blood:100; IV Piggyback:950]  Out: 2705 (30.1 mL/kg) [Urine:2265 (0.7 mL/kg/hr); Chest Tube:440]  Weight: 90 kg        Vent settings:       Physical Exam:   - Constitutional: Critically ill male lying in bed  - Neuro: A&Ox4, CAM negative. Moves all four extremities spontaneously and to commands. No FND  - CV: RRR, NSR, hypertensive with SBPs in the 170s  - Pulm: Not in respiratory distress, breathing easily on RA  - GI: Soft, nonpalpation, nondistended  - : Logan in place draining orange urine, no hematoma appreciated in the right groin  - Extremities: WWP, 2+ pulses throughout, 1-2+ pitting edema  - Skin: No jaundiced, no obvious rashes or deformities, sternal incision closed, well aproximated, and without signs of infection  - Psych: Calm and cooperative, normal mood and behavior    Daily Risk Screen  Intubated: No  Central line: Yes - for invasive hemodynamic monitoring and aquapharesis  Logan: Yes - for accurate I&Os    Images: All relevant images reviewed.    Invasive Hemodynamics:    Most Recent Range Past 24hrs   BP (Art) 141/50 Arterial Line BP 1  Min: 79/76  Max: 159/67   MAP(Art) 72 mmHg Arterial Line MAP 1 (mmHg)   Min: 42 mmHg  Max: 158 mmHg   RA/CVP   No data recorded   PA 48/40 PAP  Min: 48/40  Max: 65/57   PA(mean) 44 mmHg PAP (Mean)  " Min: 44 mmHg  Max: 61 mmHg   CO 5.2 L/min No data recorded   CI 2.6 L/min/m2 No data recorded   Mixed Venous 77 % No data recorded   SVR  841 (dyne*sec)/cm5 No data recorded       Assessment/Plan    Edu Echavarria is a 74 y.o. male with PMH of HTN, HFrEF, CAD, GERD, and gout, initially presented to OSH with STEMI. Transferred to [facility] for CABG evaluation, hospital course c/b MARYBETH, worsening hyponatremia causing delay of surgery case, then further complicated by respiratory failure following fluid resuscitation for correction of hyponatremia, and cardiogenic shock necessitating IABP.    Admitted to CTICU s/p CABGx4, LAAL with Dr. Villa. S/p R fem IABP. He is now weaned off inotropic and vasopressor support. He continues on amio and lidocaine gtts and has remained free of ectopy and abnormal heart rhythms. Will begin to wean lidocaine and eventually transition amio to PO, likely tomorrow. He continues to be hypervolemic, so will place LIJ and begin aquapharesis along with Bumex gtt. Nephrology does not see an indication for HD at this time. He is eating better and abdominal pain/nausea are better controlled. Optimal care is made challenging by pts adherence to PO medications.      NEURO:  History of gout. Acute post operative pain.  - Serial neuro and pain assessments   - Scheduled Tylenol, lidocaine patch  - Continue IV Tylenol due to inability to tolerate PO Tylenol  - PRN oxycodone  - PT Consult, OOB to chair as tolerated, chair position if not tolerated   - CAM ICU score Qshift  - Sleep/wake cycle hygiene  - Holding home allopurinol (gout)     CV:  PMH of HTN, HFrEF, CAD, STEMI c/b cardiogenic shock with IABP placement. Now s/p CABG x4 with LAAL. Pre/Post EF: 30/30% Arrived to CTICU on epi 0.06, norepi 0.03, vasopressin 0.03. A/V epicardial wires set DDD @ 80. R fem IABP removed 12/5.  - Maintain goal MAP 65-90  - Mixed venous and CI Q4H  - Volume resuscitate as clinically indicated  - Maintain epicardial wires  set VVI backup @ 50  - Continue ASA 81mg (changed to rectal to decrease pill burden)  - Continue Plavix 75mg  - Continue atorvastatin 80mg  - Continue IV hydralazine 10mg Q4H PRN  - Hold home metoprolol succinate  - Continue amio gtt @ 1  - Decrease lidocaine gtt from 1 to 0.5     PULM:  No significant pulmonary PMH. Developed AHRF in CICU 2/2 cardiogenic shock, no stable respiratory wise and not requiring supplememtal O2.  - Daily CXR  - Wean FiO2 maintaining SpO2 >92%.   - IS Q1H and OOB to chair when extubated     GI:  PMH of GERD. Last BM 12/8.  - Continue cardiac-diabetic diet  - Miralax, senna-docusate, and Milk of Mag BID  - Holding PRN antiemetics due to QTc>500  - Continue PPI BID and Mylanta Q4H PRN     /Renal:  No /renal PMH. Baseline Cr of 0.7-1.0. Has had MARYBETH and acute on chronic hyponatremia likely 2/2 hypovolemia 2/2 decreased PO intake and diarrhea. CSA-MARYBETH Risk Score 3. Creatinine rising post-op, possibly due to inferior displacement of IABP. Hawthorne in place and making fair UOP.  - Continue hawthorne catheter for strict I/Os  - Goal UOP 0.5ml/kg/hr  - RFP as clinically indicated  - Replete electrolytes per CTICU protocol   -Continue Bumex gtt @ 2  - Place LIJ and start aquapharesis @100  - Start sevelamer 800mg TID  - Nephrology consulted    ENDO:  No significant PMH. Last A1c: 6.3 (11/18/24). Euglycemic.  - SSI #4  - Maintain BG <180, insulin p/er CTICU protocol     HEME:  No significant PMH. Acute blood loss anemia and thrombocytopenia.  - Monitor drain output volume and characteristics  - CBC, coags, and fibrinogen post op and as clinically indicated  - Continue ASA and Plavix  - SCDs & SQH for DVT prophylaxis  - Type & Screen: Q72H     ID: Afebrile, no current indications of infection. MRSA negative. Broad infectious work-up for leukocytosis completed and negative, s/p Vanc/Zosyn (11/29-12/2).  - Trend temp Q4H  - Periop cefazolin x 48hrs completed  - Pan-cultures obtained 12/8 over c/f  sepsis-mediated NSVT; blood and urine cultures negative and respiratory cultures were poor     Skin: No active skin issues.  - Preventative Mepilex dressings in place on sacrum and heels  - Change preventative Mepilex weekly or more frequently as indicated (when moist/soiled)   - Every shift skin assessment per nursing and weekly ICU skin rounds  - Moisture barrier to be applied with shirley care  - Active skin problems addressed with nursing on daily rounds     PPx:  SCDs  SQH  PPI     G: Line  Left IJ with PAC placed 12/10/24  Left radial A-line placed 11/29/24    F: Family:  Will update at bedside postoperatively.    Restraints:  The indications and risks/benefits of non-violent/non self-destructive restraints were discussed.      A,B,C,D,E,F,G: reviewed     Dispo: CTICU care for now.     Code status: Full Code   Extended Emergency Contact Information  Primary Emergency Contact: Guillermo Barrios  Mobile Phone: [phone]  Relation: Relative  Preferred language: English   needed? No  Secondary Emergency Contact: COLIN ROMERO  Mobile Phone: [phone]  Relation: Friend  Preferred language: English   needed? No     Patient staffed with Dr. Benjamin    CTICU TEAM PHONE [phone]      Noe Aviles MD  PGY-1 Resident Physician  Department of Anesthesiology & Perioperative Medicine

## 2024-12-10 NOTE — CARE PLAN
Problem: Safety - Adult  Goal: Free from fall injury  Outcome: Progressing     Problem: Discharge Planning  Goal: Discharge to home or other facility with appropriate resources  Outcome: Progressing     Problem: Chronic Conditions and Co-morbidities  Goal: Patient's chronic conditions and co-morbidity symptoms are monitored and maintained or improved  Outcome: Progressing     Problem: Skin  Goal: Participates in plan/prevention/treatment measures  Outcome: Progressing  Flowsheets (Taken 12/4/2024 0439 by Cristal Barber RN)  Participates in plan/prevention/treatment measures: Elevate heels  Goal: Prevent/manage excess moisture  Outcome: Progressing  Flowsheets (Taken 12/4/2024 0439 by Cristal Barber RN)  Prevent/manage excess moisture: Cleanse incontinence/protect with barrier cream  Goal: Prevent/minimize sheer/friction injuries  Outcome: Progressing  Flowsheets (Taken 12/4/2024 0439 by Cristal Barber RN)  Prevent/minimize sheer/friction injuries: Turn/reposition every 2 hours/use positioning/transfer devices

## 2024-12-10 NOTE — PROGRESS NOTES
"Edu Echavarria   74 yMarisaoMarisa    @WT@  MRN/Room: [id number]/04/04-A  DOA: 11/18/2024    SUBJECTIVE: no acute complains, he wanted to drink soup    OBJECTIVE:  VITALS:  Temp:  [36.6 °C (97.9 °F)-36.8 °C (98.2 °F)] 36.8 °C (98.2 °F)  Heart Rate:  [68-94] 74  Resp:  [12-21] 15  BP: (117-161)/() 151/63  Arterial Line BP 1: (117-177)/(46-73) 148/55     Intake/Output Summary (Last 24 hours) at 12/10/2024 1141  Last data filed at 12/10/2024 1100  Gross per 24 hour   Intake 1247.92 ml   Output 1560 ml   Net -312.08 ml      I/O last 3 completed shifts:  In: 3432.9 (38.1 mL/kg) [P.O.:280; I.V.:2202.9 (24.5 mL/kg); Blood:100; IV Piggyback:850]  Out: 2325 (25.8 mL/kg) [Urine:2065 (0.6 mL/kg/hr); Chest Tube:260]  Weight: 90 kg   PAP: (44-64)/(36-58) 61/51  CVP:  [3 mmHg-22 mmHg] 20 mmHg    PHYSICAL EXAMINATION:  General appearance: no distress  Eyes: non-icteric  Skin: no apparent rash  Heart: regular  Lungs: NVB B/L with crackles  Abdomen: soft, nt/nd  Extremities: edema B/L      INVESTIGATIONS:  Results from last 7 days   Lab Units 12/10/24  0205   WBC AUTO x10*3/uL 14.1*   RBC AUTO x10*6/uL 2.77*   HEMOGLOBIN g/dL 8.0*   HEMATOCRIT % 23.9*     Results from last 7 days   Lab Units 12/10/24  0205 12/05/24  0516 12/04/24  2318   SODIUM mmol/L 132*   < > 136   POTASSIUM mmol/L 3.8   < > 4.0   CHLORIDE mmol/L 91*   < > 97*   CO2 mmol/L 21   < > 26   BUN mg/dL 73*   < > 22   CREATININE mg/dL 4.58*   < > 2.00*   CALCIUM mg/dL 8.4*   < > 8.4*   PHOSPHORUS mg/dL 6.6*   < > 4.4   MAGNESIUM mg/dL 3.01*   < > 3.56*   BILIRUBIN TOTAL mg/dL  --   --  1.0   ALT U/L  --   --  16   AST U/L  --   --  39    < > = values in this interval not displayed.     Results from last 7 days   Lab Units 12/09/24  1005   COLOR U  Light-Yellow   PH U  6.0   SPEC GRAV UR  1.016   PROTEIN U mg/dL 30 (1+)*   BLOOD UR  0.1 (1+)*   NITRITE U  NEGATIVE   WBC UR /HPF >50*     No results found for: \"ALBUR\", \"RHC56VNN\"   No results found for the last 90 " days.      IMAGING:  XR chest 1 view    Result Date: 12/10/2024  Interpreted By:  Jennifer Hdz, STUDY: XR CHEST 1 VIEW;  12/10/2024 3:53 am   INDICATION: Signs/Symptoms:Assessment of pulmonary congestion.     COMPARISON: 12/09/2024   ACCESSION NUMBER(S): [id number]   ORDERING CLINICIAN: LUZ MARIA COUCH   FINDINGS: AP radiograph of the chest was provided.   Patient is status post median sternotomy. Right IJ [place] catheter with the projection of the right main pulmonary artery tract. Apical appendage closure device in stable position   CARDIOMEDIASTINAL SILHOUETTE: Cardiomediastinal silhouette is stable in size and configuration.   LUNGS: There are diffuse increased interstitial, similar when compared with the prior exam. Left basilar opacity again identified. Blunting of bilateral costophrenic angles. Bilateral chest tubes has been removed. No evidence of a pneumothorax.   ABDOMEN: No remarkable upper abdominal findings.   BONES: No acute osseous changes.       1.  Mild interstitial edema with left basilar atelectasis/edema. Small bilateral pleural effusions. 2. No evidence of a pneumothorax. 3. Medical devices as above.       MACRO: None   Signed by: Jennifer Hdz 12/10/2024 8:45 AM Dictation workstation:   [id number]    Electrocardiogram 12-lead PRN for arrhythmia    Result Date: 12/9/2024  Sinus rhythm with short TN Anterolateral infarct (cited on or before 06-DEC-2024) Prolonged QT ** ** ACUTE MI / STEMI ** ** Abnormal ECG Confirmed by Jaspreet Chavis ([id number]) on 12/9/2024 5:06:46 PM    Transthoracic Echo (TTE) Complete    Result Date: 12/9/2024   [facility], [address] [place] [address]                Tel [phone] and Fax [phone] TRANSTHORACIC ECHOCARDIOGRAM REPORT  Patient Name:       EMMA Persaud Physician:    [id number] Abdulkadir Barrera MD Study Date:         12/9/2024           Ordering  Provider:    [id number] LUZ MARIACALIXTO COUCH MRN/PID:            [id number]            Fellow: Accession#:         [id number]        Nurse: Date of Birth/Age:  1950 / 74      Sonographer:          Misael Wilder                     years                                     RDCS, RVT Gender assigned at  M                   Additional Staff: Birth: Height:             170.18 cm           Admit Date:           11/18/2024 Weight:             89.81 kg            Admission Status:     Inpatient -                                                               Routine BSA / BMI:          2.01 m2 / 31.01     Encounter#:           [id number]                     kg/m2 Blood Pressure:     120/51 mmHg         Department Location:  [facility] Study Type:    TRANSTHORACIC ECHO (TTE) COMPLETE Diagnosis/ICD: ST elevation (STEMI) myocardial infarction of unspecified                site-I21.3 Indication:    s/p CABGx4 w prolonged course, new arrhythmias/hypotension CPT Code:      Echo Complete w Full Doppler-[id number] Patient History: Pertinent History: S/p CABGx4 12/4/24, CAD, HTN, HLD, asthma. Study Detail: The following Echo studies were performed: 2D, M-Mode, Doppler and               color flow. Definity used as a contrast agent for endocardial               border definition. Total contrast used for this procedure was 4 mL               via IV push.  PHYSICIAN INTERPRETATION: Left Ventricle: The left ventricular systolic function is moderately decreased, with a visually estimated ejection fraction of 30-35%. There is global hypokinesis of the left ventricle with minor regional variations. The left ventricular cavity size is normal. There is normal septal and normal posterior left ventricular wall thickness. There is no evidence of left ventricular hypertrophy. Abnormal (paradoxical) septal motion consistent with post-operative status. Spectral Doppler shows a Grade II  (pseudonormal pattern) of left ventricular diastolic filling with an elevated left atrial pressure. Left Atrium: The left atrium is moderately dilated. Right Ventricle: The right ventricle is upper limits of normal in size. There is normal right ventricular global systolic function. Right Atrium: The right atrium is normal in size. Aortic Valve: The aortic valve is trileaflet. The aortic valve dimensionless index is 0.56. There is no evidence of aortic valve regurgitation. The peak instantaneous gradient of the aortic valve is 17 mmHg. The mean gradient of the aortic valve is 8 mmHg. Mitral Valve: The mitral valve is normal in structure. There is no evidence of mitral valve regurgitation. Tricuspid Valve: The tricuspid valve is structurally normal. There is mild tricuspid regurgitation. Pulmonic Valve: The pulmonic valve is not well visualized. There is physiologic pulmonic valve regurgitation. Pericardium: Trivial pericardial effusion. Aorta: The aortic root is normal. Pulmonary Artery: The tricuspid regurgitant velocity is 3.03 m/s, and with an estimated right atrial pressure of 8 mmHg, the estimated pulmonary artery pressure is mildly elevated with the RVSP at 44.7 mmHg. Systemic Veins: The inferior vena cava appears normal in size, with IVC inspiratory collapse less than 50%. In comparison to the previous echocardiogram(s): Compared with study dated 12/4/2024,. LV EF unchanged from intraop ANNA MARIE imaging.  CONCLUSIONS:  1. The left ventricular systolic function is moderately decreased, with a visually estimated ejection fraction of 30-35%.  2. There is global hypokinesis of the left ventricle with minor regional variations.  3. Spectral Doppler shows a Grade II (pseudonormal pattern) of left ventricular diastolic filling with an elevated left atrial pressure.  4. Abnormal septal motion consistent with post-operative status.  5. There is no evidence of left ventricular hypertrophy.  6. There is normal right  ventricular global systolic function.  7. The left atrium is moderately dilated. QUANTITATIVE DATA SUMMARY:  2D MEASUREMENTS:          Normal Ranges: Ao Root d:       3.00 cm  (2.0-3.7cm) IVSd:            0.60 cm  (0.6-1.1cm) LVPWd:           0.60 cm  (0.6-1.1cm) LVIDd:           4.60 cm  (3.9-5.9cm) LVIDs:           3.60 cm LV Mass Index:   41 g/m2 LVEDV Index:     73 ml/m2 LV % FS          21.7 %  LA VOLUME:                    Normal Ranges: LA Vol A4C:        83.7 ml    (22+/-6mL/m2) LA Vol A2C:        92.8 ml LA Vol BP:         88.8 ml LA Vol Index A4C:  41.5ml/m2 LA Vol Index A2C:  46.1 ml/m2 LA Vol Index BP:   44.1 ml/m2 LA Area A4C:       24.3 cm2 LA Area A2C:       25.8 cm2 LA Major Axis A4C: 6.0 cm LA Major Axis A2C: 6.1 cm  RA VOLUME BY A/L METHOD:          Normal Ranges: RA Area A4C:             16.8 cm2  AORTA MEASUREMENTS:         Normal Ranges: Asc Ao, d:          2.70 cm (2.1-3.4cm)  LV SYSTOLIC FUNCTION BY 2D PLANIMETRY (MOD):                      Normal Ranges: EF-A4C View:    33 % (>=55%) EF-A2C View:    38 % EF-Biplane:     36 % EF-Visual:      33 % LV EF Reported: 33 %  LV DIASTOLIC FUNCTION:            Normal Ranges: MV Peak E:             1.08 m/s   (0.7-1.2 m/s) MV Peak A:             0.40 m/s   (0.42-0.7 m/s) E/A Ratio:             2.67       (1.0-2.2) MV e'                  0.072 m/s  (>8.0) MV lateral e'          0.10 m/s MV medial e'           0.05 m/s E/e' Ratio:            14.93      (<8.0) PulmV Sys Storm:         73.60 cm/s PulmV Garcias Storm:        36.60 cm/s PulmV S/D Storm:         2.00  AORTIC VALVE:                      Normal Ranges: AoV Vmax:                2.06 m/s  (<=1.7m/s) AoV Peak P.0 mmHg (<20mmHg) AoV Mean P.0 mmHg  (1.7-11.5mmHg) LVOT Max Storm:            1.15 m/s  (<=1.1m/s) AoV VTI:                 28.30 cm  (18-25cm) LVOT VTI:                15.80 cm LVOT Diameter:           2.00 cm   (1.8-2.4cm) AoV Area, VTI:           1.75 cm2   (2.5-5.5cm2) AoV Area,Vmax:           1.75 cm2  (2.5-4.5cm2) AoV Dimensionless Index: 0.56  RIGHT VENTRICLE: RV Basal 4.40 cm RV Mid   3.30 cm RV Major 7.1 cm TAPSE:   14.7 mm RV s'    0.10 m/s  TRICUSPID VALVE/RVSP:          Normal Ranges: Peak TR Velocity:     3.03 m/s Est. RA Pressure:     8 mmHg RV Syst Pressure:     45 mmHg  (< 30mmHg) IVC Diam:             2.00 cm  PULMONIC VALVE:          Normal Ranges: PV Accel Time:  103 msec (>120ms) PV Max Storm:     1.2 m/s  (0.6-0.9m/s) PV Max P.8 mmHg PV Mean PG:     3.0 mmHg PV VTI:         17.40 cm  Pulmonary Veins: PulmV Garcias Storm: 36.60 cm/s PulmV S/D Storm:  2.00 PulmV Sys Storm:  73.60 cm/s  [id number] Abdulkadir Barrera MD Electronically signed on 2024 at 3:16:48 PM  ** Final **     XR chest 1 view    Result Date: 2024  Interpreted By:  Jennifer Hdz, STUDY: XR CHEST 1 VIEW;  2024 3:48 am   INDICATION: Signs/Symptoms:Assessment of pulmonary congestion.     COMPARISON: 2024   ACCESSION NUMBER(S): [id number]   ORDERING CLINICIAN: LUZ MARIA COUCH   FINDINGS: AP radiograph of the chest was provided.   Patient is status post median sternotomy. Left apical appendage closure device in place. Right IJ catheter with tip in the projection of the right main pulmonary artery. Bilateral chest tubes in stable position.   CARDIOMEDIASTINAL SILHOUETTE: Cardiomediastinal silhouette is stable in size and configuration.   LUNGS: Airspace consolidation of the left lung base. There is blunting of bilateral costophrenic angles.   ABDOMEN: No remarkable upper abdominal findings.   BONES: No acute osseous changes.       1.  Left basilar atelectasis with small bilateral pleural effusions. No sizable pneumothorax. 2. Medical devices as above       MACRO: None   Signed by: Jennifer Hdz 2024 11:04 AM Dictation workstation:   [id number]       ASSESSMENT:  Edu Echavarria is a 74 y.o. male with PMH of HTN, HFrEF, CAD, GERD, and gout, initially presented to OSH with  STEMI. Transferred to [facility] for CABG evaluation, hospital course c/b MARYBETH, worsening hyponatremia causing delay of surgery case, then further complicated by respiratory failure following fluid resuscitation for correction of hyponatremia, and cardiogenic shock necessitating IABP now s/p s/p CABGx4, LAAL 12/4 with Dr. Villa. Nephrology consulted for MARYBETH to assist with management.      #Non Oliguric MARYBETH  - Baseline creatinine is 0.7-0.8 with eGFR of >90. Cr started to trend up on 11/30 from 1.38, remianed stable till 12/4 and then uptrended to 2 on 12/4. It remained stable till 12/6 followed by uptrend again to 4.6 on 12/9. Uop is decent,1300ml/24 hrs on bumex 2mg/hr gtt  - UA sig for pyuria, hematuria and proteinuria- hawthorne sample  - TPCR 1g/g  - Renal US/CT abdomen: no recent imaging for renals    Etiology of MARYBETH: For initial hike, it could be attributed to soft pressures in setting of A.fib, cardiorenal hemodynamics with CVP around ~18. Later abrupt bump in Cr after 12/4 is likely from hemodynamic instability/cardiogenic shock requiring pressors.    #Electrolytes  - WNL    #Acid-Base  - bicarb 21 with HAGMA, ph 7.39    #Hemodynamics  - soft pressures requiring 2 pressors, at RA  - TTE from 11/29 showed EF 25-30% with global hypokinesis of the left ventricle, RVSP 72       RECOMMENDATIONS:  - renal US  - Cr is stable with first number showing downward trend, will keep evaluating needs for dialysis on daily basis  - No absolute indication for emergent dialysis for now  - Keep MAP >70 or SBP >100-120, avoid nephrotoxic medications, radiocontrast if possible, follow medication trough levels as appropriate and titrate the nephrotoxic medications according to GFR.  - Strict I/O monitoring, daily weights, daily BMP    - Will continue to follow      Patient is discussed with the attending.      Mercedez Quick MD  Nephrology Fellow   Daytime / Weekend Renal Pager [phone]  After 7 pm Emergencies [phone] Pager [phone]

## 2024-12-10 NOTE — PROCEDURES
Central Line    Date/Time: 12/10/2024 12:06 PM    Performed by: Ana Luisa Benjamin MD  Authorized by: Ana Luisa Benjamin MD    Consent:     Consent obtained:  Verbal    Consent given by:  Patient    Risks, benefits, and alternatives were discussed: yes      Risks discussed:  Incorrect placement, arterial puncture, bleeding, infection, pneumothorax and nerve damage    Alternatives discussed:  Alternative treatment, observation and delayed treatment  Universal protocol:     Procedure explained and questions answered to patient or proxy's satisfaction: yes      Relevant documents present and verified: yes      Test results available: yes      Imaging studies available: yes      Required blood products, implants, devices, and special equipment available: yes      Site/side marked: no      Immediately prior to procedure, a time out was called: yes      Patient identity confirmed:  Verbally with patient and arm band  Pre-procedure details:     Indication(s): central venous access      Indication(s) comment:  Non functioning PA/ RIJ line. possible need for HD in near future given worsening renal function.    Hand hygiene: Hand hygiene performed prior to insertion      Sterile barrier technique: All elements of maximal sterile technique followed      Skin preparation:  Chlorhexidine  Sedation:     Sedation type:  None  Anesthesia:     Anesthesia method:  Local infiltration    Local anesthetic:  Lidocaine 2% w/o epi  Procedure details:     Location:  L internal jugular    Patient position:  Trendelenburg    Procedural supplies: Trialysis Line.    Catheter size:  13 Fr    Catheter length:  20    Landmarks identified: yes      Ultrasound guidance: yes      Ultrasound guidance timing: prior to insertion and real time      Sterile ultrasound techniques: Sterile gel and sterile probe covers were used      Number of attempts:  1    Successful placement: yes    Post-procedure details:     Post-procedure:  Dressing applied  and line sutured    Assessment:  Blood return through all ports, placement verified by x-ray and free fluid flow    Procedure completion:  Tolerated well, no immediate complications

## 2024-12-10 NOTE — CARE PLAN
The patient's goals for the shift include        Problem: Safety - Adult  Goal: Free from fall injury  Outcome: Progressing     Problem: Chronic Conditions and Co-morbidities  Goal: Patient's chronic conditions and co-morbidity symptoms are monitored and maintained or improved  Outcome: Progressing     Problem: Skin  Goal: Participates in plan/prevention/treatment measures  Outcome: Progressing  Goal: Prevent/manage excess moisture  Outcome: Progressing  Goal: Prevent/minimize sheer/friction injuries  Outcome: Progressing

## 2024-12-11 ENCOUNTER — APPOINTMENT (OUTPATIENT)
Dept: RADIOLOGY | Facility: HOSPITAL | Age: 74
DRG: 235 | End: 2024-12-11
Payer: MEDICARE

## 2024-12-11 LAB
ABO GROUP (TYPE) IN BLOOD: NORMAL
ALBUMIN SERPL BCP-MCNC: 3.3 G/DL (ref 3.4–5)
ALBUMIN SERPL BCP-MCNC: 3.3 G/DL (ref 3.4–5)
ALBUMIN SERPL BCP-MCNC: 3.5 G/DL (ref 3.4–5)
ANION GAP BLDA CALCULATED.4IONS-SCNC: 13 MMO/L (ref 10–25)
ANION GAP SERPL CALC-SCNC: 21 MMOL/L (ref 10–20)
ANION GAP SERPL CALC-SCNC: 22 MMOL/L (ref 10–20)
ANION GAP SERPL CALC-SCNC: 24 MMOL/L (ref 10–20)
ANTIBODY SCREEN: NORMAL
BASE EXCESS BLDA CALC-SCNC: -0.9 MMOL/L (ref -2–3)
BODY TEMPERATURE: 37 DEGREES CELSIUS
BUN SERPL-MCNC: 76 MG/DL (ref 6–23)
BUN SERPL-MCNC: 82 MG/DL (ref 6–23)
BUN SERPL-MCNC: 82 MG/DL (ref 6–23)
CA-I BLD-SCNC: 0.99 MMOL/L (ref 1.1–1.33)
CA-I BLD-SCNC: 1.03 MMOL/L (ref 1.1–1.33)
CA-I BLD-SCNC: 1.07 MMOL/L (ref 1.1–1.33)
CA-I BLDA-SCNC: 1.03 MMOL/L (ref 1.1–1.33)
CALCIUM SERPL-MCNC: 8 MG/DL (ref 8.6–10.6)
CALCIUM SERPL-MCNC: 8.4 MG/DL (ref 8.6–10.6)
CALCIUM SERPL-MCNC: 8.5 MG/DL (ref 8.6–10.6)
CHLORIDE BLDA-SCNC: 95 MMOL/L (ref 98–107)
CHLORIDE SERPL-SCNC: 90 MMOL/L (ref 98–107)
CHLORIDE SERPL-SCNC: 91 MMOL/L (ref 98–107)
CHLORIDE SERPL-SCNC: 91 MMOL/L (ref 98–107)
CO2 SERPL-SCNC: 23 MMOL/L (ref 21–32)
CO2 SERPL-SCNC: 24 MMOL/L (ref 21–32)
CO2 SERPL-SCNC: 24 MMOL/L (ref 21–32)
CREAT SERPL-MCNC: 4.27 MG/DL (ref 0.5–1.3)
CREAT SERPL-MCNC: 4.3 MG/DL (ref 0.5–1.3)
CREAT SERPL-MCNC: 4.43 MG/DL (ref 0.5–1.3)
EGFRCR SERPLBLD CKD-EPI 2021: 13 ML/MIN/1.73M*2
EGFRCR SERPLBLD CKD-EPI 2021: 14 ML/MIN/1.73M*2
EGFRCR SERPLBLD CKD-EPI 2021: 14 ML/MIN/1.73M*2
ERYTHROCYTE [DISTWIDTH] IN BLOOD BY AUTOMATED COUNT: 14.8 % (ref 11.5–14.5)
ERYTHROCYTE [DISTWIDTH] IN BLOOD BY AUTOMATED COUNT: 15.2 % (ref 11.5–14.5)
GLUCOSE BLD MANUAL STRIP-MCNC: 136 MG/DL (ref 74–99)
GLUCOSE BLD MANUAL STRIP-MCNC: 172 MG/DL (ref 74–99)
GLUCOSE BLD MANUAL STRIP-MCNC: 216 MG/DL (ref 74–99)
GLUCOSE BLD MANUAL STRIP-MCNC: 232 MG/DL (ref 74–99)
GLUCOSE BLDA-MCNC: 203 MG/DL (ref 74–99)
GLUCOSE SERPL-MCNC: 151 MG/DL (ref 74–99)
GLUCOSE SERPL-MCNC: 200 MG/DL (ref 74–99)
GLUCOSE SERPL-MCNC: 219 MG/DL (ref 74–99)
HCO3 BLDA-SCNC: 23.2 MMOL/L (ref 22–26)
HCT VFR BLD AUTO: 25 % (ref 41–52)
HCT VFR BLD AUTO: 25.1 % (ref 41–52)
HCT VFR BLD EST: 27 % (ref 41–52)
HGB BLD-MCNC: 8.4 G/DL (ref 13.5–17.5)
HGB BLD-MCNC: 8.9 G/DL (ref 13.5–17.5)
HGB BLDA-MCNC: 9.1 G/DL (ref 13.5–17.5)
INHALED O2 CONCENTRATION: 21 %
LACTATE BLDA-SCNC: 0.8 MMOL/L (ref 0.4–2)
MAGNESIUM SERPL-MCNC: 3.21 MG/DL (ref 1.6–2.4)
MAGNESIUM SERPL-MCNC: 3.38 MG/DL (ref 1.6–2.4)
MCH RBC QN AUTO: 28.7 PG (ref 26–34)
MCH RBC QN AUTO: 28.9 PG (ref 26–34)
MCHC RBC AUTO-ENTMCNC: 33.6 G/DL (ref 32–36)
MCHC RBC AUTO-ENTMCNC: 35.5 G/DL (ref 32–36)
MCV RBC AUTO: 82 FL (ref 80–100)
MCV RBC AUTO: 85 FL (ref 80–100)
NRBC BLD-RTO: 0.2 /100 WBCS (ref 0–0)
NRBC BLD-RTO: 0.3 /100 WBCS (ref 0–0)
OXYHGB MFR BLDA: 97 % (ref 94–98)
PCO2 BLDA: 35 MM HG (ref 38–42)
PH BLDA: 7.43 PH (ref 7.38–7.42)
PHOSPHATE SERPL-MCNC: 5 MG/DL (ref 2.5–4.9)
PHOSPHATE SERPL-MCNC: 5.2 MG/DL (ref 2.5–4.9)
PHOSPHATE SERPL-MCNC: 6.2 MG/DL (ref 2.5–4.9)
PLATELET # BLD AUTO: 312 X10*3/UL (ref 150–450)
PLATELET # BLD AUTO: 351 X10*3/UL (ref 150–450)
PO2 BLDA: 144 MM HG (ref 85–95)
POTASSIUM BLDA-SCNC: 2.7 MMOL/L (ref 3.5–5.3)
POTASSIUM SERPL-SCNC: 3 MMOL/L (ref 3.5–5.3)
POTASSIUM SERPL-SCNC: 3.3 MMOL/L (ref 3.5–5.3)
POTASSIUM SERPL-SCNC: 3.6 MMOL/L (ref 3.5–5.3)
RBC # BLD AUTO: 2.93 X10*6/UL (ref 4.5–5.9)
RBC # BLD AUTO: 3.08 X10*6/UL (ref 4.5–5.9)
RH FACTOR (ANTIGEN D): NORMAL
SAO2 % BLDA: 100 % (ref 94–100)
SODIUM BLDA-SCNC: 128 MMOL/L (ref 136–145)
SODIUM SERPL-SCNC: 132 MMOL/L (ref 136–145)
SODIUM SERPL-SCNC: 133 MMOL/L (ref 136–145)
SODIUM SERPL-SCNC: 135 MMOL/L (ref 136–145)
WBC # BLD AUTO: 15.3 X10*3/UL (ref 4.4–11.3)
WBC # BLD AUTO: 19 X10*3/UL (ref 4.4–11.3)

## 2024-12-11 PROCEDURE — 97530 THERAPEUTIC ACTIVITIES: CPT | Mod: GO

## 2024-12-11 PROCEDURE — 85027 COMPLETE CBC AUTOMATED: CPT

## 2024-12-11 PROCEDURE — 84132 ASSAY OF SERUM POTASSIUM: CPT

## 2024-12-11 PROCEDURE — 2500000004 HC RX 250 GENERAL PHARMACY W/ HCPCS (ALT 636 FOR OP/ED): Mod: JZ

## 2024-12-11 PROCEDURE — 2500000004 HC RX 250 GENERAL PHARMACY W/ HCPCS (ALT 636 FOR OP/ED): Performed by: STUDENT IN AN ORGANIZED HEALTH CARE EDUCATION/TRAINING PROGRAM

## 2024-12-11 PROCEDURE — 2500000001 HC RX 250 WO HCPCS SELF ADMINISTERED DRUGS (ALT 637 FOR MEDICARE OP)

## 2024-12-11 PROCEDURE — 83735 ASSAY OF MAGNESIUM: CPT

## 2024-12-11 PROCEDURE — 82947 ASSAY GLUCOSE BLOOD QUANT: CPT

## 2024-12-11 PROCEDURE — 71045 X-RAY EXAM CHEST 1 VIEW: CPT

## 2024-12-11 PROCEDURE — 2020000001 HC ICU ROOM DAILY

## 2024-12-11 PROCEDURE — 97530 THERAPEUTIC ACTIVITIES: CPT | Mod: GP

## 2024-12-11 PROCEDURE — 76770 US EXAM ABDO BACK WALL COMP: CPT

## 2024-12-11 PROCEDURE — 99291 CRITICAL CARE FIRST HOUR: CPT | Performed by: STUDENT IN AN ORGANIZED HEALTH CARE EDUCATION/TRAINING PROGRAM

## 2024-12-11 PROCEDURE — 82330 ASSAY OF CALCIUM: CPT

## 2024-12-11 PROCEDURE — 93976 VASCULAR STUDY: CPT | Performed by: RADIOLOGY

## 2024-12-11 PROCEDURE — 2500000004 HC RX 250 GENERAL PHARMACY W/ HCPCS (ALT 636 FOR OP/ED)

## 2024-12-11 PROCEDURE — 2500000004 HC RX 250 GENERAL PHARMACY W/ HCPCS (ALT 636 FOR OP/ED): Performed by: NURSE PRACTITIONER

## 2024-12-11 PROCEDURE — 99233 SBSQ HOSP IP/OBS HIGH 50: CPT

## 2024-12-11 PROCEDURE — 86850 RBC ANTIBODY SCREEN: CPT

## 2024-12-11 PROCEDURE — 71045 X-RAY EXAM CHEST 1 VIEW: CPT | Performed by: RADIOLOGY

## 2024-12-11 PROCEDURE — 2500000002 HC RX 250 W HCPCS SELF ADMINISTERED DRUGS (ALT 637 FOR MEDICARE OP, ALT 636 FOR OP/ED): Performed by: NURSE PRACTITIONER

## 2024-12-11 PROCEDURE — 37799 UNLISTED PX VASCULAR SURGERY: CPT

## 2024-12-11 PROCEDURE — 76770 US EXAM ABDO BACK WALL COMP: CPT | Performed by: RADIOLOGY

## 2024-12-11 PROCEDURE — 2500000002 HC RX 250 W HCPCS SELF ADMINISTERED DRUGS (ALT 637 FOR MEDICARE OP, ALT 636 FOR OP/ED)

## 2024-12-11 RX ORDER — POTASSIUM CHLORIDE 14.9 MG/ML
20 INJECTION INTRAVENOUS ONCE
Status: COMPLETED | OUTPATIENT
Start: 2024-12-11 | End: 2024-12-11

## 2024-12-11 RX ORDER — POTASSIUM CHLORIDE 29.8 MG/ML
40 INJECTION INTRAVENOUS ONCE
Status: COMPLETED | OUTPATIENT
Start: 2024-12-11 | End: 2024-12-12

## 2024-12-11 RX ORDER — CALCIUM GLUCONATE 20 MG/ML
2 INJECTION, SOLUTION INTRAVENOUS ONCE
Status: COMPLETED | OUTPATIENT
Start: 2024-12-11 | End: 2024-12-11

## 2024-12-11 RX ORDER — SIMETHICONE 80 MG
80 TABLET,CHEWABLE ORAL ONCE
Status: COMPLETED | OUTPATIENT
Start: 2024-12-11 | End: 2024-12-11

## 2024-12-11 RX ORDER — AMIODARONE HYDROCHLORIDE 200 MG/1
400 TABLET ORAL 2 TIMES DAILY
Status: DISCONTINUED | OUTPATIENT
Start: 2024-12-11 | End: 2024-12-12

## 2024-12-11 RX ORDER — POTASSIUM CHLORIDE 14.9 MG/ML
20 INJECTION INTRAVENOUS
Status: COMPLETED | OUTPATIENT
Start: 2024-12-11 | End: 2024-12-11

## 2024-12-11 RX ORDER — ADHESIVE BANDAGE
30 BANDAGE TOPICAL DAILY PRN
Status: DISCONTINUED | OUTPATIENT
Start: 2024-12-11 | End: 2024-12-13

## 2024-12-11 RX ORDER — POTASSIUM CHLORIDE 1.5 G/1.58G
40 POWDER, FOR SOLUTION ORAL
Status: DISPENSED | OUTPATIENT
Start: 2024-12-11 | End: 2024-12-11

## 2024-12-11 ASSESSMENT — COGNITIVE AND FUNCTIONAL STATUS - GENERAL
PERSONAL GROOMING: A LITTLE
CLIMB 3 TO 5 STEPS WITH RAILING: TOTAL
TURNING FROM BACK TO SIDE WHILE IN FLAT BAD: A LOT
MOVING TO AND FROM BED TO CHAIR: A LOT
HELP NEEDED FOR BATHING: A LOT
MOBILITY SCORE: 10
DRESSING REGULAR LOWER BODY CLOTHING: A LOT
TOILETING: A LOT
DAILY ACTIVITIY SCORE: 16
MOVING FROM LYING ON BACK TO SITTING ON SIDE OF FLAT BED WITH BEDRAILS: A LOT
WALKING IN HOSPITAL ROOM: TOTAL
DRESSING REGULAR UPPER BODY CLOTHING: A LITTLE
STANDING UP FROM CHAIR USING ARMS: A LOT

## 2024-12-11 ASSESSMENT — PAIN - FUNCTIONAL ASSESSMENT
PAIN_FUNCTIONAL_ASSESSMENT: 0-10

## 2024-12-11 ASSESSMENT — PAIN SCALES - GENERAL
PAINLEVEL_OUTOF10: 9
PAINLEVEL_OUTOF10: 0 - NO PAIN
PAINLEVEL_OUTOF10: 8
PAINLEVEL_OUTOF10: 8
PAINLEVEL_OUTOF10: 7
PAINLEVEL_OUTOF10: 0 - NO PAIN
PAINLEVEL_OUTOF10: 6

## 2024-12-11 ASSESSMENT — PAIN DESCRIPTION - ORIENTATION
ORIENTATION: MID
ORIENTATION: MID

## 2024-12-11 ASSESSMENT — PAIN DESCRIPTION - LOCATION: LOCATION: CHEST

## 2024-12-11 NOTE — CARE PLAN
The patient's goals for the shift include: Rest    The clinical goals for the shift include    MAP's >65  Spo2>90    Problem: Heart Failure  Goal: Improved urinary output this shift  Outcome: Progressing     Problem: Heart Failure  Goal: Reduction in peripheral edema within 24 hours  Outcome: Progressing     Problem: Chronic Conditions and Co-morbidities  Goal: Patient's chronic conditions and co-morbidity symptoms are monitored and maintained or improved    Problem: Safety - Adult  Goal: Free from fall injury   Outcome: Progressing    Problem: Skin  Goal: Participates in plan/prevention/treatment measures  Outcome: Progressing    Goal: Prevent/manage excess moisture  Outcome: Progressing  Goal: Prevent/minimize sheer/friction injuries  Outcome: Progressing

## 2024-12-11 NOTE — PROGRESS NOTES
Occupational Therapy    Occupational Therapy Treatment    Name: Edu Echavarria  MRN: [id number]  : 1950  Date: 24  Room: A      Time Calculation  Start Time: 0950  Stop Time: 1023  Time Calculation (min): 33 min    Assessment:  OT Assessment: Pt continues to demo low initiation and effort in therapy sessions, demo's decreased strength, endurance. Would benefit from continued skilled therapy to maximize level of functioning  Prognosis: Good  Barriers to Discharge Home: Caregiver assistance, Physical needs  Caregiver Assistance: Caregiver assistance needed per identified barriers - however, level of patient's required assistance exceeds assistance available at home  Physical Needs: Stair navigation into home limited by function/safety, In-home setup navigation limited by function/safety, Ambulating household distances limited by function/safety, Intermittent ADL assistance needed  Evaluation/Treatment Tolerance: Patient tolerated treatment well  Medical Staff Made Aware: Yes  End of Session Communication: Bedside nurse  End of Session Patient Position: Up in chair, Alarm off, not on at start of session  Plan:  Treatment Interventions: ADL retraining, Functional transfer training, UE strengthening/ROM, Endurance training, Neuromuscular reeducation, Compensatory technique education  OT Frequency: 3 times per week  OT Discharge Recommendations: Moderate intensity level of continued care  Equipment Recommended upon Discharge: Wheeled walker  OT Recommended Transfer Status: Moderate assist, Assist of 2  OT - OK to Discharge: Yes    Subjective   General:  OT Last Visit  OT Received On: 24  Reason for Referral: s/p CABGx4, LAAL with Dr. Villa  Past Medical History Relevant to Rehab: HTN, HFrEF, CAD, GERD, and gout, initially presented to OSH with STEMI. Transferred to [facility] for CABG evaluation, hospital course c/b AMRYBETH, worsening hyponatremia causing delay of surgery case, then further complicated by  respiratory failure following fluid resuscitation for correction of hyponatremia, and cardiogenic shock necessitating R fem IABP, removed 12/5  Prior to Session Communication: Bedside nurse  Patient Position Received: Bed, 3 rail up, Alarm off, not on at start of session  General Comment: Pt awake and willing to participate in OT session with continued encouragement and education. Engaged in stand and transfer to chair with mod A x2 (4L O2 via NC. Amio 1, bumetanide 2)   Precautions:  Hearing/Visual Limitations: Difficulty seeing out of L eye  Medical Precautions: Cardiac precautions, Fall precautions, Oxygen therapy device and L/min  Post-Surgical Precautions: Move in the Tube  Precautions Comment: MAP 65-90; VVI@60, SpO2>92%  Vitals:     12/11/24 0950 12/11/24 1000 12/11/24 1023   Vital Signs   Vitals Session Pre OT  --  Post OT   Heart Rate 99 101 69   Resp 23 22 18   SpO2 100 % 100 % 100 %   /56 (!) 155/129 115/53   MAP (mmHg) 81 139 70   BP Location Left arm  --  Left arm   BP Method Automatic  --  Automatic   Patient Position Lying  --  Sitting   Vital Signs Comment Vitals stable with mobility  --   --      Lines/Tubes/Drains:  Arterial Line 11/29/24 Left Radial (Active)   Number of days: 11       Urethral Catheter Straight-tip 16 Fr. (Active)   Number of days: 2       Hemodialysis Cath 12/10/24 Left (Active)   Number of days: 1       Cognition:  Overall Cognitive Status: Within Functional Limits  Orientation Level: Oriented X4        Objective     Bed Mobility/Transfers:   Bed Mobility  Bed Mobility: Yes  Bed Mobility 1  Bed Mobility 1: Supine to sitting  Level of Assistance 1: Moderate assistance  Bed Mobility Comments 1: Mod A for log roll, trunk elevation  Transfers  Transfer: Yes  Transfer 1  Transfer From 1: Sit to, Stand to  Transfer to 1: Stand, Sit  Technique 1: Sit to stand, Stand to sit  Transfer Device 1: Walker  Transfer Level of Assistance 1: Maximum assistance  Trials/Comments 1: max A for  stand, unable to attain full stand this trial with max cues       Therapy/Activity:      Therapeutic Activity  Therapeutic Activity Performed: Yes  Therapeutic Activity 1: Pt engaged in stand pivot from bed to chair with arms. Required mod A x2 for sit>stand, max cues for muscle engagement to breifly attain full stand. Pt demo'd trunk flexion throughout transfer with max cues for posture. Mod A x2 for pivot with max verbal cues for initiation of steps, walker management  Therapeutic Activity 2: Pt sustained sitting EOB for ~8 min with SBA, no instability noted. Benefitted from cues to relax shoulders, arms to improve symptoms of dizziness.       Strength:  Strength  Strength Comments: BUE at least 3/5 at observed through use in task completion, transfers    Outcome Measures:  [facility] Daily Activity  Putting on and taking off regular lower body clothing: A lot  Bathing (including washing, rinsing, drying): A lot  Putting on and taking off regular upper body clothing: A little  Toileting, which includes using toilet, bedpan or urinal: A lot  Taking care of personal grooming such as brushing teeth: A little  Eating Meals: None  Daily Activity - Total Score: 16     Education Documentation  Body Mechanics, taught by THEE Urbina at 12/11/2024  1:27 PM.  Learner: Patient  Readiness: Acceptance  Method: Explanation  Response: Verbalizes Understanding    Precautions, taught by THEE Urbina at 12/11/2024  1:27 PM.  Learner: Patient  Readiness: Acceptance  Method: Explanation  Response: Verbalizes Understanding    ADL Training, taught by THEE Urbina at 12/11/2024  1:27 PM.  Learner: Patient  Readiness: Acceptance  Method: Explanation  Response: Verbalizes Understanding    Education Comments  No comments found.        Goals:  Encounter Problems       Encounter Problems (Active)       ADLs       Patient will complete LB dressing with MOD I.   (Progressing)       Start:  11/21/24    Expected End:   12/23/24            Patient will complete toileting with MOD I.   (Progressing)       Start:  11/21/24    Expected End:  12/23/24               BALANCE       Patient will demo standing balance with MOD I for at least 5 min in prep for standing ADLs.  (Progressing)       Start:  11/21/24    Expected End:  12/05/24               MOBILITY       Patient will complete bed mobility with MOD I.    (Progressing)       Start:  11/21/24    Expected End:  12/23/24            Pt. Will demo household distance functional mobility with MOD I using LRAD.   (Progressing)       Start:  11/21/24    Expected End:  12/23/24               TRANSFERS       Pt. Will complete stand pivot transfer with MOD I using LRAD.   (Progressing)       Start:  11/21/24    Expected End:  12/23/24 12/11/24 at 1:43 PM   MYRIAM VELASQUEZ, S-OT   [phone]

## 2024-12-11 NOTE — PROGRESS NOTES
Physical Therapy    Physical Therapy Treatment    Patient Name: Edu Echavarria  MRN: [id number]  Department: [facility] CTU  Room: 04/04-A  Today's Date: 12/11/2024  Time Calculation  Start Time: 1459  Stop Time: 1513  Time Calculation (min): 14 min         Assessment/Plan   PT Assessment  PT Assessment Results: Decreased strength, Decreased endurance, Impaired balance, Decreased mobility, Decreased coordination, Decreased safety awareness, Pain  Rehab Prognosis: Good  Barriers to Discharge: medical acuity  Barriers to Discharge Home: Physical needs  Physical Needs: High falls risk due to function or environment  Evaluation/Treatment Tolerance: Patient tolerated treatment well  Medical Staff Made Aware: Yes  End of Session Communication: Bedside nurse  Assessment Comment: Pt performed bed mobility with Mod A x 2 and functional transfers with Mod A x 2; pt demonstrates impairments in strength and coordination during functional transfers. Pt will continue to benefit from skilled PT to improve functional mobility.  End of Session Patient Position: Bed, 3 rail up, Alarm off, not on at start of session  PT Plan  Inpatient/Swing Bed or Outpatient: Inpatient  PT Plan  Treatment/Interventions: Bed mobility, Gait training, Transfer training, Balance training, Stair training, Strengthening, Endurance training, Therapeutic exercise, Therapeutic activity, Home exercise program  PT Plan: Ongoing PT  PT Frequency: 3 times per week  PT Discharge Recommendations: Moderate intensity level of continued care  Equipment Recommended upon Discharge: Wheeled walker  PT Recommended Transfer Status: Assist x2  PT - OK to Discharge: Yes      General Visit Information:   PT  Visit  PT Received On: 12/11/24  Response to Previous Treatment: Patient with no complaints from previous session.  General  Prior to Session Communication: Bedside nurse  Patient Position Received: Up in chair, Alarm off, not on at start of session  Preferred Learning Style:  auditory, verbal, visual  General Comment: Pt awake and willing to participate in PT treatment session. (Lines: BP cuff, tele, IV, hawthorne, O2 NC)    Subjective   Precautions:  Precautions  Medical Precautions: Cardiac precautions, Fall precautions  Post-Surgical Precautions: Move in the Tube  Precautions Comment: MAP 65-90; VVI@60, SpO2>92%    Vital Signs (Past 2hrs)        Date/Time Vitals Session Patient Position Pulse Resp SpO2 BP MAP (mmHg)    12/11/24 1400 --  --  73  16  100 %  141/46  73     12/11/24 1459 Pre PT  --  71  --  100 %  --  --     12/11/24 1500 --  --  71  27  100 %  78/63  69     12/11/24 1513 Post PT  Lying  73  --  100 %  133/61  82                         Objective   Pain:  Pain Assessment  Pain Assessment: 0-10  0-10 (Numeric) Pain Score: 0 - No pain  Cognition:  Cognition  Overall Cognitive Status: Within Functional Limits  Orientation Level: Oriented X4  Coordination:  Movements are Fluid and Coordinated: No  Coordination Comment: LE weakness/coordination deficits    Activity Tolerance:  Activity Tolerance  Endurance: Tolerates 10 - 20 min exercise with multiple rests  Early Mobility/Exercise Safety Screen: Proceed with mobilization - No exclusion criteria met  Activity Tolerance Comments: Vitals stable throughout session  Treatments:  Therapeutic Activity  Therapeutic Activity Performed: Yes  Therapeutic Activity 1: Increased time for advanced ICU line management required for functional mobility  Therapeutic Activity 2: Pt sat EOB for 2 minutes with Min A for balance prior to sit>sup  Therapeutic Activity 3: Dependent boost provided at end of session for positioning in supine    Bed Mobility  Bed Mobility: Yes  Bed Mobility 1  Bed Mobility 1: Sitting to supine  Level of Assistance 1: Moderate assistance, +2  Bed Mobility Comments 1: Mod A x 2 for LE management and trunk control to supine    Transfers  Transfer: Yes  Transfer 1  Transfer From 1: Chair with arms to  Transfer to 1:  Bed  Technique 1: Sit to stand, Stand to sit  Transfer Level of Assistance 1: Moderate assistance, +2  Trials/Comments 1: Mod A x 2 for hip elevation to stand via arm in arm assist; cues for sequencing 3 side steps to bed. Slow/delayed initiation of steps    Outcome Measures:  [facility] Basic Mobility  Turning from your back to your side while in a flat bed without using bedrails: A lot  Moving from lying on your back to sitting on the side of a flat bed without using bedrails: A lot  Moving to and from bed to chair (including a wheelchair): A lot  Standing up from a chair using your arms (e.g. wheelchair or bedside chair): A lot  To walk in hospital room: Total  Climbing 3-5 steps with railing: Total  Basic Mobility - Total Score: 10    FSS-ICU  Ambulation: Unable to attempt due to weakness  Rolling: Moderate assistance (performs 50 - 74% of task)  Sitting: Minimal assistance (performs 75% or more of task)  Transfer Sit-to-Stand: Maximal assistance (performs 25% - 49% of task)  Transfer Supine-to-Sit: Maximal assistance (performs 25% - 49% of task)  Total Score: 11      Early Mobility/Exercise Safety Screen: Proceed with mobilization - No exclusion criteria met  ICU Mobility Scale: Transferring bed to chair [5]    Education Documentation  Precautions, taught by Genet Sy PT at 12/11/2024  3:24 PM.  Learner: Patient  Readiness: Acceptance  Method: Explanation  Response: Needs Reinforcement    Body Mechanics, taught by Genet Sy PT at 12/11/2024  3:24 PM.  Learner: Patient  Readiness: Acceptance  Method: Explanation  Response: Needs Reinforcement    Mobility Training, taught by Genet Sy PT at 12/11/2024  3:24 PM.  Learner: Patient  Readiness: Acceptance  Method: Explanation  Response: Needs Reinforcement    Education Comments  No comments found.           Encounter Problems       Encounter Problems (Active)       Balance       Pt will demonstrate improved sitting/standing static/dynamic balance  activities without LOB via score of 24/28 on the Tinetti for increase in safety prior to DC.  (Progressing)       Start:  12/06/24    Expected End:  12/20/24               Mobility       Pt will ambulate >25ft with appropriate form, Min A or less, LRAD, and no LOB for safe DC.  (Progressing)       Start:  12/06/24    Expected End:  12/20/24            Pt will tolerate >15 minutes of upright standing activity without seated rest breaks with no changes in vital signs for improved functional mobility.  (Progressing)       Start:  12/06/24    Expected End:  12/20/24               PT Transfers       Pt will perform bed mobility with SBA or less and use of LRAD to safely DC.  (Progressing)       Start:  12/06/24    Expected End:  12/20/24            Pt will perform sit<>stand transfers with CGA or less and use of LRAD to safely DC.  (Progressing)       Start:  12/06/24    Expected End:  12/20/24                 ABY SINHA, PT

## 2024-12-11 NOTE — PROGRESS NOTES
"Edu Echavarria   74 rhet[address]Marisa    @WT@  MRN/Room: [id number]  DOA: 11/18/2024    SUBJECTIVE: no acute complains, lethargic.     OBJECTIVE:  VITALS:  Temp:  [35.8 °C (96.4 °F)-37 °C (98.6 °F)] 35.8 °C (96.4 °F)  Heart Rate:  [] 72  Resp:  [8-23] 18  BP: ()/() 134/64  Arterial Line BP 1: ()/() 129/125     Intake/Output Summary (Last 24 hours) at 12/11/2024 1342  Last data filed at 12/11/2024 1200  Gross per 24 hour   Intake 1371.94 ml   Output 3901 ml   Net -2529.06 ml      I/O last 3 completed shifts:  In: 1999.4 (22.2 mL/kg) [I.V.:1899.4 (21.1 mL/kg); IV Piggyback:100]  Out: 4828 (53.6 mL/kg) [Urine:3455 (1.1 mL/kg/hr); Other:1373]  Weight: 90 kg   PAP: (49-53)/(42-47) 49/42  CVP:  [16 mmHg-17 mmHg] 17 mmHg    PHYSICAL EXAMINATION:  General appearance: no distress  Eyes: non-icteric  Skin: no apparent rash  Heart: regular  Lungs: NVB B/L with crackles  Abdomen: soft, nt/nd  Extremities: edema B/L      INVESTIGATIONS:  Results from last 7 days   Lab Units 12/11/24  1203   WBC AUTO x10*3/uL 19.0*   RBC AUTO x10*6/uL 3.08*   HEMOGLOBIN g/dL 8.9*   HEMATOCRIT % 25.1*     Results from last 7 days   Lab Units 12/11/24  1203 12/05/24  0516 12/04/24  2318   SODIUM mmol/L 135*   < > 136   POTASSIUM mmol/L 3.6   < > 4.0   CHLORIDE mmol/L 91*   < > 97*   CO2 mmol/L 24   < > 26   BUN mg/dL 76*   < > 22   CREATININE mg/dL 4.43*   < > 2.00*   CALCIUM mg/dL 8.5*   < > 8.4*   PHOSPHORUS mg/dL 5.2*   < > 4.4   MAGNESIUM mg/dL 3.21*   < > 3.56*   BILIRUBIN TOTAL mg/dL  --   --  1.0   ALT U/L  --   --  16   AST U/L  --   --  39    < > = values in this interval not displayed.     Results from last 7 days   Lab Units 12/09/24  1005   COLOR U  Light-Yellow   PH U  6.0   SPEC GRAV UR  1.016   PROTEIN U mg/dL 30 (1+)*   BLOOD UR  0.1 (1+)*   NITRITE U  NEGATIVE   WBC UR /HPF >50*     No results found for: \"ALBUR\", \"IGI25RSU\"   No results found for the last 90 days.      IMAGING:  XR chest 1 view    Result Date: " 12/11/2024  Interpreted By:  Jennifer Hdz, STUDY: XR CHEST 1 VIEW;  12/11/2024 3:42 am   INDICATION: Signs/Symptoms:Assessment of pulmonary congestion.     COMPARISON: 12/10/2024   ACCESSION NUMBER(S): [id number]   ORDERING CLINICIAN: LUZ MARIA COUCH   FINDINGS: AP radiograph of the chest was provided.   Patient is status post median sternotomy. Left apical appendage closure device in place. Left IJ catheter with tip projection of the superior vena cava. The right IJ [place] catheter has been removed.   CARDIOMEDIASTINAL SILHOUETTE: Cardiomediastinal silhouette is stable in size and configuration.   LUNGS: There are diffuse increased interstitial markings, which have progressed in the bibasilar distribution. No sizable pneumothorax. Costophrenic angles are blunted.   ABDOMEN: No remarkable upper abdominal findings.   BONES: No acute osseous changes.       1.  Increasing bibasilar airspace opacities consistent with worsening atelectasis/edema. Small bilateral pleural effusions. 2. Mild interstitial edema. 3. Medical devices as above.       MACRO: None   Signed by: Jennifer Hdz 12/11/2024 11:30 AM Dictation workstation:   [id number]    XR chest 1 view    Result Date: 12/11/2024  Interpreted By:  Jennifer Hdz, STUDY: XR CHEST 1 VIEW;  12/10/2024 12:19 pm   INDICATION: Signs/Symptoms:Eval LIJ placement.     COMPARISON: 12/10/2024   ACCESSION NUMBER(S): [id number]   ORDERING CLINICIAN: LUZ MARIA COUCH   FINDINGS: AP radiograph of the chest was provided.   Patient is status post median sternotomy. Right IJ [place] catheter with tip in the projection of the right main pulmonary artery. Left IJ dialysis catheter tip of the mid superior vena cava. Left apical appendage closure device in stable position.   CARDIOMEDIASTINAL SILHOUETTE: Cardiomediastinal silhouette is stable in size and configuration.   LUNGS: There are diffuse increased interstitial markings, findings have progressed when compared with the  prior exam. The left basilar airspace opacity is less conspicuous when compared with the prior. Minimal platelike atelectasis remains. There is blunting of bilateral costophrenic angles.   ABDOMEN: No remarkable upper abdominal findings.   BONES: No acute osseous changes.       1.  Worsening interstitial edema with small bilateral pleural effusions. 2. Platelike atelectasis left lung base. 3. Medical devices as above.       MACRO: None   Signed by: Jennifer Hdz 12/11/2024 9:31 AM Dictation workstation:   [id number]    XR chest 1 view    Result Date: 12/10/2024  Interpreted By:  Jennifer Hdz, STUDY: XR CHEST 1 VIEW;  12/10/2024 3:53 am   INDICATION: Signs/Symptoms:Assessment of pulmonary congestion.     COMPARISON: 12/09/2024   ACCESSION NUMBER(S): [id number]   ORDERING CLINICIAN: LUZ MARIA COUCH   FINDINGS: AP radiograph of the chest was provided.   Patient is status post median sternotomy. Right IJ [place] catheter with the projection of the right main pulmonary artery tract. Apical appendage closure device in stable position   CARDIOMEDIASTINAL SILHOUETTE: Cardiomediastinal silhouette is stable in size and configuration.   LUNGS: There are diffuse increased interstitial, similar when compared with the prior exam. Left basilar opacity again identified. Blunting of bilateral costophrenic angles. Bilateral chest tubes has been removed. No evidence of a pneumothorax.   ABDOMEN: No remarkable upper abdominal findings.   BONES: No acute osseous changes.       1.  Mild interstitial edema with left basilar atelectasis/edema. Small bilateral pleural effusions. 2. No evidence of a pneumothorax. 3. Medical devices as above.       MACRO: None   Signed by: Jennifer Hdz 12/10/2024 8:45 AM Dictation workstation:   [id number]       ASSESSMENT:  Edu Echavarria is a 74 y.o. male with PMH of HTN, HFrEF, CAD, GERD, and gout, initially presented to OSH with STEMI. Transferred to [facility] for CABG evaluation, hospital course  c/b MARYBETH, worsening hyponatremia causing delay of surgery case, then further complicated by respiratory failure following fluid resuscitation for correction of hyponatremia, and cardiogenic shock necessitating IABP now s/p CABGx4, LAAL 12/4 with Dr. Villa. Nephrology consulted for MARYBETH to assist with management.      #Non Oliguric MARYBETH  - Baseline creatinine is 0.7-0.8 with eGFR of >90. Cr started to trend up on 11/30 from 1.38, remianed stable till 12/4 and then uptrended to 2 on 12/4. It remained stable till 12/6 followed by uptrend again to 4.6 on 12/9. Uop is decent,1300ml/24 hrs on bumex 2mg/hr gtt  - UA sig for pyuria, hematuria and proteinuria- hawthorne sample  - TPCR 1g/g  - Renal US/CT abdomen: no recent imaging for renals    Etiology of MARYBETH: For initial hike, it could be attributed to soft pressures in setting of A.fib, cardiorenal hemodynamics with CVP around ~18. Later abrupt bump in Cr after 12/4 is likely from hemodynamic instability/cardiogenic shock requiring pressors.    #Electrolytes  - WNL    #Acid-Base  - bicarb 24 with HAGMA, ph 7.43    #Hemodynamics  - not on any pressors   - TTE from 11/29 showed EF 25-30% with global hypokinesis of the left ventricle, RVSP 72     #cardiogenic shock necessitating IABP now s/p CABGx4, LAAL 12/4 with Dr. Villa  - underwent aquaphresis 12/10 overnight       RECOMMENDATIONS:  - renal US  - Cr is stable, good UOP ~2.5L, will keep evaluating needs for dialysis on daily basis  - No absolute indication for emergent dialysis for now  - Keep MAP >70 or SBP >100-120, avoid nephrotoxic medications, radiocontrast if possible, follow medication trough levels as appropriate and titrate the nephrotoxic medications according to GFR.  - Strict I/O monitoring, daily weights, daily BMP  - Will continue to follow      Patient is discussed with the attending.      Mercedez Quick MD  Nephrology Fellow   Daytime / Weekend Renal Pager [phone]  After 7 pm Emergencies [phone] Pager  [phone]

## 2024-12-11 NOTE — PROGRESS NOTES
CTICU Progress Note  Edu Echavarria/[id number]    Admit Date: 11/18/2024  Hospital Length of Stay: 23   ICU Length of Stay: 6d 8h   CT SURGEON: Dr. Villa    Subjective    Pt still endorsing nausea and refusing PO medications except for Oxy + Mylanta. Worsening renal function, electrolytes and UOP. Overall net - 2.6L. Still on bumex drip, Aquadex and had Trialysis line placed yesterday. Electrolyte repletion restricted by refusal of PO meds given nausea. Pt remains in AF on Amio infusion, will start PO Amio. Had 20bt run of VT this afternoon and received amio bolus. Remains off pressors, Mil, Epi. Was OOTC this morning. Will  pt on importance of taking PO meds such as K, Amio, ASA/Plavix etc.      MEDICATIONS  Infusions:  amiodarone, Last Rate: 1 mg/min (12/11/24 0600)  bumetanide, Last Rate: 2 mg/hr (12/11/24 0717)  heparin, Last Rate: 300 Units/hr (12/11/24 0500)      Scheduled:  acetaminophen, 1,000 mg, q6h SEKOU  [Held by provider] acetaminophen, 975 mg, q8h  aspirin, 150 mg, Daily  atorvastatin, 80 mg, Nightly  clopidogrel, 75 mg, Daily  heparin, 5,000 Units, q8h  insulin lispro, 0-10 Units, TID AC  [Held by provider] lidocaine, 1 patch, Daily  magnesium hydroxide, 30 mL, Daily  pantoprazole, 40 mg, BID  potassium chloride, 40 mEq, q2h  potassium chloride, 20 mEq, q2h  sevelamer carbonate, 800 mg, TID      PRN:  alteplase, 2 mg, PRN  alum-mag hydroxide-simeth, 20 mL, 4x daily PRN  calcium gluconate, 1 g, q6h PRN  calcium gluconate, 2 g, q6h PRN  dextrose, 12.5 g, q15 min PRN  dextrose, 25 g, q15 min PRN  glucagon, 1 mg, q15 min PRN  glucagon, 1 mg, q15 min PRN  hydrALAZINE, 10 mg, q4h PRN  lubricating eye drops, 1 drop, TID PRN  magnesium sulfate, 2 g, q6h PRN  magnesium sulfate, 4 g, q6h PRN  naloxone, 0.2 mg, q5 min PRN  [Held by provider] ondansetron, 4 mg, q4h PRN  oxyCODONE, 10 mg, q4h PRN  oxyCODONE, 5 mg, q4h PRN  oxygen, , Continuous PRN - O2/gases  [Held by provider] potassium chloride CR, 20 mEq,  "q6h PRN   Or  [Held by provider] potassium chloride, 20 mEq, q6h PRN  [Held by provider] potassium chloride CR, 40 mEq, q6h PRN   Or  [Held by provider] potassium chloride, 40 mEq, q6h PRN  [Held by provider] potassium chloride, 20 mEq, q6h PRN  [Held by provider] potassium chloride, 40 mEq, q6h PRN  sodium chloride, 1,000 mL, PRN  sodium chloride 0.9%, 20 mL, q4h PRN          Objective    Visit Vitals  /55   Pulse 93   Temp 35.8 °C (96.4 °F) (Temporal)   Resp 13   Ht 1.702 m (5' 7\")   Wt 90 kg (198 lb 6.6 oz)   SpO2 100%   BMI 31.08 kg/m²   Smoking Status Former   BSA 2.06 m²     Wt Readings from Last 5 Encounters:   12/09/24 90 kg (198 lb 6.6 oz)   11/18/24 82 kg (180 lb 12.4 oz)     Intake & Output:  I/O last 3 completed shifts:  In: 1999.4 (22.2 mL/kg) [I.V.:1899.4 (21.1 mL/kg); IV Piggyback:100]  Out: 4828 (53.6 mL/kg) [Urine:3455 (1.1 mL/kg/hr); Other:1373]  Weight: 90 kg        Vent settings:       Physical Exam:   - Constitutional: Critically ill male lying in bed  - Neuro: A&Ox4, CAM negative. Moves all four extremities spontaneously and to commands. No FND  - CV: RRR, AF, hypertensive with SBPs in the 170s, pacer VVI 50  - Pulm: Not in respiratory distress, breathing easily on RA  - GI: Soft, nonpalpation, nondistended  - : Logan in place draining orange urine, no hematoma appreciated in the right groin  - Extremities: WWP, 2+ pulses throughout, 1-2+ pitting edema  - Skin: No jaundiced, no obvious rashes or deformities, sternal incision closed, well aproximated, and without signs of infection  - Psych: Calm and cooperative, normal mood and behavior    Daily Risk Screen  Intubated: No  Central line: Yes - for invasive hemodynamic monitoring and aquapharesis  Logan: Yes - for accurate I&Os    Images: All relevant images reviewed.    Invasive Hemodynamics:    Most Recent Range Past 24hrs   BP (Art) 83/79 Arterial Line BP 1  Min: 83/79  Max: 177/73   MAP(Art) 90 mmHg Arterial Line MAP 1 (mmHg)   Min: " 60 mmHg  Max: 178 mmHg   RA/CVP   No data recorded   PA 49/42 PAP  Min: 44/36  Max: 61/51   PA(mean) 45 mmHg PAP (Mean)  Min: 40 mmHg  Max: 56 mmHg   CO 5.2 L/min No data recorded   CI 2.6 L/min/m2 No data recorded   Mixed Venous 77 % No data recorded   SVR  841 (dyne*sec)/cm5 No data recorded       Assessment/Plan    Edu Echavarria is a 74 y.o. male with PMH of HTN, HFrEF, CAD, GERD, and gout, initially presented to OSH with STEMI. Transferred to [facility] for CABG evaluation, hospital course c/b MARYBETH, worsening hyponatremia causing delay of surgery case, then further complicated by respiratory failure following fluid resuscitation for correction of hyponatremia, and cardiogenic shock necessitating IABP.    Admitted to CTICU s/p CABGx4, LAAL with Dr. Villa. S/p R fem IABP. He is now weaned off inotropic and vasopressor support. He continues on amio and lidocaine gtts and has remained free of ectopy and abnormal heart rhythms. Will begin to wean lidocaine and eventually transition amio to PO, likely tomorrow. He continues to be hypervolemic, so will place LIJ and begin aquapharesis along with Bumex gtt. Nephrology does not see an indication for HD at this time. Still endorsing nausea. Optimal care is made challenging by pts adherence to PO medications.      NEURO:  History of gout. Acute post operative pain. Pain 7/10, alleviated only with opioids. Received 45mg Oxycodone in 24hrs.   - Serial neuro and pain assessments   - Scheduled Tylenol, lidocaine patch  - Continue IV Tylenol due to inability to tolerate PO Tylenol  - PRN oxycodone 5/10 Q4  - PT Consult, OOB to chair as tolerated, chair position if not tolerated   - CAM ICU score Qshift  - Sleep/wake cycle hygiene  - Holding home allopurinol (gout)     CV:  PMH of HTN, HFrEF, CAD, STEMI c/b cardiogenic shock with IABP placement. Now s/p CABG x4 with LAAL. Pre/Post EF: 30/30% Arrived to CTICU on epi 0.06, norepi 0.03, vasopressin 0.03. A/V epicardial wires set DDD @  80. R fem IABP removed 12/5.  AF RVR and NSVT eps previously on Amio/lido infusion, currently on Amio infusion. Off Epi, Mil, Levo, Vaso.   - Maintain goal MAP 65-90  - Mixed venous and CI Q4H  - Volume resuscitate as clinically indicated  - Maintain epicardial wires set VVI backup @ 50  - Hold home metoprolol succinate  - Start PO Amio 400 BID   - Decrease Amio infusion to 0.5/hr once taking PO Amio  - Continue OK ASA suppository, transition to PO when able   - Continue Plavix, Statin   - Continue IV hydralazine 10mg Q4H PRN  - Consider starting GDMT when stable      PULM:  No significant pulmonary PMH. Developed AHRF in CICU 2/2 cardiogenic shock, no stable respiratory wise and not requiring supplememtal O2. Worsening atelectasis and pulm edema on CXR, will continue diuresis and start RT therapies  - Start scheduled EzPap treatments   - Daily CXR  - Wean FiO2 maintaining SpO2 >92%.   - IS Q1H and OOB to chair when extubated     GI:  PMH of GERD. Last BM 12/8. Nausea since OR, anti-emetics limited by prolonged Qtc 550. Some relief with phenergan and emend. Tolerating some meals. Refusal of PO meds except Mylanta and Oxy currently.   - Continue cardiac-diabetic diet  - Miralax, senna-docusate, and Milk of Mag BID  - Holding PRN antiemetics due to QTc>500  - Continue PPI BID and Mylanta Q4H PRN     /Renal:  No /renal PMH. Baseline Cr of 0.7-1.0. Has had MARYBETH and acute on chronic hyponatremia likely 2/2 hypovolemia 2/2 decreased PO intake and diarrhea. CSA-MARYBETH Risk Score 3. Creatinine rising post-op. Hawthorne in place and making fair UOP. LIJ trialysis placed on 12/10 for possible dialysis needs and Aquadex. Currently on Aquadex and Bumex 2/hr infusion. /hr w/ 1470 removed via Aquadex. Net -2.6L.   - Continue hawthorne catheter for strict I/Os  - Goal UOP 0.5ml/kg/hr  - RFP as clinically indicated  - Replete electrolytes per CTICU protocol  - Continue Bumex gtt @ 2  - Place LIJ and start aquapharesis @100  -  Continue sevelamer 800mg TID (yesterday refused by patient given nausea)  - Nephrology consulted    ENDO:  No significant PMH. Last A1c: 6.3 (11/18/24). Euglycemic. Received 2u SSI overnight. BG WNL.   - SSI #4  - Maintain BG <180, insulin p/er CTICU protocol     HEME:  No significant PMH. Acute blood loss anemia and thrombocytopenia. H/H stable at 8.4, Plts 312.   - Monitor drain output volume and characteristics  - CBC, coags, and fibrinogen post op and as clinically indicated  - Continue NC ASA and Plavix (yesterday refused by patient given nausea)  - SCDs & SQH for DVT prophylaxis  - Type & Screen: Q72H     ID: Afebrile, no current indications of infection. MRSA negative. Broad infectious work-up for leukocytosis completed and negative, s/p Vanc/Zosyn (11/29-12/2).  - Trend temp Q4H  - Periop cefazolin x 48hrs completed  - Pan-cultures obtained 12/8 over c/f sepsis-mediated NSVT; blood and urine cultures negative and respiratory cultures were poor     Skin: No active skin issues. Bedsore followed by wound care, currently covered with Mepilex.   - Preventative Mepilex dressings in place on sacrum and heels  - Change preventative Mepilex weekly or more frequently as indicated (when moist/soiled)   - Every shift skin assessment per nursing and weekly ICU skin rounds  - Moisture barrier to be applied with shirley care  - Active skin problems addressed with nursing on daily rounds     PPx:  SCDs  SQH  PPI     G: Line  Left IJ with PAC placed 12/10/24  Left radial A-line placed 11/29/24    F: Family:  Will update at bedside postoperatively.    Restraints:  The indications and risks/benefits of non-violent/non self-destructive restraints were discussed.      A,B,C,D,E,F,G: reviewed     Dispo: CTICU care for now.     Code status: Full Code   Extended Emergency Contact Information  Primary Emergency Contact: Guillermo Barrios  Mobile Phone: [phone]  Relation: Relative  Preferred language: English   needed?  No  Secondary Emergency Contact: COLIN ROMERO  Mobile Phone: [phone]  Relation: Friend  Preferred language: English   needed? No     Patient staffed with Dr. Benjamin    [facility] [phone]

## 2024-12-12 ENCOUNTER — APPOINTMENT (OUTPATIENT)
Dept: RADIOLOGY | Facility: HOSPITAL | Age: 74
DRG: 235 | End: 2024-12-12
Payer: MEDICARE

## 2024-12-12 ENCOUNTER — APPOINTMENT (OUTPATIENT)
Dept: CARDIOLOGY | Facility: HOSPITAL | Age: 74
DRG: 235 | End: 2024-12-12
Payer: MEDICARE

## 2024-12-12 LAB
ALBUMIN SERPL BCP-MCNC: 3.1 G/DL (ref 3.4–5)
ALBUMIN SERPL BCP-MCNC: 3.3 G/DL (ref 3.4–5)
ANION GAP SERPL CALC-SCNC: 19 MMOL/L (ref 10–20)
ANION GAP SERPL CALC-SCNC: 22 MMOL/L (ref 10–20)
ATRIAL RATE: 79 BPM
ATRIAL RATE: 96 BPM
BUN SERPL-MCNC: 72 MG/DL (ref 6–23)
BUN SERPL-MCNC: 79 MG/DL (ref 6–23)
CA-I BLD-SCNC: 1.07 MMOL/L (ref 1.1–1.33)
CA-I BLD-SCNC: 1.07 MMOL/L (ref 1.1–1.33)
CALCIUM SERPL-MCNC: 8.2 MG/DL (ref 8.6–10.6)
CALCIUM SERPL-MCNC: 8.3 MG/DL (ref 8.6–10.6)
CHLORIDE SERPL-SCNC: 93 MMOL/L (ref 98–107)
CHLORIDE SERPL-SCNC: 93 MMOL/L (ref 98–107)
CO2 SERPL-SCNC: 24 MMOL/L (ref 21–32)
CO2 SERPL-SCNC: 27 MMOL/L (ref 21–32)
CREAT SERPL-MCNC: 3.44 MG/DL (ref 0.5–1.3)
CREAT SERPL-MCNC: 4.07 MG/DL (ref 0.5–1.3)
EGFRCR SERPLBLD CKD-EPI 2021: 15 ML/MIN/1.73M*2
EGFRCR SERPLBLD CKD-EPI 2021: 18 ML/MIN/1.73M*2
ERYTHROCYTE [DISTWIDTH] IN BLOOD BY AUTOMATED COUNT: 14.7 % (ref 11.5–14.5)
ERYTHROCYTE [DISTWIDTH] IN BLOOD BY AUTOMATED COUNT: 15.4 % (ref 11.5–14.5)
GLUCOSE BLD MANUAL STRIP-MCNC: 166 MG/DL (ref 74–99)
GLUCOSE BLD MANUAL STRIP-MCNC: 184 MG/DL (ref 74–99)
GLUCOSE BLD MANUAL STRIP-MCNC: 190 MG/DL (ref 74–99)
GLUCOSE BLD MANUAL STRIP-MCNC: 197 MG/DL (ref 74–99)
GLUCOSE BLD MANUAL STRIP-MCNC: 252 MG/DL (ref 74–99)
GLUCOSE SERPL-MCNC: 157 MG/DL (ref 74–99)
GLUCOSE SERPL-MCNC: 226 MG/DL (ref 74–99)
HCT VFR BLD AUTO: 23 % (ref 41–52)
HCT VFR BLD AUTO: 23.8 % (ref 41–52)
HGB BLD-MCNC: 8.2 G/DL (ref 13.5–17.5)
HGB BLD-MCNC: 8.4 G/DL (ref 13.5–17.5)
MAGNESIUM SERPL-MCNC: 2.68 MG/DL (ref 1.6–2.4)
MAGNESIUM SERPL-MCNC: 2.92 MG/DL (ref 1.6–2.4)
MCH RBC QN AUTO: 28.9 PG (ref 26–34)
MCH RBC QN AUTO: 29.2 PG (ref 26–34)
MCHC RBC AUTO-ENTMCNC: 35.3 G/DL (ref 32–36)
MCHC RBC AUTO-ENTMCNC: 35.7 G/DL (ref 32–36)
MCV RBC AUTO: 82 FL (ref 80–100)
MCV RBC AUTO: 82 FL (ref 80–100)
NRBC BLD-RTO: 0.1 /100 WBCS (ref 0–0)
NRBC BLD-RTO: 0.2 /100 WBCS (ref 0–0)
P AXIS: 76 DEGREES
P OFFSET: 192 MS
P ONSET: 132 MS
PHOSPHATE SERPL-MCNC: 4.5 MG/DL (ref 2.5–4.9)
PHOSPHATE SERPL-MCNC: 5 MG/DL (ref 2.5–4.9)
PLATELET # BLD AUTO: 290 X10*3/UL (ref 150–450)
PLATELET # BLD AUTO: 305 X10*3/UL (ref 150–450)
POTASSIUM SERPL-SCNC: 3.2 MMOL/L (ref 3.5–5.3)
POTASSIUM SERPL-SCNC: 3.4 MMOL/L (ref 3.5–5.3)
PR INTERVAL: 160 MS
Q ONSET: 212 MS
Q ONSET: 213 MS
QRS COUNT: 13 BEATS
QRS COUNT: 15 BEATS
QRS DURATION: 110 MS
QRS DURATION: 116 MS
QT INTERVAL: 450 MS
QT INTERVAL: 462 MS
QTC CALCULATION(BAZETT): 529 MS
QTC CALCULATION(BAZETT): 550 MS
QTC FREDERICIA: 506 MS
QTC FREDERICIA: 515 MS
R AXIS: 33 DEGREES
R AXIS: 81 DEGREES
RBC # BLD AUTO: 2.81 X10*6/UL (ref 4.5–5.9)
RBC # BLD AUTO: 2.91 X10*6/UL (ref 4.5–5.9)
SODIUM SERPL-SCNC: 136 MMOL/L (ref 136–145)
SODIUM SERPL-SCNC: 136 MMOL/L (ref 136–145)
T AXIS: -26 DEGREES
T AXIS: 210 DEGREES
T OFFSET: 438 MS
T OFFSET: 443 MS
VENTRICULAR RATE: 79 BPM
VENTRICULAR RATE: 90 BPM
WBC # BLD AUTO: 16.3 X10*3/UL (ref 4.4–11.3)
WBC # BLD AUTO: 17.1 X10*3/UL (ref 4.4–11.3)

## 2024-12-12 PROCEDURE — 80069 RENAL FUNCTION PANEL: CPT

## 2024-12-12 PROCEDURE — 83735 ASSAY OF MAGNESIUM: CPT

## 2024-12-12 PROCEDURE — 99232 SBSQ HOSP IP/OBS MODERATE 35: CPT

## 2024-12-12 PROCEDURE — 1100000001 HC PRIVATE ROOM DAILY

## 2024-12-12 PROCEDURE — 99233 SBSQ HOSP IP/OBS HIGH 50: CPT | Performed by: STUDENT IN AN ORGANIZED HEALTH CARE EDUCATION/TRAINING PROGRAM

## 2024-12-12 PROCEDURE — 93010 ELECTROCARDIOGRAM REPORT: CPT | Performed by: INTERNAL MEDICINE

## 2024-12-12 PROCEDURE — 93005 ELECTROCARDIOGRAM TRACING: CPT

## 2024-12-12 PROCEDURE — 2500000004 HC RX 250 GENERAL PHARMACY W/ HCPCS (ALT 636 FOR OP/ED)

## 2024-12-12 PROCEDURE — 82330 ASSAY OF CALCIUM: CPT

## 2024-12-12 PROCEDURE — 2500000004 HC RX 250 GENERAL PHARMACY W/ HCPCS (ALT 636 FOR OP/ED): Performed by: STUDENT IN AN ORGANIZED HEALTH CARE EDUCATION/TRAINING PROGRAM

## 2024-12-12 PROCEDURE — 71045 X-RAY EXAM CHEST 1 VIEW: CPT | Performed by: RADIOLOGY

## 2024-12-12 PROCEDURE — 82947 ASSAY GLUCOSE BLOOD QUANT: CPT

## 2024-12-12 PROCEDURE — 2500000002 HC RX 250 W HCPCS SELF ADMINISTERED DRUGS (ALT 637 FOR MEDICARE OP, ALT 636 FOR OP/ED)

## 2024-12-12 PROCEDURE — 2060000001 HC INTERMEDIATE ICU ROOM DAILY

## 2024-12-12 PROCEDURE — 71045 X-RAY EXAM CHEST 1 VIEW: CPT

## 2024-12-12 PROCEDURE — 37799 UNLISTED PX VASCULAR SURGERY: CPT

## 2024-12-12 PROCEDURE — 2500000001 HC RX 250 WO HCPCS SELF ADMINISTERED DRUGS (ALT 637 FOR MEDICARE OP): Performed by: NURSE PRACTITIONER

## 2024-12-12 PROCEDURE — 85027 COMPLETE CBC AUTOMATED: CPT

## 2024-12-12 RX ORDER — ACETAMINOPHEN 325 MG/1
975 TABLET ORAL EVERY 8 HOURS
Status: DISCONTINUED | OUTPATIENT
Start: 2024-12-12 | End: 2024-12-12

## 2024-12-12 RX ORDER — METOPROLOL TARTRATE 25 MG/1
12.5 TABLET, FILM COATED ORAL 2 TIMES DAILY
Status: DISCONTINUED | OUTPATIENT
Start: 2024-12-12 | End: 2024-12-13

## 2024-12-12 RX ORDER — ACETAMINOPHEN 160 MG/5ML
1000 SOLUTION ORAL EVERY 8 HOURS
Status: DISCONTINUED | OUTPATIENT
Start: 2024-12-12 | End: 2024-12-13

## 2024-12-12 RX ORDER — HEPARIN SODIUM,PORCINE/PF 10 UNIT/ML
10 SYRINGE (ML) INTRAVENOUS AS NEEDED
Status: DISCONTINUED | OUTPATIENT
Start: 2024-12-12 | End: 2024-12-13

## 2024-12-12 RX ORDER — POTASSIUM CHLORIDE 29.8 MG/ML
40 INJECTION INTRAVENOUS ONCE
Status: COMPLETED | OUTPATIENT
Start: 2024-12-12 | End: 2024-12-12

## 2024-12-12 RX ORDER — NAPROXEN SODIUM 220 MG/1
81 TABLET, FILM COATED ORAL DAILY
Status: DISCONTINUED | OUTPATIENT
Start: 2024-12-12 | End: 2024-12-18

## 2024-12-12 RX ORDER — POTASSIUM CHLORIDE 14.9 MG/ML
20 INJECTION INTRAVENOUS
Status: COMPLETED | OUTPATIENT
Start: 2024-12-12 | End: 2024-12-12

## 2024-12-12 ASSESSMENT — PAIN - FUNCTIONAL ASSESSMENT
PAIN_FUNCTIONAL_ASSESSMENT: 0-10

## 2024-12-12 ASSESSMENT — PAIN SCALES - GENERAL
PAINLEVEL_OUTOF10: 0 - NO PAIN
PAINLEVEL_OUTOF10: 2
PAINLEVEL_OUTOF10: 0 - NO PAIN

## 2024-12-12 NOTE — PROGRESS NOTES
CTICU Progress Note  Edu Echavarria/[id number]    Admit Date: 11/18/2024  Hospital Length of Stay: 24   ICU Length of Stay: 7d 8h   CT SURGEON: Dr. Villa    Subjective    Pt currently in RC AF (HR ) on Amio 0.5 gtt. Will trial PO Metop as pt refused PO Amio. Adequate UOP on Bumex gtt. Remains HDS, off pressors w/ NAEON.     MEDICATIONS  Infusions:  amiodarone, Last Rate: 1 mg/min (12/12/24 0700)  bumetanide, Last Rate: 2 mg/hr (12/12/24 0700)      Scheduled:  acetaminophen, 1,000 mg, q6h SEKOU  [Held by provider] acetaminophen, 975 mg, q8h  amiodarone, 400 mg, BID  aspirin, 150 mg, Daily  atorvastatin, 80 mg, Nightly  clopidogrel, 75 mg, Daily  heparin, 5,000 Units, q8h  insulin lispro, 0-10 Units, TID AC  [Held by provider] lidocaine, 1 patch, Daily  magnesium hydroxide, 30 mL, Daily  pantoprazole, 40 mg, BID  sevelamer carbonate, 800 mg, TID      PRN:  alteplase, 2 mg, PRN  alum-mag hydroxide-simeth, 20 mL, 4x daily PRN  calcium gluconate, 1 g, q6h PRN  calcium gluconate, 2 g, q6h PRN  dextrose, 12.5 g, q15 min PRN  dextrose, 25 g, q15 min PRN  glucagon, 1 mg, q15 min PRN  glucagon, 1 mg, q15 min PRN  hydrALAZINE, 10 mg, q4h PRN  lubricating eye drops, 1 drop, TID PRN  magnesium hydroxide, 30 mL, Daily PRN  magnesium sulfate, 2 g, q6h PRN  magnesium sulfate, 4 g, q6h PRN  naloxone, 0.2 mg, q5 min PRN  [Held by provider] ondansetron, 4 mg, q4h PRN  oxyCODONE, 10 mg, q4h PRN  oxyCODONE, 5 mg, q4h PRN  oxygen, , Continuous PRN - O2/gases  [Held by provider] potassium chloride CR, 20 mEq, q6h PRN   Or  [Held by provider] potassium chloride, 20 mEq, q6h PRN  [Held by provider] potassium chloride CR, 40 mEq, q6h PRN   Or  [Held by provider] potassium chloride, 40 mEq, q6h PRN  [Held by provider] potassium chloride, 20 mEq, q6h PRN  [Held by provider] potassium chloride, 40 mEq, q6h PRN  sodium chloride, 1,000 mL, PRN  sodium chloride 0.9%, 20 mL, q4h PRN          Objective    Visit Vitals  /54   Pulse 68   Temp  "36.1 °C (97 °F) (Temporal)   Resp 10   Ht 1.702 m (5' 7\")   Wt 90 kg (198 lb 6.6 oz)   SpO2 100%   BMI 31.08 kg/m²   Smoking Status Former   BSA 2.06 m²     Wt Readings from Last 5 Encounters:   12/09/24 90 kg (198 lb 6.6 oz)   11/18/24 82 kg (180 lb 12.4 oz)     Intake & Output:  I/O last 3 completed shifts:  In: 2259 (25.1 mL/kg) [I.V.:1959 (21.8 mL/kg); IV Piggyback:300]  Out: 5030 (55.9 mL/kg) [Urine:4100 (1.3 mL/kg/hr); Other:930]  Weight: 90 kg        Vent settings:       Physical Exam:   - Constitutional: Critically ill male lying in bed  - Neuro: A&Ox4, CAM negative. Moves all four extremities spontaneously and to commands. No FND  - CV: RRR, AF, hypertensive with SBPs in the 170s, pacer VVI 50  - Pulm: Not in respiratory distress, breathing easily on RA  - GI: Soft, nonpalpation, nondistended  - : Logan in place draining orange urine, no hematoma appreciated in the right groin  - Extremities: WWP, 2+ pulses throughout, 1-2+ pitting edema  - Skin: No jaundiced, no obvious rashes or deformities, sternal incision closed, well aproximated, and without signs of infection  - Psych: Calm and cooperative, normal mood and behavior    Daily Risk Screen  Intubated: No  Central line: Yes - for invasive hemodynamic monitoring and aquapharesis  Logan: Yes - for accurate I&Os    Images: All relevant images reviewed.    Invasive Hemodynamics:    Most Recent Range Past 24hrs   BP (Art) 111/41 Arterial Line BP 1  Min: 62/59  Max: 153/54   MAP(Art) 60 mmHg Arterial Line MAP 1 (mmHg)   Min: 60 mmHg  Max: 169 mmHg   RA/CVP   No data recorded   PA 49/42 No data recorded   PA(mean) 45 mmHg No data recorded   CO 5.2 L/min No data recorded   CI 2.6 L/min/m2 No data recorded   Mixed Venous 77 % No data recorded   SVR  841 (dyne*sec)/cm5 No data recorded       Assessment/Plan    Edu Echavarria is a 74 y.o. male with PMH of HTN, HFrEF, CAD, GERD, and gout, initially presented to OSH with STEMI. Transferred to [facility] for CABG " "evaluation, hospital course c/b MARYBETH, worsening hyponatremia causing delay of surgery case, then further complicated by respiratory failure following fluid resuscitation for correction of hyponatremia, and cardiogenic shock necessitating IABP.    Admitted to CTICU s/p CABGx4, LAAL with Dr. Villa. S/p R fem IABP. He is now weaned off inotropic and vasopressor support. He continues on amio and lidocaine gtts and has remained free of ectopy and abnormal heart rhythms. Will begin to wean lidocaine and eventually transition amio to PO, likely tomorrow. He continues to be hypervolemic, so will place LIJ and begin aquapharesis along with Bumex gtt. Nephrology does not see an indication for HD at this time. Still endorsing nausea. Optimal care is made challenging by pt's refusal of PO medications.    Pt continues to refuse PO medications except Oxy and Mylanta. States \"pills make him nauseous, IV meds work better, pills have more side effects\". Requested ASA and simethicone suppositories. Will attempt to transition meds to liquid. Did accept PO Metop today. Refusal of Plavix, ASA, Statin, Amio, lytes, tylenol, PPI, bowel ppx. Tolerating PO diet, passing gas, having Bms, no episodes of emesis or dry heaving during ICU stay. Will educate patient on importance of AF medications and Plavix. Dr. Villa informed of the situation.       NEURO:  History of gout. Acute post operative pain. Pain 7/10, alleviated with opioids. Received 45mg Oxycodone in 24hrs.   - Serial neuro and pain assessments   - Scheduled Tylenol, lidocaine patch  - Continue IV Tylenol due to inability to tolerate PO Tylenol  - PRN oxycodone 5/10 Q4  - PT Consult, OOB to chair as tolerated, chair position if not tolerated   - CAM ICU score Qshift  - Sleep/wake cycle hygiene  - Holding home allopurinol (gout)     CV:  PMH of HTN, HFrEF, CAD, STEMI c/b cardiogenic shock with IABP placement. Now s/p CABG x4 with LAAL. Pre/Post EF: 30/30% Arrived to CTICU on epi " 0.06, norepi 0.03, vasopressin 0.03. A/V epicardial wires set DDD @ 80. R fem IABP removed 12/5. Off Epi, Mil, Levo, Vaso. AF RVR and NSVT eps previously on Amio/lido infusion, now currently on Amio infusion and PO Metop. Pt remains in/out of AF, rate-controlled.   - Maintain goal MAP 65-90  - Mixed venous and CI Q4H  - Volume resuscitate as clinically indicated  - Maintain epicardial wires set VVI backup @ 50  - Hold home metoprolol succinate  - Start PO Metop 25 BID (hold if HR<50)  - Continue Amio infusion at 0.5/hr   - Continue AL ASA suppository, transition to PO when able   - Continue Plavix, Statin (patient has refused all doses so far)  - Continue IV hydralazine 10mg Q4H PRN  - Consider starting GDMT when stable      PULM:  No significant pulmonary PMH. Developed AHRF in CICU 2/2 cardiogenic shock, no stable respiratory wise and not requiring supplememtal O2. Worsening atelectasis and pulm edema on CXR, will continue diuresis and start RT therapies  - Continue scheduled EzPap treatments   - Daily CXR  - Wean FiO2 maintaining SpO2 >92%.   - IS Q1H and OOB to chair when extubated     GI:  PMH of GERD. Last BM 12/8. Nausea since OR, anti-emetics limited by prolonged Qtc 550. Some relief with phenergan and emend. Tolerating some meals. Refusal of PO meds except Mylanta and Oxy currently.   - Continue cardiac-diabetic diet  - Miralax, senna-docusate, and Milk of Mag BID  - PRN Zofran and Phenergan for nausea (Qtc 450 today)  - Continue PPI BID and Mylanta Q4H PRN     /Renal:  No /renal PMH. Baseline Cr of 0.7-1.0. Has had MARYBETH and acute on chronic hyponatremia likely 2/2 hypovolemia 2/2 decreased PO intake and diarrhea. CSA-MARYBETH Risk Score 3. Creatinine rising post-op. Hawthorne in place and making fair UOP. LIJ trialysis placed on 12/10 for possible dialysis needs and Aquadex. Currently on Aquadex and Bumex 2/hr infusion. /hr w/ 1470 removed via Aquadex. Net -2.6L.   - Continue hawthorne catheter for strict  I/Os  - Goal UOP 0.5ml/kg/hr  - RFP as clinically indicated  - Replete electrolytes per CTICU protocol  - Continue Bumex gtt @ 2  - Continue sevelamer 800mg TID (pt currently refusing)  - Nephrology consulted    ENDO:  No significant PMH. Last A1c: 6.3 (11/18/24). Euglycemic. Received 2u SSI overnight. BG WNL.   - SSI #4  - Maintain BG <180, insulin p/er CTICU protocol     HEME:  No significant PMH. Acute blood loss anemia and thrombocytopenia. H/H stable at 8.4, Plts 312.   - Monitor drain output volume and characteristics  - CBC, coags, and fibrinogen post op and as clinically indicated  - Continue MS ASA and Plavix (refused by patient)  - SCDs & SQH for DVT prophylaxis  - Type & Screen: Q72H     ID: Afebrile, no current indications of infection. MRSA negative. Broad infectious work-up for leukocytosis completed and negative, s/p Vanc/Zosyn (11/29-12/2).  - Trend temp Q4H  - Periop cefazolin x 48hrs completed  - Pan-cultures obtained 12/8 over c/f sepsis-mediated NSVT; blood and urine cultures negative and respiratory cultures were poor     Skin: No active skin issues. Bedsore followed by wound care, currently covered with Mepilex.   - Preventative Mepilex dressings in place on sacrum and heels  - Change preventative Mepilex weekly or more frequently as indicated (when moist/soiled)   - Every shift skin assessment per nursing and weekly ICU skin rounds  - Moisture barrier to be applied with shirley care  - Active skin problems addressed with nursing on daily rounds     PPx:  SCDs  SQH  PPI     G: Line  Left IJ with PAC placed 12/10/24  Left radial A-line placed 11/29/24 -    F: Family:  Friend updated at bedside postoperatively.    Restraints:  The indications and risks/benefits of non-violent/non self-destructive restraints were discussed.      A,B,C,D,E,F,G: reviewed     Dispo: CTICU care for now.     Code status: Full Code   Extended Emergency Contact Information  Primary Emergency Contact: Guillermo Barrios  Phone: [phone]  Relation: Relative  Preferred language: English   needed? No  Secondary Emergency Contact: COLIN ROMERO  Mobile Phone: [phone]  Relation: Friend  Preferred language: English   needed? No     Patient staffed with Dr. Haas    CTICU TEAM PHONE [phone]

## 2024-12-12 NOTE — PROGRESS NOTES
Communication Note    Patient Name: Edu Echavarria  MRN: [id number]  Today's Date: 12/12/2024   Room: 04/04A    Discipline: Physical Therapy      PT Missed Visit: Yes  Missed Visit Reason:  (PT intervention attempted, patient declining all activity and not receptive to any discussion.  Will follow up as agreeable for participation.)      12/12/24 at 11:22 AM   Akil Lara, PT   Rehab Office: [phone]

## 2024-12-12 NOTE — CARE PLAN
Problem: Skin  Goal: Participates in plan/prevention/treatment measures  Outcome: Progressing  Flowsheets (Taken 12/12/2024 0608)  Participates in plan/prevention/treatment measures:   Discuss with provider PT/OT consult   Elevate heels   Increase activity/out of bed for meals  Goal: Prevent/manage excess moisture  Outcome: Progressing  Flowsheets (Taken 12/12/2024 0608)  Prevent/manage excess moisture:   Moisturize dry skin   Monitor for/manage infection if present   Cleanse incontinence/protect with barrier cream   Follow provider orders for dressing changes  Goal: Prevent/minimize sheer/friction injuries  Outcome: Progressing  Flowsheets (Taken 12/12/2024 0608)  Prevent/minimize sheer/friction injuries:   Complete micro-shifts as needed if patient unable. Adjust patient position to relieve pressure points, not a full turn   Increase activity/out of bed for meals   Use pull sheet   HOB 30 degrees or less   Turn/reposition every 2 hours/use positioning/transfer devices   Utilize specialty bed per algorithm

## 2024-12-12 NOTE — PROGRESS NOTES
"Edu Jericho   74 rhetto[address]isa    @WT@  MRN/Room: [id number]/04/04-A  DOA: 11/18/2024    SUBJECTIVE: no acute complains, lethargic.     OBJECTIVE:  VITALS:  Temp:  [36 °C (96.8 °F)-36.5 °C (97.7 °F)] 36.5 °C (97.7 °F)  Heart Rate:  [61-92] 66  Resp:  [10-20] 15  BP: (110-151)/(52-83) 129/59  Arterial Line BP 1: ()/() 76/73     Intake/Output Summary (Last 24 hours) at 12/12/2024 1726  Last data filed at 12/12/2024 1600  Gross per 24 hour   Intake 864.4 ml   Output 3525 ml   Net -2660.6 ml      I/O last 3 completed shifts:  In: 2259 (25.1 mL/kg) [I.V.:1959 (21.8 mL/kg); IV Piggyback:300]  Out: 5030 (55.9 mL/kg) [Urine:4100 (1.3 mL/kg/hr); Other:930]  Weight: 90 kg        PHYSICAL EXAMINATION:  General appearance: no distress  Eyes: non-icteric  Skin: no apparent rash  Heart: regular  Lungs: NVB B/L with crackles  Abdomen: soft, nt/nd  Extremities: edema B/L      INVESTIGATIONS:  Results from last 7 days   Lab Units 12/12/24  1248   WBC AUTO x10*3/uL 17.1*   RBC AUTO x10*6/uL 2.91*   HEMOGLOBIN g/dL 8.4*   HEMATOCRIT % 23.8*     Results from last 7 days   Lab Units 12/12/24  1248   SODIUM mmol/L 136   POTASSIUM mmol/L 3.2*   CHLORIDE mmol/L 93*   CO2 mmol/L 27   BUN mg/dL 72*   CREATININE mg/dL 3.44*   CALCIUM mg/dL 8.3*   PHOSPHORUS mg/dL 4.5   MAGNESIUM mg/dL 2.68*     Results from last 7 days   Lab Units 12/09/24  1005   COLOR U  Light-Yellow   PH U  6.0   SPEC GRAV UR  1.016   PROTEIN U mg/dL 30 (1+)*   BLOOD UR  0.1 (1+)*   NITRITE U  NEGATIVE   WBC UR /HPF >50*     No results found for: \"ALBUR\", \"[id number]\"   No results found for the last 90 days.      IMAGING:  Electrocardiogram 12-lead PRN for arrhythmia    Result Date: 12/12/2024  Atrial fibrillation Low voltage QRS Cannot rule out Anterior infarct (cited on or before 06-DEC-2024) Abnormal ECG When compared with ECG of 08-DEC-2024 23:36, Atrial fibrillation has replaced Sinus rhythm Serial changes of evolving Anterior infarct Present    XR chest 1 " view    Result Date: 12/12/2024  Interpreted By:  Jennifer Hdz, STUDY: XR CHEST 1 VIEW;  12/12/2024 3:50 am   INDICATION: Signs/Symptoms:Assessment of pulmonary congestion.     COMPARISON: 12/11/2024   ACCESSION NUMBER(S): [id number]   ORDERING CLINICIAN: LUZ MARIA COUCH   FINDINGS: AP radiograph of the chest was provided.   Left internal jugular central venous catheter tip projects over the expected location of the superior vena cava Postsurgical changes consistent with median sternotomy are seen. Left atrial appendage closure device is in place. .   CARDIOMEDIASTINAL SILHOUETTE: Cardiomediastinal silhouette is stable in size and configuration.   LUNGS: Again noted are diffuse increased interstitial markings. Costophrenic angles are blunted. No evidence of pneumothorax. Airspace opacity within the left mid lung field.   ABDOMEN: No remarkable upper abdominal findings.   BONES: No acute osseous changes.       1.  Interstitial edema with small bilateral pleural effusions. 2. Medical devices as above.       MACRO: None   Signed by: Jennifer Hdz 12/12/2024 10:36 AM Dictation workstation:   [id number]    US native renal with doppler    Result Date: 12/12/2024  Interpreted By:  Peña Sher and Benza Andrew STUDY: US NATIVE RENAL WITH DOPPLER;  12/11/2024 6:26 pm   INDICATION: Signs/Symptoms:Assessment of renal artery patency.     COMPARISON: None.   ACCESSION NUMBER(S): [id number]   ORDERING CLINICIAN: LUZ MARIA COUCH   TECHNIQUE: Multiple images of the kidneys were obtained. Doppler evaluation is markedly limited due to artifact.   FINDINGS: RIGHT KIDNEY: The right kidney measures 12.20 cm in length. The renal cortical echogenicity and thickness are within normal limits. No hydronephrosis is present; no evidence of nephrolithiasis. There is a right renal cyst measuring 2.1 x 1.8 x 1.5 cm.   The RI values are 0.69 in the upper pole, 0.74 in the mid pole, and 0.75 in the lower pole.   The renal artery  velocities are 76.2cm/s in the mid portion and 68.5cm/s distally.   The renal vein velocity is 16.1cm/s area of.   LEFT KIDNEY: The left kidney measures 12.41 cm in length. The renal cortical echogenicity and thickness are within normal limits. No hydronephrosis is present; no evidence of nephrolithiasis.   Doppler evaluation is nondiagnostic.   AORTA: The aortic velocity is 50.3cm/s.   IVC: The IVC velocity is 56.5cm/s.   BLADDER: The urinary bladder is unremarkable in appearance.       Doppler evaluation is extremely limited and essentially nondiagnostic. The right renal artery appears patent but assessment is limited due to severe artifact. The left renal artery is not well visualized, although it is unclear whether this is artifactual or pathologic. CTA abdomen pelvis can be considered for further evaluation.   Unremarkable anatomic appearance of the kidneys.   I personally reviewed the images/study and I agree with the findings as stated above by resident physician, Rui Herrera MD. This study was interpreted at [place], [place].   MACRO: None   Signed by: Peña Sher 12/12/2024 5:54 AM Dictation workstation:   [id number]    XR chest 1 view    Result Date: 12/11/2024  Interpreted By:  Jennifer Hdz, STUDY: XR CHEST 1 VIEW;  12/11/2024 3:42 am   INDICATION: Signs/Symptoms:Assessment of pulmonary congestion.     COMPARISON: 12/10/2024   ACCESSION NUMBER(S): [id number]   ORDERING CLINICIAN: LUZ MARIA COUCH   FINDINGS: AP radiograph of the chest was provided.   Patient is status post median sternotomy. Left apical appendage closure device in place. Left IJ catheter with tip projection of the superior vena cava. The right IJ [place] catheter has been removed.   CARDIOMEDIASTINAL SILHOUETTE: Cardiomediastinal silhouette is stable in size and configuration.   LUNGS: There are diffuse increased interstitial markings, which have progressed in the bibasilar  distribution. No sizable pneumothorax. Costophrenic angles are blunted.   ABDOMEN: No remarkable upper abdominal findings.   BONES: No acute osseous changes.       1.  Increasing bibasilar airspace opacities consistent with worsening atelectasis/edema. Small bilateral pleural effusions. 2. Mild interstitial edema. 3. Medical devices as above.       MACRO: None   Signed by: Jennifer Hdz 12/11/2024 11:30 AM Dictation workstation:   [id number]       ASSESSMENT:  Edu Echavarria is a 74 y.o. male with PMH of HTN, HFrEF, CAD, GERD, and gout, initially presented to OSH with STEMI. Transferred to [facility] for CABG evaluation, hospital course c/b MARYBETH, worsening hyponatremia causing delay of surgery case, then further complicated by respiratory failure following fluid resuscitation for correction of hyponatremia, and cardiogenic shock necessitating IABP now s/p CABGx4, LAAL 12/4 with Dr. Villa. Nephrology consulted for MARYBETH to assist with management.      #Non Oliguric MARYBETH  - Baseline creatinine is 0.7-0.8 with eGFR of >90. Cr started to trend up on 11/30 from 1.38, remianed stable till 12/4 and then uptrended to 2 on 12/4. It remained stable till 12/6 followed by uptrend again to 4.6 on 12/9. Uop is decent,1300ml/24 hrs on bumex 2mg/hr gtt  - UA sig for pyuria, hematuria and proteinuria- hawthorne sample  - TPCR 1g/g  - Renal US/CT abdomen: no recent imaging for renals    Etiology of MARYBETH: For initial hike, it could be attributed to soft pressures in setting of A.fib, cardiorenal hemodynamics with CVP around ~18. Later abrupt bump in Cr after 12/4 is likely from hemodynamic instability/cardiogenic shock requiring pressors.    #Electrolytes  - hypoK from loop diuretics    #Acid-Base  - bicarb 27     #Hemodynamics  - not on any pressors   - TTE from 11/29 showed EF 25-30% with global hypokinesis of the left ventricle, RVSP 72     #cardiogenic shock necessitating IABP now s/p CABGx4, LAAL 12/4 with Dr. Villa  - underwent  aquaphresis 12/10 overnight       RECOMMENDATIONS:  - renal US  - replete potassium  - Cr is stable, good UOP ~2.7L, on bumex drip, will sign off. Should there be any questions or concerns please feel free to reach out.  - No absolute indication for emergent dialysis for now  - Keep MAP >70 or SBP >100-120, avoid nephrotoxic medications, radiocontrast if possible, follow medication trough levels as appropriate and titrate the nephrotoxic medications according to GFR.  - Strict I/O monitoring, daily weights, daily BMP  - Will continue to follow      Patient is discussed with the attending.      Mercedez Quick MD  Nephrology Fellow   Daytime / Weekend Renal Pager [phone]  After 7 pm Emergencies [phone] Pager [phone]

## 2024-12-13 ENCOUNTER — APPOINTMENT (OUTPATIENT)
Dept: RADIOLOGY | Facility: HOSPITAL | Age: 74
DRG: 235 | End: 2024-12-13
Payer: MEDICARE

## 2024-12-13 LAB
ALBUMIN SERPL BCP-MCNC: 3.1 G/DL (ref 3.4–5)
ANION GAP SERPL CALC-SCNC: 18 MMOL/L (ref 10–20)
BACTERIA BLD CULT: NORMAL
BACTERIA BLD CULT: NORMAL
BUN SERPL-MCNC: 75 MG/DL (ref 6–23)
CA-I BLD-SCNC: 1.09 MMOL/L (ref 1.1–1.33)
CALCIUM SERPL-MCNC: 8.2 MG/DL (ref 8.6–10.6)
CHLORIDE SERPL-SCNC: 94 MMOL/L (ref 98–107)
CO2 SERPL-SCNC: 29 MMOL/L (ref 21–32)
CREAT SERPL-MCNC: 3.47 MG/DL (ref 0.5–1.3)
EGFRCR SERPLBLD CKD-EPI 2021: 18 ML/MIN/1.73M*2
ERYTHROCYTE [DISTWIDTH] IN BLOOD BY AUTOMATED COUNT: 15.6 % (ref 11.5–14.5)
GLUCOSE BLD MANUAL STRIP-MCNC: 165 MG/DL (ref 74–99)
GLUCOSE BLD MANUAL STRIP-MCNC: 183 MG/DL (ref 74–99)
GLUCOSE BLD MANUAL STRIP-MCNC: 193 MG/DL (ref 74–99)
GLUCOSE BLD MANUAL STRIP-MCNC: 196 MG/DL (ref 74–99)
GLUCOSE BLD MANUAL STRIP-MCNC: 201 MG/DL (ref 74–99)
GLUCOSE SERPL-MCNC: 180 MG/DL (ref 74–99)
HCT VFR BLD AUTO: 23.6 % (ref 41–52)
HGB BLD-MCNC: 8.1 G/DL (ref 13.5–17.5)
MAGNESIUM SERPL-MCNC: 2.53 MG/DL (ref 1.6–2.4)
MCH RBC QN AUTO: 28.8 PG (ref 26–34)
MCHC RBC AUTO-ENTMCNC: 34.3 G/DL (ref 32–36)
MCV RBC AUTO: 84 FL (ref 80–100)
NRBC BLD-RTO: 0 /100 WBCS (ref 0–0)
PHOSPHATE SERPL-MCNC: 4 MG/DL (ref 2.5–4.9)
PLATELET # BLD AUTO: 283 X10*3/UL (ref 150–450)
POTASSIUM SERPL-SCNC: 3 MMOL/L (ref 3.5–5.3)
RBC # BLD AUTO: 2.81 X10*6/UL (ref 4.5–5.9)
SODIUM SERPL-SCNC: 138 MMOL/L (ref 136–145)
WBC # BLD AUTO: 13.1 X10*3/UL (ref 4.4–11.3)

## 2024-12-13 PROCEDURE — 2500000004 HC RX 250 GENERAL PHARMACY W/ HCPCS (ALT 636 FOR OP/ED)

## 2024-12-13 PROCEDURE — 71045 X-RAY EXAM CHEST 1 VIEW: CPT

## 2024-12-13 PROCEDURE — 2500000002 HC RX 250 W HCPCS SELF ADMINISTERED DRUGS (ALT 637 FOR MEDICARE OP, ALT 636 FOR OP/ED)

## 2024-12-13 PROCEDURE — 82947 ASSAY GLUCOSE BLOOD QUANT: CPT

## 2024-12-13 PROCEDURE — 2500000001 HC RX 250 WO HCPCS SELF ADMINISTERED DRUGS (ALT 637 FOR MEDICARE OP)

## 2024-12-13 PROCEDURE — 85027 COMPLETE CBC AUTOMATED: CPT

## 2024-12-13 PROCEDURE — 99233 SBSQ HOSP IP/OBS HIGH 50: CPT | Performed by: STUDENT IN AN ORGANIZED HEALTH CARE EDUCATION/TRAINING PROGRAM

## 2024-12-13 PROCEDURE — 83735 ASSAY OF MAGNESIUM: CPT

## 2024-12-13 PROCEDURE — 82330 ASSAY OF CALCIUM: CPT

## 2024-12-13 PROCEDURE — 2500000004 HC RX 250 GENERAL PHARMACY W/ HCPCS (ALT 636 FOR OP/ED): Performed by: STUDENT IN AN ORGANIZED HEALTH CARE EDUCATION/TRAINING PROGRAM

## 2024-12-13 PROCEDURE — 2500000005 HC RX 250 GENERAL PHARMACY W/O HCPCS: Performed by: STUDENT IN AN ORGANIZED HEALTH CARE EDUCATION/TRAINING PROGRAM

## 2024-12-13 PROCEDURE — 80069 RENAL FUNCTION PANEL: CPT

## 2024-12-13 PROCEDURE — 1200000002 HC GENERAL ROOM WITH TELEMETRY DAILY

## 2024-12-13 PROCEDURE — 37799 UNLISTED PX VASCULAR SURGERY: CPT

## 2024-12-13 PROCEDURE — 71045 X-RAY EXAM CHEST 1 VIEW: CPT | Performed by: RADIOLOGY

## 2024-12-13 RX ORDER — POTASSIUM CHLORIDE 1.5 G/1.58G
40 POWDER, FOR SOLUTION ORAL ONCE
Status: COMPLETED | OUTPATIENT
Start: 2024-12-13 | End: 2024-12-13

## 2024-12-13 RX ORDER — POLYETHYLENE GLYCOL 3350 17 G/17G
17 POWDER, FOR SOLUTION ORAL 2 TIMES DAILY
Status: DISCONTINUED | OUTPATIENT
Start: 2024-12-13 | End: 2024-12-18 | Stop reason: HOSPADM

## 2024-12-13 RX ORDER — POTASSIUM CHLORIDE 14.9 MG/ML
20 INJECTION INTRAVENOUS ONCE
Status: COMPLETED | OUTPATIENT
Start: 2024-12-13 | End: 2024-12-13

## 2024-12-13 RX ORDER — METOPROLOL TARTRATE 25 MG/1
25 TABLET, FILM COATED ORAL 2 TIMES DAILY
Status: DISCONTINUED | OUTPATIENT
Start: 2024-12-13 | End: 2024-12-15

## 2024-12-13 RX ORDER — BISACODYL 10 MG/1
10 SUPPOSITORY RECTAL ONCE
Status: COMPLETED | OUTPATIENT
Start: 2024-12-13 | End: 2024-12-13

## 2024-12-13 RX ORDER — ACETAMINOPHEN 160 MG/5ML
650 SOLUTION ORAL EVERY 6 HOURS
Status: DISCONTINUED | OUTPATIENT
Start: 2024-12-13 | End: 2024-12-18 | Stop reason: HOSPADM

## 2024-12-13 RX ORDER — BUMETANIDE 0.25 MG/ML
4 INJECTION, SOLUTION INTRAMUSCULAR; INTRAVENOUS 2 TIMES DAILY
Status: DISCONTINUED | OUTPATIENT
Start: 2024-12-13 | End: 2024-12-13

## 2024-12-13 RX ORDER — POTASSIUM CHLORIDE 1.5 G/1.58G
20 POWDER, FOR SOLUTION ORAL ONCE
Status: COMPLETED | OUTPATIENT
Start: 2024-12-13 | End: 2024-12-13

## 2024-12-13 RX ORDER — BUMETANIDE 0.25 MG/ML
4 INJECTION, SOLUTION INTRAMUSCULAR; INTRAVENOUS ONCE
Status: COMPLETED | OUTPATIENT
Start: 2024-12-14 | End: 2024-12-14

## 2024-12-13 RX ORDER — POTASSIUM CHLORIDE 29.8 MG/ML
40 INJECTION INTRAVENOUS ONCE
Status: COMPLETED | OUTPATIENT
Start: 2024-12-13 | End: 2024-12-13

## 2024-12-13 RX ORDER — AMOXICILLIN 250 MG
2 CAPSULE ORAL 2 TIMES DAILY
Status: DISCONTINUED | OUTPATIENT
Start: 2024-12-13 | End: 2024-12-18 | Stop reason: HOSPADM

## 2024-12-13 ASSESSMENT — COGNITIVE AND FUNCTIONAL STATUS - GENERAL
EATING MEALS: A LITTLE
MOVING TO AND FROM BED TO CHAIR: A LITTLE
STANDING UP FROM CHAIR USING ARMS: A LITTLE
HELP NEEDED FOR BATHING: A LITTLE
PERSONAL GROOMING: A LITTLE
CLIMB 3 TO 5 STEPS WITH RAILING: A LITTLE
TOILETING: A LITTLE
WALKING IN HOSPITAL ROOM: A LITTLE
DRESSING REGULAR UPPER BODY CLOTHING: A LITTLE
MOBILITY SCORE: 18
MOVING FROM LYING ON BACK TO SITTING ON SIDE OF FLAT BED WITH BEDRAILS: A LITTLE
DAILY ACTIVITIY SCORE: 18
TURNING FROM BACK TO SIDE WHILE IN FLAT BAD: A LITTLE
DRESSING REGULAR LOWER BODY CLOTHING: A LITTLE

## 2024-12-13 ASSESSMENT — PAIN SCALES - GENERAL
PAINLEVEL_OUTOF10: 2
PAINLEVEL_OUTOF10: 7
PAINLEVEL_OUTOF10: 9
PAINLEVEL_OUTOF10: 7
PAINLEVEL_OUTOF10: 6
PAINLEVEL_OUTOF10: 5 - MODERATE PAIN
PAINLEVEL_OUTOF10: 9

## 2024-12-13 ASSESSMENT — PAIN DESCRIPTION - LOCATION
LOCATION: CHEST
LOCATION: INCISION
LOCATION: CHEST

## 2024-12-13 ASSESSMENT — PAIN - FUNCTIONAL ASSESSMENT
PAIN_FUNCTIONAL_ASSESSMENT: 0-10

## 2024-12-13 ASSESSMENT — PAIN DESCRIPTION - ORIENTATION: ORIENTATION: MID

## 2024-12-13 NOTE — CARE PLAN
The patient's goals for the shift include        Problem: Safety - Adult  Goal: Free from fall injury  Outcome: Progressing     Problem: Chronic Conditions and Co-morbidities  Goal: Patient's chronic conditions and co-morbidity symptoms are monitored and maintained or improved  Outcome: Progressing     Problem: Skin  Goal: Participates in plan/prevention/treatment measures  Outcome: Progressing  Goal: Prevent/manage excess moisture  Outcome: Progressing  Goal: Prevent/minimize sheer/friction injuries  Outcome: Progressing     Problem: Heart Failure  Goal: Improved urinary output this shift  Outcome: Progressing  Goal: Reduction in peripheral edema within 24 hours  Outcome: Progressing  Goal: Report improvement of dyspnea/breathlessness this shift  Outcome: Progressing  Goal: Increase self care and/or family involvement in 24 hours  Outcome: Progressing

## 2024-12-13 NOTE — CARE PLAN
The patient's goals for the shift include      The clinical goals for the shift include Pt to remain HDS and be transferred to floor      Problem: Safety - Adult  Goal: Free from fall injury  Outcome: Progressing     Problem: Discharge Planning  Goal: Discharge to home or other facility with appropriate resources  Outcome: Progressing     Problem: Chronic Conditions and Co-morbidities  Goal: Patient's chronic conditions and co-morbidity symptoms are monitored and maintained or improved  Outcome: Progressing     Problem: Skin  Goal: Participates in plan/prevention/treatment measures  Outcome: Progressing  Goal: Prevent/manage excess moisture  Outcome: Progressing  Flowsheets (Taken 12/13/2024 1055)  Prevent/manage excess moisture: Cleanse incontinence/protect with barrier cream  Goal: Prevent/minimize sheer/friction injuries  Outcome: Progressing  Flowsheets (Taken 12/13/2024 1055)  Prevent/minimize sheer/friction injuries:   Complete micro-shifts as needed if patient unable. Adjust patient position to relieve pressure points, not a full turn   Use pull sheet     Problem: Heart Failure  Goal: Improved gas exchange this shift  Outcome: Progressing  Goal: Improved urinary output this shift  Outcome: Progressing  Goal: Reduction in peripheral edema within 24 hours  Outcome: Progressing  Goal: Report improvement of dyspnea/breathlessness this shift  Outcome: Progressing  Goal: Weight from fluid excess reduced over 2-3 days, then stabilize  Outcome: Progressing  Goal: Increase self care and/or family involvement in 24 hours  Outcome: Progressing

## 2024-12-13 NOTE — PROGRESS NOTES
CTICU Progress Note  Edu Echavarria/[id number]    Admit Date: 11/18/2024  Hospital Length of Stay: 25   ICU Length of Stay: 8d 8h   CT SURGEON: Dr. Villa    Subjective    Continues to diuresis appropriately, amenable to some PO electrolyte replacement as well as Plavix this AM. Plan to wean off Bumex drip to Bumex 4 BID. NSR overnight w/ some PVCs noted, tolerating Metop. No complaints of pain, nausea, HA, palpitations. Tolerating PO diet and OOBTC this AM.     MEDICATIONS  Infusions:  bumetanide, Last Rate: 2 mg/hr (12/13/24 0642)      Scheduled:  acetaminophen, 1,000 mg, q8h  aspirin, 81 mg, Daily  aspirin, 150 mg, Daily  atorvastatin, 80 mg, Nightly  clopidogrel, 75 mg, Daily  heparin, 5,000 Units, q8h  insulin lispro, 0-10 Units, TID AC  lidocaine, 1 patch, Daily  magnesium hydroxide, 30 mL, Daily  metoprolol tartrate, 25 mg, BID  pantoprazole, 40 mg, BID  potassium chloride, 20 mEq, Once  sevelamer carbonate, 800 mg, TID      PRN:  alteplase, 2 mg, PRN  alum-mag hydroxide-simeth, 20 mL, 4x daily PRN  calcium gluconate, 1 g, q6h PRN  calcium gluconate, 2 g, q6h PRN  dextrose, 12.5 g, q15 min PRN  dextrose, 25 g, q15 min PRN  glucagon, 1 mg, q15 min PRN  glucagon, 1 mg, q15 min PRN  heparin flush, 10 Units, PRN  hydrALAZINE, 10 mg, q4h PRN  lubricating eye drops, 1 drop, TID PRN  magnesium hydroxide, 30 mL, Daily PRN  magnesium sulfate, 2 g, q6h PRN  magnesium sulfate, 4 g, q6h PRN  naloxone, 0.2 mg, q5 min PRN  [Held by provider] ondansetron, 4 mg, q4h PRN  oxyCODONE, 10 mg, q4h PRN  oxyCODONE, 5 mg, q4h PRN  oxygen, , Continuous PRN - O2/gases  [Held by provider] potassium chloride CR, 20 mEq, q6h PRN   Or  [Held by provider] potassium chloride, 20 mEq, q6h PRN  [Held by provider] potassium chloride CR, 40 mEq, q6h PRN   Or  [Held by provider] potassium chloride, 40 mEq, q6h PRN  [Held by provider] potassium chloride, 20 mEq, q6h PRN  [Held by provider] potassium chloride, 40 mEq, q6h PRN          Objective   "  Visit Vitals  /74   Pulse 107   Temp 37 °C (98.6 °F) (Temporal)   Resp 18   Ht 1.702 m (5' 7\")   Wt 90 kg (198 lb 6.6 oz)   SpO2 96%   BMI 31.08 kg/m²   Smoking Status Former   BSA 2.06 m²     Wt Readings from Last 5 Encounters:   12/09/24 90 kg (198 lb 6.6 oz)   11/18/24 82 kg (180 lb 12.4 oz)     Intake & Output:  I/O last 3 completed shifts:  In: 1334.5 (14.8 mL/kg) [I.V.:1184.5 (13.2 mL/kg); IV Piggyback:150]  Out: 4865 (54.1 mL/kg) [Urine:4865 (1.5 mL/kg/hr)]  Weight: 90 kg        Vent settings:  FiO2 (%):  [40 %] 40 %    Physical Exam:   - Constitutional: Critically ill male lying in bed  - Neuro: A&Ox4, CAM negative. Moves all four extremities spontaneously and to commands. No FND  - CV: RRR, AF, hypertensive with SBPs in the 170s, pacer VVI 50  - Pulm: Not in respiratory distress, breathing easily on RA  - GI: Soft, nonpalpation, nondistended  - : Logan in place draining orange urine, no hematoma appreciated in the right groin  - Extremities: WWP, 2+ pulses throughout, 1-2+ pitting edema  - Skin: No jaundiced, no obvious rashes or deformities, sternal incision closed, well aproximated, and without signs of infection  - Psych: Calm and cooperative, normal mood and behavior    Daily Risk Screen  Intubated: No  Central line: Yes - for invasive hemodynamic monitoring and aquapharesis  Logan: Yes - for accurate I&Os    Images: All relevant images reviewed.    Invasive Hemodynamics:    Most Recent Range Past 24hrs   BP (Art) 129/56 Arterial Line BP 1  Min: 73/69  Max: 149/57   MAP(Art) 79 mmHg Arterial Line MAP 1 (mmHg)   Min: 57 mmHg  Max: 110 mmHg   RA/CVP   No data recorded   PA 49/42 No data recorded   PA(mean) 45 mmHg No data recorded   CO 5.2 L/min No data recorded   CI 2.6 L/min/m2 No data recorded   Mixed Venous 77 % No data recorded   SVR  841 (dyne*sec)/cm5 No data recorded       Assessment/Plan    Edu cEhavarria is a 74 y.o. male with PMH of HTN, HFrEF, CAD, GERD, and gout, initially presented " "to OSH with STEMI. Transferred to [facility] for CABG evaluation, hospital course c/b MARYBETH, worsening hyponatremia causing delay of surgery case, then further complicated by respiratory failure following fluid resuscitation for correction of hyponatremia, and cardiogenic shock necessitating IABP.    Admitted to CTICU s/p CABGx4, LAAL with Dr. Villa. S/p R fem IABP. He is now weaned off inotropic and vasopressor support. He continues on amio and lidocaine gtts and has remained free of ectopy and abnormal heart rhythms. Will begin to wean lidocaine and eventually transition amio to PO, likely tomorrow. He continues to be hypervolemic, so will place LIJ and begin aquapharesis along with Bumex gtt. Nephrology does not see an indication for HD at this time. Still endorsing nausea. Optimal care is made challenging by pt's refusal of PO medications.    Pt continues to refuse PO medications except Oxy and Mylanta. States \"pills make him nauseous, IV meds work better, pills have more side effects\". Requested ASA and simethicone suppositories. Will attempt to transition meds to liquid. Did accept PO Metop today. Refusal of Plavix, ASA, Statin, Amio, lytes, tylenol, PPI, bowel ppx. Tolerating PO diet, passing gas, having Bms, no episodes of emesis or dry heaving during ICU stay. Will educate patient on importance of AF medications and Plavix. Dr. Villa informed of the situation.       NEURO:  History of gout. Acute post operative pain. Pain 7/10, alleviated with opioids. Refused liquid tylenol, will re-evaluate if pain uncontrolled later in day. He is satisfied with Oxy for now.   - Serial neuro and pain assessments   - Scheduled liquid Tylenol, lidocaine patch  - PRN oxycodone 5/10 Q4  - PT Consult, OOB to chair as tolerated, chair position if not tolerated   - CAM ICU score Qshift  - Sleep/wake cycle hygiene  - Holding home allopurinol (gout)     CV:  PMH of HTN, HFrEF, CAD, STEMI c/b cardiogenic shock with IABP placement. " Now s/p CABG x4 with LAAL. Pre/Post EF: 30/30% Arrived to CTICU on epi 0.06, norepi 0.03, vasopressin 0.03. A/V epicardial wires set DDD @ 80. R fem IABP removed 12/5. Off Epi, Mil, Levo, Vaso. AF RVR and NSVT eps previously on Amio/lido infusion, now currently rate controlled on PO Metop. Pt remains in/out of AF.   - Discussion w/ Dr. Villa regarding AC for AF but decision made that risks of perioperative bleeding outweigh the risks.   - Maintain goal MAP 65-90  - Mixed venous and CI Q4H  - Volume resuscitate as clinically indicated  - Maintain epicardial wires set VVI backup @ 50  - Start Metoprolol 25 BID   - Discontinue Amiodarone   - Continue DE ASA until patient amenable to PO ASA   - Continue Statin (patient has refused all doses so far)  - Continue PRN IV Hydral 10 Q4 for HTN   - Consider starting GDMT when stable closer to discharge   - Hold home metoprolol succinate 100 BID     PULM:  No significant pulmonary PMH. Developed AHRF in CICU 2/2 cardiogenic shock. Worsening atelectasis and pulm edema on CXR post-op, with improvement s/p bumex drip diuresis and RT therapies. Currently stable on RA satting 95%.   - Continue scheduled EzPap treatments   - Daily CXR  - Wean FiO2 maintaining SpO2 >92%.   - IS Q1H and OOB to chair when extubated     GI:  PMH of GERD. Last BM 12/10. Intermittent nausea since OR. Tolerating PO diet w/o any episodes of emesis. Pt refusing many PO medications due to fear of nausea and side effects such as ASA, Statin, electrolyte repletion, bowel regimen etc.   - Continue cardiac-diabetic diet  - Miralax, senna-docusate, and Milk of Mag BID  - PRN Zofran and Phenergan for nausea (Qtc 450)  - Continue PPI   - Continue Simethicone suppository and Mylanta PRN per patient request      /Renal:  No /renal PMH. Baseline Cr of 0.7-1.0. Has had MARYBETH and acute on chronic hyponatremia likely 2/2 hypovolemia 2/2 decreased PO intake and diarrhea. CSA-MARYBETH Risk Score 3. Creatinine rising post-op.  MARYBETH w/ oliguria post-operatively. LIJ trialysis placed on 12/10 for possible dialysis needs and Aquadex. Adequate urine output on Bumex infusion and Aquadex. Currently off Bumex gtt and started on Bumex bolus dosing today. Net -2.3L.   - Remove hawthorne and transition to external condom catheter vs urinal   - Goal UOP 0.5ml/kg/hr  - RFP as clinically indicated  - Replete electrolytes per CTICU protocol  - Continue sevelamer 800mg TID (pt currently refusing)  - Nephrology consulted, no dialysis needs currently     ENDO:  No significant PMH. Last A1c: 6.3 (11/18/24). Euglycemic. Received 4u SSI overnight. BG WNL.   - SSI #4  - Maintain BG <180, insulin p/er CTICU protocol     HEME:  No significant PMH. Acute blood loss anemia and thrombocytopenia. H/H stable at 8.4, Plts 312.   - Monitor drain output volume and characteristics  - CBC, coags, and fibrinogen post op and as clinically indicated  - Continue FL ASA   - Continue Plavix (previously refused by patient but accepted today)  - SCDs & SQH for DVT prophylaxis  - Type & Screen: Q72H     ID: Afebrile, no current indications of infection. MRSA negative. Broad infectious work-up for leukocytosis completed and negative, s/p Vanc/Zosyn (11/29-12/2).  - Trend temp Q4H  - Periop cefazolin x 48hrs completed  - Pan-cultures obtained 12/8 over c/f sepsis-mediated NSVT; blood and urine cultures negative and respiratory cultures were poor     Skin: No active skin issues. Bedsore followed by wound care, currently covered with Mepilex.   - Preventative Mepilex dressings in place on sacrum and heels  - Change preventative Mepilex weekly or more frequently as indicated (when moist/soiled)   - Every shift skin assessment per nursing and weekly ICU skin rounds  - Moisture barrier to be applied with shirley care  - Active skin problems addressed with nursing on daily rounds     PPx:  SCDs  SQH  PPI     G: Line  PIVx2    F: Family:  Friend updated at bedside postoperatively.    Restraints:   The indications and risks/benefits of non-violent/non self-destructive restraints were discussed.      A,B,C,D,E,F,G: reviewed     Dispo: Stable for LT3 Transfer      Code status: Full Code   Extended Emergency Contact Information  Primary Emergency Contact: Guillermo Barrios  Mobile Phone: [phone]  Relation: Relative  Preferred language: English   needed? No  Secondary Emergency Contact: COLIN ROMERO  Mobile Phone: [phone]  Relation: Friend  Preferred language: English   needed? No     Patient staffed with Dr. Haas    CTICU TEAM PHONE [phone]

## 2024-12-13 NOTE — PROGRESS NOTES
CT SURGERY  12/04 CABGx4, LAAL with Dr. Villa      DC PLAN  SNF: ______   > no precert required      PAYOR   Medicare A/B      PT/OT   12/11 PT/OT = MOD    SUPPORT  CousinGuillermo [phone]   Friend, Brandon Lehman      COMPLETED  (X) Daily ongoing review of patient via chart and/or (M-F) IDT rounds     Sophie Ibrahim (LSW, MSW)

## 2024-12-14 ENCOUNTER — APPOINTMENT (OUTPATIENT)
Dept: RADIOLOGY | Facility: HOSPITAL | Age: 74
DRG: 235 | End: 2024-12-14
Payer: MEDICARE

## 2024-12-14 LAB
ALBUMIN SERPL BCP-MCNC: 3.4 G/DL (ref 3.4–5)
ANION GAP SERPL CALC-SCNC: 18 MMOL/L (ref 10–20)
BUN SERPL-MCNC: 65 MG/DL (ref 6–23)
CALCIUM SERPL-MCNC: 8.4 MG/DL (ref 8.6–10.6)
CHLORIDE SERPL-SCNC: 96 MMOL/L (ref 98–107)
CO2 SERPL-SCNC: 28 MMOL/L (ref 21–32)
CREAT SERPL-MCNC: 3.04 MG/DL (ref 0.5–1.3)
EGFRCR SERPLBLD CKD-EPI 2021: 21 ML/MIN/1.73M*2
ERYTHROCYTE [DISTWIDTH] IN BLOOD BY AUTOMATED COUNT: 15.8 % (ref 11.5–14.5)
GLUCOSE BLD MANUAL STRIP-MCNC: 138 MG/DL (ref 74–99)
GLUCOSE BLD MANUAL STRIP-MCNC: 174 MG/DL (ref 74–99)
GLUCOSE BLD MANUAL STRIP-MCNC: 205 MG/DL (ref 74–99)
GLUCOSE BLD MANUAL STRIP-MCNC: 210 MG/DL (ref 74–99)
GLUCOSE SERPL-MCNC: 202 MG/DL (ref 74–99)
HCT VFR BLD AUTO: 25.8 % (ref 41–52)
HGB BLD-MCNC: 8.3 G/DL (ref 13.5–17.5)
MAGNESIUM SERPL-MCNC: 2.24 MG/DL (ref 1.6–2.4)
MCH RBC QN AUTO: 29.1 PG (ref 26–34)
MCHC RBC AUTO-ENTMCNC: 32.2 G/DL (ref 32–36)
MCV RBC AUTO: 91 FL (ref 80–100)
NRBC BLD-RTO: 0 /100 WBCS (ref 0–0)
PHOSPHATE SERPL-MCNC: 2.9 MG/DL (ref 2.5–4.9)
PLATELET # BLD AUTO: 276 X10*3/UL (ref 150–450)
POTASSIUM SERPL-SCNC: 3.2 MMOL/L (ref 3.5–5.3)
RBC # BLD AUTO: 2.85 X10*6/UL (ref 4.5–5.9)
SODIUM SERPL-SCNC: 139 MMOL/L (ref 136–145)
WBC # BLD AUTO: 15.6 X10*3/UL (ref 4.4–11.3)

## 2024-12-14 PROCEDURE — 71045 X-RAY EXAM CHEST 1 VIEW: CPT

## 2024-12-14 PROCEDURE — 2500000001 HC RX 250 WO HCPCS SELF ADMINISTERED DRUGS (ALT 637 FOR MEDICARE OP)

## 2024-12-14 PROCEDURE — 1200000002 HC GENERAL ROOM WITH TELEMETRY DAILY

## 2024-12-14 PROCEDURE — 2500000002 HC RX 250 W HCPCS SELF ADMINISTERED DRUGS (ALT 637 FOR MEDICARE OP, ALT 636 FOR OP/ED)

## 2024-12-14 PROCEDURE — 2500000004 HC RX 250 GENERAL PHARMACY W/ HCPCS (ALT 636 FOR OP/ED)

## 2024-12-14 PROCEDURE — 85027 COMPLETE CBC AUTOMATED: CPT | Performed by: NURSE PRACTITIONER

## 2024-12-14 PROCEDURE — 36415 COLL VENOUS BLD VENIPUNCTURE: CPT | Performed by: NURSE PRACTITIONER

## 2024-12-14 PROCEDURE — 80069 RENAL FUNCTION PANEL: CPT | Performed by: NURSE PRACTITIONER

## 2024-12-14 PROCEDURE — 71045 X-RAY EXAM CHEST 1 VIEW: CPT | Performed by: RADIOLOGY

## 2024-12-14 PROCEDURE — 2500000005 HC RX 250 GENERAL PHARMACY W/O HCPCS

## 2024-12-14 PROCEDURE — 83735 ASSAY OF MAGNESIUM: CPT | Performed by: NURSE PRACTITIONER

## 2024-12-14 PROCEDURE — 2500000001 HC RX 250 WO HCPCS SELF ADMINISTERED DRUGS (ALT 637 FOR MEDICARE OP): Performed by: STUDENT IN AN ORGANIZED HEALTH CARE EDUCATION/TRAINING PROGRAM

## 2024-12-14 PROCEDURE — 82947 ASSAY GLUCOSE BLOOD QUANT: CPT

## 2024-12-14 RX ORDER — ALUMINUM HYDROXIDE, MAGNESIUM HYDROXIDE, AND SIMETHICONE 1200; 120; 1200 MG/30ML; MG/30ML; MG/30ML
10 SUSPENSION ORAL ONCE
Status: COMPLETED | OUTPATIENT
Start: 2024-12-14 | End: 2024-12-14

## 2024-12-14 ASSESSMENT — PAIN SCALES - GENERAL: PAINLEVEL_OUTOF10: 7

## 2024-12-14 ASSESSMENT — COGNITIVE AND FUNCTIONAL STATUS - GENERAL
TOILETING: A LITTLE
PERSONAL GROOMING: A LITTLE
DRESSING REGULAR LOWER BODY CLOTHING: A LITTLE
STANDING UP FROM CHAIR USING ARMS: A LITTLE
TURNING FROM BACK TO SIDE WHILE IN FLAT BAD: A LITTLE
DRESSING REGULAR UPPER BODY CLOTHING: A LITTLE
CLIMB 3 TO 5 STEPS WITH RAILING: A LITTLE
MOVING FROM LYING ON BACK TO SITTING ON SIDE OF FLAT BED WITH BEDRAILS: A LITTLE
HELP NEEDED FOR BATHING: A LITTLE
WALKING IN HOSPITAL ROOM: A LITTLE
DAILY ACTIVITIY SCORE: 19
MOBILITY SCORE: 18
MOVING TO AND FROM BED TO CHAIR: A LITTLE

## 2024-12-14 ASSESSMENT — PAIN - FUNCTIONAL ASSESSMENT: PAIN_FUNCTIONAL_ASSESSMENT: 0-10

## 2024-12-14 ASSESSMENT — PAIN DESCRIPTION - DESCRIPTORS: DESCRIPTORS: ACHING

## 2024-12-14 NOTE — PROGRESS NOTES
CARDIAC SURGERY DAILY PROGRESS NOTE    Edu Echavarria is a 74 y.o. male, with a PMH of HTN, HFrEF, CAD, GERD, and gout, , who presented to [facility] on 11/18/2024 for CABG. Hospital course c/b MARYBETH, worsening hyponatremia causing delay of surgery case, then further complicated by respiratory failure following fluid resuscitation for correction of hyponatremia, and cardiogenic shock necessitating IABP. Pt is now s/p removal of IABP and CABG x4 w/ LAAL w. Dr. Villa    OPERATION/PROCEDURE: CABG x 4 LIMA and veins, YESY ligation, left posterior window with Sunil Villa MD  CABG x 4 LIMA and veins, YESY ligation, left posterior window  20802 - AL CORONARY ARTERY BYP W/VEIN & ARTERY GRAFT 2 VEIN  1. Coronary artery bypass grafting x 4:               In situ left internal mammary artery to left anterior descending coronary artery bypass.               Aorto to right posterior descending saphenous vein coronary artery bypass.                  Aorto to first marginal circumflex saphenous vein coronary artery bypass.               Abarca of vein graft to first diagonal saphenous vein coronary artery bypass.  2. Left atrial appendage ligation with AtriCure ACHV 35 mm clip ([id number]).   3. Ligation, Ligament of Mau.  4. Left posterior pericardial window creation.  5. Cannulation and initiation of cardiopulmonary oxygenator.   6. Medistim flow probe analysis of bypass grafts.   7. Endoscopic harvest of right saphenous vein.    CTICU Course: LIJ placed and Bumex drip initiated 2/2 hypervolemia. No indication for dialysis at this time per nephrology and they have since signed off. Optimal care is made challenging by pt's refusal of PO medications. Dr. Villa aware of the situation.    Transferred to T3 on 12/13/2024    Interval History:   NAEON. Rate controlled A fib on monitor that began early this AM.    SUBJECTIVE:  This AM, pt states that he continues to have nausea with PO meds but is willing to try  "to take important medications such as Plavix and metoprolol.     Objective   /70 (BP Location: Left arm, Patient Position: Lying)   Pulse 97   Temp 36.5 °C (97.7 °F) (Temporal)   Resp 17   Ht 1.702 m (5' 7\")   Wt 80.7 kg (177 lb 14.4 oz)   SpO2 94%   BMI 27.86 kg/m²   0-10 (Numeric) Pain Score: 2   3 Day Weight Change: Unable to Calculate    Intake and Output    Intake/Output Summary (Last 24 hours) at 12/14/2024 0903  Last data filed at 12/14/2024 0633  Gross per 24 hour   Intake 1048 ml   Output 1655 ml   Net -607 ml       Physical Exam  Physical Exam  Musculoskeletal:         General: Normal range of motion.      Cervical back: Normal range of motion.   Skin:     General: Skin is warm and dry.   Neurological:      Mental Status: He is alert.     Medications  Scheduled medications  acetaminophen, 650 mg, oral, q6h  aspirin, 81 mg, oral, Daily  atorvastatin, 80 mg, oral, Nightly  clopidogrel, 75 mg, oral, Daily  heparin, 5,000 Units, subcutaneous, q8h  insulin lispro, 0-10 Units, subcutaneous, TID AC  lidocaine, 1 patch, transdermal, Daily  magnesium hydroxide, 30 mL, oral, Daily  metoprolol tartrate, 25 mg, oral, BID  pantoprazole, 40 mg, intravenous, BID  polyethylene glycol, 17 g, oral, BID  sennosides-docusate sodium, 2 tablet, oral, BID  sevelamer carbonate, 800 mg, oral, TID    Continuous medications   PRN medications  PRN medications: oxyCODONE, oxyCODONE    Labs  Results for orders placed or performed during the hospital encounter of 11/18/24 (from the past 24 hours)   POCT GLUCOSE   Result Value Ref Range    POCT Glucose 201 (H) 74 - 99 mg/dL   POCT GLUCOSE   Result Value Ref Range    POCT Glucose 165 (H) 74 - 99 mg/dL   POCT GLUCOSE   Result Value Ref Range    POCT Glucose 193 (H) 74 - 99 mg/dL   Renal Function Panel   Result Value Ref Range    Glucose 202 (H) 74 - 99 mg/dL    Sodium 139 136 - 145 mmol/L    Potassium 3.2 (L) 3.5 - 5.3 mmol/L    Chloride 96 (L) 98 - 107 mmol/L    Bicarbonate " 28 21 - 32 mmol/L    Anion Gap 18 10 - 20 mmol/L    Urea Nitrogen 65 (H) 6 - 23 mg/dL    Creatinine 3.04 (H) 0.50 - 1.30 mg/dL    eGFR 21 (L) >60 mL/min/1.73m*2    Calcium 8.4 (L) 8.6 - 10.6 mg/dL    Phosphorus 2.9 2.5 - 4.9 mg/dL    Albumin 3.4 3.4 - 5.0 g/dL   Magnesium   Result Value Ref Range    Magnesium 2.24 1.60 - 2.40 mg/dL   CBC   Result Value Ref Range    WBC 15.6 (H) 4.4 - 11.3 x10*3/uL    nRBC 0.0 0.0 - 0.0 /100 WBCs    RBC 2.85 (L) 4.50 - 5.90 x10*6/uL    Hemoglobin 8.3 (L) 13.5 - 17.5 g/dL    Hematocrit 25.8 (L) 41.0 - 52.0 %    MCV 91 80 - 100 fL    MCH 29.1 26.0 - 34.0 pg    MCHC 32.2 32.0 - 36.0 g/dL    RDW 15.8 (H) 11.5 - 14.5 %    Platelets 276 150 - 450 x10*3/uL   POCT GLUCOSE   Result Value Ref Range    POCT Glucose 174 (H) 74 - 99 mg/dL         IMPRESSION & PLAN:  POD # 10 s/p removal of IABP and CABG x4 w/ LASTACEY Villa  - Increase activity/ ambulation; PT/OT  - Encourage IS, C/DB; respiratory therapy; wean O2 as joni   - Cardiac rehab referral   - Continue cardiac meds: ASA, BB, statin   - continue plavix x1 year for NSTEMI   - Pain and anticonstipation meds  - 2v CXR today or tomorrow  - Postop echo 1-2 days for Pre/Post EF 30%  - Remove epicardial wires prior to discharge   - Tele until discharge  - Optimize nutrition and electrolytes    Rhythm  - Tele: rate Controlled Afib, Hr 100s.   - Continue BB, Plavix. Discussion was had with the patient this morning about the importance of taking his meds PO.   - Adjust medications as tolerated    Acute Blood Loss Anemia   Recent Labs     12/14/24  0549 12/13/24  0257 12/12/24  1248 12/12/24  0021 12/11/24  1203 12/11/24  0508 12/10/24  0205   HGB 8.3* 8.1* 8.4* 8.2* 8.9* 8.4* 8.0*   HCT 25.8* 23.6* 23.8* 23.0* 25.1* 25.0* 23.9*   - MV, PO Iron x1mo  - Daily labs, transfuse as indicated    Thrombocytopenia  Recent Labs     12/14/24  0549 12/13/24  0257 12/12/24  1248 12/12/24  0021 12/11/24  1203 12/11/24  0508 12/10/24  0205    283 305  290 351 312 293   - Etiology likely postop/CPB related  - Continue to trend with daily CBCs    Volume/Electrolyte Status: Preop wt Weight: 82.6 kg (182 lb)   Vitals:    12/14/24 0633   Weight: 80.7 kg (177 lb 14.4 oz)     - Weight: 12/09: 90kg, 12/14: 80.7kg  - Adjust diuresis as needed for postop cardiac surgery hypervolemia  - Replete electrolytes for hypokalemia/hypomagnesemia/hypophosphatemia as needed   - Daily weights and strict I&Os  - Daily RFP while admitted    GERD  - PRN Zofran and Phenergan for nausea (Qtc 450)  - Continue PPI     Gout  - Restart home Allopurinol    MARYBETH  - Bumex gtts discontinued yesterday, bumex IV PRN  - RFP daily    Acute on chronic hyponatremia: 2/2 decreased PO intake and diarrhea, now resolved  Sodium  136 - 145 mmol/L 139   - RFP daily    VTE Prophylaxis: SCDs/TEDs, ambulation, SQ heparin  Code Status: Full Code    Dispo  - PT/OT recs: moderate intensity, discharge with wheeled walker  - Would benefit from homecare for cardiac surgery carepath and RN visits  - Anticipate discharge 3-4 days, pending resolution of nausea and tolerating PO meds  - Will continue to assess discharge needs    Pauly Crowe DO, CA-3/PGY-IV, MBS  Cardiac Surgery AKOSUA  [facility], [place]  Team Phone [phone]    12/14/2024  9:03 AM

## 2024-12-15 VITALS
HEART RATE: 103 BPM | SYSTOLIC BLOOD PRESSURE: 129 MMHG | BODY MASS INDEX: 29.1 KG/M2 | HEIGHT: 67 IN | OXYGEN SATURATION: 95 % | DIASTOLIC BLOOD PRESSURE: 87 MMHG | TEMPERATURE: 97.5 F | RESPIRATION RATE: 18 BRPM | WEIGHT: 185.41 LBS

## 2024-12-15 LAB
ALBUMIN SERPL BCP-MCNC: 3.3 G/DL (ref 3.4–5)
ANION GAP SERPL CALC-SCNC: 17 MMOL/L (ref 10–20)
BUN SERPL-MCNC: 57 MG/DL (ref 6–23)
CALCIUM SERPL-MCNC: 8.7 MG/DL (ref 8.6–10.6)
CHLORIDE SERPL-SCNC: 95 MMOL/L (ref 98–107)
CO2 SERPL-SCNC: 31 MMOL/L (ref 21–32)
CREAT SERPL-MCNC: 2.57 MG/DL (ref 0.5–1.3)
EGFRCR SERPLBLD CKD-EPI 2021: 26 ML/MIN/1.73M*2
ERYTHROCYTE [DISTWIDTH] IN BLOOD BY AUTOMATED COUNT: 15.8 % (ref 11.5–14.5)
GLUCOSE BLD MANUAL STRIP-MCNC: 177 MG/DL (ref 74–99)
GLUCOSE BLD MANUAL STRIP-MCNC: 196 MG/DL (ref 74–99)
GLUCOSE BLD MANUAL STRIP-MCNC: 201 MG/DL (ref 74–99)
GLUCOSE BLD MANUAL STRIP-MCNC: 257 MG/DL (ref 74–99)
GLUCOSE SERPL-MCNC: 207 MG/DL (ref 74–99)
HCT VFR BLD AUTO: 26.5 % (ref 41–52)
HGB BLD-MCNC: 8.4 G/DL (ref 13.5–17.5)
MAGNESIUM SERPL-MCNC: 2.08 MG/DL (ref 1.6–2.4)
MCH RBC QN AUTO: 29 PG (ref 26–34)
MCHC RBC AUTO-ENTMCNC: 31.7 G/DL (ref 32–36)
MCV RBC AUTO: 91 FL (ref 80–100)
NRBC BLD-RTO: 0 /100 WBCS (ref 0–0)
PHOSPHATE SERPL-MCNC: 2.7 MG/DL (ref 2.5–4.9)
PLATELET # BLD AUTO: 271 X10*3/UL (ref 150–450)
POTASSIUM SERPL-SCNC: 3.1 MMOL/L (ref 3.5–5.3)
RBC # BLD AUTO: 2.9 X10*6/UL (ref 4.5–5.9)
SODIUM SERPL-SCNC: 140 MMOL/L (ref 136–145)
WBC # BLD AUTO: 19.8 X10*3/UL (ref 4.4–11.3)

## 2024-12-15 PROCEDURE — 2500000001 HC RX 250 WO HCPCS SELF ADMINISTERED DRUGS (ALT 637 FOR MEDICARE OP)

## 2024-12-15 PROCEDURE — 2500000004 HC RX 250 GENERAL PHARMACY W/ HCPCS (ALT 636 FOR OP/ED)

## 2024-12-15 PROCEDURE — 83735 ASSAY OF MAGNESIUM: CPT | Performed by: NURSE PRACTITIONER

## 2024-12-15 PROCEDURE — 2500000004 HC RX 250 GENERAL PHARMACY W/ HCPCS (ALT 636 FOR OP/ED): Performed by: NURSE PRACTITIONER

## 2024-12-15 PROCEDURE — 36415 COLL VENOUS BLD VENIPUNCTURE: CPT | Performed by: NURSE PRACTITIONER

## 2024-12-15 PROCEDURE — 2500000005 HC RX 250 GENERAL PHARMACY W/O HCPCS

## 2024-12-15 PROCEDURE — 85027 COMPLETE CBC AUTOMATED: CPT | Performed by: NURSE PRACTITIONER

## 2024-12-15 PROCEDURE — 82947 ASSAY GLUCOSE BLOOD QUANT: CPT

## 2024-12-15 PROCEDURE — 2500000001 HC RX 250 WO HCPCS SELF ADMINISTERED DRUGS (ALT 637 FOR MEDICARE OP): Performed by: NURSE PRACTITIONER

## 2024-12-15 PROCEDURE — 1200000002 HC GENERAL ROOM WITH TELEMETRY DAILY

## 2024-12-15 PROCEDURE — 2500000002 HC RX 250 W HCPCS SELF ADMINISTERED DRUGS (ALT 637 FOR MEDICARE OP, ALT 636 FOR OP/ED)

## 2024-12-15 PROCEDURE — 80069 RENAL FUNCTION PANEL: CPT | Performed by: NURSE PRACTITIONER

## 2024-12-15 RX ORDER — POTASSIUM CHLORIDE 20 MEQ/1
40 TABLET, EXTENDED RELEASE ORAL ONCE
Status: DISCONTINUED | OUTPATIENT
Start: 2024-12-15 | End: 2024-12-15

## 2024-12-15 RX ORDER — POTASSIUM CHLORIDE 1.5 G/1.58G
40 POWDER, FOR SOLUTION ORAL ONCE
Status: COMPLETED | OUTPATIENT
Start: 2024-12-15 | End: 2024-12-15

## 2024-12-15 RX ORDER — METOPROLOL TARTRATE 25 MG/1
37.5 TABLET, FILM COATED ORAL 2 TIMES DAILY
Status: DISCONTINUED | OUTPATIENT
Start: 2024-12-15 | End: 2024-12-16

## 2024-12-15 RX ORDER — BUMETANIDE 0.25 MG/ML
2 INJECTION, SOLUTION INTRAMUSCULAR; INTRAVENOUS ONCE
Status: COMPLETED | OUTPATIENT
Start: 2024-12-15 | End: 2024-12-15

## 2024-12-15 RX ORDER — SIMETHICONE 80 MG
80 TABLET,CHEWABLE ORAL ONCE
Status: COMPLETED | OUTPATIENT
Start: 2024-12-15 | End: 2024-12-15

## 2024-12-15 ASSESSMENT — COGNITIVE AND FUNCTIONAL STATUS - GENERAL
HELP NEEDED FOR BATHING: A LITTLE
DAILY ACTIVITIY SCORE: 19
DRESSING REGULAR LOWER BODY CLOTHING: A LITTLE
MOVING FROM LYING ON BACK TO SITTING ON SIDE OF FLAT BED WITH BEDRAILS: A LITTLE
TURNING FROM BACK TO SIDE WHILE IN FLAT BAD: A LITTLE
DRESSING REGULAR UPPER BODY CLOTHING: A LITTLE
CLIMB 3 TO 5 STEPS WITH RAILING: A LITTLE
STANDING UP FROM CHAIR USING ARMS: A LITTLE
TOILETING: A LITTLE
WALKING IN HOSPITAL ROOM: A LITTLE
PERSONAL GROOMING: A LITTLE
MOVING TO AND FROM BED TO CHAIR: A LITTLE
MOBILITY SCORE: 18

## 2024-12-15 ASSESSMENT — PAIN - FUNCTIONAL ASSESSMENT
PAIN_FUNCTIONAL_ASSESSMENT: 0-10

## 2024-12-15 ASSESSMENT — PAIN SCALES - GENERAL
PAINLEVEL_OUTOF10: 6
PAINLEVEL_OUTOF10: 6
PAINLEVEL_OUTOF10: 2
PAINLEVEL_OUTOF10: 2

## 2024-12-15 ASSESSMENT — PAIN DESCRIPTION - DESCRIPTORS: DESCRIPTORS: ACHING

## 2024-12-15 NOTE — PROGRESS NOTES
CARDIAC SURGERY DAILY PROGRESS NOTE    Edu Echavarria is a 74 y.o. male, with a PMH of HTN, HFrEF, CAD, GERD, and gout, , who presented to [facility] on 11/18/2024 for CABG. Hospital course c/b MARYBETH, worsening hyponatremia causing delay of surgery case, then further complicated by respiratory failure following fluid resuscitation for correction of hyponatremia, and cardiogenic shock necessitating IABP. Pt is now s/p removal of IABP and CABG x4 w/ LAAL w. Dr. Villa    OPERATION/PROCEDURE: CABG x 4 LIMA and veins, YESY ligation, left posterior window with Sunil Villa MD  CABG x 4 LIMA and veins, YESY ligation, left posterior window  [id number] - ME CORONARY ARTERY BYP W/VEIN & ARTERY GRAFT 2 VEIN  1. Coronary artery bypass grafting x 4:               In situ left internal mammary artery to left anterior descending coronary artery bypass.               Aorto to right posterior descending saphenous vein coronary artery bypass.                  Aorto to first marginal circumflex saphenous vein coronary artery bypass.               Abarca of vein graft to first diagonal saphenous vein coronary artery bypass.  2. Left atrial appendage ligation with AtriCure ACHV 35 mm clip (LOT:[id number]).   3. Ligation, Ligament of Mau.  4. Left posterior pericardial window creation.  5. Cannulation and initiation of cardiopulmonary oxygenator.   6. Medistim flow probe analysis of bypass grafts.   7. Endoscopic harvest of right saphenous vein.    CTICU Course: LIJ placed and Bumex drip initiated 2/2 hypervolemia. No indication for dialysis at this time per nephrology and they have since signed off. Optimal care is made challenging by pt's refusal of PO medications. Dr. Villa aware of the situation.    Transferred to T3 on 12/13/2024    Interval History:   Remains in Afib rate control 100s    SUBJECTIVE:  No  acute events    Objective   /79 (BP Location: Right arm, Patient Position: Sitting)   Pulse 94   Temp 36.6  "°C (97.9 °F) (Temporal)   Resp 18   Ht 1.702 m (5' 7\")   Wt 84.1 kg (185 lb 6.5 oz)   SpO2 98%   BMI 29.04 kg/m²   0-10 (Numeric) Pain Score: 7   3 Day Weight Change: Unable to Calculate    Intake and Output    Intake/Output Summary (Last 24 hours) at 12/15/2024 1649  Last data filed at 12/15/2024 1417  Gross per 24 hour   Intake 1260 ml   Output 1315 ml   Net -55 ml       Physical Exam  Physical Exam  Constitutional:       Appearance: Normal appearance.   HENT:      Head: Atraumatic.      Mouth/Throat:      Mouth: Mucous membranes are moist.   Eyes:      Conjunctiva/sclera: Conjunctivae normal.   Cardiovascular:      Rate and Rhythm: Normal rate and regular rhythm.      Comments: HR 100s Afib  Pulmonary:      Breath sounds: Normal breath sounds.   Abdominal:      General: Bowel sounds are normal. There is no distension.      Palpations: Abdomen is soft.      Tenderness: There is no abdominal tenderness.   Genitourinary:     Comments: voiding  Musculoskeletal:         General: Normal range of motion.      Cervical back: Neck supple.   Skin:     General: Skin is warm and dry.      Capillary Refill: Capillary refill takes less than 2 seconds.   Neurological:      Mental Status: He is alert and oriented to person, place, and time. Mental status is at baseline.   Psychiatric:         Mood and Affect: Mood normal.     Medications  Scheduled medications  acetaminophen, 650 mg, oral, q6h  aspirin, 81 mg, oral, Daily  atorvastatin, 80 mg, oral, Nightly  clopidogrel, 75 mg, oral, Daily  heparin, 5,000 Units, subcutaneous, q8h  insulin lispro, 0-10 Units, subcutaneous, TID AC  lidocaine, 1 patch, transdermal, Daily  magnesium hydroxide, 30 mL, oral, Daily  metoprolol tartrate, 25 mg, oral, BID  pantoprazole, 40 mg, intravenous, BID  polyethylene glycol, 17 g, oral, BID  sennosides-docusate sodium, 2 tablet, oral, BID  sevelamer carbonate, 800 mg, oral, TID    Continuous medications   PRN medications  PRN medications: " oxyCODONE, oxyCODONE    Labs  Results for orders placed or performed during the hospital encounter of 11/18/24 (from the past 24 hours)   POCT GLUCOSE   Result Value Ref Range    POCT Glucose 138 (H) 74 - 99 mg/dL   Renal Function Panel   Result Value Ref Range    Glucose 207 (H) 74 - 99 mg/dL    Sodium 140 136 - 145 mmol/L    Potassium 3.1 (L) 3.5 - 5.3 mmol/L    Chloride 95 (L) 98 - 107 mmol/L    Bicarbonate 31 21 - 32 mmol/L    Anion Gap 17 10 - 20 mmol/L    Urea Nitrogen 57 (H) 6 - 23 mg/dL    Creatinine 2.57 (H) 0.50 - 1.30 mg/dL    eGFR 26 (L) >60 mL/min/1.73m*2    Calcium 8.7 8.6 - 10.6 mg/dL    Phosphorus 2.7 2.5 - 4.9 mg/dL    Albumin 3.3 (L) 3.4 - 5.0 g/dL   Magnesium   Result Value Ref Range    Magnesium 2.08 1.60 - 2.40 mg/dL   CBC   Result Value Ref Range    WBC 19.8 (H) 4.4 - 11.3 x10*3/uL    nRBC 0.0 0.0 - 0.0 /100 WBCs    RBC 2.90 (L) 4.50 - 5.90 x10*6/uL    Hemoglobin 8.4 (L) 13.5 - 17.5 g/dL    Hematocrit 26.5 (L) 41.0 - 52.0 %    MCV 91 80 - 100 fL    MCH 29.0 26.0 - 34.0 pg    MCHC 31.7 (L) 32.0 - 36.0 g/dL    RDW 15.8 (H) 11.5 - 14.5 %    Platelets 271 150 - 450 x10*3/uL   POCT GLUCOSE   Result Value Ref Range    POCT Glucose 196 (H) 74 - 99 mg/dL   POCT GLUCOSE   Result Value Ref Range    POCT Glucose 257 (H) 74 - 99 mg/dL   POCT GLUCOSE   Result Value Ref Range    POCT Glucose 201 (H) 74 - 99 mg/dL         IMPRESSION & PLAN:  POD # 11 s/p removal of IABP and CABG x4 w/ LIO Villa  - Increase activity/ ambulation; PT/OT  - Encourage IS, C/DB; respiratory therapy; wean O2 as joni - on RA  - Cardiac rehab referral   - Continue cardiac meds: ASA, BB, statin   - continue plavix x1 year for NSTEMI   - Pain and anticonstipation meds  - 2v CXR ordered for 12/16  - Postop echo 1-2 days for Pre/Post EF 30%, done on 12/9  - CUTepicardial wires prior to discharge   - Tele until discharge  - Optimize nutrition and electrolytes    Rhythm  - Tele: rate Controlled Afib, Hr 100s.   - Continue BB,  Plavix. Discussion was had with the patient this morning about the importance of taking his meds PO.   -12/15 increased Metoprolol to 37.5 BID, uptitrate as needed  - Adjust medications as tolerated    Acute Blood Loss Anemia   Recent Labs     12/15/24  0725 12/14/24  0549 12/13/24  0257 12/12/24  1248 12/12/24  0021 12/11/24  1203 12/11/24  0508   HGB 8.4* 8.3* 8.1* 8.4* 8.2* 8.9* 8.4*   HCT 26.5* 25.8* 23.6* 23.8* 23.0* 25.1* 25.0*   - MV, PO Iron x1mo  - Daily labs, transfuse as indicated    Thrombocytopenia  Recent Labs     12/15/24  0725 12/14/24  0549 12/13/24  0257 12/12/24  1248 12/12/24  0021 12/11/24  1203 12/11/24  0508    276 283 305 290 351 312   - Etiology likely postop/CPB related  - Continue to trend with daily CBCs    Volume/Electrolyte Status: Preop wt Weight: 82.6 kg (182 lb)   Vitals:    12/15/24 0044   Weight: 84.1 kg (185 lb 6.5 oz)     - Weight: 12/09: 90kg, 12/14: 80.7kg  - Adjust diuresis as needed for postop cardiac surgery hypervolemia  - Replete electrolytes for hypokalemia/hypomagnesemia/hypophosphatemia as needed , replaced K 12/15  - ha been requiring Bumex, (was on Bumex gtt)  -gave 2 mg IV Bumex on 12/15  - Daily weights and strict I&Os  - Daily RFP while admitted    GERD  - PRN Zofran and Phenergan for nausea (Qtc 450)  - Continue PPI     Gout  - Restart home Allopurinol    MARYBETH  - Bumex gtts discontinued 12/14, bumex IV PRN- gave 2 mg IV on 12/15  - RFP daily    Acute on chronic hyponatremia: 2/2 decreased PO intake and diarrhea, now resolved  Sodium  136 - 145 mmol/L 139   - RFP daily    VTE Prophylaxis: SCDs/TEDs, ambulation, SQ heparin  Code Status: Full Code    Dispo  - PT/OT recs: moderate intensity, discharge with wheeled walker  - Would benefit from homecare for cardiac surgery carepath and RN visits  - Anticipate discharge 3-4 days, pending resolution of nausea and tolerating PO meds, diuresis, improving renal function   - Will continue to assess discharge  needs    Lavinia Byrd, YOSHI-CNP, CA-3/PGY-IV, MBS  Cardiac Surgery [facility]  [facility]  Team Phone [phone]    12/15/2024  4:49 PM

## 2024-12-16 ENCOUNTER — APPOINTMENT (OUTPATIENT)
Dept: RADIOLOGY | Facility: HOSPITAL | Age: 74
DRG: 235 | End: 2024-12-16
Payer: MEDICARE

## 2024-12-16 LAB
ALBUMIN SERPL BCP-MCNC: 3.2 G/DL (ref 3.4–5)
ANION GAP SERPL CALC-SCNC: 17 MMOL/L (ref 10–20)
BUN SERPL-MCNC: 57 MG/DL (ref 6–23)
CALCIUM SERPL-MCNC: 8.4 MG/DL (ref 8.6–10.6)
CHLORIDE SERPL-SCNC: 98 MMOL/L (ref 98–107)
CO2 SERPL-SCNC: 29 MMOL/L (ref 21–32)
CREAT SERPL-MCNC: 2.44 MG/DL (ref 0.5–1.3)
EGFRCR SERPLBLD CKD-EPI 2021: 27 ML/MIN/1.73M*2
ERYTHROCYTE [DISTWIDTH] IN BLOOD BY AUTOMATED COUNT: 15.6 % (ref 11.5–14.5)
GLUCOSE BLD MANUAL STRIP-MCNC: 193 MG/DL (ref 74–99)
GLUCOSE BLD MANUAL STRIP-MCNC: 215 MG/DL (ref 74–99)
GLUCOSE BLD MANUAL STRIP-MCNC: 224 MG/DL (ref 74–99)
GLUCOSE SERPL-MCNC: 226 MG/DL (ref 74–99)
HCT VFR BLD AUTO: 26.6 % (ref 41–52)
HGB BLD-MCNC: 8.4 G/DL (ref 13.5–17.5)
MAGNESIUM SERPL-MCNC: 2.01 MG/DL (ref 1.6–2.4)
MCH RBC QN AUTO: 28.9 PG (ref 26–34)
MCHC RBC AUTO-ENTMCNC: 31.6 G/DL (ref 32–36)
MCV RBC AUTO: 91 FL (ref 80–100)
NRBC BLD-RTO: 0 /100 WBCS (ref 0–0)
PHOSPHATE SERPL-MCNC: 2.7 MG/DL (ref 2.5–4.9)
PLATELET # BLD AUTO: 271 X10*3/UL (ref 150–450)
POTASSIUM SERPL-SCNC: 3.5 MMOL/L (ref 3.5–5.3)
RBC # BLD AUTO: 2.91 X10*6/UL (ref 4.5–5.9)
SODIUM SERPL-SCNC: 140 MMOL/L (ref 136–145)
WBC # BLD AUTO: 18.6 X10*3/UL (ref 4.4–11.3)

## 2024-12-16 PROCEDURE — 2500000005 HC RX 250 GENERAL PHARMACY W/O HCPCS

## 2024-12-16 PROCEDURE — 71046 X-RAY EXAM CHEST 2 VIEWS: CPT | Performed by: RADIOLOGY

## 2024-12-16 PROCEDURE — 2500000001 HC RX 250 WO HCPCS SELF ADMINISTERED DRUGS (ALT 637 FOR MEDICARE OP)

## 2024-12-16 PROCEDURE — 71046 X-RAY EXAM CHEST 2 VIEWS: CPT

## 2024-12-16 PROCEDURE — 85027 COMPLETE CBC AUTOMATED: CPT | Performed by: NURSE PRACTITIONER

## 2024-12-16 PROCEDURE — 36415 COLL VENOUS BLD VENIPUNCTURE: CPT | Performed by: NURSE PRACTITIONER

## 2024-12-16 PROCEDURE — 2500000004 HC RX 250 GENERAL PHARMACY W/ HCPCS (ALT 636 FOR OP/ED)

## 2024-12-16 PROCEDURE — 2500000001 HC RX 250 WO HCPCS SELF ADMINISTERED DRUGS (ALT 637 FOR MEDICARE OP): Performed by: STUDENT IN AN ORGANIZED HEALTH CARE EDUCATION/TRAINING PROGRAM

## 2024-12-16 PROCEDURE — 2500000001 HC RX 250 WO HCPCS SELF ADMINISTERED DRUGS (ALT 637 FOR MEDICARE OP): Performed by: NURSE PRACTITIONER

## 2024-12-16 PROCEDURE — 80069 RENAL FUNCTION PANEL: CPT | Performed by: NURSE PRACTITIONER

## 2024-12-16 PROCEDURE — 83735 ASSAY OF MAGNESIUM: CPT | Performed by: NURSE PRACTITIONER

## 2024-12-16 PROCEDURE — 2500000002 HC RX 250 W HCPCS SELF ADMINISTERED DRUGS (ALT 637 FOR MEDICARE OP, ALT 636 FOR OP/ED)

## 2024-12-16 PROCEDURE — 1200000002 HC GENERAL ROOM WITH TELEMETRY DAILY

## 2024-12-16 PROCEDURE — 82947 ASSAY GLUCOSE BLOOD QUANT: CPT

## 2024-12-16 PROCEDURE — 97530 THERAPEUTIC ACTIVITIES: CPT | Mod: GP,CQ

## 2024-12-16 RX ORDER — ALUMINUM HYDROXIDE, MAGNESIUM HYDROXIDE, AND SIMETHICONE 1200; 120; 1200 MG/30ML; MG/30ML; MG/30ML
10 SUSPENSION ORAL ONCE
Status: COMPLETED | OUTPATIENT
Start: 2024-12-16 | End: 2024-12-16

## 2024-12-16 RX ORDER — METOPROLOL SUCCINATE 25 MG/1
25 TABLET, EXTENDED RELEASE ORAL 2 TIMES DAILY
Status: DISCONTINUED | OUTPATIENT
Start: 2024-12-16 | End: 2024-12-17

## 2024-12-16 RX ORDER — ALUMINUM HYDROXIDE, MAGNESIUM HYDROXIDE, AND SIMETHICONE 1200; 120; 1200 MG/30ML; MG/30ML; MG/30ML
10 SUSPENSION ORAL 4 TIMES DAILY PRN
Status: DISCONTINUED | OUTPATIENT
Start: 2024-12-16 | End: 2024-12-17

## 2024-12-16 ASSESSMENT — COGNITIVE AND FUNCTIONAL STATUS - GENERAL
MOVING TO AND FROM BED TO CHAIR: A LITTLE
STANDING UP FROM CHAIR USING ARMS: A LOT
PERSONAL GROOMING: A LITTLE
TOILETING: A LITTLE
MOBILITY SCORE: 16
TURNING FROM BACK TO SIDE WHILE IN FLAT BAD: A LITTLE
MOVING FROM LYING ON BACK TO SITTING ON SIDE OF FLAT BED WITH BEDRAILS: A LITTLE
DRESSING REGULAR UPPER BODY CLOTHING: A LOT
CLIMB 3 TO 5 STEPS WITH RAILING: A LOT
MOBILITY SCORE: 14
WALKING IN HOSPITAL ROOM: A LOT
WALKING IN HOSPITAL ROOM: A LOT
DRESSING REGULAR LOWER BODY CLOTHING: A LITTLE
MOVING FROM LYING ON BACK TO SITTING ON SIDE OF FLAT BED WITH BEDRAILS: A LITTLE
CLIMB 3 TO 5 STEPS WITH RAILING: TOTAL
DAILY ACTIVITIY SCORE: 18
MOVING TO AND FROM BED TO CHAIR: A LITTLE
HELP NEEDED FOR BATHING: A LITTLE
TURNING FROM BACK TO SIDE WHILE IN FLAT BAD: A LITTLE
STANDING UP FROM CHAIR USING ARMS: A LITTLE

## 2024-12-16 ASSESSMENT — PAIN SCALES - GENERAL
PAINLEVEL_OUTOF10: 9
PAINLEVEL_OUTOF10: 3
PAINLEVEL_OUTOF10: 5 - MODERATE PAIN
PAINLEVEL_OUTOF10: 6
PAINLEVEL_OUTOF10: 8
PAINLEVEL_OUTOF10: 3
PAINLEVEL_OUTOF10: 5 - MODERATE PAIN
PAINLEVEL_OUTOF10: 0 - NO PAIN

## 2024-12-16 ASSESSMENT — PAIN DESCRIPTION - DESCRIPTORS: DESCRIPTORS: ACHING;SHARP

## 2024-12-16 ASSESSMENT — PAIN DESCRIPTION - ORIENTATION: ORIENTATION: RIGHT

## 2024-12-16 ASSESSMENT — PAIN - FUNCTIONAL ASSESSMENT
PAIN_FUNCTIONAL_ASSESSMENT: 0-10

## 2024-12-16 ASSESSMENT — PAIN DESCRIPTION - LOCATION: LOCATION: CHEST

## 2024-12-16 NOTE — PROGRESS NOTES
Met with patient and introduced myself as Care Coordinator and member of the discharge planning team.  Pt is s/p CABGx4. He plans to discharge to his cousin's home at time of discharge. Spoke to Guillermo Barrios by phone. His address is [address] [place] [address]. Cousin says he will be out of town until next Monday. Spoke to him about therapy's recommendation for moderate intensity therapy. Emailed a list of facilities in a 20 mile radius to [email or web address]. Care coordinator will continue to follow for home going needs.  Shelby Casanova RN  Add Referral sent to [facility] at the request of pt's family. Requested SNF choices.   Add Referral sent to [facility] at families request.

## 2024-12-16 NOTE — CARE PLAN
Problem: Safety - Adult  Goal: Free from fall injury  Outcome: Progressing     Problem: Chronic Conditions and Co-morbidities  Goal: Patient's chronic conditions and co-morbidity symptoms are monitored and maintained or improved  Outcome: Progressing     Problem: Skin  Goal: Participates in plan/prevention/treatment measures  Outcome: Progressing  Goal: Prevent/manage excess moisture  Outcome: Progressing  Goal: Prevent/minimize sheer/friction injuries  Outcome: Progressing

## 2024-12-16 NOTE — PROGRESS NOTES
Physical Therapy Treatment    Patient Name: Edu Echavarria  MRN: [id number]  Today's Date: 12/16/2024  Room: [address] [place], [place] [address]  Time Calculation  Start Time: 1133  Stop Time: 1211  Time Calculation (min): 38 min       Assessment/Plan   PT Assessment  PT Assessment Results: Decreased strength, Decreased endurance, Impaired balance, Decreased mobility, Decreased coordination, Decreased safety awareness, Pain  Rehab Prognosis: Good  Barriers to Discharge Home: Physical needs  Physical Needs: High falls risk due to function or environment  Evaluation/Treatment Tolerance: Patient limited by fatigue  Medical Staff Made Aware: Yes  Strengths: Premorbid level of function  Barriers to Participation: Attitude of self, Comorbidities, Coping skills  End of Session Communication: Bedside nurse  End of Session Patient Position: Bed, 3 rail up, Alarm off, not on at start of session  PT Plan  Inpatient/Swing Bed or Outpatient: Inpatient  PT Plan  Treatment/Interventions: Bed mobility, Gait training, Transfer training, Balance training, Stair training, Strengthening, Endurance training, Therapeutic exercise, Therapeutic activity, Home exercise program  PT Plan: Ongoing PT  PT Frequency: 3 times per week  PT Discharge Recommendations: Moderate intensity level of continued care  Equipment Recommended upon Discharge: Wheeled walker  PT Recommended Transfer Status: Assist x2  PT - OK to Discharge: Yes  Assessment: Patient is progressing Fairly with therapy this date. Self limiting and needs education and encouragement to participate in therapy with the best of his ability. Decreased endurance and safety awareness, Would continue to benefit from continued skilled PT to address all mobility deficits; Patient remains appropriate for MOD intensity therapy when medically appropriate for discharge from acute stay.  Will continue to follow.     General Visit Information:   PT  Visit  PT Received On: 12/16/24  Prior to Session Communication: Bedside  nurse  Patient Position Received: Bed, 3 rail up  Family/Caregiver Present: Yes  Caregiver Feedback: cousin present and supportive     Subjective   Subjective: Pt agreeable with encouragement  Precautions:  Precautions  Medical Precautions: Cardiac precautions, Fall precautions  Post-Surgical Precautions: Move in the Tube    Objective   Pain:  Pain Assessment  Pain Assessment: 0-10  0-10 (Numeric) Pain Score: 3  Pain Type: Acute pain  Pain Location: Chest  Cognition:  Cognition  Overall Cognitive Status: Within Functional Limits  Orientation Level: Oriented X4  Cognition Comments: impulsive  Insight: Mild  Impulsive: Mildly    Dynamic Sitting Balance:  Dynamic Sitting Balance  Dynamic Sitting-Balance Support: Feet supported  Dynamic Sitting-Level of Assistance: Close supervision  Dynamic Sitting-Balance: Lateral lean  Dynamic Sitting-Comments: hygiene tasks, max encouragement for participation d/t self limiting behaviors    PT Treatments:           Bed Mobility 1  Bed Mobility 1: Supine to sitting  Level of Assistance 1: Contact guard  Bed Mobility Comments 1: increased time and effort to complete vc for sequencing  Ambulation/Gait Training 1  Surface 1: Level tile  Device 1: Rolling walker  Assistance 1: Moderate assistance, Maximum verbal cues  Quality of Gait 1: Decreased step length, Forward flexed posture, Wide base of support, Soft knee(s)  Comments/Distance (ft) 1: 16'x2, min-modA LOB lateral, unsteady. Unclear if truly weak or partially self inflicted d/t easy distractability/impulsivity. Vc for upright posture and attention to task  Transfer 1  Transfer From 1: Sit to  Transfer to 1: Stand  Technique 1: Sit to stand, Stand to sit  Transfer Device 1: Walker  Transfer Level of Assistance 1: Minimum assistance, Moderate verbal cues  Trials/Comments 1: x4 vc for seuqnecing and handplacement  Transfers 2  Transfer From 2: Stand to  Transfer to 2: Bed, Toilet  Technique 2: Stand pivot  Transfer Device 2:  Walker  Transfer Level of Assistance 2: Minimum assistance  Trials/Comments 2: x2     Activity tolerance:  Activity Tolerance  Endurance: Tolerates 10 - 20 min exercise with multiple rests    Outcome Measures:  [facility] Basic Mobility  Turning from your back to your side while in a flat bed without using bedrails: A little  Moving from lying on your back to sitting on the side of a flat bed without using bedrails: A little  Moving to and from bed to chair (including a wheelchair): A little  Standing up from a chair using your arms (e.g. wheelchair or bedside chair): A lot  To walk in hospital room: A lot  Climbing 3-5 steps with railing: Total  Basic Mobility - Total Score: 14       Education Documentation  Handouts, taught by Graciela Muñiz PTA at 12/16/2024 12:40 PM.  Learner: Patient  Readiness: Acceptance  Method: Explanation  Response: Needs Reinforcement    Body Mechanics, taught by Graciela Muñiz PTA at 12/16/2024 12:40 PM.  Learner: Patient  Readiness: Acceptance  Method: Explanation  Response: Needs Reinforcement    Precautions, taught by Graciela Muñiz PTA at 12/16/2024 12:40 PM.  Learner: Patient  Readiness: Acceptance  Method: Explanation  Response: Needs Reinforcement    Home Exercise Program, taught by Graciela Muñiz PTA at 12/16/2024 12:40 PM.  Learner: Patient  Readiness: Acceptance  Method: Explanation  Response: Needs Reinforcement    ADL Training, taught by Graciela Muñiz PTA at 12/16/2024 12:40 PM.  Learner: Patient  Readiness: Acceptance  Method: Explanation  Response: Needs Reinforcement    Precautions, taught by Graciela Muñiz PTA at 12/16/2024 12:40 PM.  Learner: Patient  Readiness: Acceptance  Method: Explanation  Response: Needs Reinforcement    Body Mechanics, taught by Graciela Muñiz PTA at 12/16/2024 12:40 PM.  Learner: Patient  Readiness: Acceptance  Method: Explanation  Response: Needs Reinforcement    Mobility Training, taught by Graciela Muñiz PTA at 12/16/2024 12:40 PM.  Learner: Patient  Readiness:  Acceptance  Method: Explanation  Response: Needs Reinforcement    Education Comments  No comments found.          OP EDUCATION:       Encounter Problems       Encounter Problems (Active)       Balance       Pt will demonstrate improved sitting/standing static/dynamic balance activities without LOB via score of 24/28 on the Tinetti for increase in safety prior to DC.  (Progressing)       Start:  12/06/24    Expected End:  12/20/24               Mobility       Pt will ambulate >25ft with appropriate form, Min A or less, LRAD, and no LOB for safe DC.  (Progressing)       Start:  12/06/24    Expected End:  12/20/24            Pt will tolerate >15 minutes of upright standing activity without seated rest breaks with no changes in vital signs for improved functional mobility.  (Progressing)       Start:  12/06/24    Expected End:  12/20/24               PT Transfers       Pt will perform bed mobility with SBA or less and use of LRAD to safely DC.  (Progressing)       Start:  12/06/24    Expected End:  12/20/24            Pt will perform sit<>stand transfers with CGA or less and use of LRAD to safely DC.  (Progressing)       Start:  12/06/24    Expected End:  12/20/24

## 2024-12-17 LAB
ALBUMIN SERPL BCP-MCNC: 3.5 G/DL (ref 3.4–5)
ANION GAP SERPL CALC-SCNC: 17 MMOL/L (ref 10–20)
BUN SERPL-MCNC: 55 MG/DL (ref 6–23)
CALCIUM SERPL-MCNC: 8.6 MG/DL (ref 8.6–10.6)
CHLORIDE SERPL-SCNC: 98 MMOL/L (ref 98–107)
CO2 SERPL-SCNC: 28 MMOL/L (ref 21–32)
CREAT SERPL-MCNC: 2.11 MG/DL (ref 0.5–1.3)
EGFRCR SERPLBLD CKD-EPI 2021: 32 ML/MIN/1.73M*2
ERYTHROCYTE [DISTWIDTH] IN BLOOD BY AUTOMATED COUNT: 15.7 % (ref 11.5–14.5)
GLUCOSE BLD MANUAL STRIP-MCNC: 160 MG/DL (ref 74–99)
GLUCOSE BLD MANUAL STRIP-MCNC: 209 MG/DL (ref 74–99)
GLUCOSE BLD MANUAL STRIP-MCNC: 243 MG/DL (ref 74–99)
GLUCOSE BLD MANUAL STRIP-MCNC: 276 MG/DL (ref 74–99)
GLUCOSE SERPL-MCNC: 212 MG/DL (ref 74–99)
HCT VFR BLD AUTO: 27.8 % (ref 41–52)
HGB BLD-MCNC: 8.7 G/DL (ref 13.5–17.5)
MAGNESIUM SERPL-MCNC: 1.99 MG/DL (ref 1.6–2.4)
MCH RBC QN AUTO: 28.7 PG (ref 26–34)
MCHC RBC AUTO-ENTMCNC: 31.3 G/DL (ref 32–36)
MCV RBC AUTO: 92 FL (ref 80–100)
NRBC BLD-RTO: 0 /100 WBCS (ref 0–0)
PHOSPHATE SERPL-MCNC: 2.6 MG/DL (ref 2.5–4.9)
PLATELET # BLD AUTO: 293 X10*3/UL (ref 150–450)
POTASSIUM SERPL-SCNC: 3.4 MMOL/L (ref 3.5–5.3)
RBC # BLD AUTO: 3.03 X10*6/UL (ref 4.5–5.9)
SODIUM SERPL-SCNC: 140 MMOL/L (ref 136–145)
WBC # BLD AUTO: 17.1 X10*3/UL (ref 4.4–11.3)

## 2024-12-17 PROCEDURE — 2500000004 HC RX 250 GENERAL PHARMACY W/ HCPCS (ALT 636 FOR OP/ED)

## 2024-12-17 PROCEDURE — 2500000001 HC RX 250 WO HCPCS SELF ADMINISTERED DRUGS (ALT 637 FOR MEDICARE OP)

## 2024-12-17 PROCEDURE — 2500000002 HC RX 250 W HCPCS SELF ADMINISTERED DRUGS (ALT 637 FOR MEDICARE OP, ALT 636 FOR OP/ED)

## 2024-12-17 PROCEDURE — 2500000001 HC RX 250 WO HCPCS SELF ADMINISTERED DRUGS (ALT 637 FOR MEDICARE OP): Performed by: STUDENT IN AN ORGANIZED HEALTH CARE EDUCATION/TRAINING PROGRAM

## 2024-12-17 PROCEDURE — 1200000002 HC GENERAL ROOM WITH TELEMETRY DAILY

## 2024-12-17 PROCEDURE — 2500000005 HC RX 250 GENERAL PHARMACY W/O HCPCS

## 2024-12-17 PROCEDURE — 84100 ASSAY OF PHOSPHORUS: CPT | Performed by: NURSE PRACTITIONER

## 2024-12-17 PROCEDURE — 36415 COLL VENOUS BLD VENIPUNCTURE: CPT | Performed by: NURSE PRACTITIONER

## 2024-12-17 PROCEDURE — 83735 ASSAY OF MAGNESIUM: CPT | Performed by: NURSE PRACTITIONER

## 2024-12-17 PROCEDURE — 82947 ASSAY GLUCOSE BLOOD QUANT: CPT

## 2024-12-17 PROCEDURE — 85027 COMPLETE CBC AUTOMATED: CPT | Performed by: NURSE PRACTITIONER

## 2024-12-17 RX ORDER — METOPROLOL TARTRATE 1 MG/ML
5 INJECTION, SOLUTION INTRAVENOUS ONCE
Status: COMPLETED | OUTPATIENT
Start: 2024-12-17 | End: 2024-12-17

## 2024-12-17 RX ORDER — METOPROLOL SUCCINATE 50 MG/1
50 TABLET, EXTENDED RELEASE ORAL 2 TIMES DAILY
Status: DISCONTINUED | OUTPATIENT
Start: 2024-12-17 | End: 2024-12-18 | Stop reason: HOSPADM

## 2024-12-17 RX ORDER — SIMETHICONE 80 MG
40 TABLET,CHEWABLE ORAL 4 TIMES DAILY PRN
Status: DISCONTINUED | OUTPATIENT
Start: 2024-12-17 | End: 2024-12-18 | Stop reason: HOSPADM

## 2024-12-17 RX ORDER — BUMETANIDE 0.25 MG/ML
1 INJECTION, SOLUTION INTRAMUSCULAR; INTRAVENOUS ONCE
Status: COMPLETED | OUTPATIENT
Start: 2024-12-17 | End: 2024-12-17

## 2024-12-17 RX ORDER — METOPROLOL SUCCINATE 25 MG/1
25 TABLET, EXTENDED RELEASE ORAL ONCE
Status: COMPLETED | OUTPATIENT
Start: 2024-12-17 | End: 2024-12-17

## 2024-12-17 RX ORDER — POTASSIUM CHLORIDE 20 MEQ/1
20 TABLET, EXTENDED RELEASE ORAL ONCE
Status: DISCONTINUED | OUTPATIENT
Start: 2024-12-17 | End: 2024-12-17

## 2024-12-17 RX ORDER — POTASSIUM CHLORIDE 20 MEQ/1
20 TABLET, EXTENDED RELEASE ORAL ONCE
Status: COMPLETED | OUTPATIENT
Start: 2024-12-17 | End: 2024-12-17

## 2024-12-17 RX ORDER — BISACODYL 10 MG/1
10 SUPPOSITORY RECTAL DAILY PRN
Status: DISCONTINUED | OUTPATIENT
Start: 2024-12-17 | End: 2024-12-18 | Stop reason: HOSPADM

## 2024-12-17 RX ORDER — METOPROLOL SUCCINATE 25 MG/1
25 TABLET, EXTENDED RELEASE ORAL DAILY
Status: DISCONTINUED | OUTPATIENT
Start: 2024-12-17 | End: 2024-12-17

## 2024-12-17 RX ORDER — POTASSIUM CHLORIDE 1.5 G/1.58G
40 POWDER, FOR SOLUTION ORAL ONCE
Status: COMPLETED | OUTPATIENT
Start: 2024-12-17 | End: 2024-12-17

## 2024-12-17 RX ORDER — AMIODARONE HYDROCHLORIDE 200 MG/1
200 TABLET ORAL DAILY
Status: DISCONTINUED | OUTPATIENT
Start: 2024-12-17 | End: 2024-12-18

## 2024-12-17 ASSESSMENT — PAIN SCALES - GENERAL
PAINLEVEL_OUTOF10: 6
PAINLEVEL_OUTOF10: 9
PAINLEVEL_OUTOF10: 6
PAINLEVEL_OUTOF10: 9

## 2024-12-17 ASSESSMENT — PAIN DESCRIPTION - DESCRIPTORS: DESCRIPTORS: ACHING

## 2024-12-17 ASSESSMENT — PAIN DESCRIPTION - LOCATION: LOCATION: OTHER (COMMENT)

## 2024-12-17 ASSESSMENT — PAIN - FUNCTIONAL ASSESSMENT
PAIN_FUNCTIONAL_ASSESSMENT: UNABLE TO SELF-REPORT
PAIN_FUNCTIONAL_ASSESSMENT: 0-10

## 2024-12-17 ASSESSMENT — PAIN DESCRIPTION - ORIENTATION: ORIENTATION: MID

## 2024-12-17 NOTE — PROGRESS NOTES
Communication Note    Patient Name: Edu Echavarria  MRN: [id number]  Today's Date: 12/17/2024   Room: [address] [place], [place] [address]    Discipline: Occupational Therapy      Missed Visit Reason:  (Per RN, ok to see with Afib. Patient requesting OT return later this date. Patient does not feel comfortable with OOB activity at this time. OT to reattempt.) at 1314.    Second attempt at 1545: Patient declined and reporting he does not want to participate in OOB activity 2/2 continued afib and awaiting suppository.        12/17/24 at 1:36 PM   Colleen Salazar OT   Rehab Office: [phone]

## 2024-12-17 NOTE — PROGRESS NOTES
Spoke to patient and his cousin re SNF choices. [facility] not have a bed. They allowed referrals be sent to area SNF's to see who has bed availability. [facility] is choice. They are requesting OT notes. OT was requested.  Shelby Casanova RN  Addendum: Spoke to patient and family to let them know that [facility] is able to accept. They are agreeable with the facility.

## 2024-12-17 NOTE — PROGRESS NOTES
"CARDIAC SURGERY DAILY PROGRESS NOTE    Edu Echavarria is a 74 y.o. male, with a PMH of HTN, HFrEF, CAD, GERD, and gout, , who presented to [facility] on 11/18/2024 for CABG. Hospital course c/b MARYBETH, worsening hyponatremia causing delay of surgery case, then further complicated by respiratory failure following fluid resuscitation for correction of hyponatremia, and cardiogenic shock necessitating IABP. Pt is now s/p removal of IABP and CABG x4 w/ LAAL w. Dr. Villa    OPERATION/PROCEDURE with Sunil Villa MD on 12/4/24  1. Coronary artery bypass grafting x 4: In situ left internal mammary artery to left anterior descending coronary artery bypass. Aorto to right posterior descending saphenous vein coronary artery bypass. Aorto to first marginal circumflex saphenous vein coronary artery bypass. Abarca of vein graft to first diagonal saphenous vein coronary artery bypass.  2. Left atrial appendage ligation with AtriCure ACHV 35 mm clip ([id number]).   3. Ligation, Ligament of Mau.  4. Left posterior pericardial window creation.  5. Cannulation and initiation of cardiopulmonary oxygenator.   6. Medistim flow probe analysis of bypass grafts.   7. Endoscopic harvest of right saphenous vein.    CTICU Course: LIJ placed and Bumex drip initiated 2/2 hypervolemia. No indication for dialysis at this time per nephrology and they have since signed off. Optimal care is made challenging by pt's refusal of PO medications. Dr. Villa aware of the situation.    Transferred to T3: on 12/13/2024    Interval History:   No acute events reported overnight     SUBJECTIVE:  Patient seen and examined today. Tele showed SR 80's with PVC's <-> 's. Reviewed imaging and labs. Evaluated by PT yesterday, will go to SNF tomorrow     Objective   /81 (BP Location: Right arm, Patient Position: Lying)   Pulse (!) 111   Temp 37.3 °C (99.1 °F) (Temporal)   Resp 19   Ht 1.702 m (5' 7\")   Wt 85.3 kg (188 lb)   " SpO2 96%   BMI 29.44 kg/m²   0-10 (Numeric) Pain Score: 6   3 Day Weight Change: 1.527 kg (3 lb 5.9 oz) per day    Intake and Output    Intake/Output Summary (Last 24 hours) at 12/17/2024 1624  Last data filed at 12/17/2024 1346  Gross per 24 hour   Intake 1010 ml   Output 700 ml   Net 310 ml       Physical Exam  Physical Exam  Constitutional:       Appearance: Normal appearance.   HENT:      Head: Atraumatic.      Nose: Nose normal.      Mouth/Throat:      Mouth: Mucous membranes are moist.   Eyes:      Conjunctiva/sclera: Conjunctivae normal.   Cardiovascular:      Rate and Rhythm: Normal rate. Rhythm irregular.      Comments: SR 80's <-> 's  Pulmonary:      Effort: Pulmonary effort is normal.      Breath sounds: Normal breath sounds.   Abdominal:      General: Bowel sounds are normal.      Palpations: Abdomen is soft.   Genitourinary:     Comments: voiding  Musculoskeletal:         General: Normal range of motion.      Cervical back: Neck supple.      Right lower leg: Edema present.      Left lower leg: Edema present.   Skin:     General: Skin is warm and dry.      Capillary Refill: Capillary refill takes less than 2 seconds.      Comments: Mid sternum incision well approximated, no drainage, no erythema. Right SVG sites intact, no erythema, no drainage   Neurological:      Mental Status: He is alert and oriented to person, place, and time. Mental status is at baseline.   Psychiatric:         Mood and Affect: Mood normal.     Medications  Scheduled medications  acetaminophen, 650 mg, oral, q6h  amiodarone, 200 mg, oral, Daily  apixaban, 5 mg, oral, q12h  aspirin, 81 mg, oral, Daily  atorvastatin, 80 mg, oral, Nightly  insulin lispro, 0-10 Units, subcutaneous, TID AC  lidocaine, 1 patch, transdermal, Daily  magnesium hydroxide, 30 mL, oral, Daily  metoprolol succinate XL, 50 mg, oral, BID  pantoprazole, 40 mg, intravenous, BID  polyethylene glycol, 17 g, oral, BID  sennosides-docusate sodium, 2 tablet,  oral, BID  sevelamer carbonate, 800 mg, oral, TID    Continuous medications   PRN medications  PRN medications: oxyCODONE, oxyCODONE, simethicone    Labs  Results for orders placed or performed during the hospital encounter of 11/18/24 (from the past 24 hours)   Renal Function Panel   Result Value Ref Range    Glucose 212 (H) 74 - 99 mg/dL    Sodium 140 136 - 145 mmol/L    Potassium 3.4 (L) 3.5 - 5.3 mmol/L    Chloride 98 98 - 107 mmol/L    Bicarbonate 28 21 - 32 mmol/L    Anion Gap 17 10 - 20 mmol/L    Urea Nitrogen 55 (H) 6 - 23 mg/dL    Creatinine 2.11 (H) 0.50 - 1.30 mg/dL    eGFR 32 (L) >60 mL/min/1.73m*2    Calcium 8.6 8.6 - 10.6 mg/dL    Phosphorus 2.6 2.5 - 4.9 mg/dL    Albumin 3.5 3.4 - 5.0 g/dL   Magnesium   Result Value Ref Range    Magnesium 1.99 1.60 - 2.40 mg/dL   CBC   Result Value Ref Range    WBC 17.1 (H) 4.4 - 11.3 x10*3/uL    nRBC 0.0 0.0 - 0.0 /100 WBCs    RBC 3.03 (L) 4.50 - 5.90 x10*6/uL    Hemoglobin 8.7 (L) 13.5 - 17.5 g/dL    Hematocrit 27.8 (L) 41.0 - 52.0 %    MCV 92 80 - 100 fL    MCH 28.7 26.0 - 34.0 pg    MCHC 31.3 (L) 32.0 - 36.0 g/dL    RDW 15.7 (H) 11.5 - 14.5 %    Platelets 293 150 - 450 x10*3/uL   POCT GLUCOSE   Result Value Ref Range    POCT Glucose 243 (H) 74 - 99 mg/dL   POCT GLUCOSE   Result Value Ref Range    POCT Glucose 276 (H) 74 - 99 mg/dL     Imaging    12/16/24 XR chest 2 views 12/16/2024  Impression  1.  Slight improvement in basilar aeration with residual  postoperative atelectasis and effusion. No pneumothorax.    12/9/24 Transthoracic Echo (TTE) Complete With Contrast   CONCLUSIONS:  1. The left ventricular systolic function is moderately decreased, with a visually estimated ejection fraction of 30-35%.  2. There is global hypokinesis of the left ventricle with minor regional variations.  3. Spectral Doppler shows a Grade II (pseudonormal pattern) of left ventricular diastolic filling with an elevated left atrial pressure.  4. Abnormal septal motion consistent with  post-operative status.  5. There is no evidence of left ventricular hypertrophy.  6. There is normal right ventricular global systolic function.  7. The left atrium is moderately dilated.    12/3/24 Transthoracic Echo (TTE) Limited With Contrast   CONCLUSIONS:  1. Multiple segmental abnormalities exist. See findings.  2. Poorly visualized anatomical structures due to suboptimal image quality.  3. Left ventricular ejection fraction is severely decreased, by visual estimate at 25-30%.  4. There are multiple left ventricular wall motion abnormalities.  5. Left ventricular cavity size is moderate to severely dilated.  6. No left ventricular thrombus visualized.  7. There is normal right ventricular global systolic function.  8. Compared with the prior exam from 11/29/2024, the LV systoic function does appear to be somewhat more dynamic, though still severely reduced. The LV cavity size could not be measured on the prior exam, and today's indexed end disatolic volume is consistent with moderate to severely dilated cavity.    IMPRESSION & PLAN:  POD # 13 s/p removal of IABP and CABG x4 w/ LAAL w. Dr. Villa on 12/4  - Increase activity/ ambulation; PT/OT (PT eval today 12/16)  - Encourage IS, C/DB; respiratory therapy; wean O2 as joni - on RA  - Cardiac rehab referral   - Continue cardiac meds: ASA, BB, statin   - Pain and anticonstipation meds  - 2v CXR completed 12/16  - Postop echo 12/9 showed EF 30-35%  - CUTepicardial wires prior to discharge   - Tele until discharge  - Optimize nutrition and electrolytes  - 12/17 Stop plavix today (secondary to starting DOAC for AF today)    Rhythm  - Tele: SR 80's <-> 's  - Continue BB, Plavix. Discussion was had with the patient this morning about the importance of taking his meds PO.   -12/15 increased Metoprolol to 37.5 BID, uptitrate as needed  -12/17 Due to the frequent episodes of AF, will stop plavix and start Eliquis (Ok per Dr Villa, Eliquis and ASA only)  -12/17  Start amiodarone 200 mg every day to try to restore SR given low EF, case discussed with Dr. Villa   -12/17 Toprol increased to 50 mg bid  - Adjust medications as tolerated    Acute Blood Loss Anemia - Stable  Recent Labs     12/17/24  0652 12/16/24  0738 12/15/24  0725 12/14/24  0549 12/13/24  0257 12/12/24  1248 12/12/24  0021   HGB 8.7* 8.4* 8.4* 8.3* 8.1* 8.4* 8.2*   HCT 27.8* 26.6* 26.5* 25.8* 23.6* 23.8* 23.0*   - MV, PO Iron x1mo  - Daily labs, transfuse as indicated    Thrombocytopenia - Resolved  Recent Labs     12/17/24  0652 12/16/24  0738 12/15/24  0725 12/14/24  0549 12/13/24  0257 12/12/24  1248 12/12/24  0021    271 271 276 283 305 290   - Etiology likely postop/CPB related  - Continue to trend with daily CBCs    Volume/Electrolyte Status: Preop wt Weight: 82.6 kg (182 lb)   Vitals:    12/17/24 0536   Weight: 85.3 kg (188 lb)     - Daily weights and strict I&Os  - Daily RFP while admitted  - Weight: Pre-op 89kg, 12/17 85.3kg  - Adjust diuresis as needed for postop cardiac surgery hypervolemia  - Replete electrolytes for hypokalemia/hypomagnesemia/hypophosphatemia as needed replaced K 12/15 and 12/17  -gave 2 mg IV Bumex on 12/15  -12/17 Bumex 1 mg IV x1 dose today    Cardiomyopathy  - Likely ischemic in nature, patient was surgically revascularized on 12/4   - 12/16 Change metoprolol tartrate 37.5 bid to toprol 25 mg bid  - 12/16 Consider HF consult to evaluate need for LifeVest and GDMT given the renal function  - 12/16 Renal function prohibiting start of ARNI/SGLT2-I/MRA, will reassess once renal function improves     GERD  - PRN Zofran and Phenergan for nausea (Qtc 450)  - Continue PPI     Gout  - Restart home Allopurinol (Monitor renal function)    MARYBETH  - Bumex gtts discontinued 12/14, bumex IV PRN- gave 2 mg IV on 12/15  - RFP daily  - 12/16 Likely a component of cardio renal syndrome, renal function slowly improving. Will reassess need for IV diuresis in AM  - 12/17 Scr 2.11 today  improving    Acute on chronic hyponatremia: 2/2 decreased PO intake and diarrhea, now resolved  Sodium  136 - 145 mmol/L 139   - RFP daily    VTE Prophylaxis: SCDs/TEDs, ambulation, SQ heparin  Code Status: Full Code    Dispo  - PT/OT recs: Moderate intensity   - Would benefit from homecare for cardiac surgery carepath and RN visits  - Anticipate discharge in AM to SNF  - Will continue to assess discharge needs    Myrna Williamson, APRN-CNP, CA-3/PGY-IV, MBS  Cardiac Surgery [facility]  [facility]  Team Phone [phone]    12/17/2024  4:24 PM

## 2024-12-17 NOTE — PROGRESS NOTES
CARDIAC SURGERY DAILY PROGRESS NOTE    Edu Echavarria is a 74 y.o. male, with a PMH of HTN, HFrEF, CAD, GERD, and gout, , who presented to [facility] on 11/18/2024 for CABG. Hospital course c/b MARYBETH, worsening hyponatremia causing delay of surgery case, then further complicated by respiratory failure following fluid resuscitation for correction of hyponatremia, and cardiogenic shock necessitating IABP. Pt is now s/p removal of IABP and CABG x4 w/ LAAL w. Dr. Villa    OPERATION/PROCEDURE with Sunil Villa MD on 12/4/24  1. Coronary artery bypass grafting x 4: In situ left internal mammary artery to left anterior descending coronary artery bypass. Aorto to right posterior descending saphenous vein coronary artery bypass. Aorto to first marginal circumflex saphenous vein coronary artery bypass. Abarca of vein graft to first diagonal saphenous vein coronary artery bypass.  2. Left atrial appendage ligation with AtriCure ACHV 35 mm clip ([id number]).   3. Ligation, Ligament of Mau.  4. Left posterior pericardial window creation.  5. Cannulation and initiation of cardiopulmonary oxygenator.   6. Medistim flow probe analysis of bypass grafts.   7. Endoscopic harvest of right saphenous vein.    CTICU Course: LIJ placed and Bumex drip initiated 2/2 hypervolemia. No indication for dialysis at this time per nephrology and they have since signed off. Optimal care is made challenging by pt's refusal of PO medications. Dr. Villa aware of the situation.    Transferred to T3: on 12/13/2024    Interval History:   No acute events reported overnight     SUBJECTIVE:  Patient seen and examined today. Patient has politely been declining to participate in his care including PT. Reviewed current labs and imaging. Patient agrees to participate in his care with plans for PT eval today to assess need for possible rehab vs SNF.    Objective   /66 (BP Location: Right arm, Patient Position: Lying)   Pulse 90    "Temp 36.4 °C (97.5 °F) (Temporal)   Resp 16   Ht 1.702 m (5' 7\")   Wt 84.8 kg (187 lb)   SpO2 96%   BMI 29.29 kg/m²   0-10 (Numeric) Pain Score: 3   3 Day Weight Change: Unable to Calculate    Intake and Output    Intake/Output Summary (Last 24 hours) at 12/16/2024 2000  Last data filed at 12/16/2024 1448  Gross per 24 hour   Intake 1500 ml   Output 1000 ml   Net 500 ml       Physical Exam  Physical Exam  Constitutional:       Appearance: Normal appearance.   HENT:      Head: Atraumatic.      Nose: Nose normal.      Mouth/Throat:      Mouth: Mucous membranes are moist.   Eyes:      Conjunctiva/sclera: Conjunctivae normal.   Cardiovascular:      Rate and Rhythm: Normal rate and regular rhythm.      Comments: Currently SR, has been in AF previously   Pulmonary:      Effort: Pulmonary effort is normal.      Breath sounds: Normal breath sounds.   Abdominal:      General: Bowel sounds are normal.      Palpations: Abdomen is soft.   Genitourinary:     Comments: voiding  Musculoskeletal:         General: Normal range of motion.      Cervical back: Neck supple.   Skin:     General: Skin is warm and dry.      Capillary Refill: Capillary refill takes less than 2 seconds.      Comments: Mid sternum incision well approximated, no drainage, no erythema. SVG sites intact, no erythema, no drainage   Neurological:      Mental Status: He is alert and oriented to person, place, and time. Mental status is at baseline.   Psychiatric:         Mood and Affect: Mood normal.     Medications  Scheduled medications  acetaminophen, 650 mg, oral, q6h  aspirin, 81 mg, oral, Daily  atorvastatin, 80 mg, oral, Nightly  clopidogrel, 75 mg, oral, Daily  heparin, 5,000 Units, subcutaneous, q8h  insulin lispro, 0-10 Units, subcutaneous, TID AC  lidocaine, 1 patch, transdermal, Daily  magnesium hydroxide, 30 mL, oral, Daily  metoprolol succinate XL, 25 mg, oral, BID  pantoprazole, 40 mg, intravenous, BID  polyethylene glycol, 17 g, oral, " BID  sennosides-docusate sodium, 2 tablet, oral, BID  sevelamer carbonate, 800 mg, oral, TID    Continuous medications   PRN medications  PRN medications: oxyCODONE, oxyCODONE    Labs  Results for orders placed or performed during the hospital encounter of 11/18/24 (from the past 24 hours)   POCT GLUCOSE   Result Value Ref Range    POCT Glucose 177 (H) 74 - 99 mg/dL   Renal Function Panel   Result Value Ref Range    Glucose 226 (H) 74 - 99 mg/dL    Sodium 140 136 - 145 mmol/L    Potassium 3.5 3.5 - 5.3 mmol/L    Chloride 98 98 - 107 mmol/L    Bicarbonate 29 21 - 32 mmol/L    Anion Gap 17 10 - 20 mmol/L    Urea Nitrogen 57 (H) 6 - 23 mg/dL    Creatinine 2.44 (H) 0.50 - 1.30 mg/dL    eGFR 27 (L) >60 mL/min/1.73m*2    Calcium 8.4 (L) 8.6 - 10.6 mg/dL    Phosphorus 2.7 2.5 - 4.9 mg/dL    Albumin 3.2 (L) 3.4 - 5.0 g/dL   Magnesium   Result Value Ref Range    Magnesium 2.01 1.60 - 2.40 mg/dL   CBC   Result Value Ref Range    WBC 18.6 (H) 4.4 - 11.3 x10*3/uL    nRBC 0.0 0.0 - 0.0 /100 WBCs    RBC 2.91 (L) 4.50 - 5.90 x10*6/uL    Hemoglobin 8.4 (L) 13.5 - 17.5 g/dL    Hematocrit 26.6 (L) 41.0 - 52.0 %    MCV 91 80 - 100 fL    MCH 28.9 26.0 - 34.0 pg    MCHC 31.6 (L) 32.0 - 36.0 g/dL    RDW 15.6 (H) 11.5 - 14.5 %    Platelets 271 150 - 450 x10*3/uL   POCT GLUCOSE   Result Value Ref Range    POCT Glucose 224 (H) 74 - 99 mg/dL   POCT GLUCOSE   Result Value Ref Range    POCT Glucose 215 (H) 74 - 99 mg/dL   POCT GLUCOSE   Result Value Ref Range    POCT Glucose 193 (H) 74 - 99 mg/dL     Imaging    12/16/24 XR chest 2 views 12/16/2024  Impression  1.  Slight improvement in basilar aeration with residual  postoperative atelectasis and effusion. No pneumothorax.    12/9/24 Transthoracic Echo (TTE) Complete With Contrast   CONCLUSIONS:  1. The left ventricular systolic function is moderately decreased, with a visually estimated ejection fraction of 30-35%.  2. There is global hypokinesis of the left ventricle with minor regional  variations.  3. Spectral Doppler shows a Grade II (pseudonormal pattern) of left ventricular diastolic filling with an elevated left atrial pressure.  4. Abnormal septal motion consistent with post-operative status.  5. There is no evidence of left ventricular hypertrophy.  6. There is normal right ventricular global systolic function.  7. The left atrium is moderately dilated.    12/3/24 Transthoracic Echo (TTE) Limited With Contrast   CONCLUSIONS:  1. Multiple segmental abnormalities exist. See findings.  2. Poorly visualized anatomical structures due to suboptimal image quality.  3. Left ventricular ejection fraction is severely decreased, by visual estimate at 25-30%.  4. There are multiple left ventricular wall motion abnormalities.  5. Left ventricular cavity size is moderate to severely dilated.  6. No left ventricular thrombus visualized.  7. There is normal right ventricular global systolic function.  8. Compared with the prior exam from 11/29/2024, the LV systoic function does appear to be somewhat more dynamic, though still severely reduced. The LV cavity size could not be measured on the prior exam, and today's indexed end disatolic volume is consistent with moderate to severely dilated cavity.      IMPRESSION & PLAN:  POD # 12 s/p removal of IABP and CABG x4 w/ LAAL wMarisa Villa on 12/4  - Increase activity/ ambulation; PT/OT (PT eval today 12/16)  - Encourage IS, C/DB; respiratory therapy; wean O2 as joni - on RA  - Cardiac rehab referral   - Continue cardiac meds: ASA, BB, statin   - continue plavix x1 year for NSTEMI   - Pain and anticonstipation meds  - 2v CXR completed 12/16  - Postop echo 12/9 showed EF 30-35%  - CUTepicardial wires prior to discharge   - Tele until discharge  - Optimize nutrition and electrolytes    Rhythm  - Tele: Currently SR, however, patient has been on rate Controlled Afib, Hr  - Continue BB, Plavix. Discussion was had with the patient this morning about the importance of  taking his meds PO.   -12/15 increased Metoprolol to 37.5 BID, uptitrate as needed  - Adjust medications as tolerated    Acute Blood Loss Anemia - Stable  Recent Labs     12/16/24  0738 12/15/24  0725 12/14/24  0549 12/13/24  0257 12/12/24  1248 12/12/24  0021 12/11/24  1203   HGB 8.4* 8.4* 8.3* 8.1* 8.4* 8.2* 8.9*   HCT 26.6* 26.5* 25.8* 23.6* 23.8* 23.0* 25.1*   - MV, PO Iron x1mo  - Daily labs, transfuse as indicated    Thrombocytopenia - Resolved  Recent Labs     12/16/24  0738 12/15/24  0725 12/14/24  0549 12/13/24  0257 12/12/24  1248 12/12/24  0021 12/11/24  1203    271 276 283 305 290 351   - Etiology likely postop/CPB related  - Continue to trend with daily CBCs    Volume/Electrolyte Status: Preop wt Weight: 82.6 kg (182 lb)   Vitals:    12/16/24 0500   Weight: 84.8 kg (187 lb)     - Daily weights and strict I&Os  - Daily RFP while admitted  - Weight: Pre-op 89kg, 12/16 weight 89kg  - Adjust diuresis as needed for postop cardiac surgery hypervolemia  - Replete electrolytes for hypokalemia/hypomagnesemia/hypophosphatemia as needed replaced K 12/15  -gave 2 mg IV Bumex on 12/15    Cardiomyopathy  - Likely ischemic in nature, patient was surgically revascularized on 12/4   - 12/16 Change metoprolol tartrate 37.5 bid to toprol 25 mg bid  - 12/16 Consider HF consult to evaluate need for LifeVest and GDMT given the renal function  - 12/16 Renal function prohibiting start of ARNI/SGLT2-I/MRA, will reassess once renal function improves     GERD  - PRN Zofran and Phenergan for nausea (Qtc 450)  - Continue PPI     Gout  - Restart home Allopurinol (Monitor renal function)    MARYBETH  - Bumex gtts discontinued 12/14, bumex IV PRN- gave 2 mg IV on 12/15  - RFP daily  - 12/16 Likely a component of cardio renal syndrome, renal function slowly improving. Will reassess need for IV diuresis in AM    Acute on chronic hyponatremia: 2/2 decreased PO intake and diarrhea, now resolved  Sodium  136 - 145 mmol/L 139   - RFP  daily    VTE Prophylaxis: SCDs/TEDs, ambulation, SQ heparin  Code Status: Full Code    Dispo  - PT/OT recs: Moderate intensity   - Would benefit from homecare for cardiac surgery carepath and RN visits  - Anticipate discharge 3-4 days, pending renal function improvement and medication titration  - Will continue to assess discharge needs    YOSHI Cuevas-CNP, CA-3/PGY-IV, MBS  Cardiac Surgery [facility]  [facility]  Team Phone [phone]    12/16/2024  8:00 PM

## 2024-12-17 NOTE — DISCHARGE INSTRUCTIONS
Don't forget to “Keep Your Move in the Tube!!”     Please refer to the “Move in the Tube” handout.  -- Load bearing activities can be completed if you are “staying in the tube.” If you are attempting load bearing activities, let pain be your guide with when trying an activity “out of the tube”. If an activity hurts or is uncomfortable go back to doing it while you stay “in the tube”. There is no time limit to “stay in the tube”.     -- Non-load bearing activities, which are your activities of daily living, can be completed with your arms “out of the tube” as long as you remain pain free. Some activities of daily living examples are dressing, personal care, showering, washing hair, and toilet hygiene.     Don't forget to KEEP YOUR MOVE IN THE TUBE and think of a LAURIE sandoval!      For any questions about your discharge, please call the cardiac surgery office at [phone]

## 2024-12-18 VITALS
TEMPERATURE: 98.2 F | HEIGHT: 67 IN | HEART RATE: 85 BPM | WEIGHT: 187.6 LBS | DIASTOLIC BLOOD PRESSURE: 73 MMHG | RESPIRATION RATE: 18 BRPM | OXYGEN SATURATION: 95 % | BODY MASS INDEX: 29.44 KG/M2 | SYSTOLIC BLOOD PRESSURE: 128 MMHG

## 2024-12-18 PROBLEM — R07.9 CHEST PAIN: Status: RESOLVED | Noted: 2024-11-22 | Resolved: 2024-12-18

## 2024-12-18 PROBLEM — I21.3 STEMI (ST ELEVATION MYOCARDIAL INFARCTION) (MULTI): Status: RESOLVED | Noted: 2024-11-18 | Resolved: 2024-12-18

## 2024-12-18 PROBLEM — Z95.1 S/P CABG X 4: Status: ACTIVE | Noted: 2024-12-18

## 2024-12-18 PROBLEM — R57.9 SHOCK (MULTI): Status: RESOLVED | Noted: 2024-11-29 | Resolved: 2024-12-18

## 2024-12-18 LAB
ALBUMIN SERPL BCP-MCNC: 3.4 G/DL (ref 3.4–5)
ANION GAP SERPL CALC-SCNC: 18 MMOL/L (ref 10–20)
BUN SERPL-MCNC: 44 MG/DL (ref 6–23)
CALCIUM SERPL-MCNC: 8.7 MG/DL (ref 8.6–10.6)
CHLORIDE SERPL-SCNC: 98 MMOL/L (ref 98–107)
CO2 SERPL-SCNC: 29 MMOL/L (ref 21–32)
CREAT SERPL-MCNC: 1.84 MG/DL (ref 0.5–1.3)
EGFRCR SERPLBLD CKD-EPI 2021: 38 ML/MIN/1.73M*2
ERYTHROCYTE [DISTWIDTH] IN BLOOD BY AUTOMATED COUNT: 15.5 % (ref 11.5–14.5)
GLUCOSE BLD MANUAL STRIP-MCNC: 186 MG/DL (ref 74–99)
GLUCOSE SERPL-MCNC: 200 MG/DL (ref 74–99)
HCT VFR BLD AUTO: 28.7 % (ref 41–52)
HGB BLD-MCNC: 8.8 G/DL (ref 13.5–17.5)
MAGNESIUM SERPL-MCNC: 2.03 MG/DL (ref 1.6–2.4)
MCH RBC QN AUTO: 28.4 PG (ref 26–34)
MCHC RBC AUTO-ENTMCNC: 30.7 G/DL (ref 32–36)
MCV RBC AUTO: 93 FL (ref 80–100)
NRBC BLD-RTO: 0 /100 WBCS (ref 0–0)
PHOSPHATE SERPL-MCNC: 2.8 MG/DL (ref 2.5–4.9)
PLATELET # BLD AUTO: 299 X10*3/UL (ref 150–450)
POTASSIUM SERPL-SCNC: 3.9 MMOL/L (ref 3.5–5.3)
RBC # BLD AUTO: 3.1 X10*6/UL (ref 4.5–5.9)
SODIUM SERPL-SCNC: 141 MMOL/L (ref 136–145)
WBC # BLD AUTO: 17.8 X10*3/UL (ref 4.4–11.3)

## 2024-12-18 PROCEDURE — 2500000001 HC RX 250 WO HCPCS SELF ADMINISTERED DRUGS (ALT 637 FOR MEDICARE OP)

## 2024-12-18 PROCEDURE — 36415 COLL VENOUS BLD VENIPUNCTURE: CPT | Performed by: NURSE PRACTITIONER

## 2024-12-18 PROCEDURE — 2500000001 HC RX 250 WO HCPCS SELF ADMINISTERED DRUGS (ALT 637 FOR MEDICARE OP): Performed by: STUDENT IN AN ORGANIZED HEALTH CARE EDUCATION/TRAINING PROGRAM

## 2024-12-18 PROCEDURE — 85027 COMPLETE CBC AUTOMATED: CPT | Performed by: NURSE PRACTITIONER

## 2024-12-18 PROCEDURE — 2500000005 HC RX 250 GENERAL PHARMACY W/O HCPCS

## 2024-12-18 PROCEDURE — 82947 ASSAY GLUCOSE BLOOD QUANT: CPT

## 2024-12-18 PROCEDURE — 80069 RENAL FUNCTION PANEL: CPT | Performed by: NURSE PRACTITIONER

## 2024-12-18 PROCEDURE — 2500000004 HC RX 250 GENERAL PHARMACY W/ HCPCS (ALT 636 FOR OP/ED)

## 2024-12-18 PROCEDURE — 83735 ASSAY OF MAGNESIUM: CPT | Performed by: NURSE PRACTITIONER

## 2024-12-18 RX ORDER — SIMETHICONE 80 MG
40 TABLET,CHEWABLE ORAL 4 TIMES DAILY PRN
Start: 2024-12-18

## 2024-12-18 RX ORDER — ADHESIVE BANDAGE
30 BANDAGE TOPICAL DAILY
Start: 2024-12-19

## 2024-12-18 RX ORDER — METOPROLOL SUCCINATE 50 MG/1
50 TABLET, EXTENDED RELEASE ORAL 2 TIMES DAILY
Start: 2024-12-18

## 2024-12-18 RX ORDER — OXYCODONE HYDROCHLORIDE 5 MG/1
5 TABLET ORAL EVERY 6 HOURS PRN
Qty: 8 TABLET | Refills: 0 | Status: SHIPPED | OUTPATIENT
Start: 2024-12-18 | End: 2024-12-20

## 2024-12-18 RX ORDER — ACETAMINOPHEN 160 MG/5ML
650 SOLUTION ORAL EVERY 6 HOURS
Start: 2024-12-18

## 2024-12-18 RX ORDER — AMOXICILLIN 250 MG
2 CAPSULE ORAL 2 TIMES DAILY
Start: 2024-12-18

## 2024-12-18 RX ORDER — POLYETHYLENE GLYCOL 3350 17 G/17G
17 POWDER, FOR SOLUTION ORAL 2 TIMES DAILY
Start: 2024-12-18

## 2024-12-18 RX ORDER — SEVELAMER CARBONATE 800 MG/1
800 TABLET, FILM COATED ORAL
Start: 2024-12-18

## 2024-12-18 RX ORDER — PANTOPRAZOLE SODIUM 40 MG/1
40 TABLET, DELAYED RELEASE ORAL
Status: DISCONTINUED | OUTPATIENT
Start: 2024-12-19 | End: 2024-12-18 | Stop reason: HOSPADM

## 2024-12-18 RX ORDER — INSULIN LISPRO 100 [IU]/ML
0-10 INJECTION, SOLUTION INTRAVENOUS; SUBCUTANEOUS
Start: 2024-12-18

## 2024-12-18 RX ORDER — ASPIRIN 81 MG/1
81 TABLET ORAL DAILY
Status: DISCONTINUED | OUTPATIENT
Start: 2024-12-19 | End: 2024-12-18 | Stop reason: HOSPADM

## 2024-12-18 RX ORDER — ATORVASTATIN CALCIUM 80 MG/1
80 TABLET, FILM COATED ORAL NIGHTLY
Start: 2024-12-18

## 2024-12-18 RX ORDER — PANTOPRAZOLE SODIUM 40 MG/1
40 TABLET, DELAYED RELEASE ORAL
Start: 2024-12-19

## 2024-12-18 RX ORDER — BISACODYL 10 MG/1
10 SUPPOSITORY RECTAL DAILY PRN
Start: 2024-12-18

## 2024-12-18 RX ORDER — ASPIRIN 81 MG/1
81 TABLET ORAL DAILY
Start: 2024-12-19

## 2024-12-18 RX ORDER — LIDOCAINE 560 MG/1
1 PATCH PERCUTANEOUS; TOPICAL; TRANSDERMAL DAILY
Start: 2024-12-19

## 2024-12-18 RX ORDER — ONDANSETRON HYDROCHLORIDE 2 MG/ML
4 INJECTION, SOLUTION INTRAVENOUS EVERY 8 HOURS PRN
Status: DISCONTINUED | OUTPATIENT
Start: 2024-12-18 | End: 2024-12-18

## 2024-12-18 RX ORDER — ONDANSETRON 4 MG/1
4 TABLET, FILM COATED ORAL EVERY 8 HOURS PRN
Status: DISCONTINUED | OUTPATIENT
Start: 2024-12-18 | End: 2024-12-18

## 2024-12-18 ASSESSMENT — COGNITIVE AND FUNCTIONAL STATUS - GENERAL
MOVING TO AND FROM BED TO CHAIR: A LITTLE
TURNING FROM BACK TO SIDE WHILE IN FLAT BAD: A LITTLE
DAILY ACTIVITIY SCORE: 16
EATING MEALS: A LITTLE
TOILETING: A LITTLE
DRESSING REGULAR LOWER BODY CLOTHING: A LITTLE
MOBILITY SCORE: 14
PERSONAL GROOMING: A LITTLE
STANDING UP FROM CHAIR USING ARMS: A LOT
CLIMB 3 TO 5 STEPS WITH RAILING: TOTAL
MOVING FROM LYING ON BACK TO SITTING ON SIDE OF FLAT BED WITH BEDRAILS: A LITTLE
WALKING IN HOSPITAL ROOM: A LOT
HELP NEEDED FOR BATHING: A LOT
DRESSING REGULAR UPPER BODY CLOTHING: A LOT

## 2024-12-18 ASSESSMENT — PAIN SCALES - GENERAL
PAINLEVEL_OUTOF10: 8
PAINLEVEL_OUTOF10: 8
PAINLEVEL_OUTOF10: 10 - WORST POSSIBLE PAIN

## 2024-12-18 ASSESSMENT — PAIN DESCRIPTION - ORIENTATION: ORIENTATION: MID

## 2024-12-18 ASSESSMENT — PAIN - FUNCTIONAL ASSESSMENT
PAIN_FUNCTIONAL_ASSESSMENT: 0-10
PAIN_FUNCTIONAL_ASSESSMENT: 0-10

## 2024-12-18 ASSESSMENT — PAIN DESCRIPTION - LOCATION: LOCATION: INCISION

## 2024-12-18 ASSESSMENT — PAIN SCALES - PAIN ASSESSMENT IN ADVANCED DEMENTIA (PAINAD): TOTALSCORE: MEDICATION (SEE MAR)

## 2024-12-18 NOTE — PROGRESS NOTES
CARDIAC SURGERY DAILY PROGRESS NOTE    Edu Echavarria is a 74 y.o. male, with a PMH of HTN, HFrEF, CAD, GERD, and gout, , who presented to [facility] on 11/18/2024 for CABG. Hospital course c/b MARYBETH, worsening hyponatremia causing delay of surgery case, then further complicated by respiratory failure following fluid resuscitation for correction of hyponatremia, and cardiogenic shock necessitating IABP. Pt is now s/p removal of IABP and CABG x4 w/ LAAL w. Dr. Villa    OPERATION/PROCEDURE with Sunil Villa MD on 12/4/24  1. Coronary artery bypass grafting x 4: In situ left internal mammary artery to left anterior descending coronary artery bypass. Aorto to right posterior descending saphenous vein coronary artery bypass. Aorto to first marginal circumflex saphenous vein coronary artery bypass. Abarca of vein graft to first diagonal saphenous vein coronary artery bypass.  2. Left atrial appendage ligation with AtriCure ACHV 35 mm clip ([id number]).   3. Ligation, Ligament of Mau.  4. Left posterior pericardial window creation.  5. Cannulation and initiation of cardiopulmonary oxygenator.   6. Medist[employer] flow probe analysis of bypass grafts.   7. Endoscopic harvest of right saphenous vein.    CTICU Course: LIJ placed and Bumex drip initiated 2/2 hypervolemia. No indication for dialysis at this time per nephrology and they have since signed off. Optimal care is made challenging by pt's refusal of PO medications. Dr. Villa aware of the situation.    Transferred to T3: on 12/13/2024    Interval History:   Refused amiodarone and Eliquis. Requesting to be a DNR    SUBJECTIVE:  Patient seen and examined today. Tele showed SR 80's with PVC's <-> 's. Reviewed imaging and labs. Evaluated by PT yesterday, SNF was recommended. Patient requested a DNR status before going to SNF and verbally agreed to take ASA, statin, Eliquis and Metoprolol. Dr Freeman discussed case with patient.    Objective   BP  "128/73 (BP Location: Right arm, Patient Position: Lying)   Pulse 85   Temp 36.8 °C (98.2 °F) (Temporal)   Resp 18   Ht 1.702 m (5' 7\")   Wt 85.1 kg (187 lb 9.6 oz)   SpO2 95%   BMI 29.38 kg/m²   0-10 (Numeric) Pain Score: 8   3 Day Weight Change: 0.332 kg (11.7 oz) per day    Intake and Output    Intake/Output Summary (Last 24 hours) at 12/18/2024 1011  Last data filed at 12/18/2024 0952  Gross per 24 hour   Intake 1170 ml   Output 1100 ml   Net 70 ml       Physical Exam  Physical Exam  Constitutional:       Appearance: Normal appearance.   HENT:      Head: Atraumatic.      Nose: Nose normal.      Mouth/Throat:      Mouth: Mucous membranes are moist.   Eyes:      Conjunctiva/sclera: Conjunctivae normal.   Cardiovascular:      Rate and Rhythm: Normal rate. Rhythm irregular.      Comments: SR 80's <-> 's  Pulmonary:      Effort: Pulmonary effort is normal.      Breath sounds: Normal breath sounds.   Abdominal:      General: Bowel sounds are normal.      Palpations: Abdomen is soft.   Genitourinary:     Comments: voiding  Musculoskeletal:         General: Normal range of motion.      Cervical back: Neck supple.      Right lower leg: Edema present.      Left lower leg: Edema present.   Skin:     General: Skin is warm and dry.      Capillary Refill: Capillary refill takes less than 2 seconds.      Comments: Mid sternum incision well approximated, no drainage, no erythema. Right SVG sites intact, no erythema, no drainage   Neurological:      Mental Status: He is alert and oriented to person, place, and time. Mental status is at baseline.   Psychiatric:         Mood and Affect: Mood normal.     Medications  Scheduled medications  acetaminophen, 650 mg, oral, q6h  amiodarone, 200 mg, oral, Daily  apixaban, 5 mg, oral, q12h  aspirin, 81 mg, oral, Daily  atorvastatin, 80 mg, oral, Nightly  insulin lispro, 0-10 Units, subcutaneous, TID AC  lidocaine, 1 patch, transdermal, Daily  magnesium hydroxide, 30 mL, oral, " Daily  metoprolol succinate XL, 50 mg, oral, BID  pantoprazole, 40 mg, intravenous, BID  polyethylene glycol, 17 g, oral, BID  sennosides-docusate sodium, 2 tablet, oral, BID  sevelamer carbonate, 800 mg, oral, TID    Continuous medications   PRN medications  PRN medications: bisacodyl, oxyCODONE, oxyCODONE, simethicone, trimethobenzamide    Labs  Results for orders placed or performed during the hospital encounter of 11/18/24 (from the past 24 hours)   POCT GLUCOSE   Result Value Ref Range    POCT Glucose 276 (H) 74 - 99 mg/dL   POCT GLUCOSE   Result Value Ref Range    POCT Glucose 209 (H) 74 - 99 mg/dL   POCT GLUCOSE   Result Value Ref Range    POCT Glucose 160 (H) 74 - 99 mg/dL   POCT GLUCOSE   Result Value Ref Range    POCT Glucose 186 (H) 74 - 99 mg/dL   Renal Function Panel   Result Value Ref Range    Glucose 200 (H) 74 - 99 mg/dL    Sodium 141 136 - 145 mmol/L    Potassium 3.9 3.5 - 5.3 mmol/L    Chloride 98 98 - 107 mmol/L    Bicarbonate 29 21 - 32 mmol/L    Anion Gap 18 10 - 20 mmol/L    Urea Nitrogen 44 (H) 6 - 23 mg/dL    Creatinine 1.84 (H) 0.50 - 1.30 mg/dL    eGFR 38 (L) >60 mL/min/1.73m*2    Calcium 8.7 8.6 - 10.6 mg/dL    Phosphorus 2.8 2.5 - 4.9 mg/dL    Albumin 3.4 3.4 - 5.0 g/dL   Magnesium   Result Value Ref Range    Magnesium 2.03 1.60 - 2.40 mg/dL   CBC   Result Value Ref Range    WBC 17.8 (H) 4.4 - 11.3 x10*3/uL    nRBC 0.0 0.0 - 0.0 /100 WBCs    RBC 3.10 (L) 4.50 - 5.90 x10*6/uL    Hemoglobin 8.8 (L) 13.5 - 17.5 g/dL    Hematocrit 28.7 (L) 41.0 - 52.0 %    MCV 93 80 - 100 fL    MCH 28.4 26.0 - 34.0 pg    MCHC 30.7 (L) 32.0 - 36.0 g/dL    RDW 15.5 (H) 11.5 - 14.5 %    Platelets 299 150 - 450 x10*3/uL     Imaging    12/16/24 XR chest 2 views 12/16/2024  Impression  1.  Slight improvement in basilar aeration with residual  postoperative atelectasis and effusion. No pneumothorax.    12/9/24 Transthoracic Echo (TTE) Complete With Contrast   CONCLUSIONS:  1. The left ventricular systolic function  is moderately decreased, with a visually estimated ejection fraction of 30-35%.  2. There is global hypokinesis of the left ventricle with minor regional variations.  3. Spectral Doppler shows a Grade II (pseudonormal pattern) of left ventricular diastolic filling with an elevated left atrial pressure.  4. Abnormal septal motion consistent with post-operative status.  5. There is no evidence of left ventricular hypertrophy.  6. There is normal right ventricular global systolic function.  7. The left atrium is moderately dilated.    12/3/24 Transthoracic Echo (TTE) Limited With Contrast   CONCLUSIONS:  1. Multiple segmental abnormalities exist. See findings.  2. Poorly visualized anatomical structures due to suboptimal image quality.  3. Left ventricular ejection fraction is severely decreased, by visual estimate at 25-30%.  4. There are multiple left ventricular wall motion abnormalities.  5. Left ventricular cavity size is moderate to severely dilated.  6. No left ventricular thrombus visualized.  7. There is normal right ventricular global systolic function.  8. Compared with the prior exam from 11/29/2024, the LV systoic function does appear to be somewhat more dynamic, though still severely reduced. The LV cavity size could not be measured on the prior exam, and today's indexed end disatolic volume is consistent with moderate to severely dilated cavity.    IMPRESSION & PLAN:  POD # 14 s/p removal of IABP and CABG x4 w/ LIO wMarisa Villa on 12/4  - Increase activity/ ambulation; PT/OT (PT eval today 12/16)  - Encourage IS, C/DB; respiratory therapy; wean O2 as joni - on RA  - Cardiac rehab referral   - Continue cardiac meds: ASA, BB, statin   - Pain and anticonstipation meds  - 2v CXR completed 12/16  - Postop echo 12/9 showed EF 30-35%  - CUTepicardial wires prior to discharge   - Tele until discharge  - Optimize nutrition and electrolytes  - 12/17 Stop plavix today (secondary to starting DOAC for AF  today)    Rhythm  - Tele: SR 80's <-> 's  - Continue BB, Plavix. Discussion was had with the patient this morning about the importance of taking his meds PO.   -12/15 increased Metoprolol to 37.5 BID, uptitrate as needed  -12/17 Due to the frequent episodes of AF, will stop plavix and start Eliquis (Ok per Dr Villa, Eliquis and ASA only)  -12/17 Start amiodarone 200 mg every day to try to restore SR given low EF, case discussed with Dr. Villa   -12/17 Toprol increased to 50 mg bid  - Adjust medications as tolerated  - 12/18 Patient refusing to take amiodarone. Dr Freeman discussed case with patient at length, all benefits vs risk were explained, patient verbalized understanding.    Acute Blood Loss Anemia - Stable  Recent Labs     12/18/24  0803 12/17/24  0652 12/16/24  0738 12/15/24  0725 12/14/24  0549 12/13/24  0257 12/12/24  1248   HGB 8.8* 8.7* 8.4* 8.4* 8.3* 8.1* 8.4*   HCT 28.7* 27.8* 26.6* 26.5* 25.8* 23.6* 23.8*   - MV, PO Iron x1mo  - Daily labs, transfuse as indicated    Thrombocytopenia - Resolved  Recent Labs     12/18/24  0803 12/17/24  0652 12/16/24  0738 12/15/24  0725 12/14/24  0549 12/13/24  0257 12/12/24  1248    293 271 271 276 283 305   - Etiology likely postop/CPB related  - Continue to trend with daily CBCs    Volume/Electrolyte Status: Preop wt Weight: 82.6 kg (182 lb)   Vitals:    12/18/24 0552   Weight: 85.1 kg (187 lb 9.6 oz)     - Daily weights and strict I&Os  - Daily RFP while admitted  - Weight: Pre-op 89kg, 12/17 85.3kg  - Adjust diuresis as needed for postop cardiac surgery hypervolemia  - Replete electrolytes for hypokalemia/hypomagnesemia/hypophosphatemia as needed replaced K 12/15 and 12/17  -gave 2 mg IV Bumex on 12/15  -12/17 Bumex 1 mg IV x1 dose today    Cardiomyopathy  - Likely ischemic in nature, patient was surgically revascularized on 12/4   - 12/16 Change metoprolol tartrate 37.5 bid to toprol 25 mg bid  - 12/16 Consider HF consult to evaluate need for  LifeVest and GDMT given the renal function  - 12/16 Renal function prohibiting start of ARNI/SGLT2-I/MRA, will reassess once renal function improves   - 12/17 doing better today since given Bumex yesterday    GERD  - PRN Zofran and Phenergan for nausea (Qtc 450)  - Continue PPI     Gout  - Restart home Allopurinol (Monitor renal function)    MARYBETH  - Bumex gtts discontinued 12/14, bumex IV PRN- gave 2 mg IV on 12/15  - RFP daily  - 12/16 Likely a component of cardio renal syndrome, renal function slowly improving. Will reassess need for IV diuresis in AM  - 12/17 Scr 2.11 today improving  - 12/18 Scr 1.87 today (bumex 1mg was given yesterday) likely a cardiorenal component    Acute on chronic hyponatremia: 2/2 decreased PO intake and diarrhea, now resolved  Sodium  136 - 145 mmol/L 139   - RFP daily    VTE Prophylaxis: SCDs/TEDs, ambulation, SQ heparin  Code Status: Full Code    Dispo  - PT/OT recs: Moderate intensity   - Would benefit from homecare for cardiac surgery carepath and RN visits  - Anticipate discharge today to SNF  - Will continue to assess discharge needs    Myrna Williamson, APRN-CNP, CA-3/PGY-IV, MBS  Cardiac Surgery [facility]  [facility]  Team Phone [phone]    12/18/2024  10:11 AM

## 2024-12-18 NOTE — PROGRESS NOTES
Patient is agreeable to discharge to [facility]. Transport is confirmed at 1130 by [facility] . Patient was informed of time as well as family, nurse, NP facility and . Nurse was provided with phone number for report. [facility]  Shelby Casanova RN

## 2024-12-18 NOTE — SIGNIFICANT EVENT
12/18  Patient refused to take amiodarone and Eliquis last night. Patient is refusing to go to SNF today unless he signs a DNR. Dr. Villa made aware via secure web. Dr Escamilla discussed case with patient explained all risk and benefits, also explained DNR and the prior conversation he had (the patient) with Dr Villa regarding staying as full code. After discussion with Dr Escamilla, patient agreed to take asa, bb, statin and eliquis. Also patient will be made a DNR.

## 2024-12-18 NOTE — DISCHARGE SUMMARY
Discharge Diagnosis  STEMI (ST elevation myocardial infarction) (Multi)    Issues Requiring Follow-Up  Discharging to SNF today    Test Results Pending At Discharge  Pending Labs       No current pending labs.            Hospital Course  Edu Echavarria is a 74 y.o. male with PMH of HTN, HFrEF, CAD, GERD, and gout, initially presented to OSH with STEMI. Transferred to [facility] for CABG evaluation, hospital course c/b MARYBETH, worsening hyponatremia causing delay of surgery case, then further complicated by respiratory failure following fluid resuscitation for correction of hyponatremia, and cardiogenic shock necessitating IABP. Now presents to CTICU s/p CABGx4, LAAL with Dr. Villa    12/4/2024 OPERATION: by Homer Villa  1. Coronary artery bypass grafting x 4:               In situ left internal mammary artery to left anterior descending coronary artery bypass.               Aorto to right posterior descending saphenous vein coronary artery bypass.                  Aorto to first marginal circumflex saphenous vein coronary artery bypass.               Abarca of vein graft to first diagonal saphenous vein coronary artery bypass.  2. Left atrial appendage ligation with AtriCure ACHV 35 mm clip ([id number]).   3. Ligation, Ligament of Mau.  4. Left posterior pericardial window creation.  5. Cannulation and initiation of cardiopulmonary oxygenator.   6. [employer] flow probe analysis of bypass grafts.   7. Endoscopic harvest of right saphenous vein.    Echo Pre/Post: Pre EF 30%, Post EF 30%,   Chest Tubes/Drains: L pleural, R pleural, mediastinal   Temporary wires location/setting: A/V wires set DDD@ 80 on arrival    CTICU course:    Transfer to T3 on 12/13    ========================================    Floor Course:  - Patient was diuresed for fluid volume overload post cardiac surgery; Preop weight: 89kg, discharge wt: 85kg  - On ASA, statin, BB by discharge  - Epicardial wires CUT on 12/18  - Telemetry at discharge SR  -  2v CXR done 12/15  - Postop echo done   - Cardiac rehab referral was placed  - PT recs SNF  - Anticipate discharge to skilled nursing facility    Discharged on     On day of discharge, vital signs were stable and no acute distress was noted. All questions were answered. After VS and labs were reviewed it was determined the patient was stable for discharge.   Hospital day of discharge management- spent >30 minutes coordinating the discharge and counseling/educating patient and family regarding discharge instructions.     ========================================    History:  Past Surgical History:  Procedure Laterality Date  · CARDIAC CATHETERIZATION N/A 2024    Procedure: IABP Insertion;  Surgeon: Mari Banuelos MD;  Location: [place] [address] Cardiac Cath Lab;  Service: Cardiovascular;  Laterality: N/A;     Prescriptions Prior to Admission  Medications Prior to Admission  Medication Sig Dispense Refill Last Dose/Taking  · allopurinol (Zyloprim) 100 mg tablet Take 1 tablet (100 mg) by mouth once daily.        · metoprolol succinate XL (Kapspargo Sprinkle) 100 mg 24 hr capsule Take 1 capsule (100 mg) by mouth once daily. Capsules must be opened and contents sprinkled on a small amount of soft food or liquid (non-warmed) and administered within 15 minutes of preparation. Do not crush or chew granules.           Allergies: Patient has no known allergies.     Social History  Tobacco Use  · Smoking status: Former      Types: Cigarettes      Start date:       Quit date: 1972      Years since quittin.9  · Smokeless tobacco: Never  Vaping Use  · Vaping status: Never Used  Substance Use Topics  · Drug use: Yes      Types: Marijuana      Comment: occasional  ======================================    Pertinent Physical Exam At Time of Discharge  Physical Exam See progress note    Home Medications     Medication List      START taking these medications     acetaminophen 650 mg/20.3 mL solution oral liquid;  Commonly known as:   Tylenol; Take 20.3 mL (650 mg) by mouth every 6 hours.   apixaban 5 mg tablet; Commonly known as: Eliquis; Take 1 tablet (5 mg)   by mouth every 12 hours.   aspirin 81 mg EC tablet; Take 1 tablet (81 mg) by mouth once daily. Do   not fill before December 19, 2024.; Start taking on: December 19, 2024   atorvastatin 80 mg tablet; Commonly known as: Lipitor; Take 1 tablet (80   mg) by mouth once daily at bedtime.   bisacodyl 10 mg suppository; Commonly known as: Dulcolax; Insert 1   suppository (10 mg) into the rectum once daily as needed for constipation.   insulin lispro 100 unit/mL injection; Inject 0-10 Units under the skin 3   times a day before meals. Take as directed per insulin instructions.   lidocaine 4 % patch; Place 1 patch over 12 hours on the skin once daily.   Remove & discard patch within 12 hours or as directed by MD.; Start taking   on: December 19, 2024   magnesium hydroxide 400 mg/5 mL suspension; Commonly known as: Milk of   Magnesia; Take 30 mL by mouth once daily.; Start taking on: December 19, 2024   metoprolol succinate XL 50 mg 24 hr tablet; Commonly known as:   Toprol-XL; Take 1 tablet (50 mg) by mouth 2 times a day. Do not crush or   chew.; Replaces: metoprolol succinate  mg 24 hr capsule   oxyCODONE 5 mg immediate release tablet; Commonly known as: Roxicodone;   Take 1 tablet (5 mg) by mouth every 6 hours if needed for moderate pain (4   - 6) for up to 2 days.   pantoprazole 40 mg EC tablet; Commonly known as: ProtoNix; Take 1 tablet   (40 mg) by mouth once daily in the morning. Take before meals. Do not   crush, chew, or split. Do not fill before December 19, 2024.; Start taking   on: December 19, 2024   polyethylene glycol 17 gram packet; Commonly known as: Glycolax,   Miralax; Take 17 g by mouth 2 times a day.   sennosides-docusate sodium 8.6-50 mg tablet; Commonly known as:   Bharati-Colace; Take 2 tablets by mouth 2 times a day.   sevelamer carbonate 800 mg  tablet; Commonly known as: Renvela; Take 1   tablet (800 mg) by mouth 3 times daily (morning, midday, late afternoon).   Swallow tablet whole; do not crush, break, or chew.   simethicone 80 mg chewable tablet; Commonly known as: Mylicon; Chew 0.5   tablets (40 mg) 4 times a day as needed for flatulence.     CONTINUE taking these medications     allopurinol 100 mg tablet; Commonly known as: Zyloprim     STOP taking these medications     metoprolol succinate  mg 24 hr capsule; Commonly known as:   Kapspargo Sprinkle; Replaced by: metoprolol succinate XL 50 mg 24 hr   tablet       Outpatient Follow-Up  No future appointments.    Myrna Williamson, APRN-CNP

## 2024-12-19 LAB
ATRIAL RATE: 150 BPM
ATRIAL RATE: 96 BPM
Q ONSET: 213 MS
Q ONSET: 213 MS
QRS COUNT: 15 BEATS
QRS COUNT: 21 BEATS
QRS DURATION: 102 MS
QRS DURATION: 116 MS
QT INTERVAL: 382 MS
QT INTERVAL: 450 MS
QTC CALCULATION(BAZETT): 550 MS
QTC CALCULATION(BAZETT): 557 MS
QTC FREDERICIA: 492 MS
QTC FREDERICIA: 515 MS
R AXIS: 111 DEGREES
R AXIS: 81 DEGREES
T AXIS: -26 DEGREES
T AXIS: -39 DEGREES
T OFFSET: 404 MS
T OFFSET: 438 MS
VENTRICULAR RATE: 128 BPM
VENTRICULAR RATE: 90 BPM

## 2024-12-22 LAB
ATRIAL RATE: 90 BPM
P AXIS: 67 DEGREES
P OFFSET: 196 MS
P ONSET: 148 MS
PR INTERVAL: 132 MS
Q ONSET: 214 MS
QRS COUNT: 15 BEATS
QRS DURATION: 102 MS
QT INTERVAL: 446 MS
QTC CALCULATION(BAZETT): 545 MS
QTC FREDERICIA: 510 MS
R AXIS: 88 DEGREES
T AXIS: 11 DEGREES
T OFFSET: 437 MS
VENTRICULAR RATE: 90 BPM

## 2024-12-30 ENCOUNTER — HOSPITAL ENCOUNTER (INPATIENT)
Dept: HOSPITAL 101 - ER | Age: 74
LOS: 3 days | Discharge: HOSPICE | DRG: 871 | End: 2025-01-02
Payer: MEDICARE

## 2024-12-30 VITALS — HEART RATE: 134 BPM | SYSTOLIC BLOOD PRESSURE: 109 MMHG | DIASTOLIC BLOOD PRESSURE: 88 MMHG

## 2024-12-30 VITALS — SYSTOLIC BLOOD PRESSURE: 89 MMHG | OXYGEN SATURATION: 95 % | DIASTOLIC BLOOD PRESSURE: 62 MMHG | HEART RATE: 132 BPM

## 2024-12-30 VITALS — HEART RATE: 114 BPM

## 2024-12-30 VITALS — SYSTOLIC BLOOD PRESSURE: 124 MMHG | OXYGEN SATURATION: 91 % | HEART RATE: 138 BPM | DIASTOLIC BLOOD PRESSURE: 91 MMHG

## 2024-12-30 VITALS — HEART RATE: 132 BPM | DIASTOLIC BLOOD PRESSURE: 80 MMHG | SYSTOLIC BLOOD PRESSURE: 104 MMHG | OXYGEN SATURATION: 97 %

## 2024-12-30 VITALS — SYSTOLIC BLOOD PRESSURE: 147 MMHG | DIASTOLIC BLOOD PRESSURE: 89 MMHG | HEART RATE: 76 BPM

## 2024-12-30 VITALS — HEART RATE: 74 BPM

## 2024-12-30 VITALS — DIASTOLIC BLOOD PRESSURE: 70 MMHG | HEART RATE: 72 BPM | SYSTOLIC BLOOD PRESSURE: 130 MMHG

## 2024-12-30 VITALS — HEART RATE: 71 BPM | OXYGEN SATURATION: 95 % | SYSTOLIC BLOOD PRESSURE: 130 MMHG | DIASTOLIC BLOOD PRESSURE: 79 MMHG

## 2024-12-30 VITALS — HEART RATE: 131 BPM | DIASTOLIC BLOOD PRESSURE: 84 MMHG | SYSTOLIC BLOOD PRESSURE: 118 MMHG

## 2024-12-30 VITALS — HEART RATE: 133 BPM

## 2024-12-30 VITALS — DIASTOLIC BLOOD PRESSURE: 71 MMHG | SYSTOLIC BLOOD PRESSURE: 138 MMHG | HEART RATE: 73 BPM

## 2024-12-30 VITALS — SYSTOLIC BLOOD PRESSURE: 124 MMHG | DIASTOLIC BLOOD PRESSURE: 82 MMHG | OXYGEN SATURATION: 87 % | HEART RATE: 75 BPM

## 2024-12-30 VITALS — SYSTOLIC BLOOD PRESSURE: 144 MMHG | DIASTOLIC BLOOD PRESSURE: 81 MMHG | OXYGEN SATURATION: 97 % | HEART RATE: 74 BPM

## 2024-12-30 VITALS — OXYGEN SATURATION: 95 % | HEART RATE: 133 BPM | SYSTOLIC BLOOD PRESSURE: 82 MMHG | DIASTOLIC BLOOD PRESSURE: 66 MMHG

## 2024-12-30 VITALS — HEART RATE: 74 BPM | OXYGEN SATURATION: 95 % | DIASTOLIC BLOOD PRESSURE: 79 MMHG | SYSTOLIC BLOOD PRESSURE: 145 MMHG

## 2024-12-30 VITALS — HEART RATE: 133 BPM | SYSTOLIC BLOOD PRESSURE: 128 MMHG | DIASTOLIC BLOOD PRESSURE: 86 MMHG | OXYGEN SATURATION: 99 %

## 2024-12-30 VITALS — HEART RATE: 74 BPM | DIASTOLIC BLOOD PRESSURE: 95 MMHG | SYSTOLIC BLOOD PRESSURE: 150 MMHG

## 2024-12-30 VITALS
SYSTOLIC BLOOD PRESSURE: 108 MMHG | DIASTOLIC BLOOD PRESSURE: 77 MMHG | HEART RATE: 134 BPM | OXYGEN SATURATION: 89 % | TEMPERATURE: 98.06 F

## 2024-12-30 VITALS — DIASTOLIC BLOOD PRESSURE: 79 MMHG | SYSTOLIC BLOOD PRESSURE: 110 MMHG | OXYGEN SATURATION: 92 % | HEART RATE: 132 BPM

## 2024-12-30 VITALS — HEART RATE: 119 BPM | SYSTOLIC BLOOD PRESSURE: 116 MMHG | DIASTOLIC BLOOD PRESSURE: 93 MMHG | OXYGEN SATURATION: 98 %

## 2024-12-30 VITALS — DIASTOLIC BLOOD PRESSURE: 60 MMHG | SYSTOLIC BLOOD PRESSURE: 114 MMHG | HEART RATE: 71 BPM

## 2024-12-30 VITALS — HEART RATE: 80 BPM | OXYGEN SATURATION: 89 %

## 2024-12-30 VITALS — HEART RATE: 71 BPM | DIASTOLIC BLOOD PRESSURE: 83 MMHG | SYSTOLIC BLOOD PRESSURE: 136 MMHG

## 2024-12-30 VITALS — DIASTOLIC BLOOD PRESSURE: 59 MMHG | OXYGEN SATURATION: 97 % | HEART RATE: 132 BPM | SYSTOLIC BLOOD PRESSURE: 84 MMHG

## 2024-12-30 VITALS — HEART RATE: 72 BPM | SYSTOLIC BLOOD PRESSURE: 140 MMHG | DIASTOLIC BLOOD PRESSURE: 87 MMHG

## 2024-12-30 VITALS — SYSTOLIC BLOOD PRESSURE: 132 MMHG | DIASTOLIC BLOOD PRESSURE: 91 MMHG | HEART RATE: 108 BPM | OXYGEN SATURATION: 95 %

## 2024-12-30 VITALS — HEART RATE: 109 BPM | DIASTOLIC BLOOD PRESSURE: 83 MMHG | OXYGEN SATURATION: 95 % | SYSTOLIC BLOOD PRESSURE: 118 MMHG

## 2024-12-30 VITALS — DIASTOLIC BLOOD PRESSURE: 74 MMHG | SYSTOLIC BLOOD PRESSURE: 129 MMHG | HEART RATE: 72 BPM

## 2024-12-30 VITALS — DIASTOLIC BLOOD PRESSURE: 80 MMHG | OXYGEN SATURATION: 96 % | SYSTOLIC BLOOD PRESSURE: 136 MMHG | HEART RATE: 73 BPM

## 2024-12-30 VITALS — HEART RATE: 132 BPM | OXYGEN SATURATION: 98 %

## 2024-12-30 VITALS — DIASTOLIC BLOOD PRESSURE: 84 MMHG | HEART RATE: 74 BPM | SYSTOLIC BLOOD PRESSURE: 147 MMHG

## 2024-12-30 VITALS — DIASTOLIC BLOOD PRESSURE: 65 MMHG | HEART RATE: 72 BPM | SYSTOLIC BLOOD PRESSURE: 121 MMHG

## 2024-12-30 VITALS — HEART RATE: 120 BPM | SYSTOLIC BLOOD PRESSURE: 111 MMHG | DIASTOLIC BLOOD PRESSURE: 82 MMHG | OXYGEN SATURATION: 91 %

## 2024-12-30 VITALS — SYSTOLIC BLOOD PRESSURE: 114 MMHG | OXYGEN SATURATION: 96 % | DIASTOLIC BLOOD PRESSURE: 60 MMHG | HEART RATE: 71 BPM

## 2024-12-30 VITALS — DIASTOLIC BLOOD PRESSURE: 77 MMHG | SYSTOLIC BLOOD PRESSURE: 103 MMHG | HEART RATE: 131 BPM | OXYGEN SATURATION: 97 %

## 2024-12-30 VITALS — OXYGEN SATURATION: 94 % | DIASTOLIC BLOOD PRESSURE: 80 MMHG | SYSTOLIC BLOOD PRESSURE: 142 MMHG | HEART RATE: 75 BPM

## 2024-12-30 VITALS — SYSTOLIC BLOOD PRESSURE: 132 MMHG | DIASTOLIC BLOOD PRESSURE: 83 MMHG | HEART RATE: 73 BPM

## 2024-12-30 VITALS — DIASTOLIC BLOOD PRESSURE: 78 MMHG | HEART RATE: 133 BPM | SYSTOLIC BLOOD PRESSURE: 113 MMHG | OXYGEN SATURATION: 100 %

## 2024-12-30 VITALS — BODY MASS INDEX: 29.4 KG/M2 | BODY MASS INDEX: 29.1 KG/M2

## 2024-12-30 VITALS — HEART RATE: 103 BPM | OXYGEN SATURATION: 93 % | SYSTOLIC BLOOD PRESSURE: 100 MMHG | DIASTOLIC BLOOD PRESSURE: 73 MMHG

## 2024-12-30 VITALS — HEART RATE: 73 BPM | DIASTOLIC BLOOD PRESSURE: 70 MMHG | SYSTOLIC BLOOD PRESSURE: 129 MMHG | OXYGEN SATURATION: 87 %

## 2024-12-30 VITALS — OXYGEN SATURATION: 89 % | HEART RATE: 125 BPM

## 2024-12-30 VITALS — OXYGEN SATURATION: 96 % | HEART RATE: 138 BPM | SYSTOLIC BLOOD PRESSURE: 126 MMHG | DIASTOLIC BLOOD PRESSURE: 96 MMHG

## 2024-12-30 VITALS — SYSTOLIC BLOOD PRESSURE: 127 MMHG | DIASTOLIC BLOOD PRESSURE: 108 MMHG | HEART RATE: 108 BPM | OXYGEN SATURATION: 97 %

## 2024-12-30 VITALS — OXYGEN SATURATION: 98 % | DIASTOLIC BLOOD PRESSURE: 79 MMHG | SYSTOLIC BLOOD PRESSURE: 111 MMHG | HEART RATE: 131 BPM

## 2024-12-30 VITALS — SYSTOLIC BLOOD PRESSURE: 90 MMHG | DIASTOLIC BLOOD PRESSURE: 69 MMHG | OXYGEN SATURATION: 95 % | HEART RATE: 116 BPM

## 2024-12-30 VITALS — OXYGEN SATURATION: 92 % | HEART RATE: 134 BPM

## 2024-12-30 VITALS — HEART RATE: 71 BPM | OXYGEN SATURATION: 89 % | SYSTOLIC BLOOD PRESSURE: 120 MMHG | DIASTOLIC BLOOD PRESSURE: 78 MMHG

## 2024-12-30 VITALS — DIASTOLIC BLOOD PRESSURE: 96 MMHG | HEART RATE: 137 BPM | OXYGEN SATURATION: 95 % | SYSTOLIC BLOOD PRESSURE: 122 MMHG

## 2024-12-30 VITALS — SYSTOLIC BLOOD PRESSURE: 92 MMHG | DIASTOLIC BLOOD PRESSURE: 68 MMHG | OXYGEN SATURATION: 92 % | HEART RATE: 136 BPM

## 2024-12-30 VITALS — OXYGEN SATURATION: 100 % | HEART RATE: 133 BPM | DIASTOLIC BLOOD PRESSURE: 80 MMHG | SYSTOLIC BLOOD PRESSURE: 105 MMHG

## 2024-12-30 VITALS — OXYGEN SATURATION: 97 % | HEART RATE: 102 BPM | SYSTOLIC BLOOD PRESSURE: 128 MMHG | DIASTOLIC BLOOD PRESSURE: 99 MMHG

## 2024-12-30 VITALS — HEART RATE: 135 BPM | SYSTOLIC BLOOD PRESSURE: 122 MMHG | DIASTOLIC BLOOD PRESSURE: 95 MMHG

## 2024-12-30 VITALS — HEART RATE: 72 BPM | DIASTOLIC BLOOD PRESSURE: 82 MMHG | SYSTOLIC BLOOD PRESSURE: 143 MMHG

## 2024-12-30 VITALS — HEART RATE: 119 BPM | DIASTOLIC BLOOD PRESSURE: 89 MMHG | OXYGEN SATURATION: 97 % | SYSTOLIC BLOOD PRESSURE: 122 MMHG

## 2024-12-30 VITALS — HEART RATE: 114 BPM | OXYGEN SATURATION: 92 %

## 2024-12-30 VITALS — HEART RATE: 136 BPM | OXYGEN SATURATION: 93 %

## 2024-12-30 VITALS — DIASTOLIC BLOOD PRESSURE: 72 MMHG | SYSTOLIC BLOOD PRESSURE: 104 MMHG | HEART RATE: 132 BPM

## 2024-12-30 VITALS — SYSTOLIC BLOOD PRESSURE: 127 MMHG | DIASTOLIC BLOOD PRESSURE: 67 MMHG | HEART RATE: 71 BPM

## 2024-12-30 VITALS — OXYGEN SATURATION: 95 %

## 2024-12-30 VITALS — OXYGEN SATURATION: 97 % | HEART RATE: 136 BPM | SYSTOLIC BLOOD PRESSURE: 121 MMHG | DIASTOLIC BLOOD PRESSURE: 90 MMHG

## 2024-12-30 VITALS — HEART RATE: 71 BPM | DIASTOLIC BLOOD PRESSURE: 65 MMHG | SYSTOLIC BLOOD PRESSURE: 112 MMHG

## 2024-12-30 VITALS — HEART RATE: 131 BPM | OXYGEN SATURATION: 94 %

## 2024-12-30 VITALS — DIASTOLIC BLOOD PRESSURE: 67 MMHG | SYSTOLIC BLOOD PRESSURE: 121 MMHG | HEART RATE: 72 BPM

## 2024-12-30 VITALS — HEART RATE: 71 BPM | DIASTOLIC BLOOD PRESSURE: 76 MMHG | SYSTOLIC BLOOD PRESSURE: 133 MMHG

## 2024-12-30 VITALS — HEART RATE: 68 BPM | DIASTOLIC BLOOD PRESSURE: 65 MMHG | SYSTOLIC BLOOD PRESSURE: 118 MMHG

## 2024-12-30 VITALS — SYSTOLIC BLOOD PRESSURE: 127 MMHG | HEART RATE: 75 BPM | DIASTOLIC BLOOD PRESSURE: 67 MMHG

## 2024-12-30 VITALS — DIASTOLIC BLOOD PRESSURE: 72 MMHG | SYSTOLIC BLOOD PRESSURE: 124 MMHG | HEART RATE: 72 BPM

## 2024-12-30 VITALS — SYSTOLIC BLOOD PRESSURE: 123 MMHG | HEART RATE: 73 BPM | DIASTOLIC BLOOD PRESSURE: 63 MMHG

## 2024-12-30 VITALS — DIASTOLIC BLOOD PRESSURE: 79 MMHG | SYSTOLIC BLOOD PRESSURE: 110 MMHG | OXYGEN SATURATION: 83 % | HEART RATE: 133 BPM

## 2024-12-30 VITALS — OXYGEN SATURATION: 83 % | HEART RATE: 100 BPM

## 2024-12-30 VITALS — DIASTOLIC BLOOD PRESSURE: 70 MMHG | SYSTOLIC BLOOD PRESSURE: 139 MMHG | HEART RATE: 73 BPM

## 2024-12-30 VITALS — HEART RATE: 114 BPM | OXYGEN SATURATION: 93 % | DIASTOLIC BLOOD PRESSURE: 72 MMHG | SYSTOLIC BLOOD PRESSURE: 106 MMHG

## 2024-12-30 VITALS — HEART RATE: 132 BPM | OXYGEN SATURATION: 96 %

## 2024-12-30 VITALS — TEMPERATURE: 97.7 F

## 2024-12-30 DIAGNOSIS — Z79.899: ICD-10-CM

## 2024-12-30 DIAGNOSIS — K21.9: ICD-10-CM

## 2024-12-30 DIAGNOSIS — I21.A1: ICD-10-CM

## 2024-12-30 DIAGNOSIS — R65.20: ICD-10-CM

## 2024-12-30 DIAGNOSIS — I13.0: ICD-10-CM

## 2024-12-30 DIAGNOSIS — J44.0: ICD-10-CM
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
## 2024-12-30 DIAGNOSIS — I50.33: ICD-10-CM

## 2024-12-30 DIAGNOSIS — E87.20: ICD-10-CM

## 2024-12-30 DIAGNOSIS — E03.9: ICD-10-CM

## 2024-12-30 DIAGNOSIS — E11.22: ICD-10-CM

## 2024-12-30 DIAGNOSIS — Z79.82: ICD-10-CM

## 2024-12-30 DIAGNOSIS — Z95.1: ICD-10-CM

## 2024-12-30 DIAGNOSIS — I42.9: ICD-10-CM

## 2024-12-30 DIAGNOSIS — F03.90: ICD-10-CM

## 2024-12-30 DIAGNOSIS — E11.65: ICD-10-CM

## 2024-12-30 DIAGNOSIS — J18.9: ICD-10-CM

## 2024-12-30 DIAGNOSIS — J44.1: ICD-10-CM

## 2024-12-30 DIAGNOSIS — J96.01: ICD-10-CM

## 2024-12-30 DIAGNOSIS — E83.41: ICD-10-CM

## 2024-12-30 DIAGNOSIS — I27.20: ICD-10-CM

## 2024-12-30 DIAGNOSIS — I25.10: ICD-10-CM

## 2024-12-30 DIAGNOSIS — A41.9: Primary | ICD-10-CM

## 2024-12-30 DIAGNOSIS — D50.9: ICD-10-CM

## 2024-12-30 DIAGNOSIS — E78.5: ICD-10-CM

## 2024-12-30 DIAGNOSIS — Z79.4: ICD-10-CM
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
## 2024-12-30 DIAGNOSIS — I48.0: ICD-10-CM

## 2024-12-30 DIAGNOSIS — Z79.01: ICD-10-CM

## 2024-12-30 DIAGNOSIS — N18.30: ICD-10-CM

## 2024-12-30 DIAGNOSIS — I08.1: ICD-10-CM

## 2024-12-30 DIAGNOSIS — N39.0: ICD-10-CM

## 2024-12-30 LAB
ADD MANUAL DIFF: NO
ALANINE AMINOTRANSFERASE: 133 U/L (ref 16–63)
ALBUMIN GLOBULIN RATIO: 0.8
ALBUMIN LEVEL: 2.8 G/DL (ref 3.4–5)
ALKALINE PHOSPHATASE: 97 U/L (ref 46–116)
ANION GAP: 14
ASPARTATE AMINO TRANSFERASE: 211 U/L (ref 15–37)
BLOOD UREA NITROGEN: 44 MG/DL (ref 7–18)
CALCIUM: 8.4 MG/DL (ref 8.5–10.1)
CARBON DIOXIDE: 28.8 MMOL/L (ref 21–32)
CAST SEEN?: (no result) #/LPF
CHLORIDE: 104 MMOL/L (ref 98–107)
CO2 BLD-SCNC: 28.8 MMOL/L (ref 21–32)
ESTIMATED GFR (AFRICAN AMERICA: 43
ESTIMATED GFR (NON-AFRICAN AME: 35
GLOBULIN: 3.3 G/DL
GLUCOSE BLD-MCNC: 62 MG/DL (ref 74–106)
GLUCOSE URINE UA: NEGATIVE MG/DL
HCT VFR BLD CALC: 36.3 % (ref 42–54)
HEMATOCRIT: 36.3 % (ref 42–54)
HEMOGLOBIN: 10.8 G/DL (ref 14–18)
IMMATURE GRANULOCYTES ABS AUTO: 0.07 10^3/UL (ref 0–0.03)
IMMATURE GRANULOCYTES PCT AUTO: 0.5 % (ref 0–0.5)
LACTATE/LACTIC ACID: 2 MMOL/L (ref 0.4–2)
LACTATE/LACTIC ACID: 2.3 MMOL/L (ref 0.4–2)
LYMPHOCYTES  ABSOLUTE AUTO: 2.2 10^3/UL (ref 1.2–3.8)
MAGNESIUM: 2.7 MG/DL (ref 1.8–2.4)
MCV RBC: 91.9 FL (ref 80–94)
MEAN CORPUSCULAR HEMOGLOBIN: 27.3 PG (ref 25.9–34)
MEAN CORPUSCULAR HGB CONC: 29.8 G/DL (ref 29.9–35.2)
MEAN CORPUSCULAR VOLUME: 91.9 FL (ref 80–94)
NT PRO B TYPE NATRIURETIC PEPT: (no result) PG/ML (ref ?–900)
PCO2 BLDV: 40.3 MMHG (ref 40–52)
PH BLDV: 7.43 [PH] (ref 7.33–7.43)
PLATELET # BLD: 254 10^3/UL (ref 150–450)
PLATELET COUNT: 254 10^3/UL (ref 150–450)
POTASSIUM SERPLBLD-SCNC: 4.8 MMOL/L (ref 3.5–5.1)
POTASSIUM: 4.8 MMOL/L (ref 3.5–5.1)
RED BLOOD COUNT: 3.95 10^6/UL (ref 4.7–6.1)
SODIUM BLD-SCNC: 142 MMOL/L (ref 136–145)
SODIUM: 142 MMOL/L (ref 136–145)
THYROID STIMULATING HORMONE: 4.54 UIU/ML (ref 0.36–3.74)
TOTAL PROTEIN: 6.1 G/DL (ref 6.4–8.2)
URINE CULTURE INDICATED: YES
WBC # BLD: 13 10^3/UL (ref 4–11)
WHITE BLOOD COUNT: 13 10^3/UL (ref 4–11)

## 2024-12-30 PROCEDURE — 93306 TTE W/DOPPLER COMPLETE: CPT

## 2024-12-30 PROCEDURE — 87086 URINE CULTURE/COLONY COUNT: CPT

## 2024-12-30 PROCEDURE — 96365 THER/PROPH/DIAG IV INF INIT: CPT

## 2024-12-30 PROCEDURE — 82948 REAGENT STRIP/BLOOD GLUCOSE: CPT

## 2024-12-30 PROCEDURE — 94668 MNPJ CHEST WALL SBSQ: CPT

## 2024-12-30 PROCEDURE — 87070 CULTURE OTHR SPECIMN AEROBIC: CPT

## 2024-12-30 PROCEDURE — 87427 SHIGA-LIKE TOXIN AG IA: CPT

## 2024-12-30 PROCEDURE — 97162 PT EVAL MOD COMPLEX 30 MIN: CPT

## 2024-12-30 PROCEDURE — 71275 CT ANGIOGRAPHY CHEST: CPT

## 2024-12-30 PROCEDURE — 84436 ASSAY OF TOTAL THYROXINE: CPT

## 2024-12-30 PROCEDURE — 80053 COMPREHEN METABOLIC PANEL: CPT

## 2024-12-30 PROCEDURE — 83880 ASSAY OF NATRIURETIC PEPTIDE: CPT

## 2024-12-30 PROCEDURE — 83735 ASSAY OF MAGNESIUM: CPT

## 2024-12-30 PROCEDURE — 97530 THERAPEUTIC ACTIVITIES: CPT

## 2024-12-30 PROCEDURE — 94761 N-INVAS EAR/PLS OXIMETRY MLT: CPT

## 2024-12-30 PROCEDURE — 36415 COLL VENOUS BLD VENIPUNCTURE: CPT

## 2024-12-30 PROCEDURE — 94640 AIRWAY INHALATION TREATMENT: CPT

## 2024-12-30 PROCEDURE — 87493 C DIFF AMPLIFIED PROBE: CPT

## 2024-12-30 PROCEDURE — 84443 ASSAY THYROID STIM HORMONE: CPT

## 2024-12-30 PROCEDURE — 97535 SELF CARE MNGMENT TRAINING: CPT

## 2024-12-30 PROCEDURE — 82800 BLOOD PH: CPT

## 2024-12-30 PROCEDURE — 96366 THER/PROPH/DIAG IV INF ADDON: CPT

## 2024-12-30 PROCEDURE — 94667 MNPJ CHEST WALL 1ST: CPT

## 2024-12-30 PROCEDURE — 80048 BASIC METABOLIC PNL TOTAL CA: CPT

## 2024-12-30 PROCEDURE — 80076 HEPATIC FUNCTION PANEL: CPT

## 2024-12-30 PROCEDURE — 84484 ASSAY OF TROPONIN QUANT: CPT

## 2024-12-30 PROCEDURE — 96368 THER/DIAG CONCURRENT INF: CPT

## 2024-12-30 PROCEDURE — 71045 X-RAY EXAM CHEST 1 VIEW: CPT

## 2024-12-30 PROCEDURE — 93970 EXTREMITY STUDY: CPT

## 2024-12-30 PROCEDURE — 85378 FIBRIN DEGRADE SEMIQUANT: CPT

## 2024-12-30 PROCEDURE — 97165 OT EVAL LOW COMPLEX 30 MIN: CPT

## 2024-12-30 PROCEDURE — 85025 COMPLETE CBC W/AUTO DIFF WBC: CPT

## 2024-12-30 PROCEDURE — 83605 ASSAY OF LACTIC ACID: CPT

## 2024-12-30 PROCEDURE — 81001 URINALYSIS AUTO W/SCOPE: CPT

## 2024-12-30 PROCEDURE — 87205 SMEAR GRAM STAIN: CPT

## 2024-12-30 PROCEDURE — 99285 EMERGENCY DEPT VISIT HI MDM: CPT

## 2024-12-30 PROCEDURE — 96375 TX/PRO/DX INJ NEW DRUG ADDON: CPT

## 2024-12-30 PROCEDURE — 87046 STOOL CULTR AEROBIC BACT EA: CPT

## 2024-12-30 PROCEDURE — 51702 INSERT TEMP BLADDER CATH: CPT

## 2024-12-30 PROCEDURE — 93005 ELECTROCARDIOGRAM TRACING: CPT

## 2024-12-30 PROCEDURE — 87045 FECES CULTURE AEROBIC BACT: CPT

## 2024-12-30 PROCEDURE — 87040 BLOOD CULTURE FOR BACTERIA: CPT

## 2024-12-30 RX ADMIN — LEVALBUTEROL HYDROCHLORIDE 0.63 MG: 0.63 SOLUTION RESPIRATORY (INHALATION) at 16:36

## 2024-12-30 RX ADMIN — PANTOPRAZOLE SODIUM 40 MG: 40 INJECTION, POWDER, FOR SOLUTION INTRAVENOUS at 10:31

## 2024-12-30 RX ADMIN — METOPROLOL SUCCINATE 25 MG: 25 TABLET, EXTENDED RELEASE ORAL at 20:18

## 2024-12-30 RX ADMIN — FUROSEMIDE 40 MG: 10 INJECTION, SOLUTION INTRAMUSCULAR; INTRAVENOUS at 08:39

## 2024-12-30 RX ADMIN — LEVALBUTEROL HYDROCHLORIDE 0.63 MG: 0.63 SOLUTION RESPIRATORY (INHALATION) at 11:06

## 2024-12-30 RX ADMIN — BUMETANIDE 20 MG: 0.25 INJECTION INTRAMUSCULAR; INTRAVENOUS at 10:31

## 2024-12-30 RX ADMIN — PIPERACILLIN SODIUM,TAZOBACTAM SODIUM 12.5 GM: 3; .375 INJECTION, POWDER, FOR SOLUTION INTRAVENOUS at 17:07

## 2024-12-30 RX ADMIN — IPRATROPIUM BROMIDE 0.5 MG: 0.5 SOLUTION RESPIRATORY (INHALATION) at 16:36

## 2024-12-30 RX ADMIN — IPRATROPIUM BROMIDE 0.5 MG: 0.5 SOLUTION RESPIRATORY (INHALATION) at 11:06

## 2024-12-30 RX ADMIN — AMIODARONE HYDROCHLORIDE 400 MG: 200 TABLET ORAL at 20:17

## 2024-12-30 RX ADMIN — VANCOMYCIN HYDROCHLORIDE 250 MG: 1 INJECTION, POWDER, LYOPHILIZED, FOR SOLUTION INTRAVENOUS at 08:05

## 2024-12-30 RX ADMIN — SEVELAMER CARBONATE 800 MG: 800 TABLET, FILM COATED ORAL at 13:08

## 2024-12-30 RX ADMIN — LINEZOLID 300 MG: 600 INJECTION, SOLUTION INTRAVENOUS at 13:08

## 2024-12-30 RX ADMIN — Medication 80 MG: at 22:51

## 2024-12-30 RX ADMIN — HYDROCODONE BITARTRATE AND ACETAMINOPHEN 1 TAB: 5; 325 TABLET ORAL at 22:51

## 2024-12-30 RX ADMIN — DEXTROSE MONOHYDRATE 50 ML: 25 INJECTION, SOLUTION INTRAVENOUS at 11:26

## 2024-12-30 RX ADMIN — APIXABAN 5 MG: 5 TABLET, FILM COATED ORAL at 20:17

## 2024-12-30 RX ADMIN — SODIUM CHLORIDE 500 ML: 9 INJECTION, SOLUTION INTRAVENOUS at 07:41

## 2024-12-30 RX ADMIN — PIPERACILLIN SODIUM,TAZOBACTAM SODIUM 12.5 GM: 3; .375 INJECTION, POWDER, FOR SOLUTION INTRAVENOUS at 09:02

## 2024-12-30 RX ADMIN — DIGOXIN 250 MCG: 0.25 INJECTION INTRAMUSCULAR; INTRAVENOUS at 11:21

## 2024-12-30 NOTE — XR_ITS
The [address] [address] 
     [phone] 
  
  
Patient Name: 
PARIS ESPINAL 
  
MRN: [id number]    YOB: 1950    Sex: M 
Assigned Patient Location: ER 
Current Patient Location: ER 
Accession/Order Number: [id number] 
Exam Date: 12/30/2024  07:15    Report Date: 12/30/2024  07:46 
  
At the request of: 
DELMER JETT   
  
Procedure:  XR chest 1V 
  
EXAMINATION: XR chest 1V  
  
HISTORY: Tachycardia 
  
COMPARISON: No relevant comparison available.  
  
FINDINGS: 
LUNGS: Underexpanded lungs with confluent opacities within right lung base  
obscuring the diaphragm and within the mid and lower left lung. 
VASCULATURE: No increased pulmonary vasculature.  
PLEURA: Right pleural effusion; likely a left pleural effusion. 
CARDIAC: Prior atrial clip placement. No convincing Cardiomegaly or cardiac  
silhouette abnormality.  
MEDIASTINUM: Prior sternotomy. No abnormal widening. 
BONES: No fracture or visible bone lesion.  
OTHER: Negative.  
  
ORDER #: [id number] XR/XR chest 1V  
IMPRESSION:   
   
1. Moderate right, moderate to marked left pulmonary infiltrates suggestive of 
  
   
pneumonia. Atelectasis is felt less likely.  
2. Small to moderate right pleural effusion and suspected left pleural   
effusion.  
   
   
Electronically authenticated by: JAMES CAMPOS   Date: 12/30/2024  07:46

## 2024-12-30 NOTE — CA_ITS
Patient Name:      
PARIS ESPINAL 
      
MR#: [id number]      
: 1950 
Accession #: [id number] 
Exam Date: 2024  
Ordering Doctor: DR CHERYL PATINO . 
  
ECHOCARDIOGRAM REPORT 
  
PROCEDURE:     CA ECHO DOPPLER COMPLETE 
  
INDICATIONS:     Dyspnea, elevated TROP and BNP, CABG, heart failure,  
hypertension, diabetes 
  
COMPARISON:     None. 
  
DESCRIPTION:     COMPLETE ECHOCARDIOGRAM Real-time transthoracic  
echocardiography with 2D, M-mode, spectral and color flow Doppler performed.  
  
QUALITY:     Technical quality was good.   
LEFT VENTRICLE:     Normal chamber size. Borderline left ventricular  
hypertrophy.  Visual estimation of left ventricular ejection fraction is  
10-15%. 
LV EF:     Severely reduced left ventricular systolic function, LVEF mid  
septal and apical septal segments as well as inferior apical segment and apex  
are akinetic, the rest of left ventricular walls are severely hypokinetic 
DIASTOLIC:     Not adequately assessed due to heart rhythm. 
ATRIAL SEPTUM:     Appears intact 
LEFT ATRIUM:     Severe dilatation.  
RIGHT ATRIUM:     Moderate dilatation.  
RIGHT VENTRICLE:     Mild dilatation. Decreased right ventricular systolic  
function.     
TRICUSPID VALVE:     Normal mobility and thickness. No stenosis with mild to  
moderate regurgitation. Doppler studies reveal moderately (45-60) elevated  
right sided pressures.RVSP 50 mmHg      
MITRAL VALVE:     Normal mobility and thickness.   No evidence of mitral valve  
stenosis.  There is no mitral annular calcification. Moderate mitral  
regurgitation.      
AORTIC VALVE:     Normal trileaflet appearance. Thickened aortic valve.   
Normal leaflet mobility.  No evidence of aortic valve stenosis. No aortic  
regurgitation.      
AORTIC ROOT:     Normal diameter and appearance.      
PULMONIC VALVE:     Normal thickness and mobility. No stenosis. No  
regurgitation.       
PERICARDIUM:     No evidence of pericardial effusion.        
IVC:     Was not visualized      
PLEURA:            
               
  
  
  
  
CONCLUSION:       
Severely reduced left ventricle systolic function with segmental motion  
abnormalities as described above, ejection 
fraction 10 to 15% 
Mildly reduced right ventricular systolic function 
Moderate mitral regurgitation  
Mild to moderate tricuspid regurgitation 
Moderate pulmonary hypertension 
Severely dilated left atrium and moderately dilated right atrium 
  
Adult Echocardiography Procedure Report 
Left Ventricle  
LVEDD (3.7 - 5.6 cm):     5.08 cm 
LVESD (2.2 - 4.0 cm):     4.48 cm 
LVIVS thickness (0.6 - 1.2 cm):     1.02 cm 
LVPW thickness (0.5 - 1.0 cm):     1.16 cm 
e':      
E - e':      
LVOT Max Gradient:     1.16 mm[Hg], 3.02 mm[Hg], 0.64 mm[Hg] 
LVOT Area (cm2):     0.60 m/s 
Peak Velocity (LVOT):     0.54 m/s, 0.87 m/s, 0.40 m/s 
Mean Velocity (LVOT):      
LVOT Diameter     2.20 cm 
Left Ventricular Ejection Fraction:      
Left Atrium  
LA Volume Index (2D A2C):     56.95 ml/m2 
Left Atrium Systolic Dimension:     5.21 cm 
Mitral Valve  
MV E to A Ratio:      
MV Max Gradient:      
MV Mean Gradient:      
Mitral Valve A-Wave Peak Velocity:      
Mitral Valve E-Wave Peak Velocity:     0.97 m/s 
Cardiovascular Orifice Area:      
Right Ventricle  
RV Internal Diastolic Dimension:      
Aorta  
AO Root Diam:     3.06 cm 
Ascending Ao Diam:      
Aortic Valve  
AoV Area (Peak Porter):     2.04 cm2, 2.27 cm2, 2.45 cm2 
AoV Area (VTI):      
Deceleration [place]:      
Pressure Half-Time:      
Peak Velocity(Antegrade Flow):     0.90 m/s, 1.35 m/s 
Peak Gradient(Antegrade Flow):     3.26 mm[Hg], 7.30 mm[Hg] 
Mean Velocity(Antegrade Flow):      
Mean Gradient(Antegrade Flow):      
Velocity Time Integral:      
Tricuspid Valve  
Peak Velocity (Regurgitant Flow):     2.53 m/s, 2.96 m/s 
Peak Velocity:      
Pulmonic Valve  
Mean Gradient:      
Mean Velocity:      
Peak Velocity:     0.66 m/s 
Peak Gradient:     3.75 mm[Hg], 1.49 mm[Hg], 1.36 mm[Hg], 0.87 mm[Hg] 
Right Atrium  
Right Atrium Systolic Pressure:     59.51 ml, 59.51 ml  
  
  
  
  
Dictated by: Enedina Camargo MD on 2024 at 16:31      
Approved by: Enedina Camargo MD on 2024 at 16:40

## 2024-12-30 NOTE — ECG_ITS
[facility] 
                                        
                                       Test Date:    2024 
Pat Name:     PARIS ESPINAL             Department:    
Patient ID:   [id number]               Room:         - 
Gender:       Male                     Technician:    
:          1950               Requested By: BLANCA AUGUSTIN 
Order Number: [id number]              Reading MD:   MATEUSZ BECKHAM 
                                 Measurements 
Intervals                              Axis           
Rate:         133                      P:            [id number] 
NJ:           [id number]                   QRS:          119 
QRSD:         106                      T:            270 
QT:           366                                     
QTc:          444                                     
                           Interpretive Statements 
1420 Undetermined rhythm (Possible supraventricular tachycardia) w/ IVCD 
3114 Cannot rule out anterior myocardial infarction, age undetermined 
4564 Twave abnormality, possible inferolateral ischemia 
5120 Possible right ventricular hypertrophy 
 
9150 **  abnormal ECG  ** 
No previous ECG available for comparison 
Electronically Signed On 2024 6:58:24 EST by MATEUSZ BECKHAM

## 2024-12-30 NOTE — P.CACN_ITS
History of Present Illness    
History of Present Illness    
Consult date: 12/30/24    
Requesting physician: Philip Tony    
Chief complaint: CARDIAC SYMPTOMS, CHF, PNEUMONIA, SEPSIS    
Narrative:     
Patient is 74-year-old male with history of coronary artery disease, ischemic   
and myopathy, recent CABG in [facility], postop atrial   
fibrillation, hypertension, hyperlipidemia, and insulin-dependent diabetes   
mellitus.  He was discharged to skilled nursing facility where he has been for   
few days.  He was admitted with worsening shortness of breath and legs edema and  
he was found to be in A-fib.  He was on Cardizem IV infusion he was given a dose  
of digoxin IV and he converted back to normal sinus rhythm.  He was given a dose  
of Lasix 40 mg IV then he was started on Bumex IV infusion at 1 mg/h.  Also he   
was scheduled to take digoxin 0.25 mg every 6 hours for 3 doses.    
    
The patient states that he already feels better.  He is on oxygen.  He denies   
any chest pains.  He did denies any dizziness.    
    
Review of Systems    
    
    
ROS      
    
 Narrative All systems were reviewed and they were negative except for the   
positive findings noted above in the history       
    
    
[facility]    
[facility]    
Medical History (Updated 12/30/24 @ 17:29 by Enedina Camargo MD)    
    
Type 2 diabetes mellitus    
   ?E11.9 - Type 2 diabetes mellitus without complications (ICD-10)    
Hypertension    
   ?I10 - Essential (primary) hypertension (ICD-10)    
Nutritional deficiency    
   ?E63.9 - Nutritional deficiency, unspecified (ICD-10)    
Acute kidney failure    
   ?N17.9 - Acute kidney failure, unspecified (ICD-10)    
Limitation of activities due to disability    
   ?Z73.6 - Limitation of activities due to disability (ICD-10)    
Difficulty in walking, not elsewhere classified    
   ?R26.2 - Difficulty in walking, not elsewhere classified (ICD-10)    
Gout    
   ?M10.9 - Gout, unspecified (ICD-10)    
Muscle weakness (generalized)    
   ?M62.81 - Muscle weakness (generalized) (ICD-10)    
GERD (gastroesophageal reflux disease)    
   ?K21.9 - Gastro-esophageal reflux disease without esophagitis (ICD-10)    
Hyponatremia    
   ?E87.1 - Hypo-osmolality and hyponatremia (ICD-10)    
Cardiomyopathy    
   ?I42.9 - Cardiomyopathy, unspecified (ICD-10)    
ST elevation (STEMI) myocardial infarction    
   ?I21.3 - ST elevation (STEMI) myocardial infarction of unspecified site (ICD-  
   10)    
Acute on chronic diastolic (congestive) heart failure    
   ?I50.33 - Acute on chronic diastolic (congestive) heart failure (ICD-10)    
Pneumonia    
   ?J18.9 - Pneumonia, unspecified organism (ICD-10)    
Elevated white blood cell count, unspecified    
   ?D72.829 - Elevated white blood cell count, unspecified (ICD-10)    
Angina pectoris    
   ?I20.9 - Angina pectoris, unspecified (ICD-10)    
Atherosclerosis    
   ?I70.90 - Unspecified atherosclerosis (ICD-10)    
    
    
Surgical History (Updated 12/30/24 @ 17:22 by Enedina Camargo MD)    
    
Presence of aortocoronary bypass graft    
   ?Z95.1 - Presence of aortocoronary bypass graft (ICD-10)    
    
    
Social History    
Highest level of school completed/degree received:  some college, no degree     
Little interest or pleasure in doing things:  several days     
Feeling down, depressed, or hopeless:  several days     
    
    
    
Meds    
Home Medications and Allergies    
                                Home Medications    
    
    
    
?Medication ?Instructions ?Recorded ?Confirmed ?Type    
     
acetaminophen 325 mg tablet (Pain 650 mg PO Q6H PRN fever or pain 12/30/24 12/30/24 History    
    
Relief (acetaminophen))        
     
albuterol sulfate 2.5 mg/0.5 mL 2.5 mg inhalation Q6H PRN 12/30/24 12/30/24   
History    
    
solution for nebulization shortness of breath or wheezing       
     
allopurinol 100 mg tablet 100 mg PO DAILY 12/30/24 12/30/24 History    
     
amlodipine 5 mg tablet (Norvasc) 5 mg PO DAILY 12/30/24 12/30/24 History    
     
apixaban 5 mg tablet (Eliquis) 5 mg PO BID 12/30/24 12/30/24 History    
     
aspirin 81 mg tablet,delayed 81 mg PO DAILY 12/30/24 12/30/24 History    
    
release (Adult Low Dose Aspirin)        
     
atorvastatin 80 mg tablet 80 mg PO DAILY 12/30/24 12/30/24 History    
     
bisacodyl 10 mg rectal suppository 10 mg KS DAILY PRN constipation 12/30/24 12/30/24 History    
     
insulin glargine 100 unit/mL (3 20 unit subcut QPM 12/30/24 12/30/24 History    
    
mL) subcutaneous pen (Lantus        
    
Solostar U-100 Insulin)        
     
insulin lispro 100 unit/mL 1 sliding scale dose subcut 12/30/24 12/30/24 History    
    
subcutaneous pen (Humalog KwikPen USEASDIRECTD       
    
(U-100) Insulin)        
     
lidocaine 4 % topical patch 1 patch topical DAILY PRN pain 12/30/24 12/30/24   
History    
    
(Lidocaine Pain Relief)        
     
magnesium hydroxide 400 mg/5 mL 30 ml PO DAILY 12/30/24 12/30/24 History    
    
oral suspension (Gentle Laxative        
    
(magnesium hydroxide))        
     
metoprolol succinate 25 mg 25 mg PO Q12H 12/30/24 12/30/24 History    
    
tablet,extended release 24 hr        
    
(Toprol XL)        
     
pantoprazole 40 mg tablet,delayed 40 mg PO DAILY 12/30/24 12/30/24 History    
    
release (Protonix)        
     
sevelamer carbonate 800 mg tablet 800 mg PO TID 12/30/24 12/30/24 History    
    
(Renvela)        
     
simethicone 80 mg chewable tablet 80 mg PO QID PRN abdominal 12/30/24 12/30/24   
History    
    
 distention       
    
    
    
                                    Allergies    
    
    
    
Allergy/AdvReac Type Severity Reaction Status Date / Time    
     
No Known Drug Allergies Allergy   Verified 12/30/24 07:22    
    
    
    
    
Exam    
Narrative    
Exam Narrative:     
He is alert, oriented, not in apparent distress    
HEENT within normal limits    
Neck: Supple normal range of motion no lymphadenopathies thyroid is normal   
jugular venous pressure is elevated    
Lungs clear to auscultation with reduced breath sounds in the bases next    
Cardiovascular system regular rate and rhythm, normal S1 and S2, no gallop or   
murmur or click    
Extremities at least +2 edema bilaterally, no cyanosis or clubbing.  Pedal   
pulses are palpable bilaterally    
Neurological examination grossly normal    
Constitutional    
Vital Signs, click to edit/add:     
                                Last Vital Signs    
    
    
    
Temp  97.7 F   12/30/24 11:12    
     
Pulse  80   12/30/24 16:36    
     
Resp  18   12/30/24 16:30    
     
BP  129/74   12/30/24 16:30    
     
Pulse Ox  89 L  12/30/24 16:36    
     
O2 Del Method  Nasal Cannula  12/30/24 16:36    
     
O2 Flow Rate  3   12/30/24 16:36    
    
    
    
    
Results    
Labs and Meds    
Lab results:     
                                 Cardiac Enzymes    
    
    
    
  12/30/24 Range/Units    
    
  07:25     
     
AST  211 H  (15-37)  U/L    
    
    
                                       CBC    
    
    
    
  12/30/24 Range/Units    
    
  07:25     
     
WBC  13.0 H  (4.0-11.0)  10^3/uL    
     
RBC  3.95 L  (4.70-6.10)  10^6/uL    
     
Hgb  10.8 L  (14.0-18.0)  g/dL    
     
Hct  36.3 L  (42.0-54.0)  %    
     
Plt Count  254  (150-450)  10^3/uL    
     
Neut # (Auto)  9.5 H  (1.4-6.5)  10^3/uL    
     
Lymph # (Auto)  2.2  (1.2-3.8)  10^3/uL    
     
Mono # (Auto)  1.1 H  (0.3-0.8)  10^3/uL    
     
Eos # (Auto)  0.1  (0.0-0.7)  10^3/uL    
     
Baso # (Auto)  0.0  (0.0-0.1)  10^3/uL    
    
    
                          Comprehensive Metabolic Panel    
    
    
    
  12/30/24 Range/Units    
    
  07:25     
     
Sodium  142  (136-145)  mmol/L    
     
Potassium  4.8  (3.5-5.1)  mmol/L    
     
Chloride  104  ()  mmol/L    
     
Carbon Dioxide  28.8  (21.0-32.0)  mmol/L    
     
BUN  44.0 H  (7.0-18.0)  mg/dL    
     
Creatinine  1.89 H  (0.70-1.30)  mg/dL    
     
Glucose  62 L  ()  mg/dL    
     
Calcium  8.4 L  (8.5-10.1)  mg/dL    
     
Direct Bilirubin  0.4 H  (0.0-0.2)  mg/dL    
     
AST  211 H  (15-37)  U/L    
     
ALT  133 H  (16-63)  U/L    
     
Alkaline Phosphatase  97  ()  U/L    
     
Total Protein  6.1 L  (6.4-8.2)  g/dL    
     
Albumin  2.8 L  (3.4-5.0)  g/dL    
    
    
                                Intake and Output    
    
    
    
 12/30/24 12/30/24 12/30/24    
    
 07:59 15:59 23:59    
     
Intake Total  1361.833 / 1361.833     
     
Output Total  800 / 800     
     
Balance  561.833 / 561.833     
     
Intake:       
     
  Oral  240 / 240     
     
  IV  1121.833 / 1121.833     
     
    0.9 % Sodium Chloride 500 ml @  500 / 500     
    
    500 mls/hr IV .Q1H ONE Rx#:       
    
    [id number]       
     
    Linezolid in Dextrose 5% 600 mg  300 / 300     
    
    In 300 ml @ 300 mls/hr IV Q12H       
    
    [facility] Rx#:[id number]       
     
    Piperacillin Sodium/Tazobactam  50 / 50     
    
    3.375 gm In 0.9 % Sodium       
    
    Chloride 50 ml @ 12.5 mls/hr IV       
    
    Q8H [facility] Rx#:[id number]       
     
    Vancomycin HCl 1,000 mg In 0.9  250 / 250     
    
    % Sodium Chloride 250 ml @ 250       
    
    mls/hr IV ONCE ONE Rx#:[id number]       
     
    dilTIAZem  mg In 0.9 %  21.833 / 21.833     
    
    Sodium Chloride 100 ml @ 10 mls       
    
    /hr IV Q12H [facility] Rx#:[id number]       
     
Output:       
     
  Urine Amount (Catheter)  800 / 800     
     
    Urethral  800 / 800     
     
Other:       
     
  Weight 81.647 kg 87.6 kg     
    
    
                                 Patient Weight    
    
    
    
 12/31/24    
    
 07:59    
     
Weight 87.6 kg    
    
EKG today showed normal sinus rhythm, right axis deviation, old anterior wall   
MI, ST-T abnormalities.    
    
Echo performed today 12/30/2024    
Mildly dilated left ventricle    
Severely reduced left ventricle systolic function, ejection fraction 10 to 15%   
with segmental wall motion abnormalities    
Moderate mitral regurgitation    
Mild to moderate tricuspid regurgitation     
Moderate pulmonary hypertension    
Mildly reduced left ventricle systolic function    
Severely dilated left atrium and moderately dilated right atrial    
    
CTA 12/30/2024    
FINDINGS:     
VASCULATURE: No pulmonary embolism or abnormal opacity.    
LUNGS: Partial collapse of the basilar segments of the right lower lobe.     
Complete collapse of left lower lobe. Mild emphysematous changes bilaterally     
and scattered wispy opacities/infiltrates..    
PLEURA: Bilateral pleural effusions, 2.8 cm in thickness on right, 3.6 cm on     
left.    
LENCHO: No mass or adenopathy.    
MEDIASTINUM: No mass or adenopathy.    
CARDIAC: No enlargement, pericardial effusion, or pericardial thickening.    
AORTA: No aneurysm or dissection.    
CHEST WALL: No mass or axillary adenopathy.    
BONES: No bone lesion or fracture.    
LIMITED ABDOMEN: No suspicious findings. Limited images of the upper abdomen.    
OTHER: Negative.    
     
    
ORDER #: [id number] CT/CT angio chest    
IMPRESSION:     
     
1. No pulmonary embolism.    
2. Large bilateral pleural effusions, left greater than right with partial     
collapse of right lower lobe basilar segments and complete collapse of left     
lower lobe.    
    
    
Assessment and Plan    
Assessment and Plan    
(1) Acute on chronic diastolic (congestive) heart failure:     
(2) Cardiomyopathy:     
      Assessment and Plan:     
Ischemic, severe, ejection fraction on today echo is 10 to 15%    
(3) Paroxysmal atrial fibrillation:     
      Assessment and Plan:     
He converted back to normal sinus rhythm.  Currently he is still on Cardizem   
infusion at 5 mg/h, he is on Toprol-XL 25 mg twice daily, Eliquis 5 mg twice   
daily, and supposed to be started on digoxin 0.25 mg daily and he received   
already 1 dose of 0.25 mg IV    
(4) Coronary artery disease:     
      Onset Date: Unknown    
(5) Status post aorto-coronary artery bypass graft:     
      Assessment and Plan:     
Recently at [facility]    
(6) Valvular heart disease:     
      Assessment and Plan:     
Including moderate mitral regurgitation and mild to moderate tricuspid   
regurgitation    
(7) Pulmonary hypertension:     
      Assessment and Plan:     
Moderate on today echo, probably secondary to left heart failure and valvular   
heart disease    
(8) Chronic kidney disease:     
(9) Pneumonia:     
(10) Essential hypertension:     
(11) Hyperlipidemia:     
      Assessment and Plan:     
On atorvastatin    
(12) Type 2 diabetes mellitus:     
    
Plan    
Continue diuresing with Bumex 1 mg/h with close monitoring of fluid balance,   
weight, renal function, and electrolytes     
Low-salt diet and restriction of total fluid intake to 1400 cc/day next    
Continue Toprol-XL 25 mg twice daily     
I will load him with amiodarone 400 mg twice daily for 1 week then 200 mg twice   
daily for another week then 200 mg daily to prevent A-fib    
Continue Eliquis for anticoagulation to prevent stroke     
I will discontinue Cardizem IV infusion and digoxin.    
Depending on the patient response and also his creatinine we will try to advance  
guideline directed medical treatment hopefully to add valsartan and or Entresto   
and later on Aldactone if possible

## 2024-12-30 NOTE — ED_ITS
HPI    
HPI - General Adult    
General    
Chief complaint: Arrhythmia/Palpitations    
Stated complaint: CARDIAC SYMPTOMS    
Time Seen by Provider: 12/30/24 06:48    
Source: patient    
Mode of arrival: ambulance    
Limitations: no limitations    
History of Present Illness    
HPI narrative:     
Patient presented to the ED complaining of nausea vomiting and shortness of   
breath.  He was sent in for elevated heart rate and shortness of breath.  He is   
tachycardic on arrival and his blood pressure is borderline low at 104/80.  He   
is 89% on 2 L nasal cannula.  Patient's had a cough and congestion.  He is   
status post CABG on 12/5 at [facility].  He denies any chest pain.  He  
does report his legs are swollen.  He said before his open heart he was walking   
but he does not really walk anymore.  His legs are swollen only tender.  No   
fever.  Patient states he is trying to cough up phlegm but he feels like it   
stuck in his chest.  He denies any abdominal pain.  Most of the history is   
provided nursing home records as patient is not the best historian.    
Related Data    
                                Home Medications    
    
    
    
?Medication ?Instructions ?Recorded ?Confirmed    
     
acetaminophen 325 mg tablet (Pain 650 mg PO Q6H PRN fever or pain 12/30/24 12/30/24    
    
Relief (acetaminophen))       
     
albuterol sulfate 2.5 mg/0.5 mL 2.5 mg inhalation Q6H PRN 12/30/24 12/30/24    
    
solution for nebulization shortness of breath or wheezing      
     
allopurinol 100 mg tablet 100 mg PO DAILY 12/30/24 12/30/24    
     
amlodipine 5 mg tablet (Norvasc) 5 mg PO DAILY 12/30/24 12/30/24    
     
apixaban 5 mg tablet (Eliquis) 5 mg PO BID 12/30/24 12/30/24    
     
aspirin 81 mg tablet,delayed 81 mg PO DAILY 12/30/24 12/30/24    
    
release (Adult Low Dose Aspirin)       
     
atorvastatin 80 mg tablet 80 mg PO DAILY 12/30/24 12/30/24    
     
bisacodyl 10 mg rectal suppository 10 mg IN DAILY PRN constipation 12/30/24 12/30/24    
     
insulin glargine 100 unit/mL (3 20 unit subcut QPM 12/30/24 12/30/24    
    
mL) subcutaneous pen (Lantus       
    
Solostar U-100 Insulin)       
     
insulin lispro 100 unit/mL 1 sliding scale dose subcut 12/30/24 12/30/24    
    
subcutaneous pen (Humalog KwikPen USEASDIRECTD      
    
(U-100) Insulin)       
     
lidocaine 4 % topical patch 1 patch topical DAILY PRN pain 12/30/24 12/30/24    
    
(Lidocaine Pain Relief)       
     
magnesium hydroxide 400 mg/5 mL 30 ml PO DAILY 12/30/24 12/30/24    
    
oral suspension (Gentle Laxative       
    
(magnesium hydroxide))       
     
metoprolol succinate 25 mg 25 mg PO Q12H 12/30/24 12/30/24    
    
tablet,extended release 24 hr       
    
(Toprol XL)       
     
pantoprazole 40 mg tablet,delayed 40 mg PO DAILY 12/30/24 12/30/24    
    
release (Protonix)       
     
sevelamer carbonate 800 mg tablet 800 mg PO TID 12/30/24 12/30/24    
    
(Renvela)       
     
simethicone 80 mg chewable tablet 80 mg PO QID PRN abdominal 12/30/24 12/30/24    
    
 distention      
    
    
    
                                    Allergies    
    
    
    
Allergy/AdvReac Type Severity Reaction Status Date / Time    
     
No Known Drug Allergies Allergy   Verified 12/30/24 07:22    
    
    
    
    
Opioid HPI    
Opioid Management    
Most Recent Opioid Data:     
    
    
                                        No Data to Display    
    
    
    
Review of Systems    
    
    
ROS      
    
 Status of ROS                          10 or more systems reviewed and unremark    
able except as noted in     
history and below       
    
    
[facility]    
Medical History (Updated 12/30/24 @ 09:28 by Lisa Melendrez DO)    
    
Type 2 diabetes mellitus    
   ?E11.9 - Type 2 diabetes mellitus without complications (ICD-10)    
Hypertension    
   ?I10 - Essential (primary) hypertension (ICD-10)    
Nutritional deficiency    
   ?E63.9 - Nutritional deficiency, unspecified (ICD-10)    
Acute kidney failure    
   ?N17.9 - Acute kidney failure, unspecified (ICD-10)    
Limitation of activities due to disability    
   ?Z73.6 - Limitation of activities due to disability (ICD-10)    
Difficulty in walking, not elsewhere classified    
   ?R26.2 - Difficulty in walking, not elsewhere classified (ICD-10)    
Gout    
   ?M10.9 - Gout, unspecified (ICD-10)    
Muscle weakness (generalized)    
   ?M62.81 - Muscle weakness (generalized) (ICD-10)    
GERD (gastroesophageal reflux disease)    
   ?K21.9 - Gastro-esophageal reflux disease without esophagitis (ICD-10)    
Hyponatremia    
   ?E87.1 - Hypo-osmolality and hyponatremia (ICD-10)    
Cardiomyopathy    
   ?I42.9 - Cardiomyopathy, unspecified (ICD-10)    
ST elevation (STEMI) myocardial infarction    
   ?I21.3 - ST elevation (STEMI) myocardial infarction of unspecified site (ICD-  
   10)    
Acute on chronic diastolic (congestive) heart failure    
   ?I50.33 - Acute on chronic diastolic (congestive) heart failure (ICD-10)    
Pneumonia    
   ?J18.9 - Pneumonia, unspecified organism (ICD-10)    
Elevated white blood cell count, unspecified    
   ?D72.829 - Elevated white blood cell count, unspecified (ICD-10)    
Angina pectoris    
   ?I20.9 - Angina pectoris, unspecified (ICD-10)    
Atherosclerosis    
   ?I70.90 - Unspecified atherosclerosis (ICD-10)    
    
    
Surgical History (Updated 12/30/24 @ 07:56 by Charleen Grijalva RN)    
    
Presence of aortocoronary bypass graft    
   ?Z95.1 - Presence of aortocoronary bypass graft (ICD-10)    
    
    
Social History    
Little interest or pleasure in doing things:  not at all     
Feeling down, depressed, or hopeless:  not at all     
    
    
    
Exam    
Narrative    
Exam Narrative:     
    
Time Seen: []    
Vital Signs:  [Per nurse's notes.]    
General:  [Alert]    
Skin:  [Warm, dry, no rash.]    
Head:  [Normocephalic, atraumatic.]    
Neck:  [Supple, trachea midline.]    
Eye:  [Pupils are equal, round and reactive to light, extraocular movements are   
intact, normal conjunctiva.]    
Ears, nose, mouth and throat:   oral mucosa dry    
Cardiovascular: Tachycardia, no murmur.]    
Respiratory: Diminished breath sounds bilaterally, rhonchi bilaterally.  No   
respiratory distress    
Chest wall:  [No tenderness, no deformity.]    
Gastrointestinal:  [Soft, nontender, non distended, normal bowel sounds.]    
MSK: No lower extremity edema.  Worse on the right.  3+ pitting on the right 2+   
on the left.  Legs are cool to touch.  Healing scab on right lower extremity   
from his CABG, no signs of cellulitis    
Psychiatric:  [Cooperative, appropriate mood & affect.]    
Neurological:  [Alert and oriented to person, place, no focal neurological   
deficit observed.]    
     
Constitutional    
Vital Signs, click to edit/add:     
    
                                Last Vital Signs    
    
    
    
Temp  98.0 F   12/30/24 06:48    
     
Pulse  132 H  12/30/24 07:00    
     
Resp  17   12/30/24 07:00    
     
BP  104/80   12/30/24 07:00    
     
Pulse Ox  97   12/30/24 07:00    
     
O2 Del Method  Nasal Cannula  12/30/24 06:48    
     
O2 Flow Rate  2   12/30/24 06:48    
    
    
    
    
    
Course    
Vital Signs    
Vital signs:     
    
                                   Vital Signs    
    
    
    
Temperature  98.0 F   12/30/24 06:48    
     
Pulse Rate  134 H  12/30/24 06:48    
     
Respiratory Rate  17   12/30/24 06:48    
     
Blood Pressure  108/77   12/30/24 06:48    
     
Pulse Oximetry  89 L  12/30/24 06:48    
     
Oxygen Delivery Method  Nasal Cannula  12/30/24 06:48    
     
Oxygen Delivery Flow Rate  2   12/30/24 06:48    
    
    
                                            
    
    
    
Temperature  98.0 F   12/30/24 06:48    
     
Pulse Rate  132 H  12/30/24 07:00    
     
Respiratory Rate  17   12/30/24 07:00    
     
Blood Pressure  104/80   12/30/24 07:00    
     
Pulse Oximetry  97   12/30/24 07:00    
     
Oxygen Delivery Method  Nasal Cannula  12/30/24 06:48    
     
Oxygen Delivery Flow Rate  2   12/30/24 06:48    
    
    
    
    
    
Medical Decision Making    
[facility] Narrative    
Medical decision making narrative:     
Patient has an elevated BNP at greater than 35,000.  Troponins elevated, I have   
no previous troponin for comparison.  Creatinine 1.8.  Patient was given 40 of   
Lasix IV.  Christianson catheter placed for I's and O's.  Patient has pneumonia on the   
chest x-ray as well as fluid overload.  He does have an elevated white blood   
cell count and elevated lactate, patient is septic but not in septic shock.    
Vascular studies showed no DVT.  Pulses were able to be dopplered.    
I spoke to Dr. Tony about admitting the patient here and he requests consulting   
with his cardiothoracic surgeon at  first.    
I then spoke to Dr. Carranza at  and he said this is most likely pneumonia and   
volume overload, if they are comfortable diuresing the patient here he is okay   
with him staying here and if there are any issues he can be transferred to .    
Dr. Tony was then called back and he is comfortable with keeping the patient here  
for diuresis and further monitoring and treatment of his pneumonia.  Patient is   
comfortable with care plan for admission and is stable in ED.  We will be   
admitting him to ICU    
Differential Diagnosis    
Differential Diagnosis: Pneumonia, ACS, CHF, electrolyte abnormality, sepsis    
Medical Records    
Medical records narrative:     
Per Dr. Carranza his EF was 30% prior to his CABG.  Creatinine on discharge from   
 was 2.5.    
Lab Data    
Lab results reviewed: Yes I reviewed the patient's lab results    
Labs:     
    
                                   Lab Results    
    
    
    
  12/30/24 12/30/24 Range/Units    
    
  07:25 08:45     
     
WBC  13.0 H   (4.0-11.0)  10^3/uL    
     
RBC  3.95 L   (4.70-6.10)  10^6/uL    
     
Hgb  10.8 L   (14.0-18.0)  g/dL    
     
Hct  36.3 L   (42.0-54.0)  %    
     
MCV  91.9   (80.0-94.0)  fL    
     
MCH  27.3   (25.9-34.0)  pg    
     
MCHC  29.8 L   (29.9-35.2)  g/dL    
     
RDW  15.3 H   (11.0-15.0)  %    
     
Plt Count  254   (150-450)  10^3/uL    
     
MPV  9.7   (9.5-13.5)  fL    
     
Neut % (Auto)  73.4   (43.0-75.0)  %    
     
Lymph % (Auto)  17.0 L   (20.5-60.0)  %    
     
Mono % (Auto)  8.1   (1.7-12.0)  %    
     
Eos % (Auto)  0.8 L   (0.9-7.0)  %    
     
Baso % (Auto)  0.2   (0.2-2.0)  %    
     
Neut # (Auto)  9.5 H   (1.4-6.5)  10^3/uL    
     
Lymph # (Auto)  2.2   (1.2-3.8)  10^3/uL    
     
Mono # (Auto)  1.1 H   (0.3-0.8)  10^3/uL    
     
Eos # (Auto)  0.1   (0.0-0.7)  10^3/uL    
     
Baso # (Auto)  0.0   (0.0-0.1)  10^3/uL    
     
Abs Immat Gran (auto)  0.07 H   (0.00-0.03)  10^3/uL    
     
Imm/Tot Granulo (auto)  0.5   (0.0-0.5)  %    
     
Sodium  142   (136-145)  mmol/L    
     
Potassium  4.8   (3.5-5.1)  mmol/L    
     
Chloride  104   ()  mmol/L    
     
Carbon Dioxide  28.8   (21.0-32.0)  mmol/L    
     
Anion Gap  14.0       
     
BUN  44.0 H   (7.0-18.0)  mg/dL    
     
Creatinine  1.89 H   (0.70-1.30)  mg/dL    
     
Est GFR ( Amer)  43 L   (>=60 mL/min/1.73m^2)      
     
Est GFR (Non-Af Amer)  35 L   (>=60 mL/min/1.73m^2)      
     
BUN/Creatinine Ratio  23.3       
     
Glucose  62 L   ()  mg/dL    
     
Lactate  2.3 H*   (0.4-2.0)  mmol/L    
     
Calcium  8.4 L   (8.5-10.1)  mg/dL    
     
Magnesium  2.7 H   (1.8-2.4)  mg/dL    
     
Troponin I High Sens  82.5 H*   (4.0-76.1)  pg/mL    
     
NT-Pro-B Natriuret Pep  >92728.0 H*   (<=900.0)  pg/mL    
     
Urine Color   Yellow  (YELLOW)      
     
Urine Clarity   Clear  (CLEAR)      
     
Urine pH   6.0  (5.0-9.0)      
     
Ur Specific Gravity   1.025  (1.005-1.025)      
     
Urine Protein   100 A  (NEG/TRACE)  mg/dL    
     
Urine Glucose (UA)   Negative  (NEGATIVE)  mg/dL    
     
Urine Ketones   Negative  (NEGATIVE)  mg/dL    
     
Urine Occult Blood   Negative  (NEGATIVE)      
     
Urine Nitrite   Negative  (NEGATIVE)      
     
Urine Bilirubin   Negative  (NEGATIVE)      
     
Urine Urobilinogen   0.2  (0.2-1.0)  EU/dL    
     
Ur Leukocyte Esterase   Negative  (NEGATIVE)      
     
Urine RBC   0-2  (0-2)  #/HPF    
     
Urine WBC   5-10 A  (NONE SEEN)  #/HPF    
     
Ur Squamous Epith Cells   Few A  (NONE/RARE)  #/LPF    
     
Urine Crystals   None seen  (None Seen)  #/HPF    
     
Urine Bacteria   Trace A  (NONE SEEN)  #/HPF    
     
Urine Casts   Seen A  (NONE SEEN)  #/LPF    
     
Hyaline Casts   Rare      
     
Urine Mucus   Trace A  (NONE SEEN)      
     
Ur Culture Indicated?   Yes      
    
    
    
    
Imaging Data    
Chest x-ray:     
      Radiologist's impression:     
    
ITS Impressions    
    
Venous Doppler Study  12/30/24 07:07    
IMPRESSION:    
     
1. No deep vein thrombus within the right and left lower extremity.    
     
     
Electronically authenticated by: JAMES CAMPOS   Date: 12/30/2024  08:18    
    
    
Chest X-Ray  12/30/24 07:30    
IMPRESSION:     
     
1. Moderate right, moderate to marked left pulmonary infiltrates suggestive of     
     
pneumonia. Atelectasis is felt less likely.    
2. Small to moderate right pleural effusion and suspected left pleural     
effusion.    
     
     
Electronically authenticated by: JAMES CAMPOS   Date: 12/30/2024  07:46    
    
    
    
    
ECG Data    
Attestation: I personally reviewed and interpreted this ECG as follows:    
Interpretation:     
    
EKG INTERPRETATION    
Time: [] 651    
Rate: [] 133    
Rhythm: _ [] Sinus tachycardia    
ST segments: _ [] No acute ST elevation or depression    
T waves: _ []    
Ectopy: _ []    
P wave/IN interval: _ []    
QRS interval: _ []    
QT interval: _ []    
Comparison: _ []    
Comparison EKG date: []    
Performed by: [self] [facility]    
     
    
Discharge Plan    
Discharge    
Chief Complaint: Arrhythmia/Palpitations    
    
Clinical Impression:    
 Sepsis, Pneumonia, CHF (congestive heart failure)    
    
    
Patient Disposition: Admitted As Inpatient    
    
Time of Disposition Decision: 09:27    
    
Condition: Fair    
    
Prescriptions / Home Meds:    
No Action    
  acetaminophen [Pain Relief (acetaminophen)] 325 mg tablet     
   650 mg PO Q6H PRN (Reason: fever or pain)     
  allopurinol 100 mg tablet     
   100 mg PO DAILY     
  aspirin [Adult Low Dose Aspirin] 81 mg tablet,delayed release (DR/EC)     
   81 mg PO DAILY     
  atorvastatin 80 mg tablet     
   80 mg PO DAILY     
  Eliquis 5 mg tablet     
   5 mg PO BID     
  insulin lispro [Humalog KwikPen Insulin] 100 unit/mL insulin pen     
   1 sliding scale dose subcut USEASDIRECTD     
  insulin glargine [Lantus Solostar U-100 Insulin] 100 unit/mL (3 mL) insulin   
pen     
   20 unit subcut QPM     
  lidocaine [Lidocaine Pain Relief] 4 % adhesive patch,medicated     
   1 patch topical DAILY PRN (Reason: pain)     
  magnesium hydroxide [Gentle Laxative (mag hydrox)] 400 mg/5 mL suspension     
   30 ml PO DAILY     
  metoprolol succinate [Toprol XL] 25 mg tablet extended release 24 hr     
   25 mg PO Q12H     
  amlodipine [Norvasc] 5 mg tablet     
   5 mg PO DAILY     
  pantoprazole [Protonix] 40 mg tablet,delayed release (DR/EC)     
   40 mg PO DAILY     
  sevelamer carbonate [Renvela] 800 mg tablet     
   800 mg PO TID     
   Rx Instructions:    
   must administer with a meal/food    
  simethicone 80 mg tablet,chewable     
   80 mg PO QID PRN (Reason: abdominal distention)     
  albuterol sulfate 2.5 mg/0.5 mL solution for nebulization     
   2.5 mg inhalation Q6H PRN (Reason: shortness of breath or wheezing)     
  bisacodyl 10 mg suppository     
   10 mg IN DAILY PRN (Reason: constipation)     
    
Print Language: English    
    
Referrals:    
BLANCA AUGUSTIN [Primary Care Provider] - 1 week

## 2024-12-30 NOTE — ECG_ITS
[facility] 
                                        
                                       Test Date:    2024 
Pat Name:     PARIS ESPINAL             Department:    
Patient ID:   [id number]               Room:         [facility] 
Gender:       Male                     Technician:    
:          1950               Requested By: BLANCA AUGUSTIN 
Order Number: [id number]              Reading MD:   MATEUSZ BECKHAM 
                                 Measurements 
Intervals                              Axis           
Rate:         74                       P:            65 
OK:           126                      QRS:          116 
QRSD:         106                      T:            270 
QT:           440                                     
QTc:          467                                     
                           Interpretive Statements 
1100 Sinus rhythm 
3114 Cannot rule out anterior myocardial infarction, age undetermined 
5120 Possible right ventricular hypertrophy 
6220 Possible left atrial enlargement 
Inferolateral STT wave changes, can't exclude myocardial ischemia 
9150 **  abnormal ECG  ** 
Electronically Signed On 2025 16:25:20 EST by MATEUSZ BECKHAM

## 2024-12-30 NOTE — US_ITS
[facility] [place] [id number] 
     [phone] 
  
  
Patient Name: 
PARIS ESPINAL 
  
MRN: [id number]    YOB: 1950    Sex: M 
Assigned Patient Location: ED.MAIN 
Current Patient Location: ED.MAIN 
Accession/Order Number: [id number] 
Exam Date: 12/30/2024  07:34    Report Date: 12/30/2024  08:18 
  
At the request of: 
VANESSA SKELTON   
  
Procedure:  US venous doppler LE BI 
  
EXAMINATION: US venous doppler LE BI  
  
HISTORY: leg pain/swelling 
  
COMPARISON: No relevant comparison available.  
  
FINDINGS: 
  
REGION: Bilateral lower extremities 
THROMBI: None within deep system. Segments of thrombosed and partially  
thrombosed great saphenous vein. Prior harvesting of distal great saphenous  
vein. 
COMPRESSIBILITY: Normal compressibility of deep system. 
FLOW: Slightly increased velocity and slight pulsatile appearance of waveform  
within deep veins bilaterally. 
OTHER: None. 
  
ORDER #: [id number] US/US venous doppler LE BI  
IMPRESSION:  
   
1. No deep vein thrombus within the right and left lower extremity.  
   
   
Electronically authenticated by: JAMES CAMPOS   Date: 12/30/2024  08:18

## 2024-12-30 NOTE — P.HP_ITS
HPI    
H&P: HPI    
History of Present Illness    
Chief complaint: CARDIAC SYMPTOMS, CHF, PNEUMONIA, SEPSIS    
Narrative:     
Patient currently in rehab for coronary artery bypass surgery, he states since   
the surgery really had some shortness of breath, but over the last 24 hours is   
increasing cough and shortness of breath with hypoxia noted at rehab facility.    
Patient presented to emergency room with tachycardia, respiratory distress   
hypoxia with O2 sat of 89% on 2 L, leukocytosis with lactic acidosis consistent   
with pneumonia with severe sepsis    
    
When I saw patient in the emergency room, in bed, resting uncomfortably   
secondary to cough and shortness of breath.    
    
Opioid HPI    
Opioid Management    
Most Recent Pain and Opioid Data:     
    
    
                          Last Pain Assessment      12/30/24 10:00    
     
                Last ORT Total Score 0               12/30/24 10:00  12/30/24    
     
                Last ORT Risk Category Low Risk        12/30/24 10:00  12/30/24    
    
    
    
Review of Systems    
    
    
ROS      
    
 Status of ROS                          10 or more systems reviewed and unremark    
able except as noted in     
history and below       
    
    
[facility]    
Medical History (Updated 12/30/24 @ 10:08 by Philip Tony MD)    
    
Type 2 diabetes mellitus    
   ?E11.9 - Type 2 diabetes mellitus without complications (ICD-10)    
Hypertension    
   ?I10 - Essential (primary) hypertension (ICD-10)    
Nutritional deficiency    
   ?E63.9 - Nutritional deficiency, unspecified (ICD-10)    
Acute kidney failure    
   ?N17.9 - Acute kidney failure, unspecified (ICD-10)    
Limitation of activities due to disability    
   ?Z73.6 - Limitation of activities due to disability (ICD-10)    
Difficulty in walking, not elsewhere classified    
   ?R26.2 - Difficulty in walking, not elsewhere classified (ICD-10)    
Gout    
   ?M10.9 - Gout, unspecified (ICD-10)    
Muscle weakness (generalized)    
   ?M62.81 - Muscle weakness (generalized) (ICD-10)    
GERD (gastroesophageal reflux disease)    
   ?K21.9 - Gastro-esophageal reflux disease without esophagitis (ICD-10)    
Hyponatremia    
   ?E87.1 - Hypo-osmolality and hyponatremia (ICD-10)    
Cardiomyopathy    
   ?I42.9 - Cardiomyopathy, unspecified (ICD-10)    
ST elevation (STEMI) myocardial infarction    
   ?I21.3 - ST elevation (STEMI) myocardial infarction of unspecified site (ICD-  
   10)    
Acute on chronic diastolic (congestive) heart failure    
   ?I50.33 - Acute on chronic diastolic (congestive) heart failure (ICD-10)    
Pneumonia    
   ?J18.9 - Pneumonia, unspecified organism (ICD-10)    
Elevated white blood cell count, unspecified    
   ?D72.829 - Elevated white blood cell count, unspecified (ICD-10)    
Angina pectoris    
   ?I20.9 - Angina pectoris, unspecified (ICD-10)    
Atherosclerosis    
   ?I70.90 - Unspecified atherosclerosis (ICD-10)    
    
    
Surgical History (Updated 12/30/24 @ 07:56 by Charleen Griajlva RN)    
    
Presence of aortocoronary bypass graft    
   ?Z95.1 - Presence of aortocoronary bypass graft (ICD-10)    
    
    
Social History    
Highest level of school completed/degree received:  some college, no degree     
Little interest or pleasure in doing things:  not at all     
Feeling down, depressed, or hopeless:  not at all     
    
    
    
Meds    
Home Medications and Allergies    
                                Home Medications    
    
    
    
?Medication ?Instructions ?Recorded ?Confirmed ?Type    
     
acetaminophen 325 mg tablet (Pain 650 mg PO Q6H PRN fever or pain 12/30/24 12/30/24 History    
    
Relief (acetaminophen))        
     
albuterol sulfate 2.5 mg/0.5 mL 2.5 mg inhalation Q6H PRN 12/30/24 12/30/24   
History    
    
solution for nebulization shortness of breath or wheezing       
     
allopurinol 100 mg tablet 100 mg PO DAILY 12/30/24 12/30/24 History    
     
amlodipine 5 mg tablet (Norvasc) 5 mg PO DAILY 12/30/24 12/30/24 History    
     
apixaban 5 mg tablet (Eliquis) 5 mg PO BID 12/30/24 12/30/24 History    
     
aspirin 81 mg tablet,delayed 81 mg PO DAILY 12/30/24 12/30/24 History    
    
release (Adult Low Dose Aspirin)        
     
atorvastatin 80 mg tablet 80 mg PO DAILY 12/30/24 12/30/24 History    
     
bisacodyl 10 mg rectal suppository 10 mg WV DAILY PRN constipation 12/30/24 12/30/24 History    
     
insulin glargine 100 unit/mL (3 20 unit subcut QPM 12/30/24 12/30/24 History    
    
mL) subcutaneous pen (Lantus        
    
Solostar U-100 Insulin)        
     
insulin lispro 100 unit/mL 1 sliding scale dose subcut 12/30/24 12/30/24 History    
    
subcutaneous pen (Humalog KwikPen USEASDIRECTD       
    
(U-100) Insulin)        
     
lidocaine 4 % topical patch 1 patch topical DAILY PRN pain 12/30/24 12/30/24   
History    
    
(Lidocaine Pain Relief)        
     
magnesium hydroxide 400 mg/5 mL 30 ml PO DAILY 12/30/24 12/30/24 History    
    
oral suspension (Gentle Laxative        
    
(magnesium hydroxide))        
     
metoprolol succinate 25 mg 25 mg PO Q12H 12/30/24 12/30/24 History    
    
tablet,extended release 24 hr        
    
(Toprol XL)        
     
pantoprazole 40 mg tablet,delayed 40 mg PO DAILY 12/30/24 12/30/24 History    
    
release (Protonix)        
     
sevelamer carbonate 800 mg tablet 800 mg PO TID 12/30/24 12/30/24 History    
    
(Renvela)        
     
simethicone 80 mg chewable tablet 80 mg PO QID PRN abdominal 12/30/24 12/30/24   
History    
    
 distention       
    
    
    
                                    Allergies    
    
    
    
Allergy/AdvReac Type Severity Reaction Status Date / Time    
     
No Known Drug Allergies Allergy   Verified 12/30/24 07:22    
    
    
    
    
Exam    
Constitutional    
Vital Signs, click to edit/add:     
                                Last Vital Signs    
    
    
    
Temp  98.0 F   12/30/24 06:48    
     
Pulse  134 H  12/30/24 09:30    
     
Resp  17   12/30/24 09:30    
     
BP  122/95 H  12/30/24 09:00    
     
Pulse Ox  99   12/30/24 08:30    
     
O2 Del Method  Nasal Cannula  12/30/24 06:48    
     
O2 Flow Rate  2   12/30/24 06:48    
    
    
    
Documenting provider has reviewed patient's vital signs: yes    
Common normals: apparent distress (Moderate respiratory distress)    
Respiratory    
Common normals: abnormal respiratory effort (Moderate respiratory distress) and   
not clear to ascultation bilaterally    
Auscultation: rales, rhonchi, wheezes and egophony (Left lower lobe)    
Cardio    
Common normals: irregular rate and irregular rhythm    
Rate: tachycardic    
Rhythm: abnormal rhythm    
GI    
Common normals: Normal to inspection, nondistended, normoactive bowel sounds   
present    
Extremity    
Common normals: normal to inspection (3+ edema)    
    
Results    
Labs    
Labs:     
                                    Short CBC    
    
    
    
  12/30/24 Range/Units    
    
  07:25     
     
WBC  13.0 H  (4.0-11.0)  10^3/uL    
     
Hgb  10.8 L  (14.0-18.0)  g/dL    
     
Hct  36.3 L  (42.0-54.0)  %    
     
Plt Count  254  (150-450)  10^3/uL    
    
    
                                       BMP    
    
    
    
  12/30/24    
    
  07:25    
     
Sodium  142    
     
Potassium  4.8    
     
Chloride  104    
     
Carbon Dioxide  28.8    
     
BUN  44.0 H    
     
Creatinine  1.89 H    
     
Glucose  62 L    
     
Calcium  8.4 L    
    
    
                                      Urine    
    
    
    
  12/30/24 Range/Units    
    
  08:45     
     
Urine Color  Yellow  (YELLOW)      
     
Urine Clarity  Clear  (CLEAR)      
     
Urine pH  6.0  (5.0-9.0)      
     
Ur Specific Gravity  1.025  (1.005-1.025)      
     
Urine Protein  100 A  (NEG/TRACE)  mg/dL    
     
Urine Glucose (UA)  Negative  (NEGATIVE)  mg/dL    
    
    
    
    
Assessment and Plan    
Assessment and Plan    
(1) CHF (congestive heart failure):     
(2) Pneumonia:     
    
Plan    
Admission findings: Tachycardia with rate over 130, respiratory distress, acute   
hypoxia with O2 sat of 89% on 2 L, leukocytosis, lactic acidosis, left lower   
lobe pneumonia on x-ray pleural effusions bilaterally.  All of this is resulted   
in severe sepsis without septic shock    
--------------------------------------------------------------------------------    
    
    
Nosocomial pneumonia's and left lower lobe with severe sepsis-IV antibiotics   
broad-spectrum, aerosol treatments, need levo albuterol secondary to   
tachycardia, will hold off on steroids unless significant metabolic acidosis,   
try to obtain sputum culture, blood cultures pending    
--------------------------------------------------------------------------------    
    
    
Acute combined congestive heart failure-elevated BNP, uncertain baseline BNP,   
given 40 Lasix in ER, will diurese with Bumex later today.  Check   
echocardiogram, consult to cardiology    
--------------------------------------------------------------------------------    
    
    
Elevated high-sensitivity troponin-possible NSTEMI type II based on severe   
sepsis and pneumonia as outlined above, check echocardiogram, consult to   
cardiology    
--------------------------------------------------------------------------------    
    
    
Iron deficiency anemia-monitor daily    
--------------------------------------------------------------------------------    
    
    
Chronic kidney disease stage III-monitor daily, likely did here to deteriorate   
secondary treatment for the sepsis and CHF as outlined above    
--------------------------------------------------------------------------------    
    
    
Tachycardia-looks like atrial fibrillation with rapid ventricular response, will  
try to slow rate with beta-blockers Lanoxin and Cardizem.    
--------------------------------------------------------------------------------    
    
    
With tachycardia, although patient is anticoagulated if D-dimer significantly   
elevated, 2 times normal, will check CTA of chest    
--------------------------------------------------------------------------------    
    
    
Acute UTI-urine is positive, cultures pending, on antibiotics as outlined above    
--------------------------------------------------------------------------------    
    
    
Coronary artery disease-status post bypass-continue with home medications    
--------------------------------------------------------------------------------    
    
    
Hypomagnesemia-monitor daily    
--------------------------------------------------------------------------------    
    
    
Patient describes sacral wound-will consult to wound, dressing in place    
--------------------------------------------------------------------------------    
    
    
Admission status: Severe sepsis without septic shock secondary to left lower   
lobe pneumonia and acute combined congestive heart failure as a consequence of   
the severe sepsis, admit to ICU, medically necessary  treatment will span 2   
midnights.  Inpatient status    
    
Urinary Catheter Management    
Urinary Catheter Management    
Urethral:     
      Cath placed during this visit: yes    
      Urethral indwelling: No    
      Insertion date: 12/30/24    
      Insertion time: 08:30

## 2024-12-30 NOTE — CT_ITS
[address] [address] 
     [phone] 
  
  
Patient Name: 
PARIS ESPINAL 
  
MRN: [id number]    YOB: 1950    Sex: M 
Assigned Patient Location: ICU 
Current Patient Location: ICU 
Accession/Order Number: [id number] 
Exam Date: 12/30/2024  12:40    Report Date: 12/30/2024  13:08 
  
At the request of: 
CHERYL PATINO   
  
Procedure:  CT angio chest 
  
EXAMINATION: CT angio chest  
  
HISTORY: pos d dimer , shortness breath, tachycardia 
  
COMPARISON: No relevant comparison available.  
  
TECHNIQUE: Multi-planar CT images were created with IV contrast. Axial,  
Coronal, and Sagittal images. Dose reduction techniques were achieved by using  
  
automated exposure control and/or adjustment of mA and/or kV according to  
patient size and/or use of iterative reconstruction technique. 3-D  
reconstruction was performed on a separate workstation. 
  
  
FINDINGS:  
VASCULATURE: No pulmonary embolism or abnormal opacity. 
LUNGS: Partial collapse of the basilar segments of the right lower lobe.  
Complete collapse of left lower lobe. Mild emphysematous changes bilaterally  
and scattered wispy opacities/infiltrates.. 
PLEURA: Bilateral pleural effusions, 2.8 cm in thickness on right, 3.6 cm on  
left. 
LENCHO: No mass or adenopathy. 
MEDIASTINUM: No mass or adenopathy. 
CARDIAC: No enlargement, pericardial effusion, or pericardial thickening. 
AORTA: No aneurysm or dissection. 
CHEST WALL: No mass or axillary adenopathy. 
BONES: No bone lesion or fracture. 
LIMITED ABDOMEN: No suspicious findings. Limited images of the upper abdomen. 
OTHER: Negative. 
  
ORDER #: [id number] CT/CT angio chest  
IMPRESSION:   
   
1. No pulmonary embolism.  
2. Large bilateral pleural effusions, left greater than right with partial   
collapse of right lower lobe basilar segments and complete collapse of left   
lower lobe.  
   
   
Electronically authenticated by: JAMES CAMPOS   Date: 12/30/2024  13:08

## 2024-12-30 NOTE — ECG_ITS
[facility] 
                                        
                                       Test Date:    2024 
Pat Name:     PARIS ESPINAL             Department:    
Patient ID:   [id number]               Room:         [facility] 
Gender:       Male                     Technician:    
:          1950               Requested By: BLANCA AUGUSTIN 
Order Number: [id number]              Reading MD:   MATEUSZ BECKHAM 
                                 Measurements 
Intervals                              Axis           
Rate:         120                      P:            [id number] 
TX:           -[id number]                   QRS:          122 
QRSD:         104                      T:            235 
QT:           370                                     
QTc:          441                                     
                           Interpretive Statements 
50425 Atrial fibrillation with rapid ventricular response 
3114 Cannot rule out anterior myocardial infarction, age undetermined 
5120 Possible right ventricular hypertrophy 
9150 **  abnormal ECG  ** 
Electronically Signed On 2025 16:24:23 EST by MATEUSZ BECKHAM

## 2024-12-30 NOTE — PM.CACN
History of Present Illness
History of Present Illness
Consult date: 12/30/24
Requesting physician: Philip Tony
Chief complaint: CARDIAC SYMPTOMS, CHF, PNEUMONIA, SEPSIS
Narrative: 
Patient is 74-year-old male with history of coronary artery disease, ischemic and myopathy, recent CABG in [facility], postop atrial fibrillation, hypertension, hyperlipidemia, and insulin-dependent diabetes mellitus.  He was 
discharged to skilled nursing facility where he has been for few days.  He was admitted with worsening shortness of breath and legs edema and he was found to be in A-fib.  He was on Cardizem IV infusion he was given a dose of digoxin IV and he 
converted back to normal sinus rhythm.  He was given a dose of Lasix 40 mg IV then he was started on Bumex IV infusion at 1 mg/h.  Also he was scheduled to take digoxin 0.25 mg every 6 hours for 3 doses.

The patient states that he already feels better.  He is on oxygen.  He denies any chest pains.  He did denies any dizziness.

Review of Systems
ROS  
 Narrative All systems were reviewed and they were negative except for the positive findings noted above in the history   

[facility]
Medical History (Updated 12/30/24 @ 17:29 by Enedina Camargo MD)

Type 2 diabetes mellitus
 ?E11.9 - Type 2 diabetes mellitus without complications (ICD-10)
Hypertension
 ?I10 - Essential (primary) hypertension (ICD-10)
Nutritional deficiency
 ?E63.9 - Nutritional deficiency, unspecified (ICD-10)
Acute kidney failure
 ?N17.9 - Acute kidney failure, unspecified (ICD-10)
Limitation of activities due to disability
 ?Z73.6 - Limitation of activities due to disability (ICD-10)
Difficulty in walking, not elsewhere classified
 ?R26.2 - Difficulty in walking, not elsewhere classified (ICD-10)
Gout
 ?M10.9 - Gout, unspecified (ICD-10)
Muscle weakness (generalized)
 ?M62.81 - Muscle weakness (generalized) (ICD-10)
GERD (gastroesophageal reflux disease)
 ?K21.9 - Gastro-esophageal reflux disease without esophagitis (ICD-10)
Hyponatremia
 ?E87.1 - Hypo-osmolality and hyponatremia (ICD-10)
Cardiomyopathy
 ?I42.9 - Cardiomyopathy, unspecified (ICD-10)
ST elevation (STEMI) myocardial infarction
 ?I21.3 - ST elevation (STEMI) myocardial infarction of unspecified site (ICD-10)
Acute on chronic diastolic (congestive) heart failure
 ?I50.33 - Acute on chronic diastolic (congestive) heart failure (ICD-10)
Pneumonia
 ?J18.9 - Pneumonia, unspecified organism (ICD-10)
Elevated white blood cell count, unspecified
 ?D72.829 - Elevated white blood cell count, unspecified (ICD-10)
Angina pectoris
 ?I20.9 - Angina pectoris, unspecified (ICD-10)
Atherosclerosis
 ?I70.90 - Unspecified atherosclerosis (ICD-10)


Surgical History (Updated 12/30/24 @ 17:22 by Enedina Camargo MD)

Presence of aortocoronary bypass graft
 ?Z95.1 - Presence of aortocoronary bypass graft (ICD-10)


Social History
Highest level of school completed/degree received:  some college, no degree 
Little interest or pleasure in doing things:  several days 
Feeling down, depressed, or hopeless:  several days 



Meds
Home Medications and Allergies
Home Medications

?Medication ?Instructions ?Recorded ?Confirmed ?Type
acetaminophen 325 mg tablet (Pain 650 mg PO Q6H PRN fever or pain 12/30/24 12/30/24 History
Relief (acetaminophen))    
albuterol sulfate 2.5 mg/0.5 mL 2.5 mg inhalation Q6H PRN 12/30/24 12/30/24 History
solution for nebulization shortness of breath or wheezing   
allopurinol 100 mg tablet 100 mg PO DAILY 12/30/24 12/30/24 History
amlodipine 5 mg tablet (Norvasc) 5 mg PO DAILY 12/30/24 12/30/24 History
apixaban 5 mg tablet (Eliquis) 5 mg PO BID 12/30/24 12/30/24 History
aspirin 81 mg tablet,delayed 81 mg PO DAILY 12/30/24 12/30/24 History
release (Adult Low Dose Aspirin)    
atorvastatin 80 mg tablet 80 mg PO DAILY 12/30/24 12/30/24 History
bisacodyl 10 mg rectal suppository 10 mg [place] DAILY PRN constipation 12/30/24 12/30/24 History
insulin glargine 100 unit/mL (3 20 unit subcut QPM 12/30/24 12/30/24 History
mL) subcutaneous pen (Lantus    
Solostar U-100 Insulin)    
insulin lispro 100 unit/mL 1 sliding scale dose subcut 12/30/24 12/30/24 History
subcutaneous pen (Humalog KwikPen USEASDIRECTD   
(U-100) Insulin)    
lidocaine 4 % topical patch 1 patch topical DAILY PRN pain 12/30/24 12/30/24 History
(Lidocaine Pain Relief)    
magnesium hydroxide 400 mg/5 mL 30 ml PO DAILY 12/30/24 12/30/24 History
oral suspension (Gentle Laxative    
(magnesium hydroxide))    
metoprolol succinate 25 mg 25 mg PO Q12H 12/30/24 12/30/24 History
tablet,extended release 24 hr    
(Toprol XL)    
pantoprazole 40 mg tablet,delayed 40 mg PO DAILY 12/30/24 12/30/24 History
release (Protonix)    
sevelamer carbonate 800 mg tablet 800 mg PO TID 12/30/24 12/30/24 History
(Renvela)    
simethicone 80 mg chewable tablet 80 mg PO QID PRN abdominal 12/30/24 12/30/24 History
 distention   


Allergies

Allergy/AdvReac Type Severity Reaction Status Date / Time
No Known Drug Allergies Allergy   Verified 12/30/24 07:22



Exam
Narrative
Exam Narrative: 
He is alert, oriented, not in apparent distress
HEENT within normal limits
Neck: Supple normal range of motion no lymphadenopathies thyroid is normal jugular venous pressure is elevated
Lungs clear to auscultation with reduced breath sounds in the bases next
Cardiovascular system regular rate and rhythm, normal S1 and S2, no gallop or murmur or click
Extremities at least +2 edema bilaterally, no cyanosis or clubbing.  Pedal pulses are palpable bilaterally
Neurological examination grossly normal
Constitutional
Vital Signs, click to edit/add: 
Last Vital Signs

Temp  97.7 F   12/30/24 11:12
Pulse  80   12/30/24 16:36
Resp  18   12/30/24 16:30
BP  129/74   12/30/24 16:30
Pulse Ox  89 L  12/30/24 16:36
O2 Del Method  Nasal Cannula  12/30/24 16:36
O2 Flow Rate  3   12/30/24 16:36



Results
Labs and Meds
Lab results: 
Cardiac Enzymes

  12/30/24 Range/Units
  07:25 
AST  211 H  (15-37)  U/L

CBC

  12/30/24 Range/Units
  07:25 
WBC  13.0 H  (4.0-11.0)  10^3/uL
RBC  3.95 L  (4.70-6.10)  10^6/uL
Hgb  10.8 L  (14.0-18.0)  g/dL
Hct  36.3 L  (42.0-54.0)  %
Plt Count  254  (150-450)  10^3/uL
Neut # (Auto)  9.5 H  (1.4-6.5)  10^3/uL
Lymph # (Auto)  2.2  (1.2-3.8)  10^3/uL
Mono # (Auto)  1.1 H  (0.3-0.8)  10^3/uL
Eos # (Auto)  0.1  (0.0-0.7)  10^3/uL
Baso # (Auto)  0.0  (0.0-0.1)  10^3/uL

Comprehensive Metabolic Panel

  12/30/24 Range/Units
  07:25 
Sodium  142  (136-145)  mmol/L
Potassium  4.8  (3.5-5.1)  mmol/L
Chloride  104  ()  mmol/L
Carbon Dioxide  28.8  (21.0-32.0)  mmol/L
BUN  44.0 H  (7.0-18.0)  mg/dL
Creatinine  1.89 H  (0.70-1.30)  mg/dL
Glucose  62 L  ()  mg/dL
Calcium  8.4 L  (8.5-10.1)  mg/dL
Direct Bilirubin  0.4 H  (0.0-0.2)  mg/dL
AST  211 H  (15-37)  U/L
ALT  133 H  (16-63)  U/L
Alkaline Phosphatase  97  ()  U/L
Total Protein  6.1 L  (6.4-8.2)  g/dL
Albumin  2.8 L  (3.4-5.0)  g/dL

Intake and Output

 12/30/24 12/30/24 12/30/24
 07:59 15:59 23:59
Intake Total  1361.833 / 1361.833 
Output Total  800 / 800 
Balance  561.833 / 561.833 
Intake:   
  Oral  240 / 240 
  IV  1121.833 / 1121.833 
    0.9 % Sodium Chloride 500 ml @  500 / 500 
    500 mls/hr IV .Q1H ONE Rx#:   
    [id number]   
    Linezolid in Dextrose 5% 600 mg  300 / 300 
    In 300 ml @ 300 mls/hr IV Q12H   
    [facility] Rx#:[id number]   
    Piperacillin Sodium/Tazobactam  50 / 50 
    3.375 gm In 0.9 % Sodium   
    Chloride 50 ml @ 12.5 mls/hr IV   
    Q8H [facility] Rx#:[id number]   
    Vancomycin HCl 1,000 mg In 0.9  250 / 250 
    % Sodium Chloride 250 ml @ 250   
    mls/hr IV ONCE ONE Rx#:[id number]   
    dilTIAZem  mg In 0.9 %  21.833 / 21.833 
    Sodium Chloride 100 ml @ 10 mls   
    /hr IV Q12H [facility] Rx#:[id number]   
Output:   
  Urine Amount (Catheter)  800 / 800 
    Urethral  800 / 800 
Other:   
  Weight 81.647 kg 87.6 kg 

Patient Weight

 12/31/24
 07:59
Weight 87.6 kg
EKG today showed normal sinus rhythm, right axis deviation, old anterior wall MI, ST-T abnormalities.

Echo performed today 12/30/2024
Mildly dilated left ventricle
Severely reduced left ventricle systolic function, ejection fraction 10 to 15% with segmental wall motion abnormalities
Moderate mitral regurgitation
Mild to moderate tricuspid regurgitation 
Moderate pulmonary hypertension
Mildly reduced left ventricle systolic function
Severely dilated left atrium and moderately dilated right atrial

CTA 12/30/2024
FINDINGS: 
VASCULATURE: No pulmonary embolism or abnormal opacity.
LUNGS: Partial collapse of the basilar segments of the right lower lobe. 
Complete collapse of left lower lobe. Mild emphysematous changes bilaterally 
and scattered wispy opacities/infiltrates..
PLEURA: Bilateral pleural effusions, 2.8 cm in thickness on right, 3.6 cm on 
left.
LENCHO: No mass or adenopathy.
MEDIASTINUM: No mass or adenopathy.
CARDIAC: No enlargement, pericardial effusion, or pericardial thickening.
AORTA: No aneurysm or dissection.
CHEST WALL: No mass or axillary adenopathy.
BONES: No bone lesion or fracture.
LIMITED ABDOMEN: No suspicious findings. Limited images of the upper abdomen.
OTHER: Negative.
 

ORDER #: [id number] CT/CT angio chest
IMPRESSION: 
 
1. No pulmonary embolism.
2. Large bilateral pleural effusions, left greater than right with partial 
collapse of right lower lobe basilar segments and complete collapse of left 
lower lobe.


Assessment and Plan
Assessment and Plan
(1) Acute on chronic diastolic (congestive) heart failure: 
(2) Cardiomyopathy: 
      Assessment and Plan: 
Ischemic, severe, ejection fraction on today echo is 10 to 15%
(3) Paroxysmal atrial fibrillation: 
      Assessment and Plan: 
He converted back to normal sinus rhythm.  Currently he is still on Cardizem infusion at 5 mg/h, he is on Toprol-XL 25 mg twice daily, Eliquis 5 mg twice daily, and supposed to be started on digoxin 0.25 mg daily and he received already 1 dose of 
0.25 mg IV
(4) Coronary artery disease: 
      Onset Date: Unknown
(5) Status post aorto-coronary artery bypass graft: 
      Assessment and Plan: 
Recently at [facility]
(6) Valvular heart disease: 
      Assessment and Plan: 
Including moderate mitral regurgitation and mild to moderate tricuspid regurgitation
(7) Pulmonary hypertension: 
      Assessment and Plan: 
Moderate on today echo, probably secondary to left heart failure and valvular heart disease
(8) Chronic kidney disease: 
(9) Pneumonia: 
(10) Essential hypertension: 
(11) Hyperlipidemia: 
      Assessment and Plan: 
On atorvastatin
(12) Type 2 diabetes mellitus: 

Plan
Continue diuresing with Bumex 1 mg/h with close monitoring of fluid balance, weight, renal function, and electrolytes 
Low-salt diet and restriction of total fluid intake to 1400 cc/day next
Continue Toprol-XL 25 mg twice daily 
I will load him with amiodarone 400 mg twice daily for 1 week then 200 mg twice daily for another week then 200 mg daily to prevent A-fib
Continue Eliquis for anticoagulation to prevent stroke 
I will discontinue Cardizem IV infusion and digoxin.
Depending on the patient response and also his creatinine we will try to advance guideline directed medical treatment hopefully to add valsartan and or Entresto and later on Aldactone if possible

## 2024-12-30 NOTE — PM.HP
HPI
H&P: HPI
History of Present Illness
Chief complaint: CARDIAC SYMPTOMS, CHF, PNEUMONIA, SEPSIS
Narrative: 
Patient currently in rehab for coronary artery bypass surgery, he states since the surgery really had some shortness of breath, but over the last 24 hours is increasing cough and shortness of breath with hypoxia noted at rehab facility.  Patient 
presented to emergency room with tachycardia, respiratory distress hypoxia with O2 sat of 89% on 2 L, leukocytosis with lactic acidosis consistent with pneumonia with severe sepsis

When I saw patient in the emergency room, in bed, resting uncomfortably secondary to cough and shortness of breath.

Opioid HPI
Opioid Management
Most Recent Pain and Opioid Data: 
      Last Pain Assessment 12/30/24 10:00  
      Last ORT Total Score 0 12/30/24 10:00 12/30/24
      Last ORT Risk Category Low Risk 12/30/24 10:00 12/30/24


Review of Systems
ROS  
 Status of ROS 10 or more systems reviewed and unremarkable except as noted in history and below   

[facility]
Medical History (Updated 12/30/24 @ 10:08 by Philip Tony MD)

Type 2 diabetes mellitus
 ?E11.9 - Type 2 diabetes mellitus without complications (ICD-10)
Hypertension
 ?I10 - Essential (primary) hypertension (ICD-10)
Nutritional deficiency
 ?E63.9 - Nutritional deficiency, unspecified (ICD-10)
Acute kidney failure
 ?N17.9 - Acute kidney failure, unspecified (ICD-10)
Limitation of activities due to disability
 ?Z73.6 - Limitation of activities due to disability (ICD-10)
Difficulty in walking, not elsewhere classified
 ?R26.2 - Difficulty in walking, not elsewhere classified (ICD-10)
Gout
 ?M10.9 - Gout, unspecified (ICD-10)
Muscle weakness (generalized)
 ?M62.81 - Muscle weakness (generalized) (ICD-10)
GERD (gastroesophageal reflux disease)
 ?K21.9 - Gastro-esophageal reflux disease without esophagitis (ICD-10)
Hyponatremia
 ?E87.1 - Hypo-osmolality and hyponatremia (ICD-10)
Cardiomyopathy
 ?I42.9 - Cardiomyopathy, unspecified (ICD-10)
ST elevation (STEMI) myocardial infarction
 ?I21.3 - ST elevation (STEMI) myocardial infarction of unspecified site (ICD-10)
Acute on chronic diastolic (congestive) heart failure
 ?I50.33 - Acute on chronic diastolic (congestive) heart failure (ICD-10)
Pneumonia
 ?J18.9 - Pneumonia, unspecified organism (ICD-10)
Elevated white blood cell count, unspecified
 ?D72.829 - Elevated white blood cell count, unspecified (ICD-10)
Angina pectoris
 ?I20.9 - Angina pectoris, unspecified (ICD-10)
Atherosclerosis
 ?I70.90 - Unspecified atherosclerosis (ICD-10)


Surgical History (Updated 12/30/24 @ 07:56 by Charleen Grijalva RN)

Presence of aortocoronary bypass graft
 ?Z95.1 - Presence of aortocoronary bypass graft (ICD-10)


Social History
Highest level of school completed/degree received:  some college, no degree 
Little interest or pleasure in doing things:  not at all 
Feeling down, depressed, or hopeless:  not at all 



Meds
Home Medications and Allergies
Home Medications

?Medication ?Instructions ?Recorded ?Confirmed ?Type
acetaminophen 325 mg tablet (Pain 650 mg PO Q6H PRN fever or pain 12/30/24 12/30/24 History
Relief (acetaminophen))    
albuterol sulfate 2.5 mg/0.5 mL 2.5 mg inhalation Q6H PRN 12/30/24 12/30/24 History
solution for nebulization shortness of breath or wheezing   
allopurinol 100 mg tablet 100 mg PO DAILY 12/30/24 12/30/24 History
amlodipine 5 mg tablet (Norvasc) 5 mg PO DAILY 12/30/24 12/30/24 History
apixaban 5 mg tablet (Eliquis) 5 mg PO BID 12/30/24 12/30/24 History
aspirin 81 mg tablet,delayed 81 mg PO DAILY 12/30/24 12/30/24 History
release (Adult Low Dose Aspirin)    
atorvastatin 80 mg tablet 80 mg PO DAILY 12/30/24 12/30/24 History
bisacodyl 10 mg rectal suppository 10 mg [place] DAILY PRN constipation 12/30/24 12/30/24 History
insulin glargine 100 unit/mL (3 20 unit subcut QPM 12/30/24 12/30/24 History
mL) subcutaneous pen (Lantus    
Solostar U-100 Insulin)    
insulin lispro 100 unit/mL 1 sliding scale dose subcut 12/30/24 12/30/24 History
subcutaneous pen (Humalog KwikPen USEASDIRECTD   
(U-100) Insulin)    
lidocaine 4 % topical patch 1 patch topical DAILY PRN pain 12/30/24 12/30/24 History
(Lidocaine Pain Relief)    
magnesium hydroxide 400 mg/5 mL 30 ml PO DAILY 12/30/24 12/30/24 History
oral suspension (Gentle Laxative    
(magnesium hydroxide))    
metoprolol succinate 25 mg 25 mg PO Q12H 12/30/24 12/30/24 History
tablet,extended release 24 hr    
(Toprol XL)    
pantoprazole 40 mg tablet,delayed 40 mg PO DAILY 12/30/24 12/30/24 History
release (Protonix)    
sevelamer carbonate 800 mg tablet 800 mg PO TID 12/30/24 12/30/24 History
(Renvela)    
simethicone 80 mg chewable tablet 80 mg PO QID PRN abdominal 12/30/24 12/30/24 History
 distention   


Allergies

Allergy/AdvReac Type Severity Reaction Status Date / Time
No Known Drug Allergies Allergy   Verified 12/30/24 07:22



Exam
Constitutional
Vital Signs, click to edit/add: 
Last Vital Signs

Temp  98.0 F   12/30/24 06:48
Pulse  134 H  12/30/24 09:30
Resp  17   12/30/24 09:30
BP  122/95 H  12/30/24 09:00
Pulse Ox  99   12/30/24 08:30
O2 Del Method  Nasal Cannula  12/30/24 06:48
O2 Flow Rate  2   12/30/24 06:48


Documenting provider has reviewed patient's vital signs: yes
Common normals: apparent distress (Moderate respiratory distress)
Respiratory
Common normals: abnormal respiratory effort (Moderate respiratory distress) and not clear to ascultation bilaterally
Auscultation: rales, rhonchi, wheezes and egophony (Left lower lobe)
Cardio
Common normals: irregular rate and irregular rhythm
Rate: tachycardic
Rhythm: abnormal rhythm
GI
Common normals: Normal to inspection, nondistended, normoactive bowel sounds present
Extremity
Common normals: normal to inspection (3+ edema)

Results
Labs
Labs: 
Short CBC

  12/30/24 Range/Units
  07:25 
WBC  13.0 H  (4.0-11.0)  10^3/uL
Hgb  10.8 L  (14.0-18.0)  g/dL
Hct  36.3 L  (42.0-54.0)  %
Plt Count  254  (150-450)  10^3/uL

BMP

  12/30/24
  07:25
Sodium  142
Potassium  4.8
Chloride  104
Carbon Dioxide  28.8
BUN  44.0 H
Creatinine  1.89 H
Glucose  62 L
Calcium  8.4 L

Urine

  12/30/24 Range/Units
  08:45 
Urine Color  Yellow  (YELLOW)  
Urine Clarity  Clear  (CLEAR)  
Urine pH  6.0  (5.0-9.0)  
Ur Specific Gravity  1.025  (1.005-1.025)  
Urine Protein  100 A  (NEG/TRACE)  mg/dL
Urine Glucose (UA)  Negative  (NEGATIVE)  mg/dL



Assessment and Plan
Assessment and Plan
(1) CHF (congestive heart failure): 
(2) Pneumonia: 

Plan
Admission findings: Tachycardia with rate over 130, respiratory distress, acute hypoxia with O2 sat of 89% on 2 L, leukocytosis, lactic acidosis, left lower lobe pneumonia on x-ray pleural effusions bilaterally.  All of this is resulted in severe 
sepsis without septic shock


Nosocomial pneumonia's and left lower lobe with severe sepsis-IV antibiotics broad-spectrum, aerosol treatments, need levo albuterol secondary to tachycardia, will hold off on steroids unless significant metabolic acidosis, try to obtain sputum 
culture, blood cultures pending


Acute combined congestive heart failure-elevated BNP, uncertain baseline BNP, given 40 Lasix in ER, will diurese with Bumex later today.  Check echocardiogram, consult to cardiology


Elevated high-sensitivity troponin-possible NSTEMI type II based on severe sepsis and pneumonia as outlined above, check echocardiogram, consult to cardiology


Iron deficiency anemia-monitor daily


Chronic kidney disease stage III-monitor daily, likely did here to deteriorate secondary treatment for the sepsis and CHF as outlined above


Tachycardia-looks like atrial fibrillation with rapid ventricular response, will try to slow rate with beta-blockers Lanoxin and Cardizem.


With tachycardia, although patient is anticoagulated if D-dimer significantly elevated, 2 times normal, will check CTA of chest


Acute UTI-urine is positive, cultures pending, on antibiotics as outlined above


Coronary artery disease-status post bypass-continue with home medications


Hypomagnesemia-monitor daily


Patient describes sacral wound-will consult to wound, dressing in place


Admission status: Severe sepsis without septic shock secondary to left lower lobe pneumonia and acute combined congestive heart failure as a consequence of the severe sepsis, admit to ICU, medically necessary  treatment will span 2 midnights.  
Inpatient status

Urinary Catheter Management
Urinary Catheter Management
Urethral: 
      Cath placed during this visit: yes
      Urethral indwelling: No
      Insertion date: 12/30/24
      Insertion time: 08:30

## 2024-12-30 NOTE — ED.GENADUL1
HPI
HPI - General Adult
General
Chief complaint: Arrhythmia/Palpitations
Stated complaint: CARDIAC SYMPTOMS
Time Seen by Provider: 12/30/24 06:48
Source: patient
Mode of arrival: ambulance
Limitations: no limitations
History of Present Illness
HPI narrative: 
Patient presented to the ED complaining of nausea vomiting and shortness of breath.  He was sent in for elevated heart rate and shortness of breath.  He is tachycardic on arrival and his blood pressure is borderline low at 104/80.  He is 89% on 2 L 
nasal cannula.  Patient's had a cough and congestion.  He is status post CABG on 12/5 at [facility].  He denies any chest pain.  He does report his legs are swollen.  He said before his open heart he was walking but he does not really walk 
anymore.  His legs are swollen only tender.  No fever.  Patient states he is trying to cough up phlegm but he feels like it stuck in his chest.  He denies any abdominal pain.  Most of the history is provided nursing home records as patient is not 
the best historian.
Related Data
Home Medications

?Medication ?Instructions ?Recorded ?Confirmed
acetaminophen 325 mg tablet (Pain 650 mg PO Q6H PRN fever or pain 12/30/24 12/30/24
Relief (acetaminophen))   
albuterol sulfate 2.5 mg/0.5 mL 2.5 mg inhalation Q6H PRN 12/30/24 12/30/24
solution for nebulization shortness of breath or wheezing  
allopurinol 100 mg tablet 100 mg PO DAILY 12/30/24 12/30/24
amlodipine 5 mg tablet (Norvasc) 5 mg PO DAILY 12/30/24 12/30/24
apixaban 5 mg tablet (Eliquis) 5 mg PO BID 12/30/24 12/30/24
aspirin 81 mg tablet,delayed 81 mg PO DAILY 12/30/24 12/30/24
release (Adult Low Dose Aspirin)   
atorvastatin 80 mg tablet 80 mg PO DAILY 12/30/24 12/30/24
bisacodyl 10 mg rectal suppository 10 mg PA DAILY PRN constipation 12/30/24 12/30/24
insulin glargine 100 unit/mL (3 20 unit subcut QPM 12/30/24 12/30/24
mL) subcutaneous pen (Lantus   
Solostar U-100 Insulin)   
insulin lispro 100 unit/mL 1 sliding scale dose subcut 12/30/24 12/30/24
subcutaneous pen (Humalog KwikPen USEASDIRECTD  
(U-100) Insulin)   
lidocaine 4 % topical patch 1 patch topical DAILY PRN pain 12/30/24 12/30/24
(Lidocaine Pain Relief)   
magnesium hydroxide 400 mg/5 mL 30 ml PO DAILY 12/30/24 12/30/24
oral suspension (Gentle Laxative   
(magnesium hydroxide))   
metoprolol succinate 25 mg 25 mg PO Q12H 12/30/24 12/30/24
tablet,extended release 24 hr   
(Toprol XL)   
pantoprazole 40 mg tablet,delayed 40 mg PO DAILY 12/30/24 12/30/24
release (Protonix)   
sevelamer carbonate 800 mg tablet 800 mg PO TID 12/30/24 12/30/24
(Renvela)   
simethicone 80 mg chewable tablet 80 mg PO QID PRN abdominal 12/30/24 12/30/24
 distention  


Allergies

Allergy/AdvReac Type Severity Reaction Status Date / Time
No Known Drug Allergies Allergy   Verified 12/30/24 07:22



Opioid HPI
Opioid Management
Most Recent Opioid Data: 
      No Data to Display


Review of Systems
ROS  
 Status of ROS 10 or more systems reviewed and unremarkable except as noted in history and below   

[facility]
Medical History (Updated 12/30/24 @ 09:28 by Lisa Melendrez DO)

Type 2 diabetes mellitus
 ?E11.9 - Type 2 diabetes mellitus without complications (ICD-10)
Hypertension
 ?I10 - Essential (primary) hypertension (ICD-10)
Nutritional deficiency
 ?E63.9 - Nutritional deficiency, unspecified (ICD-10)
Acute kidney failure
 ?N17.9 - Acute kidney failure, unspecified (ICD-10)
Limitation of activities due to disability
 ?Z73.6 - Limitation of activities due to disability (ICD-10)
Difficulty in walking, not elsewhere classified
 ?R26.2 - Difficulty in walking, not elsewhere classified (ICD-10)
Gout
 ?M10.9 - Gout, unspecified (ICD-10)
Muscle weakness (generalized)
 ?M62.81 - Muscle weakness (generalized) (ICD-10)
GERD (gastroesophageal reflux disease)
 ?K21.9 - Gastro-esophageal reflux disease without esophagitis (ICD-10)
Hyponatremia
 ?E87.1 - Hypo-osmolality and hyponatremia (ICD-10)
Cardiomyopathy
 ?I42.9 - Cardiomyopathy, unspecified (ICD-10)
ST elevation (STEMI) myocardial infarction
 ?I21.3 - ST elevation (STEMI) myocardial infarction of unspecified site (ICD-10)
Acute on chronic diastolic (congestive) heart failure
 ?I50.33 - Acute on chronic diastolic (congestive) heart failure (ICD-10)
Pneumonia
 ?J18.9 - Pneumonia, unspecified organism (ICD-10)
Elevated white blood cell count, unspecified
 ?D72.829 - Elevated white blood cell count, unspecified (ICD-10)
Angina pectoris
 ?I20.9 - Angina pectoris, unspecified (ICD-10)
Atherosclerosis
 ?I70.90 - Unspecified atherosclerosis (ICD-10)


Surgical History (Updated 12/30/24 @ 07:56 by Charleen Grijalva RN)

Presence of aortocoronary bypass graft
 ?Z95.1 - Presence of aortocoronary bypass graft (ICD-10)


Social History
Little interest or pleasure in doing things:  not at all 
Feeling down, depressed, or hopeless:  not at all 



Exam
Narrative
Exam Narrative: 

Time Seen: []
Vital Signs:  [Per nurse's notes.]
General:  [Alert]
Skin:  [Warm, dry, no rash.]
Head:  [Normocephalic, atraumatic.]
Neck:  [Supple, trachea midline.]
Eye:  [Pupils are equal, round and reactive to light, extraocular movements are intact, normal conjunctiva.]
Ears, nose, mouth and throat:   oral mucosa dry
Cardiovascular: Tachycardia, no murmur.]
Respiratory: Diminished breath sounds bilaterally, rhonchi bilaterally.  No respiratory distress
Chest wall:  [No tenderness, no deformity.]
Gastrointestinal:  [Soft, nontender, non distended, normal bowel sounds.]
MSK: No lower extremity edema.  Worse on the right.  3+ pitting on the right 2+ on the left.  Legs are cool to touch.  Healing scab on right lower extremity from his CABG, no signs of cellulitis
Psychiatric:  [Cooperative, appropriate mood & affect.]
Neurological:  [Alert and oriented to person, place, no focal neurological deficit observed.]
 
Constitutional
Vital Signs, click to edit/add: 

Last Vital Signs

Temp  98.0 F   12/30/24 06:48
Pulse  132 H  12/30/24 07:00
Resp  17   12/30/24 07:00
BP  104/80   12/30/24 07:00
Pulse Ox  97   12/30/24 07:00
O2 Del Method  Nasal Cannula  12/30/24 06:48
O2 Flow Rate  2   12/30/24 06:48




Course
Vital Signs
Vital signs: 

Vital Signs

Temperature  98.0 F   12/30/24 06:48
Pulse Rate  134 H  12/30/24 06:48
Respiratory Rate  17   12/30/24 06:48
Blood Pressure  108/77   12/30/24 06:48
Pulse Oximetry  89 L  12/30/24 06:48
Oxygen Delivery Method  Nasal Cannula  12/30/24 06:48
Oxygen Delivery Flow Rate  2   12/30/24 06:48



Temperature  98.0 F   12/30/24 06:48
Pulse Rate  132 H  12/30/24 07:00
Respiratory Rate  17   12/30/24 07:00
Blood Pressure  104/80   12/30/24 07:00
Pulse Oximetry  97   12/30/24 07:00
Oxygen Delivery Method  Nasal Cannula  12/30/24 06:48
Oxygen Delivery Flow Rate  2   12/30/24 06:48




Medical Decision Making
[facility] Narrative
Medical decision making narrative: 
Patient has an elevated BNP at greater than 35,000.  Troponins elevated, I have no previous troponin for comparison.  Creatinine 1.8.  Patient was given 40 of Lasix IV.  Christianson catheter placed for I's and O's.  Patient has pneumonia on the chest 
x-ray as well as fluid overload.  He does have an elevated white blood cell count and elevated lactate, patient is septic but not in septic shock.  Vascular studies showed no DVT.  Pulses were able to be dopplered.
I spoke to Dr. Tony about admitting the patient here and he requests consulting with his cardiothoracic surgeon at  first.
I then spoke to Dr. Carranza at  and he said this is most likely pneumonia and volume overload, if they are comfortable diuresing the patient here he is okay with him staying here and if there are any issues he can be transferred to .  Dr. Tony 
was then called back and he is comfortable with keeping the patient here for diuresis and further monitoring and treatment of his pneumonia.  Patient is comfortable with care plan for admission and is stable in ED.  We will be admitting him to ICU
Differential Diagnosis
Differential Diagnosis: Pneumonia, ACS, CHF, electrolyte abnormality, sepsis
Medical Records
Medical records narrative: 
Per Dr. Rushing his EF was 30% prior to his CABG.  Creatinine on discharge from  was 2.5.
Lab Data
Lab results reviewed: Yes I reviewed the patient's lab results
Labs: 

Lab Results

  12/30/24 12/30/24 Range/Units
  07:25 08:45 
WBC  13.0 H   (4.0-11.0)  10^3/uL
RBC  3.95 L   (4.70-6.10)  10^6/uL
Hgb  10.8 L   (14.0-18.0)  g/dL
Hct  36.3 L   (42.0-54.0)  %
MCV  91.9   (80.0-94.0)  fL
MCH  27.3   (25.9-34.0)  pg
MCHC  29.8 L   (29.9-35.2)  g/dL
RDW  15.3 H   (11.0-15.0)  %
Plt Count  254   (150-450)  10^3/uL
MPV  9.7   (9.5-13.5)  fL
Neut % (Auto)  73.4   (43.0-75.0)  %
Lymph % (Auto)  17.0 L   (20.5-60.0)  %
Mono % (Auto)  8.1   (1.7-12.0)  %
Eos % (Auto)  0.8 L   (0.9-7.0)  %
Baso % (Auto)  0.2   (0.2-2.0)  %
Neut # (Auto)  9.5 H   (1.4-6.5)  10^3/uL
Lymph # (Auto)  2.2   (1.2-3.8)  10^3/uL
Mono # (Auto)  1.1 H   (0.3-0.8)  10^3/uL
Eos # (Auto)  0.1   (0.0-0.7)  10^3/uL
Baso # (Auto)  0.0   (0.0-0.1)  10^3/uL
Abs Immat Gran (auto)  0.07 H   (0.00-0.03)  10^3/uL
Imm/Tot Granulo (auto)  0.5   (0.0-0.5)  %
Sodium  142   (136-145)  mmol/L
Potassium  4.8   (3.5-5.1)  mmol/L
Chloride  104   ()  mmol/L
Carbon Dioxide  28.8   (21.0-32.0)  mmol/L
Anion Gap  14.0   
BUN  44.0 H   (7.0-18.0)  mg/dL
Creatinine  1.89 H   (0.70-1.30)  mg/dL
Est GFR ( Amer)  43 L   (>=60 mL/min/1.73m^2)  
Est GFR (Non-Af Amer)  35 L   (>=60 mL/min/1.73m^2)  
BUN/Creatinine Ratio  23.3   
Glucose  62 L   ()  mg/dL
Lactate  2.3 H*   (0.4-2.0)  mmol/L
Calcium  8.4 L   (8.5-10.1)  mg/dL
Magnesium  2.7 H   (1.8-2.4)  mg/dL
Troponin I High Sens  82.5 H*   (4.0-76.1)  pg/mL
NT-Pro-B Natriuret Pep  >83864.0 H*   (<=900.0)  pg/mL
Urine Color   Yellow  (YELLOW)  
Urine Clarity   Clear  (CLEAR)  
Urine pH   6.0  (5.0-9.0)  
Ur Specific Gravity   1.025  (1.005-1.025)  
Urine Protein   100 A  (NEG/TRACE)  mg/dL
Urine Glucose (UA)   Negative  (NEGATIVE)  mg/dL
Urine Ketones   Negative  (NEGATIVE)  mg/dL
Urine Occult Blood   Negative  (NEGATIVE)  
Urine Nitrite   Negative  (NEGATIVE)  
Urine Bilirubin   Negative  (NEGATIVE)  
Urine Urobilinogen   0.2  (0.2-1.0)  EU/dL
Ur Leukocyte Esterase   Negative  (NEGATIVE)  
Urine RBC   0-2  (0-2)  #/HPF
Urine WBC   5-10 A  (NONE SEEN)  #/HPF
Ur Squamous Epith Cells   Few A  (NONE/RARE)  #/LPF
Urine Crystals   None seen  (None Seen)  #/HPF
Urine Bacteria   Trace A  (NONE SEEN)  #/HPF
Urine Casts   Seen A  (NONE SEEN)  #/LPF
Hyaline Casts   Rare  
Urine Mucus   Trace A  (NONE SEEN)  
Ur Culture Indicated?   Yes  



Imaging Data
Chest x-ray: 
      Radiologist's impression: 

ITS Impressions

Venous Doppler Study  12/30/24 07:07
IMPRESSION:
 
1. No deep vein thrombus within the right and left lower extremity.
 
 
Electronically authenticated by: JAMES CAMPOS   Date: 12/30/2024  08:18


Chest X-Ray  12/30/24 07:30
IMPRESSION: 
 
1. Moderate right, moderate to marked left pulmonary infiltrates suggestive of 
 
pneumonia. Atelectasis is felt less likely.
2. Small to moderate right pleural effusion and suspected left pleural 
effusion.
 
 
Electronically authenticated by: JAMES ACMPOS   Date: 12/30/2024  07:46




ECG Data
Attestation: I personally reviewed and interpreted this ECG as follows:
Interpretation: 

EKG INTERPRETATION
Time: [] 651
Rate: [] 133
Rhythm: _ [] Sinus tachycardia
ST segments: _ [] No acute ST elevation or depression
T waves: _ []
Ectopy: _ []
P wave/PA interval: _ []
QRS interval: _ []
QT interval: _ []
Comparison: _ []
Comparison EKG date: []
Performed by: [self] LVH
 

Discharge Plan
Discharge
Chief Complaint: Arrhythmia/Palpitations

Clinical Impression:
 Sepsis, Pneumonia, CHF (congestive heart failure)


Patient Disposition: Admitted As Inpatient

Time of Disposition Decision: 09:27

Condition: Fair

Prescriptions / Home Meds:
No Action
  acetaminophen [Pain Relief (acetaminophen)] 325 mg tablet 
   650 mg PO Q6H PRN (Reason: fever or pain) 
  allopurinol 100 mg tablet 
   100 mg PO DAILY 
  aspirin [Adult Low Dose Aspirin] 81 mg tablet,delayed release (DR/EC) 
   81 mg PO DAILY 
  atorvastatin 80 mg tablet 
   80 mg PO DAILY 
  Eliquis 5 mg tablet 
   5 mg PO BID 
  insulin lispro [Humalog KwikPen Insulin] 100 unit/mL insulin pen 
   1 sliding scale dose subcut USEASDIRECTD 
  insulin glargine [Lantus Solostar U-100 Insulin] 100 unit/mL (3 mL) insulin pen 
   20 unit subcut QPM 
  lidocaine [Lidocaine Pain Relief] 4 % adhesive patch,medicated 
   1 patch topical DAILY PRN (Reason: pain) 
  magnesium hydroxide [Gentle Laxative (mag hydrox)] 400 mg/5 mL suspension 
   30 ml PO DAILY 
  metoprolol succinate [Toprol XL] 25 mg tablet extended release 24 hr 
   25 mg PO Q12H 
  amlodipine [Norvasc] 5 mg tablet 
   5 mg PO DAILY 
  pantoprazole [Protonix] 40 mg tablet,delayed release (DR/EC) 
   40 mg PO DAILY 
  sevelamer carbonate [Renvela] 800 mg tablet 
   800 mg PO TID 
   Rx Instructions:
   must administer with a meal/food
  simethicone 80 mg tablet,chewable 
   80 mg PO QID PRN (Reason: abdominal distention) 
  albuterol sulfate 2.5 mg/0.5 mL solution for nebulization 
   2.5 mg inhalation Q6H PRN (Reason: shortness of breath or wheezing) 
  bisacodyl 10 mg suppository 
   10 mg PA DAILY PRN (Reason: constipation) 

Print Language: English

Referrals:
BLANCA AUGUSTIN [Primary Care Provider] - 1 week

## 2024-12-31 VITALS — HEART RATE: 74 BPM | TEMPERATURE: 98.1 F | OXYGEN SATURATION: 95 %

## 2024-12-31 VITALS — OXYGEN SATURATION: 97 % | HEART RATE: 70 BPM

## 2024-12-31 VITALS — OXYGEN SATURATION: 93 % | DIASTOLIC BLOOD PRESSURE: 104 MMHG | HEART RATE: 70 BPM | SYSTOLIC BLOOD PRESSURE: 137 MMHG

## 2024-12-31 VITALS — OXYGEN SATURATION: 95 % | HEART RATE: 74 BPM

## 2024-12-31 VITALS — OXYGEN SATURATION: 97 % | HEART RATE: 72 BPM

## 2024-12-31 VITALS — DIASTOLIC BLOOD PRESSURE: 79 MMHG | OXYGEN SATURATION: 98 % | SYSTOLIC BLOOD PRESSURE: 143 MMHG | HEART RATE: 75 BPM

## 2024-12-31 VITALS — OXYGEN SATURATION: 97 % | HEART RATE: 76 BPM

## 2024-12-31 VITALS — OXYGEN SATURATION: 96 % | HEART RATE: 78 BPM

## 2024-12-31 VITALS — HEART RATE: 71 BPM | OXYGEN SATURATION: 97 % | SYSTOLIC BLOOD PRESSURE: 144 MMHG | DIASTOLIC BLOOD PRESSURE: 91 MMHG

## 2024-12-31 VITALS — OXYGEN SATURATION: 97 % | DIASTOLIC BLOOD PRESSURE: 73 MMHG | SYSTOLIC BLOOD PRESSURE: 135 MMHG | HEART RATE: 66 BPM

## 2024-12-31 VITALS — HEART RATE: 69 BPM | OXYGEN SATURATION: 91 %

## 2024-12-31 VITALS — HEART RATE: 72 BPM | OXYGEN SATURATION: 97 %

## 2024-12-31 VITALS — OXYGEN SATURATION: 90 % | HEART RATE: 76 BPM

## 2024-12-31 VITALS — HEART RATE: 75 BPM | OXYGEN SATURATION: 93 % | SYSTOLIC BLOOD PRESSURE: 151 MMHG | DIASTOLIC BLOOD PRESSURE: 76 MMHG

## 2024-12-31 VITALS — SYSTOLIC BLOOD PRESSURE: 137 MMHG | HEART RATE: 70 BPM | OXYGEN SATURATION: 98 % | DIASTOLIC BLOOD PRESSURE: 75 MMHG

## 2024-12-31 VITALS — HEART RATE: 68 BPM | OXYGEN SATURATION: 94 %

## 2024-12-31 VITALS — HEART RATE: 73 BPM

## 2024-12-31 VITALS
SYSTOLIC BLOOD PRESSURE: 135 MMHG | OXYGEN SATURATION: 95 % | HEART RATE: 77 BPM | TEMPERATURE: 97.88 F | DIASTOLIC BLOOD PRESSURE: 76 MMHG

## 2024-12-31 VITALS — OXYGEN SATURATION: 96 % | HEART RATE: 73 BPM

## 2024-12-31 VITALS — HEART RATE: 72 BPM

## 2024-12-31 VITALS — DIASTOLIC BLOOD PRESSURE: 81 MMHG | SYSTOLIC BLOOD PRESSURE: 143 MMHG | OXYGEN SATURATION: 96 % | HEART RATE: 67 BPM

## 2024-12-31 VITALS — OXYGEN SATURATION: 96 % | HEART RATE: 72 BPM

## 2024-12-31 VITALS — SYSTOLIC BLOOD PRESSURE: 134 MMHG | TEMPERATURE: 98.5 F | HEART RATE: 65 BPM | DIASTOLIC BLOOD PRESSURE: 74 MMHG

## 2024-12-31 VITALS — SYSTOLIC BLOOD PRESSURE: 135 MMHG | DIASTOLIC BLOOD PRESSURE: 76 MMHG | HEART RATE: 69 BPM

## 2024-12-31 VITALS — HEART RATE: 70 BPM | OXYGEN SATURATION: 93 %

## 2024-12-31 VITALS — HEART RATE: 66 BPM | OXYGEN SATURATION: 97 %

## 2024-12-31 VITALS — SYSTOLIC BLOOD PRESSURE: 136 MMHG | HEART RATE: 73 BPM | OXYGEN SATURATION: 86 % | DIASTOLIC BLOOD PRESSURE: 74 MMHG

## 2024-12-31 VITALS — OXYGEN SATURATION: 97 % | DIASTOLIC BLOOD PRESSURE: 70 MMHG | SYSTOLIC BLOOD PRESSURE: 126 MMHG | HEART RATE: 74 BPM

## 2024-12-31 VITALS — OXYGEN SATURATION: 92 % | HEART RATE: 73 BPM

## 2024-12-31 VITALS — OXYGEN SATURATION: 95 % | HEART RATE: 69 BPM

## 2024-12-31 VITALS — HEART RATE: 66 BPM | SYSTOLIC BLOOD PRESSURE: 130 MMHG | DIASTOLIC BLOOD PRESSURE: 74 MMHG

## 2024-12-31 VITALS — OXYGEN SATURATION: 96 % | HEART RATE: 68 BPM

## 2024-12-31 VITALS — HEART RATE: 76 BPM | OXYGEN SATURATION: 96 %

## 2024-12-31 VITALS — DIASTOLIC BLOOD PRESSURE: 62 MMHG | SYSTOLIC BLOOD PRESSURE: 130 MMHG | HEART RATE: 74 BPM

## 2024-12-31 VITALS — HEART RATE: 70 BPM | OXYGEN SATURATION: 96 %

## 2024-12-31 VITALS — OXYGEN SATURATION: 97 % | HEART RATE: 71 BPM

## 2024-12-31 VITALS — OXYGEN SATURATION: 98 % | HEART RATE: 75 BPM

## 2024-12-31 VITALS — HEART RATE: 73 BPM | OXYGEN SATURATION: 98 %

## 2024-12-31 VITALS — OXYGEN SATURATION: 94 %

## 2024-12-31 VITALS — OXYGEN SATURATION: 100 % | HEART RATE: 75 BPM

## 2024-12-31 VITALS — HEART RATE: 65 BPM

## 2024-12-31 VITALS — OXYGEN SATURATION: 97 % | SYSTOLIC BLOOD PRESSURE: 130 MMHG | DIASTOLIC BLOOD PRESSURE: 62 MMHG | HEART RATE: 75 BPM

## 2024-12-31 VITALS — HEART RATE: 79 BPM

## 2024-12-31 VITALS — OXYGEN SATURATION: 94 % | HEART RATE: 77 BPM

## 2024-12-31 VITALS — HEART RATE: 77 BPM | OXYGEN SATURATION: 89 %

## 2024-12-31 VITALS — HEART RATE: 73 BPM | OXYGEN SATURATION: 97 %

## 2024-12-31 VITALS — OXYGEN SATURATION: 99 % | HEART RATE: 73 BPM

## 2024-12-31 VITALS — OXYGEN SATURATION: 95 % | HEART RATE: 76 BPM

## 2024-12-31 VITALS — HEART RATE: 74 BPM

## 2024-12-31 VITALS — SYSTOLIC BLOOD PRESSURE: 124 MMHG | HEART RATE: 72 BPM | DIASTOLIC BLOOD PRESSURE: 70 MMHG

## 2024-12-31 LAB
ADD MANUAL DIFF: NO
ALANINE AMINOTRANSFERASE: 95 U/L (ref 16–63)
ALBUMIN GLOBULIN RATIO: 0.8
ALBUMIN LEVEL: 2.6 G/DL (ref 3.4–5)
ALKALINE PHOSPHATASE: 97 U/L (ref 46–116)
ANION GAP: 14.1
ASPARTATE AMINO TRANSFERASE: 57 U/L (ref 15–37)
BLOOD UREA NITROGEN: 46 MG/DL (ref 7–18)
BOX TEST REFERENCE LAB: (no result)
BOX TEST REFERENCE LAB: (no result)
CALCIUM: 7.8 MG/DL (ref 8.5–10.1)
CARBON DIOXIDE: 29 MMOL/L (ref 21–32)
CHLORIDE: 101 MMOL/L (ref 98–107)
CO2 BLD-SCNC: 29 MMOL/L (ref 21–32)
ESTIMATED GFR (AFRICAN AMERICA: 35
ESTIMATED GFR (NON-AFRICAN AME: 29
GLOBULIN: 3.2 G/DL
GLUCOSE BLD-MCNC: 243 MG/DL (ref 74–106)
HCT VFR BLD CALC: 34.7 % (ref 42–54)
HEMATOCRIT: 34.7 % (ref 42–54)
HEMOGLOBIN: 10.4 G/DL (ref 14–18)
IMMATURE GRANULOCYTES ABS AUTO: 0.08 10^3/UL (ref 0–0.03)
IMMATURE GRANULOCYTES PCT AUTO: 0.6 % (ref 0–0.5)
LYMPHOCYTES  ABSOLUTE AUTO: 1.8 10^3/UL (ref 1.2–3.8)
MAGNESIUM: 2.2 MG/DL (ref 1.8–2.4)
MCV RBC: 90.1 FL (ref 80–94)
MEAN CORPUSCULAR HEMOGLOBIN: 27 PG (ref 25.9–34)
MEAN CORPUSCULAR HGB CONC: 30 G/DL (ref 29.9–35.2)
MEAN CORPUSCULAR VOLUME: 90.1 FL (ref 80–94)
NT PRO B TYPE NATRIURETIC PEPT: (no result) PG/ML (ref ?–900)
PCO2 BLDV: 43.5 MMHG (ref 40–52)
PH BLDV: 7.42 [PH] (ref 7.33–7.43)
PLATELET # BLD: 225 10^3/UL (ref 150–450)
PLATELET COUNT: 225 10^3/UL (ref 150–450)
POTASSIUM SERPLBLD-SCNC: 4.1 MMOL/L (ref 3.5–5.1)
POTASSIUM: 4.1 MMOL/L (ref 3.5–5.1)
RED BLOOD COUNT: 3.85 10^6/UL (ref 4.7–6.1)
SODIUM BLD-SCNC: 140 MMOL/L (ref 136–145)
SODIUM: 140 MMOL/L (ref 136–145)
TOTAL PROTEIN: 5.8 G/DL (ref 6.4–8.2)
WBC # BLD: 12.5 10^3/UL (ref 4–11)
WHITE BLOOD COUNT: 12.5 10^3/UL (ref 4–11)

## 2024-12-31 RX ADMIN — LEVALBUTEROL HYDROCHLORIDE 0.63 MG: 0.63 SOLUTION RESPIRATORY (INHALATION) at 16:56

## 2024-12-31 RX ADMIN — SEVELAMER CARBONATE 800 MG: 800 TABLET, FILM COATED ORAL at 13:09

## 2024-12-31 RX ADMIN — ASPIRIN 81 MG: 81 TABLET ORAL at 08:35

## 2024-12-31 RX ADMIN — IPRATROPIUM BROMIDE 0.5 MG: 0.5 SOLUTION RESPIRATORY (INHALATION) at 00:21

## 2024-12-31 RX ADMIN — METHYLPREDNISOLONE SODIUM SUCCINATE 60 MG: 125 INJECTION, POWDER, FOR SOLUTION INTRAMUSCULAR; INTRAVENOUS at 21:47

## 2024-12-31 RX ADMIN — ALLOPURINOL 100 MG: 100 TABLET ORAL at 08:35

## 2024-12-31 RX ADMIN — LEVALBUTEROL HYDROCHLORIDE 0.63 MG: 0.63 SOLUTION RESPIRATORY (INHALATION) at 11:08

## 2024-12-31 RX ADMIN — LINEZOLID 300 MG: 600 INJECTION, SOLUTION INTRAVENOUS at 13:09

## 2024-12-31 RX ADMIN — LEVALBUTEROL HYDROCHLORIDE 0.63 MG: 0.63 SOLUTION RESPIRATORY (INHALATION) at 05:10

## 2024-12-31 RX ADMIN — HYDROCODONE BITARTRATE AND ACETAMINOPHEN 1 TAB: 5; 325 TABLET ORAL at 03:19

## 2024-12-31 RX ADMIN — LINEZOLID 300 MG: 600 INJECTION, SOLUTION INTRAVENOUS at 00:21

## 2024-12-31 RX ADMIN — HYDROCODONE BITARTRATE AND ACETAMINOPHEN 1 TAB: 5; 325 TABLET ORAL at 16:17

## 2024-12-31 RX ADMIN — PIPERACILLIN SODIUM,TAZOBACTAM SODIUM 12.5 GM: 3; .375 INJECTION, POWDER, FOR SOLUTION INTRAVENOUS at 01:36

## 2024-12-31 RX ADMIN — AMIODARONE HYDROCHLORIDE 400 MG: 200 TABLET ORAL at 08:35

## 2024-12-31 RX ADMIN — PANTOPRAZOLE SODIUM 40 MG: 40 INJECTION, POWDER, FOR SOLUTION INTRAVENOUS at 11:32

## 2024-12-31 RX ADMIN — HYDROCODONE BITARTRATE AND ACETAMINOPHEN 1 TAB: 5; 325 TABLET ORAL at 22:20

## 2024-12-31 RX ADMIN — METHYLPREDNISOLONE SODIUM SUCCINATE 60 MG: 125 INJECTION, POWDER, FOR SOLUTION INTRAMUSCULAR; INTRAVENOUS at 09:28

## 2024-12-31 RX ADMIN — APIXABAN 5 MG: 5 TABLET, FILM COATED ORAL at 21:43

## 2024-12-31 RX ADMIN — BUMETANIDE 10 MG: 0.25 INJECTION INTRAMUSCULAR; INTRAVENOUS at 09:28

## 2024-12-31 RX ADMIN — PIPERACILLIN SODIUM,TAZOBACTAM SODIUM 12.5 GM: 3; .375 INJECTION, POWDER, FOR SOLUTION INTRAVENOUS at 08:34

## 2024-12-31 RX ADMIN — AMIODARONE HYDROCHLORIDE 400 MG: 200 TABLET ORAL at 21:44

## 2024-12-31 RX ADMIN — IPRATROPIUM BROMIDE 0.5 MG: 0.5 SOLUTION RESPIRATORY (INHALATION) at 05:10

## 2024-12-31 RX ADMIN — IPRATROPIUM BROMIDE 0.5 MG: 0.5 SOLUTION RESPIRATORY (INHALATION) at 16:56

## 2024-12-31 RX ADMIN — LEVALBUTEROL HYDROCHLORIDE 0.63 MG: 0.63 SOLUTION RESPIRATORY (INHALATION) at 00:21

## 2024-12-31 RX ADMIN — METHYLPREDNISOLONE SODIUM SUCCINATE 60 MG: 125 INJECTION, POWDER, FOR SOLUTION INTRAMUSCULAR; INTRAVENOUS at 16:12

## 2024-12-31 RX ADMIN — PIPERACILLIN SODIUM,TAZOBACTAM SODIUM 12.5 GM: 3; .375 INJECTION, POWDER, FOR SOLUTION INTRAVENOUS at 16:30

## 2024-12-31 RX ADMIN — Medication 80 MG: at 04:51

## 2024-12-31 RX ADMIN — INSULIN ASPART 0 UNIT: 100 INJECTION, SOLUTION INTRAVENOUS; SUBCUTANEOUS at 08:38

## 2024-12-31 RX ADMIN — INSULIN ASPART 0 UNIT: 100 INJECTION, SOLUTION INTRAVENOUS; SUBCUTANEOUS at 21:31

## 2024-12-31 RX ADMIN — INSULIN GLARGINE 20 UNIT: 100 INJECTION, SOLUTION SUBCUTANEOUS at 21:30

## 2024-12-31 RX ADMIN — INSULIN ASPART 0 UNIT: 100 INJECTION, SOLUTION INTRAVENOUS; SUBCUTANEOUS at 16:13

## 2024-12-31 RX ADMIN — IPRATROPIUM BROMIDE 0.5 MG: 0.5 SOLUTION RESPIRATORY (INHALATION) at 11:09

## 2024-12-31 RX ADMIN — METOPROLOL SUCCINATE 25 MG: 25 TABLET, EXTENDED RELEASE ORAL at 21:43

## 2024-12-31 RX ADMIN — APIXABAN 5 MG: 5 TABLET, FILM COATED ORAL at 08:35

## 2024-12-31 RX ADMIN — Medication 80 MG: at 22:43

## 2024-12-31 RX ADMIN — METOPROLOL SUCCINATE 25 MG: 25 TABLET, EXTENDED RELEASE ORAL at 08:35

## 2024-12-31 NOTE — ECG_ITS
[facility] 
                                        
                                       Test Date:    2024 
Pat Name:     PARIS ESPINAL             Department:    
Patient ID:   [id number]               Room:         [facility] 
Gender:       Male                     Technician:    
:          [id number]               Requested By: BLANCA AUGUSTIN 
Order Number: [id number]              Reading MD:   MATEUSZ BECKHAM 
                                 Measurements 
Intervals                              Axis           
Rate:         67                       P:            69 
HI:           162                      QRS:          119 
QRSD:         106                      T:            90 
QT:           392                                     
QTc:          408                                     
                           Interpretive Statements 
1100 Sinus rhythm 
Remote anteroseptal MI 
2440 Incomplete right bundle branch block 
5120 Possible right ventricular hypertrophy 
Low voltage across the precordium 
9150 **  abnormal ECG  ** 
Electronically Signed On 2025 16:27:15 EST by MATEUSZ BECKHAM

## 2024-12-31 NOTE — P.PN_ITS
Progress Note: Subjective    
Subjective    
Interval history:     
Patient appears much improved today compared to yesterday, alert and sitting up   
in a bed, ornery    
    
He was refusing some of his medications yesterday, encouraged with his heart as   
bad as it is, he needs to take all of his medications, we can manage any side ef  
fects    
    
Exam    
Constitutional    
Vital Signs, click to edit/add:     
                                Last Vital Signs    
    
    
    
Temp  98.1 F   12/31/24 07:00    
     
Pulse  73   12/31/24 11:43    
     
Resp  23 H  12/31/24 11:35    
     
BP  126/70   12/31/24 11:35    
     
Pulse Ox  97   12/31/24 11:35    
     
O2 Del Method  Nasal Cannula  12/31/24 11:43    
     
O2 Flow Rate  1   12/31/24 11:43    
    
    
    
Documenting provider has reviewed patient's vital signs: yes    
Common normals: no apparent distress (Minimal respiratory distress)    
HENMT    
Common normals: normocephalic    
Chest    
Common normals: inspection of chest normal    
Respiratory    
Common normals: abnormal respiratory effort (Moderate respiratory distress) and   
not clear to ascultation bilaterally    
Auscultation: rales (Improved air exchange), rhonchi, wheezes (Improved) and   
egophony (Left lower lobe)    
Cardio    
Common normals: regular rate and regular rhythm    
Rhythm: regular rhythm (Resolved currently normal sinus rhythm)    
GI    
Common normals: Normal to inspection, nondistended, normoactive bowel sounds   
present    
Extremity    
Common normals: abnormal to inspection (2+ edema)    
    
Progress Note: Objective    
Labs    
Labs:     
                                    Short CBC    
    
    
    
  12/31/24 Range/Units    
    
  04:55     
     
WBC  12.5 H  (4.0-11.0)  10^3/uL    
     
Hgb  10.4 L  (14.0-18.0)  g/dL    
     
Hct  34.7 L  (42.0-54.0)  %    
     
Plt Count  225  (150-450)  10^3/uL    
    
    
                                       BMP    
    
    
    
  12/31/24    
    
  04:55    
     
Sodium  140    
     
Potassium  4.1    
     
Chloride  101    
     
Carbon Dioxide  29.0    
     
BUN  46.0 H    
     
Creatinine  2.25 H    
     
Glucose  243 H    
     
Calcium  7.8 L    
    
    
                                 Liver Function    
    
    
    
  12/31/24 Range/Units    
    
  04:55     
     
Total Bilirubin  0.6  (0.2-1.0)  mg/dL    
     
AST  57 H  (15-37)  U/L    
     
ALT  95 H  (16-63)  U/L    
     
Alkaline Phosphatase  97  ()  U/L    
     
Albumin  2.6 L  (3.4-5.0)  g/dL    
    
    
    
    
Progress Note: A&P    
Assessment and Plan    
(1) Acute on chronic diastolic (congestive) heart failure:     
(2) Cardiomyopathy:     
(3) Paroxysmal atrial fibrillation:     
(4) Coronary artery disease:     
      Onset Date: Unknown    
(5) Status post aorto-coronary artery bypass graft:     
(6) Valvular heart disease:     
(7) Pulmonary hypertension:     
(8) Chronic kidney disease:     
(9) Pneumonia:     
(10) Essential hypertension:     
(11) Hyperlipidemia:     
(12) Type 2 diabetes mellitus:     
    
Plan    
Admission findings: Tachycardia with rate over 130, respiratory distress, acute   
hypoxia with O2 sat of 89% on 2 L, leukocytosis, lactic acidosis, left lower   
lobe pneumonia on x-ray pleural effusions bilaterally.  All of this is resulted   
in severe sepsis without septic shock    
    
--------------------------------------------------------------------------------    
    
    
Nosocomial pneumonia's and left lower lobe with severe sepsis acute hypoxic   
respiratory failure-IV antibiotics broad-spectrum, aerosol treatments, need levo  
albuterol secondary to tachycardia, will hold off on steroids unless significant  
metabolic acidosis, try to obtain sputum culture, blood cultures pending    
    
--------------------------------------------------------------------------------    
    
    
Acute combined congestive heart failure with reduced ejection fraction 10 to   
15%-elevated BNP, uncertain baseline BNP, given 40 Lasix in ER, will diurese   
with Bumex later today.  Check echocardiogram, consult to cardiology    
    
--------------------------------------------------------------------------------    
    
    
Elevated high-sensitivity troponin-possible NSTEMI type II based on severe   
sepsis and pneumonia as outlined above, check echocardiogram, consult to   
cardiology    
    
--------------------------------------------------------------------------------    
    
    
Atrial fibrillation with RVR-converted yesterday when he stood up, so vasovagal   
maneuver, currently maintained sinus rhythm overnight    
    
--------------------------------------------------------------------------------    
    
    
Iron deficiency anemia-monitor daily    
    
--------------------------------------------------------------------------------    
    
    
Chronic kidney disease stage III-monitor daily, likely did here to deteriorate   
secondary treatment for the sepsis and CHF as outlined above    
    
--------------------------------------------------------------------------------    
    
    
Tachycardia-looks like atrial fibrillation with rapid ventricular response, will  
try to slow rate with beta-blockers Lanoxin and Cardizem.    
    
--------------------------------------------------------------------------------    
    
    
With tachycardia, although patient is anticoagulated if D-dimer significantly   
elevated, 2 times normal, CTA negative for pulmonary embolism    
    
--------------------------------------------------------------------------------    
    
    
Acute UTI-urine is positive, cultures pending, on antibiotics as outlined above    
    
--------------------------------------------------------------------------------    
    
    
Coronary artery disease-status post bypass-continue with home medications    
    
--------------------------------------------------------------------------------    
    
    
Hypermagnesemia-monitor daily - resolved    
    
--------------------------------------------------------------------------------    
    
    
Patient describes sacral wound-will consult to wound, dressing in place    
    
--------------------------------------------------------------------------------    
    
    
Admission status: Severe sepsis without septic shock secondary to left lower   
lobe pneumonia and acute combined congestive heart failure as a consequence of   
the severe sepsis, admit to ICU, medically necessary  treatment will span 2   
midnights.  Inpatient status    
    
    
Urinary Catheter Management    
Urinary Catheter Management    
Urethral:     
      Cath placed during this visit: yes    
      Urethral indwelling: No    
      Insertion date: 12/30/24    
      Insertion time: 08:30

## 2024-12-31 NOTE — PM.PN
Progress Note: Subjective
Subjective
Interval history: 
Patient appears much improved today compared to yesterday, alert and sitting up in a bed, ornery

He was refusing some of his medications yesterday, encouraged with his heart as bad as it is, he needs to take all of his medications, we can manage any side effects

Exam
Constitutional
Vital Signs, click to edit/add: 
Last Vital Signs

Temp  98.1 F   12/31/24 07:00
Pulse  73   12/31/24 11:43
Resp  23 H  12/31/24 11:35
BP  126/70   12/31/24 11:35
Pulse Ox  97   12/31/24 11:35
O2 Del Method  Nasal Cannula  12/31/24 11:43
O2 Flow Rate  1   12/31/24 11:43


Documenting provider has reviewed patient's vital signs: yes
Common normals: no apparent distress (Minimal respiratory distress)
HENMT
Common normals: normocephalic
Chest
Common normals: inspection of chest normal
Respiratory
Common normals: abnormal respiratory effort (Moderate respiratory distress) and not clear to ascultation bilaterally
Auscultation: rales (Improved air exchange), rhonchi, wheezes (Improved) and egophony (Left lower lobe)
Cardio
Common normals: regular rate and regular rhythm
Rhythm: regular rhythm (Resolved currently normal sinus rhythm)
GI
Common normals: Normal to inspection, nondistended, normoactive bowel sounds present
Extremity
Common normals: abnormal to inspection (2+ edema)

Progress Note: Objective
Labs
Labs: 
Short CBC

  12/31/24 Range/Units
  04:55 
WBC  12.5 H  (4.0-11.0)  10^3/uL
Hgb  10.4 L  (14.0-18.0)  g/dL
Hct  34.7 L  (42.0-54.0)  %
Plt Count  225  (150-450)  10^3/uL

BMP

  12/31/24
  04:55
Sodium  140
Potassium  4.1
Chloride  101
Carbon Dioxide  29.0
BUN  46.0 H
Creatinine  2.25 H
Glucose  243 H
Calcium  7.8 L

Liver Function

  12/31/24 Range/Units
  04:55 
Total Bilirubin  0.6  (0.2-1.0)  mg/dL
AST  57 H  (15-37)  U/L
ALT  95 H  (16-63)  U/L
Alkaline Phosphatase  97  ()  U/L
Albumin  2.6 L  (3.4-5.0)  g/dL



Progress Note: A&P
Assessment and Plan
(1) Acute on chronic diastolic (congestive) heart failure: 
(2) Cardiomyopathy: 
(3) Paroxysmal atrial fibrillation: 
(4) Coronary artery disease: 
      Onset Date: Unknown
(5) Status post aorto-coronary artery bypass graft: 
(6) Valvular heart disease: 
(7) Pulmonary hypertension: 
(8) Chronic kidney disease: 
(9) Pneumonia: 
(10) Essential hypertension: 
(11) Hyperlipidemia: 
(12) Type 2 diabetes mellitus: 

Plan
Admission findings: Tachycardia with rate over 130, respiratory distress, acute hypoxia with O2 sat of 89% on 2 L, leukocytosis, lactic acidosis, left lower lobe pneumonia on x-ray pleural effusions bilaterally.  All of this is resulted in severe 
sepsis without septic shock



Nosocomial pneumonia's and left lower lobe with severe sepsis acute hypoxic respiratory failure-IV antibiotics broad-spectrum, aerosol treatments, need levo albuterol secondary to tachycardia, will hold off on steroids unless significant metabolic 
acidosis, try to obtain sputum culture, blood cultures pending



Acute combined congestive heart failure with reduced ejection fraction 10 to 15%-elevated BNP, uncertain baseline BNP, given 40 Lasix in ER, will diurese with Bumex later today.  Check echocardiogram, consult to cardiology



Elevated high-sensitivity troponin-possible NSTEMI type II based on severe sepsis and pneumonia as outlined above, check echocardiogram, consult to cardiology



Atrial fibrillation with RVR-converted yesterday when he stood up, so vasovagal maneuver, currently maintained sinus rhythm overnight



Iron deficiency anemia-monitor daily



Chronic kidney disease stage III-monitor daily, likely did here to deteriorate secondary treatment for the sepsis and CHF as outlined above



Tachycardia-looks like atrial fibrillation with rapid ventricular response, will try to slow rate with beta-blockers Lanoxin and Cardizem.



With tachycardia, although patient is anticoagulated if D-dimer significantly elevated, 2 times normal, CTA negative for pulmonary embolism



Acute UTI-urine is positive, cultures pending, on antibiotics as outlined above



Coronary artery disease-status post bypass-continue with home medications



Hypermagnesemia-monitor daily - resolved



Patient describes sacral wound-will consult to wound, dressing in place



Admission status: Severe sepsis without septic shock secondary to left lower lobe pneumonia and acute combined congestive heart failure as a consequence of the severe sepsis, admit to ICU, medically necessary  treatment will span 2 midnights.  
Inpatient status


Urinary Catheter Management
Urinary Catheter Management
Urethral: 
      Cath placed during this visit: yes
      Urethral indwelling: No
      Insertion date: 12/30/24
      Insertion time: 08:30

## 2024-12-31 NOTE — CM.NOTE
Important Message From [employer] discussed with pt, pt verbalizes understanding and signs paper.  Original given to pt and copy placed on pt's chart.

## 2024-12-31 NOTE — SWNOTE1
JUAQUIN received call from Arleen at [facility] checking on pt. Pt is from [facility] skilled. JUAQUIN sent physician notes, labs, vitals, case management report, and med list.

## 2025-01-01 VITALS — HEART RATE: 78 BPM

## 2025-01-01 VITALS
TEMPERATURE: 98.06 F | HEART RATE: 77 BPM | DIASTOLIC BLOOD PRESSURE: 49 MMHG | SYSTOLIC BLOOD PRESSURE: 112 MMHG | OXYGEN SATURATION: 91 %

## 2025-01-01 VITALS
OXYGEN SATURATION: 95 % | HEART RATE: 73 BPM | TEMPERATURE: 97.6 F | DIASTOLIC BLOOD PRESSURE: 63 MMHG | SYSTOLIC BLOOD PRESSURE: 131 MMHG

## 2025-01-01 VITALS — OXYGEN SATURATION: 96 % | HEART RATE: 68 BPM

## 2025-01-01 VITALS
HEART RATE: 80 BPM | DIASTOLIC BLOOD PRESSURE: 61 MMHG | SYSTOLIC BLOOD PRESSURE: 135 MMHG | TEMPERATURE: 97.16 F | OXYGEN SATURATION: 92 %

## 2025-01-01 VITALS — OXYGEN SATURATION: 97 % | HEART RATE: 104 BPM

## 2025-01-01 VITALS — OXYGEN SATURATION: 95 % | HEART RATE: 69 BPM

## 2025-01-01 VITALS
HEART RATE: 75 BPM | OXYGEN SATURATION: 98 % | SYSTOLIC BLOOD PRESSURE: 126 MMHG | DIASTOLIC BLOOD PRESSURE: 80 MMHG | TEMPERATURE: 97.52 F

## 2025-01-01 VITALS — OXYGEN SATURATION: 92 % | HEART RATE: 72 BPM

## 2025-01-01 VITALS
SYSTOLIC BLOOD PRESSURE: 116 MMHG | DIASTOLIC BLOOD PRESSURE: 72 MMHG | TEMPERATURE: 98 F | OXYGEN SATURATION: 92 % | HEART RATE: 79 BPM

## 2025-01-01 VITALS — HEART RATE: 79 BPM | OXYGEN SATURATION: 95 %

## 2025-01-01 VITALS — HEART RATE: 68 BPM | OXYGEN SATURATION: 96 %

## 2025-01-01 VITALS — HEART RATE: 85 BPM

## 2025-01-01 VITALS — OXYGEN SATURATION: 98 % | HEART RATE: 72 BPM

## 2025-01-01 VITALS
HEART RATE: 77 BPM | OXYGEN SATURATION: 92 % | TEMPERATURE: 98.06 F | DIASTOLIC BLOOD PRESSURE: 49 MMHG | SYSTOLIC BLOOD PRESSURE: 112 MMHG

## 2025-01-01 VITALS — HEART RATE: 71 BPM | OXYGEN SATURATION: 96 %

## 2025-01-01 VITALS
TEMPERATURE: 97.7 F | OXYGEN SATURATION: 98 % | DIASTOLIC BLOOD PRESSURE: 75 MMHG | SYSTOLIC BLOOD PRESSURE: 142 MMHG | HEART RATE: 76 BPM

## 2025-01-01 VITALS — OXYGEN SATURATION: 92 %

## 2025-01-01 VITALS — HEART RATE: 77 BPM

## 2025-01-01 VITALS — HEART RATE: 74 BPM | OXYGEN SATURATION: 95 %

## 2025-01-01 VITALS — HEART RATE: 96 BPM

## 2025-01-01 LAB
ADD MANUAL DIFF: NO
ALANINE AMINOTRANSFERASE: 72 U/L (ref 16–63)
ALBUMIN GLOBULIN RATIO: 0.8
ALBUMIN LEVEL: 2.7 G/DL (ref 3.4–5)
ALKALINE PHOSPHATASE: 91 U/L (ref 46–116)
ANION GAP: 13.1
ASPARTATE AMINO TRANSFERASE: 27 U/L (ref 15–37)
BLOOD UREA NITROGEN: 43 MG/DL (ref 7–18)
CALCIUM: 7.8 MG/DL (ref 8.5–10.1)
CARBON DIOXIDE: 32.9 MMOL/L (ref 21–32)
CHLORIDE: 98 MMOL/L (ref 98–107)
CO2 BLD-SCNC: 32.9 MMOL/L (ref 21–32)
ESTIMATED GFR (AFRICAN AMERICA: 34
ESTIMATED GFR (NON-AFRICAN AME: 28
GLOBULIN: 3.4 G/DL
GLUCOSE BLD-MCNC: 207 MG/DL (ref 74–106)
HCT VFR BLD CALC: 35.3 % (ref 42–54)
HEMATOCRIT: 35.3 % (ref 42–54)
HEMOGLOBIN: 10.9 G/DL (ref 14–18)
IMMATURE GRANULOCYTES ABS AUTO: 0.06 10^3/UL (ref 0–0.03)
IMMATURE GRANULOCYTES PCT AUTO: 0.6 % (ref 0–0.5)
LYMPHOCYTES  ABSOLUTE AUTO: 1.5 10^3/UL (ref 1.2–3.8)
MAGNESIUM: 1.8 MG/DL (ref 1.8–2.4)
MCV RBC: 88.5 FL (ref 80–94)
MEAN CORPUSCULAR HEMOGLOBIN: 27.3 PG (ref 25.9–34)
MEAN CORPUSCULAR HGB CONC: 30.9 G/DL (ref 29.9–35.2)
MEAN CORPUSCULAR VOLUME: 88.5 FL (ref 80–94)
NT PRO B TYPE NATRIURETIC PEPT: (no result) PG/ML (ref ?–900)
PLATELET # BLD: 228 10^3/UL (ref 150–450)
PLATELET COUNT: 228 10^3/UL (ref 150–450)
POTASSIUM SERPLBLD-SCNC: 3 MMOL/L (ref 3.5–5.1)
POTASSIUM: 3 MMOL/L (ref 3.5–5.1)
RED BLOOD COUNT: 3.99 10^6/UL (ref 4.7–6.1)
SODIUM BLD-SCNC: 141 MMOL/L (ref 136–145)
SODIUM: 141 MMOL/L (ref 136–145)
TOTAL PROTEIN: 6.1 G/DL (ref 6.4–8.2)
WBC # BLD: 10.9 10^3/UL (ref 4–11)
WHITE BLOOD COUNT: 10.9 10^3/UL (ref 4–11)

## 2025-01-01 RX ADMIN — METOPROLOL SUCCINATE 25 MG: 25 TABLET, EXTENDED RELEASE ORAL at 09:16

## 2025-01-01 RX ADMIN — IPRATROPIUM BROMIDE 0.5 MG: 0.5 SOLUTION RESPIRATORY (INHALATION) at 11:37

## 2025-01-01 RX ADMIN — LEVALBUTEROL HYDROCHLORIDE 0.63 MG: 0.63 SOLUTION RESPIRATORY (INHALATION) at 17:41

## 2025-01-01 RX ADMIN — METHYLPREDNISOLONE SODIUM SUCCINATE 60 MG: 125 INJECTION, POWDER, FOR SOLUTION INTRAMUSCULAR; INTRAVENOUS at 03:43

## 2025-01-01 RX ADMIN — INSULIN ASPART 0 UNIT: 100 INJECTION, SOLUTION INTRAVENOUS; SUBCUTANEOUS at 21:05

## 2025-01-01 RX ADMIN — INSULIN ASPART 0 UNIT: 100 INJECTION, SOLUTION INTRAVENOUS; SUBCUTANEOUS at 16:24

## 2025-01-01 RX ADMIN — PANTOPRAZOLE SODIUM 40 MG: 40 INJECTION, POWDER, FOR SOLUTION INTRAVENOUS at 09:30

## 2025-01-01 RX ADMIN — METOPROLOL SUCCINATE 25 MG: 25 TABLET, EXTENDED RELEASE ORAL at 20:48

## 2025-01-01 RX ADMIN — ALUMINUM HYDROXIDE, MAGNESIUM HYDROXIDE, AND SIMETHICONE 30 ML: 200; 200; 20 SUSPENSION ORAL at 20:48

## 2025-01-01 RX ADMIN — POTASSIUM CHLORIDE 67.5 MEQ: 149 INJECTION, SOLUTION, CONCENTRATE INTRAVENOUS at 10:11

## 2025-01-01 RX ADMIN — IPRATROPIUM BROMIDE 0.5 MG: 0.5 SOLUTION RESPIRATORY (INHALATION) at 17:42

## 2025-01-01 RX ADMIN — INSULIN GLARGINE 10 UNIT: 100 INJECTION, SOLUTION SUBCUTANEOUS at 10:18

## 2025-01-01 RX ADMIN — IPRATROPIUM BROMIDE 0.5 MG: 0.5 SOLUTION RESPIRATORY (INHALATION) at 22:33

## 2025-01-01 RX ADMIN — ALUMINUM HYDROXIDE, MAGNESIUM HYDROXIDE, AND SIMETHICONE 30 ML: 200; 200; 20 SUSPENSION ORAL at 06:09

## 2025-01-01 RX ADMIN — INSULIN GLARGINE 20 UNIT: 100 INJECTION, SOLUTION SUBCUTANEOUS at 10:11

## 2025-01-01 RX ADMIN — FLUCONAZOLE 100 MG: 100 TABLET ORAL at 16:25

## 2025-01-01 RX ADMIN — PIPERACILLIN SODIUM,TAZOBACTAM SODIUM 12.5 GM: 3; .375 INJECTION, POWDER, FOR SOLUTION INTRAVENOUS at 16:27

## 2025-01-01 RX ADMIN — LINEZOLID 300 MG: 600 INJECTION, SOLUTION INTRAVENOUS at 13:30

## 2025-01-01 RX ADMIN — INSULIN ASPART 0 UNIT: 100 INJECTION, SOLUTION INTRAVENOUS; SUBCUTANEOUS at 07:39

## 2025-01-01 RX ADMIN — LEVALBUTEROL HYDROCHLORIDE 0.63 MG: 0.63 SOLUTION RESPIRATORY (INHALATION) at 22:33

## 2025-01-01 RX ADMIN — LINEZOLID 300 MG: 600 INJECTION, SOLUTION INTRAVENOUS at 01:20

## 2025-01-01 RX ADMIN — BUMETANIDE 10 MG: 0.25 INJECTION INTRAMUSCULAR; INTRAVENOUS at 10:11

## 2025-01-01 RX ADMIN — HYDROCODONE BITARTRATE AND ACETAMINOPHEN 1 TAB: 5; 325 TABLET ORAL at 22:58

## 2025-01-01 RX ADMIN — PIPERACILLIN SODIUM,TAZOBACTAM SODIUM 15 GM: 3; .375 INJECTION, POWDER, FOR SOLUTION INTRAVENOUS at 02:22

## 2025-01-01 RX ADMIN — ALLOPURINOL 100 MG: 100 TABLET ORAL at 09:16

## 2025-01-01 RX ADMIN — LIDOCAINE 1 PATCH: 50 PATCH TOPICAL at 09:23

## 2025-01-01 RX ADMIN — PIPERACILLIN SODIUM,TAZOBACTAM SODIUM 12.5 GM: 3; .375 INJECTION, POWDER, FOR SOLUTION INTRAVENOUS at 09:16

## 2025-01-01 RX ADMIN — LEVALBUTEROL HYDROCHLORIDE 0.63 MG: 0.63 SOLUTION RESPIRATORY (INHALATION) at 11:37

## 2025-01-01 NOTE — PM.PN
Progress Note: Subjective
Subjective
Interval history: 
More this morning, up in chair eating breakfast, is refusing some of his medications, discussed with her to reduce the number is much as possible, also discussed the possibility for hospice will see how things go today and entertain that thought 
tomorrow, does have an ejection fraction of 10 to 15% on echocardiogram

Exam
Constitutional
Vital Signs, click to edit/add: 
Last Vital Signs

Temp  97.7 F   01/01/25 07:00
Pulse  72   01/01/25 10:00
Resp  20   01/01/25 07:00
BP  142/75 H  01/01/25 07:00
Pulse Ox  92 L  01/01/25 10:00
O2 Del Method  Nasal Cannula  01/01/25 07:00
O2 Flow Rate  0.5   01/01/25 07:00


Documenting provider has reviewed patient's vital signs: yes
Common normals: no apparent distress (Minimal respiratory distress persisting)
HENMT
Common normals: normocephalic
Chest
Common normals: inspection of chest normal
Respiratory
Common normals: no retractions, no use of accessory muscles and percussion normal; 
abnormal respiratory effort (minimal respiratory distress) and not clear to ascultation bilaterally
Auscultation: rales (Improved air exchange), rhonchi, wheezes (Improved) and egophony (Left lower lobe)
Cardio
Common normals: regular rate and regular rhythm
Rhythm: regular rhythm (Resolved currently normal sinus rhythm)
GI
Common normals: Normal to inspection, nondistended, normoactive bowel sounds present
Extremity
Common normals: abnormal to inspection (1-2+ edema - slowly improving)

Progress Note: Objective
Labs
Labs: 
Short CBC

  01/01/25 Range/Units
  06:43 
WBC  10.9  (4.0-11.0)  10^3/uL
Hgb  10.9 L  (14.0-18.0)  g/dL
Hct  35.3 L  (42.0-54.0)  %
Plt Count  228  (150-450)  10^3/uL

BMP

  01/01/25
  06:43
Sodium  141
Potassium  3.0 L
Chloride  98
Carbon Dioxide  32.9 H
BUN  43.0 H
Creatinine  2.28 H
Glucose  207 H
Calcium  7.8 L

Liver Function

  01/01/25 Range/Units
  06:43 
Total Bilirubin  0.8  (0.2-1.0)  mg/dL
AST  27  (15-37)  U/L
ALT  72 H  (16-63)  U/L
Alkaline Phosphatase  91  ()  U/L
Albumin  2.7 L  (3.4-5.0)  g/dL



Progress Note: A&P
Assessment and Plan
(1) Acute on chronic diastolic (congestive) heart failure: 
(2) Cardiomyopathy: 
(3) Paroxysmal atrial fibrillation: 
(4) Coronary artery disease: 
      Onset Date: Unknown
(5) Status post aorto-coronary artery bypass graft: 
(6) Valvular heart disease: 
(7) Pulmonary hypertension: 
(8) Chronic kidney disease: 
(9) Pneumonia: 
(10) Essential hypertension: 
(11) Hyperlipidemia: 
(12) Type 2 diabetes mellitus: 

Plan
Admission findings: Tachycardia with rate over 130, respiratory distress, acute hypoxia with O2 sat of 89% on 2 L, leukocytosis, lactic acidosis, left lower lobe pneumonia on x-ray pleural effusions bilaterally.  All of this is resulted in severe 
sepsis without septic shock



Nosocomial pneumonia's and left lower lobe with severe sepsis acute hypoxic respiratory failure-IV antibiotics broad-spectrum, aerosol treatments, need levo albuterol secondary to tachycardia, cannot improve, white blood cell count returned to normal



Acute combined congestive heart failure with reduced ejection fraction 10 to 15%-so far his kidney is tolerating the Bumex drip, will repeat that again today



Elevated high-sensitivity troponin-possible NSTEMI type II based on severe sepsis and pneumonia as outlined above, check echocardiogram, consult to cardiology



Atrial fibrillation with RVR-converted 12/30/2024, so far maintaining.  Continue with anticoagulation



Iron deficiency anemia-monitor daily, improved today



Chronic kidney disease stage III-monitor daily, likely did here to deteriorate secondary treatment for the sepsis and CHF as outlined above



Tachycardia-looks like atrial fibrillation with rapid ventricular response,-now converted to normal sinus rhythm



With tachycardia, although patient is anticoagulated if D-dimer significantly elevated, 2 times normal, CTA negative for pulmonary embolism



Acute UTI-urine is positive,-check on culture results



Coronary artery disease-status post bypass-continue with home medications



Hypermagnesemia-monitor daily - resolved



Patient describes sacral wound-will consult to wound, dressing in place



Admission status: Severe sepsis without septic shock secondary to left lower lobe pneumonia and acute combined congestive heart failure as a consequence of the severe sepsis, admit to ICU, medically necessary  treatment will span 2 midnights.  
Inpatient status, possibly back to rehab tomorrow, 1 more day of diuresis and then the hospice discussion


Urinary Catheter Management
Urinary Catheter Management
Urethral: 
      Cath placed during this visit: yes
      Urethral indwelling: No
      Insertion date: 12/30/24
      Insertion time: 08:30

## 2025-01-01 NOTE — PM.PN
Progress Note: Subjective
Subjective
Interval history: 
Patient appears much improved today compared to yesterday, alert and sitting up in a bed, ornery

He was refusing some of his medications yesterday, encouraged with his heart as bad as it is, he needs to take all of his medications, we can manage any side effects

Exam
Constitutional
Vital Signs, click to edit/add: 
Last Vital Signs

Temp  97.7 F   01/01/25 07:00
Pulse  96 H  01/01/25 07:56
Resp  20   01/01/25 07:00
BP  142/75 H  01/01/25 07:00
Pulse Ox  98   01/01/25 07:00
O2 Del Method  Nasal Cannula  01/01/25 07:00
O2 Flow Rate  0.5   01/01/25 07:00



Progress Note: Objective
Labs
Labs: 
Short CBC

  01/01/25 Range/Units
  06:43 
WBC  10.9  (4.0-11.0)  10^3/uL
Hgb  10.9 L  (14.0-18.0)  g/dL
Hct  35.3 L  (42.0-54.0)  %
Plt Count  228  (150-450)  10^3/uL

BMP

  01/01/25
  06:43
Sodium  141
Potassium  3.0 L
Chloride  98
Carbon Dioxide  32.9 H
BUN  43.0 H
Creatinine  2.28 H
Glucose  207 H
Calcium  7.8 L

Liver Function

  01/01/25 Range/Units
  06:43 
Total Bilirubin  0.8  (0.2-1.0)  mg/dL
AST  27  (15-37)  U/L
ALT  72 H  (16-63)  U/L
Alkaline Phosphatase  91  ()  U/L
Albumin  2.7 L  (3.4-5.0)  g/dL



Progress Note: A&P
Assessment and Plan
(1) Acute on chronic diastolic (congestive) heart failure: 
(2) Cardiomyopathy: 
(3) Paroxysmal atrial fibrillation: 
(4) Coronary artery disease: 
      Onset Date: Unknown
(5) Status post aorto-coronary artery bypass graft: 
(6) Valvular heart disease: 
(7) Pulmonary hypertension: 
(8) Chronic kidney disease: 
(9) Pneumonia: 
(10) Essential hypertension: 
(11) Hyperlipidemia: 
(12) Type 2 diabetes mellitus:

Urinary Catheter Management
Urinary Catheter Management
Urethral: 
      Cath placed during this visit: yes
      Urethral indwelling: No
      Insertion date: 12/30/24
      Insertion time: 08:30

## 2025-01-01 NOTE — P.PN_ITS
Progress Note: Subjective    
Subjective    
Interval history:     
Patient appears much improved today compared to yesterday, alert and sitting up   
in a bed, ornery    
    
He was refusing some of his medications yesterday, encouraged with his heart as   
bad as it is, he needs to take all of his medications, we can manage any side ef  
fects    
    
Exam    
Constitutional    
Vital Signs, click to edit/add:     
                                Last Vital Signs    
    
    
    
Temp  97.7 F   01/01/25 07:00    
     
Pulse  96 H  01/01/25 07:56    
     
Resp  20   01/01/25 07:00    
     
BP  142/75 H  01/01/25 07:00    
     
Pulse Ox  98   01/01/25 07:00    
     
O2 Del Method  Nasal Cannula  01/01/25 07:00    
     
O2 Flow Rate  0.5   01/01/25 07:00    
    
    
    
    
Progress Note: Objective    
Labs    
Labs:     
                                    Short CBC    
    
    
    
  01/01/25 Range/Units    
    
  06:43     
     
WBC  10.9  (4.0-11.0)  10^3/uL    
     
Hgb  10.9 L  (14.0-18.0)  g/dL    
     
Hct  35.3 L  (42.0-54.0)  %    
     
Plt Count  228  (150-450)  10^3/uL    
    
    
                                       BMP    
    
    
    
  01/01/25    
    
  06:43    
     
Sodium  141    
     
Potassium  3.0 L    
     
Chloride  98    
     
Carbon Dioxide  32.9 H    
     
BUN  43.0 H    
     
Creatinine  2.28 H    
     
Glucose  207 H    
     
Calcium  7.8 L    
    
    
                                 Liver Function    
    
    
    
  01/01/25 Range/Units    
    
  06:43     
     
Total Bilirubin  0.8  (0.2-1.0)  mg/dL    
     
AST  27  (15-37)  U/L    
     
ALT  72 H  (16-63)  U/L    
     
Alkaline Phosphatase  91  ()  U/L    
     
Albumin  2.7 L  (3.4-5.0)  g/dL    
    
    
    
    
Progress Note: A&P    
Assessment and Plan    
(1) Acute on chronic diastolic (congestive) heart failure:     
(2) Cardiomyopathy:     
(3) Paroxysmal atrial fibrillation:     
(4) Coronary artery disease:     
      Onset Date: Unknown    
(5) Status post aorto-coronary artery bypass graft:     
(6) Valvular heart disease:     
(7) Pulmonary hypertension:     
(8) Chronic kidney disease:     
(9) Pneumonia:     
(10) Essential hypertension:     
(11) Hyperlipidemia:     
(12) Type 2 diabetes mellitus:    
    
Urinary Catheter Management    
Urinary Catheter Management    
Urethral:     
      Cath placed during this visit: yes    
      Urethral indwelling: No    
      Insertion date: 12/30/24    
      Insertion time: 08:30

## 2025-01-01 NOTE — P.PN_ITS
Progress Note: Subjective    
Subjective    
Interval history:     
More this morning, up in chair eating breakfast, is refusing some of his   
medications, discussed with her to reduce the number is much as possible, also   
discussed the possibility for hospice will see how things go today and entertain  
that thought tomorrow, does have an ejection fraction of 10 to 15% on   
echocardiogram    
    
Exam    
Constitutional    
Vital Signs, click to edit/add:     
                                Last Vital Signs    
    
    
    
Temp  97.7 F   01/01/25 07:00    
     
Pulse  72   01/01/25 10:00    
     
Resp  20   01/01/25 07:00    
     
BP  142/75 H  01/01/25 07:00    
     
Pulse Ox  92 L  01/01/25 10:00    
     
O2 Del Method  Nasal Cannula  01/01/25 07:00    
     
O2 Flow Rate  0.5   01/01/25 07:00    
    
    
    
Documenting provider has reviewed patient's vital signs: yes    
Common normals: no apparent distress (Minimal respiratory distress persisting)    
HENMT    
Common normals: normocephalic    
Chest    
Common normals: inspection of chest normal    
Respiratory    
Common normals: no retractions, no use of accessory muscles and percussion   
normal;     
abnormal respiratory effort (minimal respiratory distress) and not clear to   
ascultation bilaterally    
Auscultation: rales (Improved air exchange), rhonchi, wheezes (Improved) and   
egophony (Left lower lobe)    
Cardio    
Common normals: regular rate and regular rhythm    
Rhythm: regular rhythm (Resolved currently normal sinus rhythm)    
GI    
Common normals: Normal to inspection, nondistended, normoactive bowel sounds   
present    
Extremity    
Common normals: abnormal to inspection (1-2+ edema - slowly improving)    
    
Progress Note: Objective    
Labs    
Labs:     
                                    Short CBC    
    
    
    
  01/01/25 Range/Units    
    
  06:43     
     
WBC  10.9  (4.0-11.0)  10^3/uL    
     
Hgb  10.9 L  (14.0-18.0)  g/dL    
     
Hct  35.3 L  (42.0-54.0)  %    
     
Plt Count  228  (150-450)  10^3/uL    
    
    
                                       BMP    
    
    
    
  01/01/25    
    
  06:43    
     
Sodium  141    
     
Potassium  3.0 L    
     
Chloride  98    
     
Carbon Dioxide  32.9 H    
     
BUN  43.0 H    
     
Creatinine  2.28 H    
     
Glucose  207 H    
     
Calcium  7.8 L    
    
    
                                 Liver Function    
    
    
    
  01/01/25 Range/Units    
    
  06:43     
     
Total Bilirubin  0.8  (0.2-1.0)  mg/dL    
     
AST  27  (15-37)  U/L    
     
ALT  72 H  (16-63)  U/L    
     
Alkaline Phosphatase  91  ()  U/L    
     
Albumin  2.7 L  (3.4-5.0)  g/dL    
    
    
    
    
Progress Note: A&P    
Assessment and Plan    
(1) Acute on chronic diastolic (congestive) heart failure:     
(2) Cardiomyopathy:     
(3) Paroxysmal atrial fibrillation:     
(4) Coronary artery disease:     
      Onset Date: Unknown    
(5) Status post aorto-coronary artery bypass graft:     
(6) Valvular heart disease:     
(7) Pulmonary hypertension:     
(8) Chronic kidney disease:     
(9) Pneumonia:     
(10) Essential hypertension:     
(11) Hyperlipidemia:     
(12) Type 2 diabetes mellitus:     
    
Plan    
Admission findings: Tachycardia with rate over 130, respiratory distress, acute   
hypoxia with O2 sat of 89% on 2 L, leukocytosis, lactic acidosis, left lower   
lobe pneumonia on x-ray pleural effusions bilaterally.  All of this is resulted   
in severe sepsis without septic shock    
    
--------------------------------------------------------------------------------    
    
    
Nosocomial pneumonia's and left lower lobe with severe sepsis acute hypoxic   
respiratory failure-IV antibiotics broad-spectrum, aerosol treatments, need levo  
albuterol secondary to tachycardia, cannot improve, white blood cell count r  
eturned to normal    
    
--------------------------------------------------------------------------------    
    
    
Acute combined congestive heart failure with reduced ejection fraction 10 to   
15%-so far his kidney is tolerating the Bumex drip, will repeat that again today    
    
--------------------------------------------------------------------------------    
    
    
Elevated high-sensitivity troponin-possible NSTEMI type II based on severe   
sepsis and pneumonia as outlined above, check echocardiogram, consult to cardio  
logy    
    
--------------------------------------------------------------------------------    
    
    
Atrial fibrillation with RVR-converted 12/30/2024, so far maintaining.  Continue  
with anticoagulation    
    
--------------------------------------------------------------------------------    
    
    
Iron deficiency anemia-monitor daily, improved today    
    
--------------------------------------------------------------------------------    
    
    
Chronic kidney disease stage III-monitor daily, likely did here to deteriorate   
secondary treatment for the sepsis and CHF as outlined above    
    
--------------------------------------------------------------------------------    
    
    
Tachycardia-looks like atrial fibrillation with rapid ventricular response,-now   
converted to normal sinus rhythm    
    
--------------------------------------------------------------------------------    
    
    
With tachycardia, although patient is anticoagulated if D-dimer significantly   
elevated, 2 times normal, CTA negative for pulmonary embolism    
    
--------------------------------------------------------------------------------    
    
    
Acute UTI-urine is positive,-check on culture results    
    
--------------------------------------------------------------------------------    
    
    
Coronary artery disease-status post bypass-continue with home medications    
    
--------------------------------------------------------------------------------    
    
    
Hypermagnesemia-monitor daily - resolved    
    
--------------------------------------------------------------------------------    
    
    
Patient describes sacral wound-will consult to wound, dressing in place    
    
--------------------------------------------------------------------------------    
    
    
Admission status: Severe sepsis without septic shock secondary to left lower   
lobe pneumonia and acute combined congestive heart failure as a consequence of   
the severe sepsis, admit to ICU, medically necessary  treatment will span 2   
midnights.  Inpatient status, possibly back to rehab tomorrow, 1 more day of   
diuresis and then the hospice discussion    
    
    
Urinary Catheter Management    
Urinary Catheter Management    
Urethral:     
      Cath placed during this visit: yes    
      Urethral indwelling: No    
      Insertion date: 12/30/24    
      Insertion time: 08:30 Yes

## 2025-01-01 NOTE — PC.NURSE
0620  Patient with multiple requests through the night.  Patient states at this time, that he is transitioning to Hospice today. States that he cannot take any medications. States to this RN,  you saw what happened after I took PRN Pain pill, 
Norco.   This RN is not aware of any issues patient had after taking Norco, other than he has been saying he is going to throw up (without any other signs) since 2300 on 12/31/24. Patient has eaten apple sauce, chocolate milk and cereal last night 
and ate crackers this morning. No vomiting or dry heaving noted. Patient was able to sleep for 2-3 hours after pain medication was given.  Patient is also stating breakfast order to this RN as 2 [employer] Chocolate milks, 2 cups of chicken noodle soup 
and a peanut butter and jelly sandwich.  Patient states that  ALL n his medications are making him sick including his IV steroids. This RN explained to patient that he needs to talk to Hospitalist this AM about Hospice care and referral if it is 
deemed appropriate.

## 2025-01-02 VITALS — HEART RATE: 78 BPM

## 2025-01-02 VITALS — OXYGEN SATURATION: 96 % | HEART RATE: 80 BPM

## 2025-01-02 VITALS — HEART RATE: 81 BPM

## 2025-01-02 VITALS — HEART RATE: 76 BPM

## 2025-01-02 VITALS — HEART RATE: 80 BPM

## 2025-01-02 LAB
ADD MANUAL DIFF: NO
ALANINE AMINOTRANSFERASE: 63 U/L (ref 16–63)
ALBUMIN GLOBULIN RATIO: 0.8
ALBUMIN LEVEL: 2.7 G/DL (ref 3.4–5)
ALKALINE PHOSPHATASE: 95 U/L (ref 46–116)
ANION GAP: 11.5
ASPARTATE AMINO TRANSFERASE: 31 U/L (ref 15–37)
BLOOD UREA NITROGEN: 44 MG/DL (ref 7–18)
CALCIUM: 7.7 MG/DL (ref 8.5–10.1)
CARBON DIOXIDE: 37.9 MMOL/L (ref 21–32)
CHLORIDE: 96 MMOL/L (ref 98–107)
CO2 BLD-SCNC: 37.9 MMOL/L (ref 21–32)
ESTIMATED GFR (AFRICAN AMERICA: 33
ESTIMATED GFR (NON-AFRICAN AME: 27
GLOBULIN: 3.3 G/DL
GLUCOSE BLD-MCNC: 186 MG/DL (ref 74–106)
HCT VFR BLD CALC: 36.9 % (ref 42–54)
HEMATOCRIT: 36.9 % (ref 42–54)
HEMOGLOBIN: 11.4 G/DL (ref 14–18)
IMMATURE GRANULOCYTES ABS AUTO: 0.08 10^3/UL (ref 0–0.03)
IMMATURE GRANULOCYTES PCT AUTO: 0.5 % (ref 0–0.5)
LYMPHOCYTES  ABSOLUTE AUTO: 1.8 10^3/UL (ref 1.2–3.8)
MCV RBC: 88.5 FL (ref 80–94)
MEAN CORPUSCULAR HEMOGLOBIN: 27.3 PG (ref 25.9–34)
MEAN CORPUSCULAR HGB CONC: 30.9 G/DL (ref 29.9–35.2)
MEAN CORPUSCULAR VOLUME: 88.5 FL (ref 80–94)
PLATELET # BLD: 260 10^3/UL (ref 150–450)
PLATELET COUNT: 260 10^3/UL (ref 150–450)
POTASSIUM SERPLBLD-SCNC: 2.4 MMOL/L (ref 3.5–5.1)
POTASSIUM: 2.4 MMOL/L (ref 3.5–5.1)
RED BLOOD COUNT: 4.17 10^6/UL (ref 4.7–6.1)
SODIUM BLD-SCNC: 143 MMOL/L (ref 136–145)
SODIUM: 143 MMOL/L (ref 136–145)
TOTAL PROTEIN: 6 G/DL (ref 6.4–8.2)
WBC # BLD: 17.7 10^3/UL (ref 4–11)
WHITE BLOOD COUNT: 17.7 10^3/UL (ref 4–11)

## 2025-01-02 RX ADMIN — LEVALBUTEROL HYDROCHLORIDE 0.63 MG: 0.63 SOLUTION RESPIRATORY (INHALATION) at 04:46

## 2025-01-02 RX ADMIN — LORAZEPAM 1 MG: 2 INJECTION INTRAMUSCULAR; INTRAVENOUS at 09:41

## 2025-01-02 RX ADMIN — PIPERACILLIN SODIUM,TAZOBACTAM SODIUM 12.5 GM: 3; .375 INJECTION, POWDER, FOR SOLUTION INTRAVENOUS at 02:08

## 2025-01-02 RX ADMIN — LINEZOLID 300 MG: 600 INJECTION, SOLUTION INTRAVENOUS at 02:06

## 2025-01-02 RX ADMIN — Medication 80 MG: at 02:51

## 2025-01-02 RX ADMIN — IPRATROPIUM BROMIDE 0.5 MG: 0.5 SOLUTION RESPIRATORY (INHALATION) at 04:46

## 2025-01-02 NOTE — CM.NOTE
Rounds made with Dr. Tony, pt refusing medications and requesting Hospice care.  Dr. Tony entered consult for Hospice and discussed further plan of care with pt.

## 2025-01-02 NOTE — P.PN_ITS
Progress Note: Subjective    
Subjective    
Interval history:     
More this morning, up in chair eating breakfast, is refusing some of his   
medications, discussed with her to reduce the number is much as possible, also   
discussed the possibility for hospice will see how things go today and entertain  
that thought tomorrow, does have an ejection fraction of 10 to 15% on   
echocardiogram    
    
Exam    
Constitutional    
Vital Signs, click to edit/add:     
                                Last Vital Signs    
    
    
    
Temp  98 F   01/01/25 23:39    
     
Pulse  78   01/02/25 06:01    
     
Resp  18   01/02/25 04:46    
     
BP  116/72   01/01/25 23:39    
     
Pulse Ox  96   01/02/25 04:46    
     
O2 Del Method  Nasal Cannula  01/02/25 04:46    
     
O2 Flow Rate  0.5   01/02/25 04:46    
    
    
    
    
Progress Note: Objective    
Labs    
Labs:     
                                    Short CBC    
    
    
    
  01/02/25 Range/Units    
    
  04:40     
     
WBC  17.7 H  (4.0-11.0)  10^3/uL    
     
Hgb  11.4 L  (14.0-18.0)  g/dL    
     
Hct  36.9 L  (42.0-54.0)  %    
     
Plt Count  260  (150-450)  10^3/uL    
    
    
                                       BMP    
    
    
    
  01/02/25    
    
  04:40    
     
Sodium  143    
     
Potassium  2.4 L*    
     
Chloride  96 L    
     
Carbon Dioxide  37.9 H    
     
BUN  44.0 H    
     
Creatinine  2.36 H    
     
Glucose  186 H    
     
Calcium  7.7 L    
    
    
                                 Liver Function    
    
    
    
  01/02/25 Range/Units    
    
  04:40     
     
Total Bilirubin  0.6  (0.2-1.0)  mg/dL    
     
AST  31  (15-37)  U/L    
     
ALT  63  (16-63)  U/L    
     
Alkaline Phosphatase  95  ()  U/L    
     
Albumin  2.7 L  (3.4-5.0)  g/dL    
    
    
    
    
Progress Note: A&P    
Assessment and Plan    
(1) Acute on chronic diastolic (congestive) heart failure:     
(2) Cardiomyopathy:     
(3) Paroxysmal atrial fibrillation:     
(4) Coronary artery disease:     
      Onset Date: Unknown    
(5) Status post aorto-coronary artery bypass graft:     
(6) Valvular heart disease:     
(7) Pulmonary hypertension:     
(8) Chronic kidney disease:     
(9) Pneumonia:     
(10) Essential hypertension:     
(11) Hyperlipidemia:     
(12) Type 2 diabetes mellitus:     
    
Plan    
Admission findings: Tachycardia, respiratory distress, acute hypoxic respiratory  
failure requiring BiPAP  (O2 sat 87% on 3 L), leukocytosis, due to bilateral   
lower lobe pneumonia causing an acute exacerbation of COPD and leading to sepsis  
(tachycardia, respiratory distress, leukocytosis and known infectious source of   
pneumonia)    
    
--------------------------------------------------------------------------------    
    
    
Sepsis due to bilateral nosocomial lower lobe pneumonia with acute exacerbation   
of COPD-steroids, aerosols, IV antibiotics, broad-spectrum secondary to location  
and extended-care facility and possible nosocomial pneumonia    
    
--------------------------------------------------------------------------------    
    
    
Hyperglycemia-monitor closely secondary to the use of steroids likely to   
deteriorate    
    
--------------------------------------------------------------------------------    
    
    
Dementia-continue with home medications    
    
--------------------------------------------------------------------------------    
    
    
GERD-continue with home medications    
    
--------------------------------------------------------------------------------    
    
    
Hypothyroidism-check levels, continue with home medications    
    
--------------------------------------------------------------------------------    
    
    
Hypertension by history-continue home medications, may need to hold if   
hypotensive    
    
--------------------------------------------------------------------------------    
    
    
Insomnia-continue with home medications    
    
--------------------------------------------------------------------------------    
    
    
Admission status: Patient with failed outpatient treatment of bilateral lower   
lobe pneumonia from an extended care facility resulting in acute hypoxic   
respiratory failure requiring BiPAP, and acute exacerbation of COPD.  Admitted   
to the intensive care unit.  Medically necessary treatment will span 2   
midnights.  Inpatient status.    
    
    
?    
    
Urinary Catheter Management    
Urinary Catheter Management    
Urethral:     
      Cath placed during this visit: yes    
      Urethral indwelling: No    
      Insertion date: 12/30/24    
      Insertion time: 08:30

## 2025-01-02 NOTE — SWNOTE1
Pt has been accepted at the [facility] [place] inpt unit and he will be going today. SW set up [employer] transportation and they will be here at 3:00 to take pt. SW let doctor and nursing know. SW filled out [employer] paperwork and took to the unit.

## 2025-01-02 NOTE — PM.PN
Progress Note: Subjective
Subjective
Interval history: 
More this morning, up in chair eating breakfast, is refusing some of his medications, discussed with her to reduce the number is much as possible, also discussed the possibility for hospice will see how things go today and entertain that thought 
tomorrow, does have an ejection fraction of 10 to 15% on echocardiogram

Exam
Constitutional
Vital Signs, click to edit/add: 
Last Vital Signs

Temp  98 F   01/01/25 23:39
Pulse  78   01/02/25 06:01
Resp  18   01/02/25 04:46
BP  116/72   01/01/25 23:39
Pulse Ox  96   01/02/25 04:46
O2 Del Method  Nasal Cannula  01/02/25 04:46
O2 Flow Rate  0.5   01/02/25 04:46



Progress Note: Objective
Labs
Labs: 
Short CBC

  01/02/25 Range/Units
  04:40 
WBC  17.7 H  (4.0-11.0)  10^3/uL
Hgb  11.4 L  (14.0-18.0)  g/dL
Hct  36.9 L  (42.0-54.0)  %
Plt Count  260  (150-450)  10^3/uL

BMP

  01/02/25
  04:40
Sodium  143
Potassium  2.4 L*
Chloride  96 L
Carbon Dioxide  37.9 H
BUN  44.0 H
Creatinine  2.36 H
Glucose  186 H
Calcium  7.7 L

Liver Function

  01/02/25 Range/Units
  04:40 
Total Bilirubin  0.6  (0.2-1.0)  mg/dL
AST  31  (15-37)  U/L
ALT  63  (16-63)  U/L
Alkaline Phosphatase  95  ()  U/L
Albumin  2.7 L  (3.4-5.0)  g/dL



Progress Note: A&P
Assessment and Plan
(1) Acute on chronic diastolic (congestive) heart failure: 
(2) Cardiomyopathy: 
(3) Paroxysmal atrial fibrillation: 
(4) Coronary artery disease: 
      Onset Date: Unknown
(5) Status post aorto-coronary artery bypass graft: 
(6) Valvular heart disease: 
(7) Pulmonary hypertension: 
(8) Chronic kidney disease: 
(9) Pneumonia: 
(10) Essential hypertension: 
(11) Hyperlipidemia: 
(12) Type 2 diabetes mellitus: 

Plan
Admission findings: Tachycardia, respiratory distress, acute hypoxic respiratory failure requiring BiPAP  (O2 sat 87% on 3 L), leukocytosis, due to bilateral lower lobe pneumonia causing an acute exacerbation of COPD and leading to sepsis 
(tachycardia, respiratory distress, leukocytosis and known infectious source of pneumonia)



Sepsis due to bilateral nosocomial lower lobe pneumonia with acute exacerbation of COPD-steroids, aerosols, IV antibiotics, broad-spectrum secondary to location and extended-care facility and possible nosocomial pneumonia



Hyperglycemia-monitor closely secondary to the use of steroids likely to deteriorate



Dementia-continue with home medications



GERD-continue with home medications



Hypothyroidism-check levels, continue with home medications



Hypertension by history-continue home medications, may need to hold if hypotensive



Insomnia-continue with home medications



Admission status: Patient with failed outpatient treatment of bilateral lower lobe pneumonia from an extended care facility resulting in acute hypoxic respiratory failure requiring BiPAP, and acute exacerbation of COPD.  Admitted to the intensive 
care unit.  Medically necessary treatment will span 2 midnights.  Inpatient status.


?

Urinary Catheter Management
Urinary Catheter Management
Urethral: 
      Cath placed during this visit: yes
      Urethral indwelling: No
      Insertion date: 12/30/24
      Insertion time: 08:30

## 2025-01-02 NOTE — P.DS_ITS
DS: Providers    
Provider    
Date of admission:     
12/30/24 09:41    
    
Primary care physician:     
BLANCA AUGUSTIN    
    
Consults:     
                                            
    
12/30/24 09:37    
Occupational Therapy Eval and Treat Routine     
   Reason for consultation: Only if needed for Rehab    
   Has provider been notified: No    
Physical Therapy Eval and Treat Routine     
   Reason for consultation: Eval and Treat    
   Has provider been notified: No    
    
12/30/24 09:42    
Consult to Wound Care Routine     
   Consulting Provider: Rajendra Zamora    
   Reason for consultation: sacral wound    
    
12/30/24 09:46    
Consult to Cardiology Routine     
   Reason for consultation: CHF    
   Has provider been notified: No    
    
01/02/25 06:00    
Consult to Hospice Routine     
   Reason for consultation: CHF    
   Has provider been notified: No    
    
    
    
    
DS: Diagnosis    
Discharge Diagnosis    
(1) Acute on chronic diastolic (congestive) heart failure:     
(2) Cardiomyopathy:     
(3) Paroxysmal atrial fibrillation:     
(4) Coronary artery disease:     
      Onset Date: Unknown    
(5) Status post aorto-coronary artery bypass graft:     
(6) Valvular heart disease:     
(7) Pulmonary hypertension:     
(8) Chronic kidney disease:     
(9) Pneumonia:     
(10) Essential hypertension:     
(11) Hyperlipidemia:     
(12) Type 2 diabetes mellitus:     
    
Plan    
Admission findings: Tachycardia, respiratory distress, acute hypoxic respiratory  
failure requiring BiPAP  (O2 sat 87% on 3 L), leukocytosis, due to bilateral   
lower lobe pneumonia causing an acute exacerbation of COPD and leading to sepsis  
(tachycardia, respiratory distress, leukocytosis and known infectious source of   
pneumonia)    
    
--------------------------------------------------------------------------------    
    
    
Sepsis due to bilateral nosocomial lower lobe pneumonia with acute exacerbation   
of COPD-steroids, aerosols, IV antibiotics, broad-spectrum secondary to location  
and extended-care facility and possible nosocomial pneumonia    
    
--------------------------------------------------------------------------------    
    
    
Hyperglycemia-monitor closely secondary to the use of steroids likely to   
deteriorate    
    
--------------------------------------------------------------------------------    
    
    
Dementia-continue with home medications    
    
--------------------------------------------------------------------------------    
    
    
GERD-continue with home medications    
    
--------------------------------------------------------------------------------    
    
    
Hypothyroidism-check levels, continue with home medications    
    
--------------------------------------------------------------------------------    
    
    
Hypertension by history-continue home medications, may need to hold if   
hypotensive    
    
--------------------------------------------------------------------------------    
    
    
Insomnia-continue with home medications    
    
--------------------------------------------------------------------------------    
    
    
Admission status: Patient with failed outpatient treatment of bilateral lower   
lobe pneumonia from an extended care facility resulting in acute hypoxic   
respiratory failure requiring BiPAP, and acute exacerbation of COPD.  Admitted   
to the intensive care unit.  Medically necessary treatment will span 2   
midnights.  Inpatient status.    
    
    
?    
    
DS: Summary    
Hospital Course    
Hospital Course:    
Patient was admitted with atrial fibrillation with rapid ventricular response,   
due to sepsis from severe acute hypoxic respiratory failure from pneumonia.    
Placed on Cardizem drip initially heart rate was difficult to improve but slowly  
improving patient stood up and a vasovagal maneuver and he converted back to   
normal sinus rhythm, at that point he was continued to being treated for his   
acute combined congestive heart failure he received Bumex drip on 2 occasions,   
he had 14 L diuresed with a net of 10, he remained in sinus rhythm,   
echocardiogram though showed ejection fraction of 10%, with the pneumonia and   
his sepsis although somewhat improved, patient feels with his heart as bad as it  
is he is not likely to significantly improve and wanted to discuss his case with  
hospice.  Hospice came and talk to the patient and they agreed to take him to   
inpatient hospice today.    
Time Spent with Patient    
Time attestation:     
Total time spent providing and/or coordinating discharge services:    
    
    
Exam    
Constitutional    
Vital Signs, click to edit/add:     
                                Last Vital Signs    
    
    
    
Temp  98 F   01/01/25 23:39    
     
Pulse  76   01/02/25 10:00    
     
Resp  18   01/02/25 04:46    
     
BP  116/72   01/01/25 23:39    
     
Pulse Ox  96   01/02/25 04:46    
     
O2 Del Method  Nasal Cannula  01/02/25 04:46    
     
O2 Flow Rate  0.5   01/02/25 04:46    
    
    
    
Documenting provider has reviewed patient's vital signs: yes    
Common normals: no apparent distress (Unchanged from previous, discussing   
hospice though)    
[facility]    
Common normals: normocephalic    
Chest    
Common normals: inspection of chest normal    
Respiratory    
Common normals: normal respiratory effort (minimal respiratory distress), no   
retractions, no use of accessory muscles and percussion normal;     
not clear to ascultation bilaterally    
Auscultation: rales (Improved air exchange), rhonchi (In) and wheezes   
(Improved);     
no egophony (Left lower lobe)    
Cardio    
Common normals: regular rate and regular rhythm    
Rhythm: regular rhythm (Resolved currently normal sinus rhythm)    
GI    
Common normals: Normal to inspection, nondistended, normoactive bowel sounds   
present    
Extremity    
Common normals: abnormal to inspection (1-2+ edema - slowly improving)    
    
DS: Data    
Data Completed and Pending    
Labs on day of discharge:     
                             Labs from last 24 hours    
    
    
    
  01/02/25 01/01/25    
    
  04:40 20:45    
     
WBC  17.7 H     
     
RBC  4.17 L     
     
Hgb  11.4 L     
     
Hct  36.9 L     
     
MCV  88.5     
     
MCH  27.3     
     
MCHC  30.9     
     
RDW  14.9     
     
Plt Count  260     
     
MPV  9.6     
     
Neut % (Auto)  85.0 H     
     
Lymph % (Auto)  10.3 L     
     
Mono % (Auto)  4.1     
     
Eos % (Auto)  0.0 L     
     
Baso % (Auto)  0.1 L     
     
Neut # (Auto)  15.1 H     
     
Lymph # (Auto)  1.8     
     
Mono # (Auto)  0.7     
     
Eos # (Auto)  0.0     
     
Baso # (Auto)  0.0     
     
Abs Immat Gran (auto)  0.08 H     
     
Imm/Tot Granulo (auto)  0.5     
     
Sodium  143     
     
Potassium  2.4 L*     
     
Chloride  96 L     
     
Carbon Dioxide  37.9 H     
     
Anion Gap  11.5     
     
BUN  44.0 H     
     
Creatinine  2.36 H     
     
Est GFR ( Amer)  33 L     
     
Est GFR (Non-Af Amer)  27 L     
     
BUN/Creatinine Ratio  18.6     
     
Glucose  186 H     
     
Calcium  7.7 L     
     
Total Bilirubin  0.6     
     
AST  31     
     
ALT  63     
     
Alkaline Phosphatase  95     
     
Total Protein  6.0 L     
     
Albumin  2.7 L     
     
Globulin  3.3     
     
Albumin/Globulin Ratio  0.8     
     
POC Glucose   167 H    
    
    
    
                     Preliminary micro results at discharge    
    
    
    
 12/30/24 07:25 Blood Culture Result 2 - Preliminary    
    
 Blood - Right Hand    NO GROWTH AT 36-48 HOURS. FINAL TO FOLLOW.    
     
 12/30/24 07:20 Blood Culture Result 1 - Preliminary    
    
 Blood - Right Antecubital    NO GROWTH AT 36-48 HOURS. FINAL TO FOLLOW.    
    
    
    
    
    
Discharge Plan    
Discharge    
Disposition: Hospice - Medical Facility    
    
Condition: Fair    
    
Discharge Medications:    
Continued    
  acetaminophen [Pain Relief (acetaminophen)] 325 mg tablet     
   650 mg PO Q6H PRN (Reason: fever or pain)     
  allopurinol 100 mg tablet     
   100 mg PO DAILY     
  aspirin [Adult Low Dose Aspirin] 81 mg tablet,delayed release (DR/EC)     
   81 mg PO DAILY     
  atorvastatin 80 mg tablet     
   80 mg PO DAILY     
  Eliquis 5 mg tablet     
   5 mg PO BID     
  insulin lispro [Humalog KwikPen Insulin] 100 unit/mL insulin pen     
   1 sliding scale dose subcut USEASDIRECTD     
  insulin glargine [Lantus Solostar U-100 Insulin] 100 unit/mL (3 mL) insulin   
pen     
   20 unit subcut QPM     
  lidocaine [Lidocaine Pain Relief] 4 % adhesive patch,medicated     
   1 patch topical DAILY PRN (Reason: pain)     
  magnesium hydroxide [Gentle Laxative (mag hydrox)] 400 mg/5 mL suspension     
   30 ml PO DAILY     
  metoprolol succinate [Toprol XL] 25 mg tablet extended release 24 hr     
   25 mg PO Q12H     
  amlodipine [Norvasc] 5 mg tablet     
   5 mg PO DAILY     
  pantoprazole [Protonix] 40 mg tablet,delayed release (DR/EC)     
   40 mg PO DAILY     
  sevelamer carbonate [Renvela] 800 mg tablet     
   800 mg PO TID     
   Rx Instructions:    
   must administer with a meal/food    
  simethicone 80 mg tablet,chewable     
   80 mg PO QID PRN (Reason: abdominal distention)     
  albuterol sulfate 2.5 mg/0.5 mL solution for nebulization     
   2.5 mg inhalation Q6H PRN (Reason: shortness of breath or wheezing)     
  bisacodyl 10 mg suppository     
   10 mg GA DAILY PRN (Reason: constipation)     
    
Print Language: English    
    
/ Instructions: Discharge to [facility] inpatient   
unit.     
    
    
Forms:  Portal Instructions    
    
Discharge Date/Time: 01/02/25 15:14

## 2025-01-02 NOTE — REH.PTDLY
Physical Therapy Daily Note

PT Daily Note/Assess                                       Start:  12/31/24 09:56
Freq:                                                      Status: Active        
Protocol:                                                                        
 Document     01/02/25 08:05  TERI  (Rec: 01/02/25 09:24  [email or web address]  [id number])
 Visit Not Completed
     Visit Not Completed     Nursing request to hold
      Due to:                
     Other Reason Visit      ROSAURA Vidal states pt has been refusing everything, hold
      Not Completed          PT today. Pt is having hospice consult.
 Physical Therapy Daily Note/Assessment
     Time In                 08:05
     Time Out                08:07

## 2025-01-02 NOTE — SWNOTE1
JUAQUIN stopped to check on pt and [facility]. Pt's cousin and cousins wife in room as well as a friend. The plan is for pt to go to the inpt unit for a few days and then transition to his cousins home from there. Jus nurse is calling the inpt unit 
to see if they have a bed. She requested JUAQUIN fax over clincal information to [facility] inpt unit.

JUAQUIN faxed physician documentation, labs, vitals, med list, nursing notes, and hospice order to the [facility] inpt unit.

## 2025-01-02 NOTE — SWNOTE1
JUAQUIN spoke to  in ICU, [facility] is saying they can't come until tomorrow morning around 9/10am. 

JUAQUIN called Masha at [facility] and she is going to look in to the situation as they only had 1 nurse working today. 

JUAQUIN received a call back from Masha and they have another nurse coming on and she will call when she knows what time a nurse is coming.

JUAQUIN received a call from [facility] and they will be here around 11:45/12:00. JUAQUIN let ICU nurse know.

## 2025-01-02 NOTE — SWNOTE1
JUAQUIN spoke to case management and a hospice consult was placed. Nursing in ICU has already initiated [facility] and made the phone call. JUAQUIN spoke to  in ICU and she spoke with [facility] via telephone and they voiced no information needed to be 
sent and they will call back with time. JUAQUIN to speak with pt.

## 2025-01-02 NOTE — PM.DS1
DS: Providers
Provider
Date of admission: 
12/30/24 09:41

Primary care physician: 
BLANCA AUGUSTIN

Consults: 


12/30/24 09:37
Occupational Therapy Eval and Treat Routine 
 Reason for consultation: Only if needed for Rehab
 Has provider been notified: No
Physical Therapy Eval and Treat Routine 
 Reason for consultation: Eval and Treat
 Has provider been notified: No

12/30/24 09:42
Consult to Wound Care Routine 
 Consulting Provider: Rajendra Zamora
 Reason for consultation: sacral wound

12/30/24 09:46
Consult to Cardiology Routine 
 Reason for consultation: CHF
 Has provider been notified: No

01/02/25 06:00
Consult to Hospice Routine 
 Reason for consultation: CHF
 Has provider been notified: No




DS: Diagnosis
Discharge Diagnosis
(1) Acute on chronic diastolic (congestive) heart failure: 
(2) Cardiomyopathy: 
(3) Paroxysmal atrial fibrillation: 
(4) Coronary artery disease: 
      Onset Date: Unknown
(5) Status post aorto-coronary artery bypass graft: 
(6) Valvular heart disease: 
(7) Pulmonary hypertension: 
(8) Chronic kidney disease: 
(9) Pneumonia: 
(10) Essential hypertension: 
(11) Hyperlipidemia: 
(12) Type 2 diabetes mellitus: 

Plan
Admission findings: Tachycardia, respiratory distress, acute hypoxic respiratory failure requiring BiPAP  (O2 sat 87% on 3 L), leukocytosis, due to bilateral lower lobe pneumonia causing an acute exacerbation of COPD and leading to sepsis 
(tachycardia, respiratory distress, leukocytosis and known infectious source of pneumonia)



Sepsis due to bilateral nosocomial lower lobe pneumonia with acute exacerbation of COPD-steroids, aerosols, IV antibiotics, broad-spectrum secondary to location and extended-care facility and possible nosocomial pneumonia



Hyperglycemia-monitor closely secondary to the use of steroids likely to deteriorate



Dementia-continue with home medications



GERD-continue with home medications



Hypothyroidism-check levels, continue with home medications



Hypertension by history-continue home medications, may need to hold if hypotensive



Insomnia-continue with home medications



Admission status: Patient with failed outpatient treatment of bilateral lower lobe pneumonia from an extended care facility resulting in acute hypoxic respiratory failure requiring BiPAP, and acute exacerbation of COPD.  Admitted to the intensive 
care unit.  Medically necessary treatment will span 2 midnights.  Inpatient status.


?

DS: Summary
Hospital Course
Hospital Course:
Patient was admitted with atrial fibrillation with rapid ventricular response, due to sepsis from severe acute hypoxic respiratory failure from pneumonia.  Placed on Cardizem drip initially heart rate was difficult to improve but slowly improving 
patient stood up and a vasovagal maneuver and he converted back to normal sinus rhythm, at that point he was continued to being treated for his acute combined congestive heart failure he received Bumex drip on 2 occasions, he had 14 L diuresed with 
a net of 10, he remained in sinus rhythm, echocardiogram though showed ejection fraction of 10%, with the pneumonia and his sepsis although somewhat improved, patient feels with his heart as bad as it is he is not likely to significantly improve and 
wanted to discuss his case with hospice.  Hospice came and talk to the patient and they agreed to take him to inpatient hospice today.
Time Spent with Patient
Time attestation: 
Total time spent providing and/or coordinating discharge services:


Exam
Constitutional
Vital Signs, click to edit/add: 
Last Vital Signs

Temp  98 F   01/01/25 23:39
Pulse  76   01/02/25 10:00
Resp  18   01/02/25 04:46
BP  116/72   01/01/25 23:39
Pulse Ox  96   01/02/25 04:46
O2 Del Method  Nasal Cannula  01/02/25 04:46
O2 Flow Rate  0.5   01/02/25 04:46


Documenting provider has reviewed patient's vital signs: yes
Common normals: no apparent distress (Unchanged from previous, discussing hospice though)
HENMT
Common normals: normocephalic
Chest
Common normals: inspection of chest normal
Respiratory
Common normals: normal respiratory effort (minimal respiratory distress), no retractions, no use of accessory muscles and percussion normal; 
not clear to ascultation bilaterally
Auscultation: rales (Improved air exchange), rhonchi (In) and wheezes (Improved); 
no egophony (Left lower lobe)
Cardio
Common normals: regular rate and regular rhythm
Rhythm: regular rhythm (Resolved currently normal sinus rhythm)
GI
Common normals: Normal to inspection, nondistended, normoactive bowel sounds present
Extremity
Common normals: abnormal to inspection (1-2+ edema - slowly improving)

DS: Data
Data Completed and Pending
Labs on day of discharge: 
Labs from last 24 hours

  01/02/25 01/01/25
  04:40 20:45
WBC  17.7 H 
RBC  4.17 L 
Hgb  11.4 L 
Hct  36.9 L 
MCV  88.5 
MCH  27.3 
MCHC  30.9 
RDW  14.9 
Plt Count  260 
MPV  9.6 
Neut % (Auto)  85.0 H 
Lymph % (Auto)  10.3 L 
Mono % (Auto)  4.1 
Eos % (Auto)  0.0 L 
Baso % (Auto)  0.1 L 
Neut # (Auto)  15.1 H 
Lymph # (Auto)  1.8 
Mono # (Auto)  0.7 
Eos # (Auto)  0.0 
Baso # (Auto)  0.0 
Abs Immat Gran (auto)  0.08 H 
Imm/Tot Granulo (auto)  0.5 
Sodium  143 
Potassium  2.4 L* 
Chloride  96 L 
Carbon Dioxide  37.9 H 
Anion Gap  11.5 
BUN  44.0 H 
Creatinine  2.36 H 
Est GFR ( Amer)  33 L 
Est GFR (Non-Af Amer)  27 L 
BUN/Creatinine Ratio  18.6 
Glucose  186 H 
Calcium  7.7 L 
Total Bilirubin  0.6 
AST  31 
ALT  63 
Alkaline Phosphatase  95 
Total Protein  6.0 L 
Albumin  2.7 L 
Globulin  3.3 
Albumin/Globulin Ratio  0.8 
POC Glucose   167 H


Preliminary micro results at discharge

 12/30/24 07:25 Blood Culture Result 2 - Preliminary
 Blood - Right Hand    NO GROWTH AT 36-48 HOURS. FINAL TO FOLLOW.
 12/30/24 07:20 Blood Culture Result 1 - Preliminary
 Blood - Right Antecubital    NO GROWTH AT 36-48 HOURS. FINAL TO FOLLOW.




Discharge Plan
Discharge
Disposition: Hospice - Medical Facility

Condition: Fair

Discharge Medications:
Continued
  acetaminophen [Pain Relief (acetaminophen)] 325 mg tablet 
   650 mg PO Q6H PRN (Reason: fever or pain) 
  allopurinol 100 mg tablet 
   100 mg PO DAILY 
  aspirin [Adult Low Dose Aspirin] 81 mg tablet,delayed release (DR/EC) 
   81 mg PO DAILY 
  atorvastatin 80 mg tablet 
   80 mg PO DAILY 
  Eliquis 5 mg tablet 
   5 mg PO BID 
  insulin lispro [Humalog KwikPen Insulin] 100 unit/mL insulin pen 
   1 sliding scale dose subcut USEASDIRECTD 
  insulin glargine [Lantus Solostar U-100 Insulin] 100 unit/mL (3 mL) insulin pen 
   20 unit subcut QPM 
  lidocaine [Lidocaine Pain Relief] 4 % adhesive patch,medicated 
   1 patch topical DAILY PRN (Reason: pain) 
  magnesium hydroxide [Gentle Laxative (mag hydrox)] 400 mg/5 mL suspension 
   30 ml PO DAILY 
  metoprolol succinate [Toprol XL] 25 mg tablet extended release 24 hr 
   25 mg PO Q12H 
  amlodipine [Norvasc] 5 mg tablet 
   5 mg PO DAILY 
  pantoprazole [Protonix] 40 mg tablet,delayed release (DR/EC) 
   40 mg PO DAILY 
  sevelamer carbonate [Renvela] 800 mg tablet 
   800 mg PO TID 
   Rx Instructions:
   must administer with a meal/food
  simethicone 80 mg tablet,chewable 
   80 mg PO QID PRN (Reason: abdominal distention) 
  albuterol sulfate 2.5 mg/0.5 mL solution for nebulization 
   2.5 mg inhalation Q6H PRN (Reason: shortness of breath or wheezing) 
  bisacodyl 10 mg suppository 
   10 mg NV DAILY PRN (Reason: constipation) 

Print Language: English

/ Instructions: Discharge to [facility] inpatient unit. 


Forms:  Portal Instructions

Discharge Date/Time: 01/02/25 15:14

## 2025-01-05 LAB — BOX TEST RESULT: (no result)

## (undated) DEVICE — CONNECTOR, STRAIGHT, 0.5 X 0.5 IN

## (undated) DEVICE — Device

## (undated) DEVICE — BAG, DECANTER

## (undated) DEVICE — CLIP, LIGATING, W/ADHESIVE PAD, MEDIUM, TITANIUM

## (undated) DEVICE — GEL, ULTRASOUND, AQUASONIC 100, 20 GM, STERILE

## (undated) DEVICE — GOWN, SURGICAL, SMARTGOWN, XLARGE, STERILE

## (undated) DEVICE — LEAD, PACING, MYOCARDIAL, BIPOLAR, TEMPORARY

## (undated) DEVICE — CLIP, LIGATING, HORIZON, LARGE, TITANIUM

## (undated) DEVICE — SUTURE, MONOCRYL, 3-0, 18 IN, PS2, UNDYED

## (undated) DEVICE — TRAY, SURESTEP, URINE METER, 14FR, SILICONE

## (undated) DEVICE — PAD, GROUNDING, ELECTROSURGICAL, W/9 FT CABLE, POLYHESIVE II, ADULT, LF

## (undated) DEVICE — DRAIN, CHANNEL, BLAKE, HUBLESS, ROUND, 28FR

## (undated) DEVICE — PLEDGET, PTFE, SOFT, LARGE, 3/8 X 3/16 X 1/16 IN

## (undated) DEVICE — TOWEL, OR, XRAY DETECT 5 PK, WHITE, 17X26, W/DMT TAG, ST

## (undated) DEVICE — DRESSING, ADHESIVE, ISLAND, TELFA, 4 X 14 IN

## (undated) DEVICE — DRESSING, ADHESIVE, ISLAND, TELFA, 2 X 3.75 IN, LF

## (undated) DEVICE — TUBE SET, PNEUMOCLEAR, SMOKE EVACU, HIGH-FLOW

## (undated) DEVICE — CASSETTE, BLOOD, PLEGIC SET

## (undated) DEVICE — KIT, CELL SAVER, W/COLLECTION SET, 225ML WASH SET

## (undated) DEVICE — KNIFE, OPHTHALMIC, SLIT, 22.5 DEG

## (undated) DEVICE — SUTURE, PROLENE, 6-0, 30 IN, C-1, CV-11, BLUE

## (undated) DEVICE — SUTURE, P6 STERNUM DOUBLE WIRE, CCS P 2 NEEDLE

## (undated) DEVICE — SENSOR, OXYGEN, CEREBRAL, SOMASENSOR, ADULT

## (undated) DEVICE — DRESSING, MEPILEX, BORDER, HEEL, 8.7 X 9.1 IN

## (undated) DEVICE — INSERT, CLAMP, SURGICAL, SOFT/TRACTION, STEALTH, 5 MM

## (undated) DEVICE — CONNECTOR, STRAIGHT, 0.375 X 0.375 IN

## (undated) DEVICE — CLIPPER, SURGICAL BLADE ASSEMBLY, GENERAL PURPOSE, SINGLE USE

## (undated) DEVICE — SHUNT, SENSOR

## (undated) DEVICE — COVER, CART, 45 X 27 X 48 IN, CLEAR

## (undated) DEVICE — APPLICATOR, CHLORAPREP, W/ORANGE TINT, 26ML

## (undated) DEVICE — MICROCOAGULATION TEST, ACT+ TEST CUVETTE

## (undated) DEVICE — KIT, CARDIOPLEGIA, VANGUARD HEAT EXCHANGER

## (undated) DEVICE — ACCESS KIT, MINI MAK, 4 FR X 10CM, 0.018 X 40CM, NITINOL/PLATINUM, STD NEEDLE

## (undated) DEVICE — SUTURE, ETHIBOND, XTRA, 30 IN, 0, CTX, GREEN

## (undated) DEVICE — MONITORING KIT, TRANSDUCER, RETROGRADE, MPS, W/EXTENSION, LF

## (undated) DEVICE — SUTURE, PROLENE, 5-0, 36 IN, RB1, DA, BLUE

## (undated) DEVICE — PACING CABLE, EXTENSION, 12 FT BEIGE, DISPOSABLE

## (undated) DEVICE — CATHETER, DRAINAGE, NASOGASTRIC, DOUBLE LUMEN, FUNNEL END, SUMP, SALEM, 18 FR, 48 IN, PVC, STERILE

## (undated) DEVICE — TAPE, RETRACT-O, 18GA X 12

## (undated) DEVICE — CLEANER, ELECTROSURGICAL, TIP, 5 X 5 CM, LF

## (undated) DEVICE — KIT, BLOWER / MISTER II

## (undated) DEVICE — SPONGE, LAP, XRAY DECT, SC+RFID, 4X18, STERILE

## (undated) DEVICE — RETRACTOR, SUTURE, HOLDING, INSERT, OCTOBASE, DISPOSABLE

## (undated) DEVICE — SUTURE, VICRYL, 2-0, 27 IN, CT-1, VIOLET

## (undated) DEVICE — SUTURE, ETHIBOND, XTRA, 30 IN, 0, CT-1, GREEN

## (undated) DEVICE — MARKER, SKIN, DUAL TIP INK W/9 LABEL AND REMOVABLE TIME OUT SLEEVE

## (undated) DEVICE — SUTURE, SURGICAL STEEL, STERNUM 7, 18 IN, KV40, SINGL-WIRE

## (undated) DEVICE — DRESSING, ISLAND, TELFA, 4 X 5 IN

## (undated) DEVICE — TOWEL, OR XRAY, RFID DETECT, WHITE, 17X26, STERILE

## (undated) DEVICE — BLADE, SAW STERNUM, STERILE

## (undated) DEVICE — CANNULA, VESSEL, FREE FLOW, BLUNT TIP, 3 MM X 5 CM

## (undated) DEVICE — CLIP, LIGATING, W/ADHESIVE, WIDE SLOT, SMALL, TITANIUM

## (undated) DEVICE — SPONGE, LAP, XRAY DECT, SC+RFID, 12X12, STERILE

## (undated) DEVICE — DRESSING, MEPILEX, BORDER, SACRUM, 8.7 X 9.8 IN

## (undated) DEVICE — SYRINGE, 20 CC, LUER LOCK, MONOJECT, W/O CAP, LF

## (undated) DEVICE — TUBING PACK, OXYGENATOR, ADULT

## (undated) DEVICE — COLLECTION UNIT, DRAINAGE, THORACIC, SINGLE TUBE, DRY SUCTION, ATS COMPATIBLE, OASIS 3600, LF

## (undated) DEVICE — GUIDEWIRE, INQWIRE, 3MM J, .035, 150

## (undated) DEVICE — CLOSURE SYSTEM, DERMABOND, PRINEO, 22CM, STERILE

## (undated) DEVICE — KIT, TOURNIQUET, 7"

## (undated) DEVICE — PITCHER, GRADUATE, 32 OZ (1200CC), STERILE

## (undated) DEVICE — MANIFOLD, 4 PORT NEPTUNE STANDARD

## (undated) DEVICE — ELECTRODE, QUICK-COMBO, EDGE SYSTEM, REDI PACK

## (undated) DEVICE — PACING CABLE, EXTENSION, 12 FT BLUE, DISPOSABLE

## (undated) DEVICE — CLIP, SPRING, BULLDOG, FOGARTY, SOFT JAW, 6 MM, LF

## (undated) DEVICE — BANDAGE, ELASTIC, ACE, ACE, DOUBLE LENGTH, 6 X 550 IN, LF

## (undated) DEVICE — DRAPE, SHEET, CARDIOVASCULAR, ANTIMICROBIAL, W/ANESTHESIA SCREEN, IOBAN 2, STERI DRAPE, 107 X 133 IN, DISPOSABLE, FABRIC, BLUE, STERILE

## (undated) DEVICE — GUIDEWIRE, AMPLATZ SUPER STIFF, STR, .035 IN X 75CM

## (undated) DEVICE — SPONGE, HEMOSTAT, SURGICEL FIBRILLAR, ABS, 4 X 4, LF

## (undated) DEVICE — OXYGENATOR FX 25, W/HR, ARTERIAL FILTER

## (undated) DEVICE — PUNCH, AORTIC 4MM

## (undated) DEVICE — DEVICE, ENDOSCOPIC VESSEL HARVESTING, SAPHENA VENAPAX

## (undated) DEVICE — SUTURE, PROLENE, 3-0, 36 IN, SH, DA, BLUE

## (undated) DEVICE — SUTURE, PROLENE, 7-0, 30 IN, BV1, DA, BLUE

## (undated) DEVICE — DRAPE, FLUID WARMER

## (undated) DEVICE — SPONGE GAUZE, XRAY SC+RFID, 4X4 16 PLY, STERILE

## (undated) DEVICE — COSEAL SURGICAL SEALANT 8ML

## (undated) DEVICE — CATHETER, INTRA-AORTIC BALLOON, S PLUS, 8 FR 50CC FIBER -OPTIC

## (undated) DEVICE — TUBING, SUCTION, CONNECTING, NON-CONDUCTIVE, SURE GRIP CONNECTORS, 3/16 X 18 IN, PVC